# Patient Record
Sex: MALE | Race: WHITE | NOT HISPANIC OR LATINO | Employment: OTHER | URBAN - METROPOLITAN AREA
[De-identification: names, ages, dates, MRNs, and addresses within clinical notes are randomized per-mention and may not be internally consistent; named-entity substitution may affect disease eponyms.]

---

## 2018-08-30 ENCOUNTER — DOCUMENTATION ONLY (OUTPATIENT)
Dept: FAMILY MEDICINE | Facility: CLINIC | Age: 63
End: 2018-08-30

## 2018-08-30 ENCOUNTER — OFFICE VISIT (OUTPATIENT)
Dept: FAMILY MEDICINE | Facility: CLINIC | Age: 63
End: 2018-08-30
Payer: MEDICARE

## 2018-08-30 VITALS
HEIGHT: 67 IN | DIASTOLIC BLOOD PRESSURE: 72 MMHG | HEART RATE: 84 BPM | TEMPERATURE: 98 F | BODY MASS INDEX: 20.62 KG/M2 | OXYGEN SATURATION: 98 % | SYSTOLIC BLOOD PRESSURE: 136 MMHG | RESPIRATION RATE: 20 BRPM | WEIGHT: 131.38 LBS

## 2018-08-30 DIAGNOSIS — G25.81 RESTLESS LEG SYNDROME: ICD-10-CM

## 2018-08-30 DIAGNOSIS — I10 ESSENTIAL HYPERTENSION: ICD-10-CM

## 2018-08-30 DIAGNOSIS — Z23 FLU VACCINE NEED: ICD-10-CM

## 2018-08-30 DIAGNOSIS — M70.61 TROCHANTERIC BURSITIS OF BOTH HIPS: Primary | ICD-10-CM

## 2018-08-30 DIAGNOSIS — F10.21 RECOVERING ALCOHOLIC IN REMISSION: ICD-10-CM

## 2018-08-30 DIAGNOSIS — Z79.1 NSAID LONG-TERM USE: ICD-10-CM

## 2018-08-30 DIAGNOSIS — M70.62 TROCHANTERIC BURSITIS OF BOTH HIPS: Primary | ICD-10-CM

## 2018-08-30 DIAGNOSIS — I71.40 ABDOMINAL AORTIC ANEURYSM (AAA) WITHOUT RUPTURE: ICD-10-CM

## 2018-08-30 DIAGNOSIS — F51.01 PRIMARY INSOMNIA: ICD-10-CM

## 2018-08-30 PROCEDURE — 99203 OFFICE O/P NEW LOW 30 MIN: CPT | Mod: 25,S$GLB,, | Performed by: NURSE PRACTITIONER

## 2018-08-30 PROCEDURE — G0008 ADMIN INFLUENZA VIRUS VAC: HCPCS | Mod: S$GLB,,, | Performed by: NURSE PRACTITIONER

## 2018-08-30 PROCEDURE — 90662 IIV NO PRSV INCREASED AG IM: CPT | Mod: S$GLB,,, | Performed by: NURSE PRACTITIONER

## 2018-08-30 RX ORDER — DICLOFENAC SODIUM 75 MG/1
75 TABLET, DELAYED RELEASE ORAL 2 TIMES DAILY
Qty: 60 TABLET | Refills: 5 | Status: SHIPPED | OUTPATIENT
Start: 2018-08-30 | End: 2018-10-17 | Stop reason: ALTCHOICE

## 2018-08-30 RX ORDER — CLONAZEPAM 1 MG/1
1 TABLET ORAL DAILY
COMMUNITY
End: 2018-08-30

## 2018-08-30 RX ORDER — ROPINIROLE 3 MG/1
3 TABLET, FILM COATED ORAL NIGHTLY
Qty: 30 TABLET | Refills: 5 | Status: SHIPPED | OUTPATIENT
Start: 2018-08-30 | End: 2019-01-31 | Stop reason: ALTCHOICE

## 2018-08-30 RX ORDER — MISOPROSTOL 200 UG/1
200 TABLET ORAL 2 TIMES DAILY
Qty: 60 TABLET | Refills: 5 | Status: SHIPPED | OUTPATIENT
Start: 2018-08-30 | End: 2018-10-17 | Stop reason: ALTCHOICE

## 2018-08-30 RX ORDER — NAPROXEN SODIUM 220 MG/1
81 TABLET, FILM COATED ORAL DAILY
COMMUNITY

## 2018-08-30 RX ORDER — MELOXICAM 15 MG/1
15 TABLET ORAL DAILY
COMMUNITY
End: 2018-08-30

## 2018-08-30 RX ORDER — FOLIC ACID 1 MG/1
1 TABLET ORAL DAILY
COMMUNITY
End: 2019-01-31

## 2018-08-30 RX ORDER — TRAZODONE HYDROCHLORIDE 50 MG/1
TABLET ORAL
Qty: 90 TABLET | Refills: 5 | Status: SHIPPED | OUTPATIENT
Start: 2018-08-30 | End: 2019-04-29

## 2018-08-30 RX ORDER — BENAZEPRIL HYDROCHLORIDE 20 MG/1
20 TABLET ORAL DAILY
COMMUNITY
End: 2019-01-31 | Stop reason: SDUPTHER

## 2018-08-30 NOTE — PROGRESS NOTES
Health Maintenance Due   Topic Date Due    Zoster Vaccine  07/22/2013    Colonoscopy  04/11/2017    Pneumococcal (65+) (1 of 2 - PCV13) 07/22/2018    Abdominal Aortic Aneurysm Screening  07/22/2018    Influenza Vaccine  08/01/2018

## 2018-08-30 NOTE — PROGRESS NOTES
Subjective:       Patient ID: Miles Vazquez is a 63 y.o. male.    Chief Complaint: Establish Care  New patient to me, not seen in past 3 years in family practice. Was previously seen in [place] by multiple specialists.     Has chronic trochanteric bursitis, started about 5 years ago. He has had steroid injections into the hips in the past without relief and has taken meloxicam, but it does not help either. He had a few weeks of physical therapy, but did not finish as he moved. The pain is gone when he is sitting and is 8-9/10 when walking. He has to stop after walking a little, then the pain goes away.     Has abdominal aortic aneruyism. He was supposed to have follow up for it, but moved here so has not had an ultrasound. He thinks the aneurysm is about the size of the tip of his finger. He denies any abdominal pain.     He has chronic restless leg syndrome and takes ropinirole for that with good relief. He only takes the medication at night.     Is recovering alcoholic since 3/2016. Doing well except he has neuropathy in both feet.     Has insomnia, was taking klonopin for it, helped him fall asleep, but did not keep him asleep, he ran out of it 3-4 days ago, he was only taking it once a day. Had taken seroquel for insomnia in the past.   HPI  Review of Systems   Constitutional: Positive for unexpected weight change. Negative for fatigue and fever.   HENT: Positive for dental problem. Negative for congestion and rhinorrhea.    Eyes: Negative for pain, redness and itching.   Respiratory: Positive for shortness of breath. Negative for cough and wheezing.    Cardiovascular: Negative for chest pain and leg swelling.   Gastrointestinal: Negative for abdominal pain, blood in stool, diarrhea, nausea and vomiting.   Endocrine: Negative for cold intolerance, heat intolerance, polydipsia and polyphagia.   Genitourinary: Negative for difficulty urinating and dysuria.   Musculoskeletal: Positive for arthralgias and  gait problem. Negative for back pain.   Skin: Negative for rash and wound.   Allergic/Immunologic: Negative for environmental allergies, food allergies and immunocompromised state.   Neurological: Positive for numbness. Negative for dizziness and headaches.   Hematological: Negative for adenopathy. Bruises/bleeds easily.   Psychiatric/Behavioral: Negative for dysphoric mood. The patient is not nervous/anxious.        Objective:      Physical Exam   Constitutional: He is oriented to person, place, and time. He appears well-developed and well-nourished. No distress.   HENT:   Head: Normocephalic and atraumatic.   Right Ear: External ear normal.   Left Ear: External ear normal.   Mouth/Throat: Oropharynx is clear and moist. No oropharyngeal exudate.   Eyes: Conjunctivae are normal. Right eye exhibits no discharge. Left eye exhibits no discharge. No scleral icterus.   Neck: Normal range of motion. Neck supple. No JVD present. No tracheal deviation present.   No supraclavicular lymph nodes palpated.    Cardiovascular: Normal rate, regular rhythm and normal heart sounds. Exam reveals no gallop and no friction rub.   No murmur heard.  Pulmonary/Chest: Effort normal and breath sounds normal. No respiratory distress. He has no wheezes. He has no rales.   Abdominal: Soft. Bowel sounds are normal. He exhibits no distension. There is no tenderness.   Lymphadenopathy:     He has no cervical adenopathy.   Neurological: He is alert and oriented to person, place, and time.   Skin: Skin is warm and dry. He is not diaphoretic.   Psychiatric: He has a normal mood and affect. His behavior is normal.   Nursing note and vitals reviewed.      Assessment:     This office visit cannot be billed incident to as it does not meet the needed criteria.     1. Trochanteric bursitis of both hips    2. NSAID long-term use    3. Restless leg syndrome    4. Primary insomnia    5. Abdominal aortic aneurysm (AAA) without rupture    6. Essential  hypertension        Plan:       Trochanteric bursitis of both hips  -     diclofenac (VOLTAREN) 75 MG EC tablet; Take 1 tablet (75 mg total) by mouth 2 (two) times daily.  Dispense: 60 tablet; Refill: 5  -     Ambulatory Referral to Physical Medicine Rehab    NSAID long-term use  -     miSOPROStol (CYTOTEC) 200 MCG Tab; Take 1 tablet (200 mcg total) by mouth 2 (two) times daily.  Dispense: 60 tablet; Refill: 5    Restless leg syndrome  -     rOPINIRole (REQUIP) 3 MG tablet; Take 1 tablet (3 mg total) by mouth every evening.  Dispense: 30 tablet; Refill: 5    Primary insomnia  -     traZODone (DESYREL) 50 MG tablet; 1-3 tabs po qhs prn  Dispense: 90 tablet; Refill: 5    Abdominal aortic aneurysm (AAA) without rupture  -     US Abdominal Aorta; Future; Expected date: 08/30/2018    Essential hypertension  -     CBC auto differential; Future; Expected date: 09/30/2018  -     Comprehensive metabolic panel; Future; Expected date: 09/30/2018  -     Lipid panel; Future; Expected date: 09/30/2018  -     TSH; Future; Expected date: 09/30/2018         May follow up with physician in [facility]. (1-3 months)

## 2018-09-15 DIAGNOSIS — M25.552 BILATERAL HIP PAIN: Primary | ICD-10-CM

## 2018-09-15 DIAGNOSIS — M25.551 BILATERAL HIP PAIN: Primary | ICD-10-CM

## 2018-09-24 ENCOUNTER — HOSPITAL ENCOUNTER (INPATIENT)
Facility: HOSPITAL | Age: 63
LOS: 8 days | Discharge: HOME | DRG: 341 | End: 2018-10-02
Attending: EMERGENCY MEDICINE | Admitting: INTERNAL MEDICINE
Payer: MEDICARE

## 2018-09-24 DIAGNOSIS — K56.609 SMALL BOWEL OBSTRUCTION: Primary | ICD-10-CM

## 2018-09-24 DIAGNOSIS — I71.40 ABDOMINAL AORTIC ANEURYSM (AAA) WITHOUT RUPTURE: ICD-10-CM

## 2018-09-24 LAB
ALBUMIN SERPL BCP-MCNC: 4.4 G/DL
ALP SERPL-CCNC: 102 U/L
ALT SERPL W/O P-5'-P-CCNC: 12 U/L
ANION GAP SERPL CALC-SCNC: 10 MMOL/L
AST SERPL-CCNC: 24 U/L
BASOPHILS # BLD AUTO: 0.04 K/UL
BASOPHILS NFR BLD: 0.3 %
BILIRUB SERPL-MCNC: 0.5 MG/DL
BUN SERPL-MCNC: 12 MG/DL
CALCIUM SERPL-MCNC: 9.5 MG/DL
CHLORIDE SERPL-SCNC: 102 MMOL/L
CO2 SERPL-SCNC: 23 MMOL/L
CREAT SERPL-MCNC: 0.8 MG/DL
DIFFERENTIAL METHOD: ABNORMAL
EOSINOPHIL # BLD AUTO: 0.2 K/UL
EOSINOPHIL NFR BLD: 1.4 %
ERYTHROCYTE [DISTWIDTH] IN BLOOD BY AUTOMATED COUNT: 17.1 %
EST. GFR  (AFRICAN AMERICAN): >60 ML/MIN/1.73 M^2
EST. GFR  (NON AFRICAN AMERICAN): >60 ML/MIN/1.73 M^2
GLUCOSE SERPL-MCNC: 117 MG/DL
HCT VFR BLD AUTO: 40.4 %
HGB BLD-MCNC: 13.3 G/DL
IMM GRANULOCYTES # BLD AUTO: 0.04 K/UL
IMM GRANULOCYTES NFR BLD AUTO: 0.3 %
LIPASE SERPL-CCNC: 13 U/L
LYMPHOCYTES # BLD AUTO: 2.2 K/UL
LYMPHOCYTES NFR BLD: 15.1 %
MCH RBC QN AUTO: 27.5 PG
MCHC RBC AUTO-ENTMCNC: 32.9 G/DL
MCV RBC AUTO: 84 FL
MONOCYTES # BLD AUTO: 0.8 K/UL
MONOCYTES NFR BLD: 5.5 %
NEUTROPHILS # BLD AUTO: 11.1 K/UL
NEUTROPHILS NFR BLD: 77.4 %
NRBC BLD-RTO: 0 /100 WBC
PLATELET # BLD AUTO: 269 K/UL
PMV BLD AUTO: 11.1 FL
POTASSIUM SERPL-SCNC: 3.6 MMOL/L
PROT SERPL-MCNC: 8.3 G/DL
RBC # BLD AUTO: 4.84 M/UL
SODIUM SERPL-SCNC: 135 MMOL/L
WBC # BLD AUTO: 14.28 K/UL

## 2018-09-24 PROCEDURE — 12000002 HC ACUTE/MED SURGE SEMI-PRIVATE ROOM

## 2018-09-24 PROCEDURE — 85025 COMPLETE CBC W/AUTO DIFF WBC: CPT

## 2018-09-24 PROCEDURE — 74176 CT ABD & PELVIS W/O CONTRAST: CPT | Mod: TC

## 2018-09-24 PROCEDURE — 83690 ASSAY OF LIPASE: CPT

## 2018-09-24 PROCEDURE — 80053 COMPREHEN METABOLIC PANEL: CPT

## 2018-09-24 PROCEDURE — 25000003 PHARM REV CODE 250: Performed by: EMERGENCY MEDICINE

## 2018-09-24 PROCEDURE — 63600175 PHARM REV CODE 636 W HCPCS: Performed by: EMERGENCY MEDICINE

## 2018-09-24 PROCEDURE — 99285 EMERGENCY DEPT VISIT HI MDM: CPT | Mod: 25

## 2018-09-24 PROCEDURE — 96374 THER/PROPH/DIAG INJ IV PUSH: CPT

## 2018-09-24 PROCEDURE — 74176 CT ABD & PELVIS W/O CONTRAST: CPT | Mod: 26,,, | Performed by: RADIOLOGY

## 2018-09-24 RX ORDER — FAMOTIDINE 10 MG/ML
20 INJECTION INTRAVENOUS EVERY 12 HOURS
Status: DISCONTINUED | OUTPATIENT
Start: 2018-09-25 | End: 2018-09-26

## 2018-09-24 RX ORDER — MORPHINE SULFATE 4 MG/ML
4 INJECTION, SOLUTION INTRAMUSCULAR; INTRAVENOUS EVERY 4 HOURS PRN
Status: DISCONTINUED | OUTPATIENT
Start: 2018-09-25 | End: 2018-09-26

## 2018-09-24 RX ORDER — ONDANSETRON 2 MG/ML
4 INJECTION INTRAMUSCULAR; INTRAVENOUS
Status: COMPLETED | OUTPATIENT
Start: 2018-09-24 | End: 2018-09-24

## 2018-09-24 RX ORDER — ONDANSETRON 2 MG/ML
4 INJECTION INTRAMUSCULAR; INTRAVENOUS EVERY 6 HOURS PRN
Status: DISCONTINUED | OUTPATIENT
Start: 2018-09-25 | End: 2018-10-02 | Stop reason: HOSPADM

## 2018-09-24 RX ORDER — MORPHINE SULFATE 4 MG/ML
4 INJECTION, SOLUTION INTRAMUSCULAR; INTRAVENOUS
Status: COMPLETED | OUTPATIENT
Start: 2018-09-24 | End: 2018-09-24

## 2018-09-24 RX ORDER — SODIUM CHLORIDE, SODIUM LACTATE, POTASSIUM CHLORIDE, CALCIUM CHLORIDE 600; 310; 30; 20 MG/100ML; MG/100ML; MG/100ML; MG/100ML
INJECTION, SOLUTION INTRAVENOUS CONTINUOUS
Status: DISCONTINUED | OUTPATIENT
Start: 2018-09-25 | End: 2018-09-26

## 2018-09-24 RX ORDER — MORPHINE SULFATE 4 MG/ML
2 INJECTION, SOLUTION INTRAMUSCULAR; INTRAVENOUS EVERY 4 HOURS PRN
Status: DISCONTINUED | OUTPATIENT
Start: 2018-09-25 | End: 2018-09-26

## 2018-09-24 RX ORDER — LORAZEPAM 2 MG/ML
1 INJECTION INTRAMUSCULAR EVERY 4 HOURS PRN
Status: DISCONTINUED | OUTPATIENT
Start: 2018-09-25 | End: 2018-09-26

## 2018-09-24 RX ADMIN — ONDANSETRON HYDROCHLORIDE 4 MG: 2 INJECTION INTRAMUSCULAR; INTRAVENOUS at 10:09

## 2018-09-24 RX ADMIN — SODIUM CHLORIDE, POTASSIUM CHLORIDE, SODIUM LACTATE AND CALCIUM CHLORIDE 1000 ML: 600; 310; 30; 20 INJECTION, SOLUTION INTRAVENOUS at 11:09

## 2018-09-24 RX ADMIN — MORPHINE SULFATE 4 MG: 4 INJECTION INTRAVENOUS at 10:09

## 2018-09-25 LAB
BILIRUB UR QL STRIP: NEGATIVE
CLARITY UR: CLEAR
COLOR UR: YELLOW
GLUCOSE UR QL STRIP: NEGATIVE
HGB UR QL STRIP: NEGATIVE
KETONES UR QL STRIP: ABNORMAL
LEUKOCYTE ESTERASE UR QL STRIP: NEGATIVE
NITRITE UR QL STRIP: NEGATIVE
PH UR STRIP: 6 [PH] (ref 5–8)
PROT UR QL STRIP: ABNORMAL
SP GR UR STRIP: 1.02 (ref 1–1.03)
URN SPEC COLLECT METH UR: ABNORMAL
UROBILINOGEN UR STRIP-ACNC: NEGATIVE EU/DL

## 2018-09-25 PROCEDURE — S0028 INJECTION, FAMOTIDINE, 20 MG: HCPCS | Performed by: EMERGENCY MEDICINE

## 2018-09-25 PROCEDURE — 11000001 HC ACUTE MED/SURG PRIVATE ROOM

## 2018-09-25 PROCEDURE — 63600175 PHARM REV CODE 636 W HCPCS: Performed by: INTERNAL MEDICINE

## 2018-09-25 PROCEDURE — S0030 INJECTION, METRONIDAZOLE: HCPCS | Performed by: INTERNAL MEDICINE

## 2018-09-25 PROCEDURE — 63600175 PHARM REV CODE 636 W HCPCS: Performed by: EMERGENCY MEDICINE

## 2018-09-25 PROCEDURE — 99222 1ST HOSP IP/OBS MODERATE 55: CPT | Mod: ICN,,, | Performed by: SURGERY

## 2018-09-25 PROCEDURE — 81003 URINALYSIS AUTO W/O SCOPE: CPT

## 2018-09-25 PROCEDURE — 25000003 PHARM REV CODE 250: Performed by: INTERNAL MEDICINE

## 2018-09-25 PROCEDURE — 25000003 PHARM REV CODE 250: Performed by: EMERGENCY MEDICINE

## 2018-09-25 RX ORDER — METRONIDAZOLE 500 MG/100ML
500 INJECTION, SOLUTION INTRAVENOUS
Status: DISCONTINUED | OUTPATIENT
Start: 2018-09-25 | End: 2018-09-28

## 2018-09-25 RX ADMIN — SODIUM CHLORIDE, POTASSIUM CHLORIDE, SODIUM LACTATE AND CALCIUM CHLORIDE: 600; 310; 30; 20 INJECTION, SOLUTION INTRAVENOUS at 12:09

## 2018-09-25 RX ADMIN — SODIUM CHLORIDE, POTASSIUM CHLORIDE, SODIUM LACTATE AND CALCIUM CHLORIDE: 600; 310; 30; 20 INJECTION, SOLUTION INTRAVENOUS at 10:09

## 2018-09-25 RX ADMIN — FAMOTIDINE 20 MG: 10 INJECTION, SOLUTION INTRAVENOUS at 08:09

## 2018-09-25 RX ADMIN — MORPHINE SULFATE 4 MG: 4 INJECTION INTRAVENOUS at 12:09

## 2018-09-25 RX ADMIN — LORAZEPAM 1 MG: 2 INJECTION INTRAMUSCULAR; INTRAVENOUS at 03:09

## 2018-09-25 RX ADMIN — SODIUM CHLORIDE, POTASSIUM CHLORIDE, SODIUM LACTATE AND CALCIUM CHLORIDE 1000 ML: 600; 310; 30; 20 INJECTION, SOLUTION INTRAVENOUS at 08:09

## 2018-09-25 RX ADMIN — ONDANSETRON HYDROCHLORIDE 4 MG: 2 INJECTION, SOLUTION INTRAMUSCULAR; INTRAVENOUS at 07:09

## 2018-09-25 RX ADMIN — MORPHINE SULFATE 4 MG: 4 INJECTION INTRAVENOUS at 06:09

## 2018-09-25 RX ADMIN — MORPHINE SULFATE 4 MG: 4 INJECTION INTRAVENOUS at 10:09

## 2018-09-25 RX ADMIN — ONDANSETRON HYDROCHLORIDE 4 MG: 2 INJECTION, SOLUTION INTRAMUSCULAR; INTRAVENOUS at 06:09

## 2018-09-25 RX ADMIN — Medication 3.38 G: at 06:09

## 2018-09-25 RX ADMIN — LORAZEPAM 1 MG: 2 INJECTION INTRAMUSCULAR; INTRAVENOUS at 10:09

## 2018-09-25 RX ADMIN — SODIUM CHLORIDE, POTASSIUM CHLORIDE, SODIUM LACTATE AND CALCIUM CHLORIDE 1000 ML: 600; 310; 30; 20 INJECTION, SOLUTION INTRAVENOUS at 04:09

## 2018-09-25 RX ADMIN — ONDANSETRON HYDROCHLORIDE 4 MG: 2 INJECTION, SOLUTION INTRAMUSCULAR; INTRAVENOUS at 12:09

## 2018-09-25 RX ADMIN — MORPHINE SULFATE 4 MG: 4 INJECTION INTRAVENOUS at 07:09

## 2018-09-25 RX ADMIN — METRONIDAZOLE 500 MG: 500 INJECTION, SOLUTION INTRAVENOUS at 10:09

## 2018-09-25 RX ADMIN — Medication 3.38 G: at 10:09

## 2018-09-25 RX ADMIN — LORAZEPAM 1 MG: 2 INJECTION INTRAMUSCULAR; INTRAVENOUS at 08:09

## 2018-09-25 RX ADMIN — METRONIDAZOLE 500 MG: 500 INJECTION, SOLUTION INTRAVENOUS at 06:09

## 2018-09-25 NOTE — ASSESSMENT & PLAN NOTE
1.  Continue bowel rest with NG tube suction and NPO status  2.  Continue IV fluids  3.  Continue pain control as needed  4.  Surgical consult to determine further treatment plan  5.  Empiric antibiotics of Zosyn and Flagyl  6.  Follow as needed based on clinical course

## 2018-09-25 NOTE — SUBJECTIVE & OBJECTIVE
Past Medical History:   Diagnosis Date    Abdominal aortic aneurysm     Alcoholism     Bursitis of both hips     COPD (chronic obstructive pulmonary disease)     Coronary artery calcification seen on CAT scan 4/24/2013    Depression     HTN (hypertension) 1/7/2013    Hyperlipidemia     Hypertension     Macrocytic anemia 3/27/2016    Personal history of colonic polyps     Restless leg syndrome     Right hip pain     RLS (restless legs syndrome)     Sleep apnea 2 years ago    does not use cpap    Smoker 1/7/2013    Vitamin disease     Wheezing        Past Surgical History:   Procedure Laterality Date    COLONOSCOPY      HERNIA REPAIR      INJECTION-STEROID-EPIDURAL-TRANSFORAMINAL Bilateral 11/9/2015    [facility]    PEG TUBE PLACEMENT/REPLACEMENT N/A 4/12/2016    Performed by Andres Luong MD at [facility] ENDO ([address] [place] [address])    UPPER GASTROINTESTINAL ENDOSCOPY         Review of patient's allergies indicates:  No Known Allergies    No current facility-administered medications on file prior to encounter.      Current Outpatient Medications on File Prior to Encounter   Medication Sig    albuterol 90 mcg/actuation inhaler Inhale 2 puffs into the lungs every 6 (six) hours as needed for Wheezing.    aspirin 81 MG Chew Take 81 mg by mouth once daily.    benazepril (LOTENSIN) 20 MG tablet Take 20 mg by mouth once daily.    cyanocobalamin (VITAMIN B-12) 1000 MCG tablet Take 0.5 tablets (500 mcg total) by mouth once daily.    folic acid (FOLVITE) 1 MG tablet Take 1 mg by mouth once daily.    rOPINIRole (REQUIP) 3 MG tablet Take 1 tablet (3 mg total) by mouth every evening.    thiamine 100 MG tablet Take 1 tablet (100 mg total) by mouth once daily.    traZODone (DESYREL) 50 MG tablet 1-3 tabs po qhs prn    diclofenac (VOLTAREN) 75 MG EC tablet Take 1 tablet (75 mg total) by mouth 2 (two) times daily.    miSOPROStol (CYTOTEC) 200 MCG Tab Take 1 tablet (200 mcg total)  by mouth 2 (two) times daily.     Family History     Problem Relation (Age of Onset)    Tuberculosis Maternal Grandmother        Tobacco Use    Smoking status: Current Every Day Smoker     Types: Cigarettes    Smokeless tobacco: Never Used    Tobacco comment: 1 or 2 cigarettes a day    Substance and Sexual Activity    Alcohol use: No     Frequency: Never     Comment: Quit drinking in March 2016    Drug use: No    Sexual activity: Not on file     Review of Systems   Constitutional: Negative for activity change, appetite change, fatigue and fever.   HENT: Negative for congestion, ear discharge, mouth sores, nosebleeds, rhinorrhea, sinus pressure, sinus pain and tinnitus.    Eyes: Negative.  Negative for pain, redness and itching.   Respiratory: Negative for apnea, cough, choking, chest tightness, shortness of breath, wheezing and stridor.    Cardiovascular: Negative for chest pain, palpitations and leg swelling.   Gastrointestinal: Positive for abdominal distention, abdominal pain and nausea. Negative for anal bleeding, blood in stool, constipation and diarrhea.   Endocrine: Negative.    Genitourinary: Negative for difficulty urinating, flank pain, frequency and urgency.   Musculoskeletal: Negative for arthralgias, back pain, gait problem and myalgias.   Skin: Negative for color change and pallor.   Allergic/Immunologic: Negative.    Neurological: Negative for dizziness, facial asymmetry, weakness, light-headedness and headaches.   Hematological: Negative for adenopathy. Does not bruise/bleed easily.     Objective:     Vital Signs (Most Recent):  Temp: 97.2 °F (36.2 °C) (09/25/18 0729)  Pulse: 98 (09/25/18 0729)  Resp: 18 (09/25/18 0729)  BP: 138/65 (09/25/18 0729)  SpO2: 95 % (09/25/18 0729) Vital Signs (24h Range):  Temp:  [96 °F (35.6 °C)-98.5 °F (36.9 °C)] 97.2 °F (36.2 °C)  Pulse:  [] 98  Resp:  [16-18] 18  SpO2:  [95 %-99 %] 95 %  BP: (138-180)/(55-80) 138/65     Weight: 59.9 kg (132 lb 0.9 oz)  Body  mass index is 20.68 kg/m².    Physical Exam   Constitutional: He is oriented to person, place, and time.   Cardiovascular: Normal rate, regular rhythm, normal heart sounds and intact distal pulses.   Pulmonary/Chest: Effort normal.   Abdominal: He exhibits distension. There is tenderness. There is guarding.   Musculoskeletal: Normal range of motion.   Neurological: He is alert and oriented to person, place, and time.   Skin: Capillary refill takes less than 2 seconds.   Psychiatric: He has a normal mood and affect. His behavior is normal. Judgment and thought content normal.   Nursing note and vitals reviewed.          Significant Labs: All pertinent labs within the past 24 hours have been reviewed.    Significant Imaging: I have reviewed and interpreted all pertinent imaging results/findings within the past 24 hours.

## 2018-09-25 NOTE — PLAN OF CARE
Problem: Patient Care Overview  Goal: Discharge Needs Assessment   09/25/18 0903   Activity/Self Care ROS   Equipment Currently Used at Home walker, standard   Social Work Plan   Patient's perception of discharge disposition home or selfcare   Patient/Family In Agreement With Plan yes   Living Environment   Able to Return to Prior Arrangements yes   Current Health   Expected Length of Stay (days) 4   Discharge Needs Assessment   Readmission Within The Last 30 Days no previous admission in last 30 days   OTHER   Communicated expected length of stay with patient/caregiver yes   Is patient able to care for self after discharge? Yes   Who are your caregiver(s) and their phone number(s)? oliva cain [phone]

## 2018-09-25 NOTE — PLAN OF CARE
CM rounded on patient to complete discharge planning assessment. Radiology present to transport patient for testing. CM will return.

## 2018-09-25 NOTE — NURSING
RECEIVED REPORT FROM ALANA MICHAUD RN. PT TRANSFERRED TO FLOOR FROM ED VIA WC, TOLERATED TRANSFER WELL, AMBULATES FROM WC TO BED W/O DIFFICULTY, GAIT STEADY, PT AOX4, PLEASANT, 18FR NG NOTED TO R NARE, CONNECTED TO LOW INTERMITTENT SUCTION AT THIS TIME, TEDS PLACED, PT ASSESSED, NO SIGNS OF DISTRESS, ADMISSION ORDERS ACKNOWLEDGED AND COMPLETED, WILL CONT. TO MONITOR.

## 2018-09-25 NOTE — PLAN OF CARE
09/25/18 0901   Medicare Message   Important Message from Medicare regarding Discharge Appeal Rights Given to patient/caregiver;Explained to patient/caregiver;Signed/date by patient/caregiver   Date IMM was signed 09/25/18   Time IMM was signed 0830

## 2018-09-25 NOTE — HPI
Patient is a 63-year-old male that presented to the emergency last evening complaining of right lower quadrant abdominal pain. Patient states a 1 day history of pain in the right lower quadrant area isn't nausea with 1 episode of vomiting.  Patient has a past medical history of recently discovered abdominal aortic aneurysm, and also has history of hypertension hyperlipidemia osteoarthritis, colonic polyps, depression, and restless leg syndrome.  Patient denies fever, chills, rigors, but states significant nausea for the last 24 hr and right lower quadrant pain.  It is vomiting only x1 patient was seen in the emergency department and started on NG tube suction and bowel rest.

## 2018-09-25 NOTE — CONSULTS
[facility] - Med Surg  General Surgery  Consult Note    Patient Name: Miles Vazquez  MRN: [id number]  Admission Date: 9/24/2018  Attending Physician: Burton Cast MD   Consult Physician: Ramírez Horner MD  Primary Care Provider: Nichelle Frederick MD    Patient information was obtained from patient.     Subjective:     Chief Complaint/Reason for Admission: Abdominal Pain    History of Present Illness:  Miles Vazquez is a 63 y.o. male with a history of abdominal aortic aneurysm, previous alcohol abuse now sober for 2 years, COPD, coronary artery disease, depression, hypertension, hyperlipidemia, macrocytic anemia, previous history of colon polyps, restless leg syndrome presented to the ER last night with abdominal pain of less than 24 hr duration.  Patient stated that it started yesterday morning.  It was associated with nausea and vomitus.  Vomitus described as previous food which she had just recently eaten.  Pain described in his pelvis and right lower quadrant. No fevers.  Patient with worsening pain presented to the ER.  In the ER the patient underwent CT scan evaluation.  CT scan showed possible distal small-bowel obstruction with mild mesenteric edema and slight amount of free fluid in pelvis.  No free air.  Patient was admitted to the medical service.  I as a surgeon on duty was called consultation.    Review of patient's allergies indicates:  No Known Allergies    Past Medical History:   Diagnosis Date    Abdominal aortic aneurysm     Alcoholism     Bursitis of both hips     COPD (chronic obstructive pulmonary disease)     Coronary artery calcification seen on CAT scan 4/24/2013    Depression     HTN (hypertension) 1/7/2013    Hyperlipidemia     Hypertension     Macrocytic anemia 3/27/2016    Personal history of colonic polyps     Restless leg syndrome     Right hip pain     RLS (restless legs syndrome)     Sleep apnea 2 years ago    does not use  cpap    Smoker 1/7/2013    Vitamin disease     Wheezing      Past Surgical History:   Procedure Laterality Date    COLONOSCOPY      HERNIA REPAIR      INJECTION-STEROID-EPIDURAL-TRANSFORAMINAL Bilateral 11/9/2015    Performed by [facility]chor [facility] at [facility] PAIN MGT    PEG TUBE PLACEMENT/REPLACEMENT N/A 4/12/2016    Performed by And[facility] at [facility] END[facility] (2ND FLR)    UPPER GASTROINTESTINAL ENDOSCOPY       Family History     Problem Relation (Age of Onset)    Tuberculosis Maternal Grandmother        Tobacco Use    Smoking status: Current Every Day Smoker     Types: Cigarettes    Smokeless tobacco: Never Used    Tobacco comment: 1 or 2 cigarettes a day    Substance and Sexual Activity    Alcohol use: No     Frequency: Never     Comment: Quit drinking in March 2016    Drug use: No    Sexual activity: Not on file     Review of Systems   Constitutional: Negative for appetite change, chills and fever.   HENT: Negative for congestion, dental problem and drooling.    Eyes: Negative for photophobia, discharge and itching.   Respiratory: Negative for apnea and chest tightness.    Cardiovascular: Negative for chest pain, palpitations and leg swelling.   Gastrointestinal: Positive for abdominal pain, nausea and vomiting. Negative for abdominal distention.   Endocrine: Negative for cold intolerance and heat intolerance.   Genitourinary: Negative for difficulty urinating and dysuria.   Musculoskeletal: Negative for arthralgias and back pain.   Skin: Negative for color change and pallor.   Neurological: Negative for dizziness, facial asymmetry and headaches.   Hematological: Negative for adenopathy. Does not bruise/bleed easily.   Psychiatric/Behavioral: Negative for agitation, behavioral problems and confusion.     Objective:     Vital Signs (Most Recent):  Temp: 96.6 °F (35.9 °C) (09/25/18 1013)  Pulse: 105 (09/25/18 1013)  Resp: 18 (09/25/18 1013)  BP: (!) 174/78 (09/25/18 1013)  SpO2: 97 % (09/25/18 1013)  Vital Signs (24h Range):  Temp:  [96 °F (35.6 °C)-98.5 °F (36.9 °C)] 96.6 °F (35.9 °C)  Pulse:  [] 105  Resp:  [16-18] 18  SpO2:  [95 %-99 %] 97 %  BP: (138-180)/(55-80) 174/78     Weight: 59.9 kg (132 lb 0.9 oz)  Body mass index is 20.68 kg/m².    Physical Exam   Constitutional: He is oriented to person, place, and time. He appears well-developed and well-nourished.   HENT:   Head: Normocephalic and atraumatic.   Eyes: EOM are normal. Pupils are equal, round, and reactive to light.   Neck: Normal range of motion. Neck supple. No thyromegaly present.   Cardiovascular: Normal rate and regular rhythm.   No murmur heard.  Pulmonary/Chest: Effort normal and breath sounds normal. No respiratory distress.   Abdominal: Soft. Bowel sounds are normal. He exhibits no distension. There is tenderness in the right lower quadrant and suprapubic area.       Musculoskeletal: Normal range of motion. He exhibits no edema.   Neurological: He is alert and oriented to person, place, and time. No cranial nerve deficit.   Skin: Skin is warm. Capillary refill takes less than 2 seconds. No rash noted. He is not diaphoretic. No erythema.   Psychiatric: He has a normal mood and affect.       Significant Labs:  CBC:   Recent Labs   Lab  09/24/18 2209   WBC  14.28*   RBC  4.84   HGB  13.3*   HCT  40.4   PLT  269   MCV  84   MCH  27.5   MCHC  32.9     BMP:   Recent Labs   Lab  09/24/18 2209   GLU  117*   NA  135*   K  3.6   CL  102   CO2  23   BUN  12   CREATININE  0.8   CALCIUM  9.5     CMP:   Recent Labs   Lab  09/24/18 2209   GLU  117*   CALCIUM  9.5   ALBUMIN  4.4   PROT  8.3   NA  135*   K  3.6   CO2  23   CL  102   BUN  12   CREATININE  0.8   ALKPHOS  102   ALT  12   AST  24   BILITOT  0.5     LFTs:   Recent Labs   Lab  09/24/18 2209   ALT  12   AST  24   ALKPHOS  102   BILITOT  0.5   PROT  8.3   ALBUMIN  4.4     Coagulation: No results for input(s): LABPROT, INR, APTT in the last 168 hours.  Specimen (12h ago, onward)     None        No results for input(s): COLORU, CLARITYU, SPECGRAV, PHUR, PROTEINUA, GLUCOSEU, BILIRUBINCON, BLOODU, WBCU, RBCU, BACTERIA, MUCUS, NITRITE, LEUKOCYTESUR, UROBILINOGEN, HYALINECASTS in the last 168 hours.    Significant Diagnostics:  CT: I have reviewed all pertinent results/findings within the past 24 hours and my personal findings are:  Bowel dilation with air fluid levels within the lower quadrants.  Mild mesenteric edema. Minimal free fluid.  No free air.    Assessment:   Miles Vazquez is a 63 y.o. male who presents with Small bowel obstruction.    Active Diagnoses:    Diagnosis Date Noted POA    PRINCIPAL PROBLEM:  Small bowel obstruction [K56.609] 09/24/2018 Yes      Problems Resolved During this Admission:     VTE Risk Mitigation (From admission, onward)        Ordered     IP VTE LOW RISK PATIENT  Once      09/24/18 2357     Place RADHA hose  Until discontinued      09/24/18 2357          Medical Decision Making/Plan:  Patient with previous history of alcohol abuse.  Will plan for routine LFTs and INR evaluation.  From a standpoint of patient's bowel obstruction, he suggest his pain is improved since admission.  IV fluids NG tube decompression and bowel rest have helped.  No peritoneal signs.  Recommendations at this time will be to continue current course. Plan will for repeat CT scan tomorrow with NG tube contrast for further delineation of this bowel obstruction.  Ultimately given the patient has not had previous surgeries, he likely will be a surgical candidate if it does not resolve in the next 24-48 hours.  Will continue to follow with you thank you for the consult    Ramírez Horner MD  General Surgery  [facility] - Med Surg

## 2018-09-25 NOTE — ED TRIAGE NOTES
Patient reports middle abdominal pain since this morning, aching, 10/10, non-radiating. No bowel movement today.

## 2018-09-25 NOTE — PLAN OF CARE
Patient is having significant pain at this time and CM advised patient nurse Juan Jose BEST. She states patient recently medicated with Morphine.  Patient participates with DC planning assessment and states that he recently moved here from [place] and lives alone. Patient drove himself to the hospital and his truck is here so he does have transportation home.

## 2018-09-25 NOTE — ED PROVIDER NOTES
Encounter Date: 9/24/2018       History     Chief Complaint   Patient presents with    Abdominal Pain     63-year-old male complains of day long pain of the abdomen with distention mild nausea and a single bout of vomiting  Surgical history he recalls is that of PEG tube placement  This was done during a period of illness surrounding some alcoholism and poor swallowing  He denies alcohol use for a number of years at this time    He reports pain diffusely but upon exam is pain in the right lower quadrant    Denies melena or bright red blood per rectum  Denies coffee-ground emesis  Denies fever  Denies hematuria dysuria    PMHx AAA - discovered in Jan 2018           Review of patient's allergies indicates:  No Known Allergies  Past Medical History:   Diagnosis Date    Abdominal aortic aneurysm     Alcoholism     Bursitis of both hips     COPD (chronic obstructive pulmonary disease)     Coronary artery calcification seen on CAT scan 4/24/2013    Depression     HTN (hypertension) 1/7/2013    Hyperlipidemia     Hypertension     Macrocytic anemia 3/27/2016    Personal history of colonic polyps     Restless leg syndrome     Right hip pain     RLS (restless legs syndrome)     Sleep apnea 2 years ago    does not use cpap    Smoker 1/7/2013    Vitamin disease     Wheezing      Past Surgical History:   Procedure Laterality Date    COLONOSCOPY      HERNIA REPAIR      INJECTION-STEROID-EPIDURAL-TRANSFORAMINAL Bilateral 11/9/2015    Performed by Ayana Melchor MD at [facility] PAIN [facility]    PEG TUBE PLACEMENT/REPLACEMENT N/A 4/12/2016    Performed by Andres Luong MD at [facility] END[facility] ([address] [place] [address])    UPPER GASTROINTESTINAL ENDOSCOPY       Family History   Problem Relation Age of Onset    Tuberculosis Maternal Grandmother     Stomach cancer Neg Hx     Colon cancer Neg Hx      Social History     Tobacco Use    Smoking status: Current Every Day Smoker     Types: Cigarettes    Smokeless tobacco: Never Used    Tobacco  comment: 1 or 2 cigarettes a day    Substance Use Topics    Alcohol use: No     Frequency: Never     Comment: Quit drinking in March 2016    Drug use: No     Review of Systems   Constitutional: Negative.    HENT: Negative.    Respiratory: Negative.    Cardiovascular: Negative.    Gastrointestinal: Positive for abdominal distention, abdominal pain, nausea and vomiting. Negative for anal bleeding, blood in stool, constipation, diarrhea and rectal pain.   Genitourinary: Negative.    Musculoskeletal: Negative.    Skin: Negative.    Neurological: Negative.    Hematological: Negative.    All other systems reviewed and are negative.      Physical Exam     Initial Vitals [09/24/18 2104]   BP Pulse Resp Temp SpO2   (!) 153/55 (!) 118 16 98.5 °F (36.9 °C) 99 %      MAP       --         Physical Exam    Nursing note and vitals reviewed.  Constitutional: He appears well-developed and well-nourished.   HENT:   Head: Normocephalic and atraumatic.   Mouth/Throat: Oropharynx is clear and moist.   Eyes: Conjunctivae are normal. No scleral icterus.   Cardiovascular: Normal rate, regular rhythm, normal heart sounds and intact distal pulses.   Pulmonary/Chest: Effort normal and breath sounds normal.   Abdominal: Bowel sounds are normal. He exhibits distension (min). There is tenderness in the right lower quadrant. There is guarding (mild muscular). There is no rebound. No hernia.   Neurological: He is oriented to person, place, and time.   Skin: Skin is warm and dry. Capillary refill takes less than 2 seconds.   Psychiatric: He has a normal mood and affect. His behavior is normal.         ED Course   Procedures  Labs Reviewed   CBC W/ AUTO DIFFERENTIAL - Abnormal; Notable for the following components:       Result Value    WBC 14.28 (*)     Hemoglobin 13.3 (*)     RDW 17.1 (*)     Gran # (ANC) 11.1 (*)     Gran% 77.4 (*)     Lymph% 15.1 (*)     All other components within normal limits   COMPREHENSIVE METABOLIC PANEL - Abnormal;  Notable for the following components:    Sodium 135 (*)     Glucose 117 (*)     All other components within normal limits   LIPASE   URINALYSIS, REFLEX TO URINE CULTURE          Imaging Results          CT Renal Stone Study ABD Pelvis WO (In process)               X-Rays:   Independently Interpreted Readings:   Other Readings:  Small-bowel obstruction with a transition point in the right lower quadrant per Radiology vRAD    Medical Decision Making:   Clinical Tests:   Lab Tests: Ordered and Reviewed  Radiological Study: Ordered and Reviewed  ED Management:  SBO with transition point in the right lower quadrant    Patient is admitted for IV fluids NG tube suction and surgical consultation with Dr. Horner  Other:   I have discussed this case with another health care provider.       <> Summary of the Discussion: Dr Evens Lindsay                       Clinical Impression:   The primary encounter diagnosis was Small bowel obstruction. A diagnosis of Abdominal aortic aneurysm (AAA) without rupture was also pertinent to this visit.      Disposition:   Disposition: Admitted  Condition: Stable                        Toni Mancilla MD  09/25/18 0141

## 2018-09-25 NOTE — HOSPITAL COURSE
Patient will be admitted to medical-surgical unit.  Inpatient surgical consult will be obtained.  Further treatment will be based on clinical course.    September 26, 2018:  Patient is seemingly worse today with pain, discomfort and obstruction.  White blood cell count has decreased however symptomatically the patient seems worse.  Dr. Horner discussed with this patient the need for exploratory laparotomy and EGD and will proceed with that this afternoon.  Patient does understand all risks and benefits and wants to proceed.  Continue controlling pain and continue NPO status follow up postsurgically    September 27, 2018:  Patient underwent exploratory laparotomy last evening.  Patient was found to have a strangulated but not incarcerated hernia of the distal small bowel.  This was easily reduced intraoperatively and the patient was recovered without complications.  No bowel resection had to be done.  Patient has remained stable overnight since surgery and is sitting up in the chair this morning stating some pain but nothing worse than he was having prior to the surgery.  I blood cell count this morning was normal white blood cells 7.45 hemoglobin 10.3 hematocrit level of 31.8 and platelets normal 193 chemistry showed a sodium 133 calcium 7.7 albumin 2.6 and otherwise no electrolyte abnormalities.  Patient magnesium was 1.2 which will be replaced this morning  9/2 08/2018.  Patient is doing well today sitting up in a chair is examination is excellent.  He has clear breath sounds he is doing 17 50 on IAS the patient bowel sounds are positive he has not passed gas yet.  The patient desires to be transferred to the floor he even go home if he can't.  The patient will be transferred to the floor.  The patient will need replacement  potassium and magnesium.  Lab pending for in a.m..  The patient H&H has been discussed.  Will continue to monitor.  09/29/2018.  The patient is doing well today is passing gas today his  chief complaint is that of abdominal discomfort but he is doing well.  He is eating his liquid diet without any difficulty.  Laboratory values have been reviewed.  Dr. TARIQ will see the patient today and make further determinations about the patient's diet.  Medications have been adjusted as needed.  Ambulation p.r.n..  Patient is doing well with incentive spirometry.  General exam is much improved.    09/30/2018.  The patient continues to improve and is doing well except for his blood pressure at this time.  Medications have been added to adjust his blood pressure.  The patient mostly lies in the bed he is not ambulatory as he should be I have asked him to walk the correa set up in the chair as much as possible.  I will advance his diet to a soft diet today.  Will ask Dr. TARIQ General surgery to encourage the patient as well.  The patient will have laboratory values in a.m..  Will consult Physical therapy for ambulation.  PM vomited pain meds will d/c opoid constipation and start stool colace and dulcolax prn constipation.SBO resolved     10/01/2018:  Patient is tolerating clear liquids and started regular diet this morning.  He did have some nausea vomiting after his regular diet.  Otherwise the time of my visit the patient was in no acute distress and states feeling okay.  Patient has had bowel movement without difficulty.  Otherwise patient in no acute distress and will continue clear liquids and possibly discharge tomorrow if tolerating regular diet and p.o. Intake    10/02/2018.:  Patient is doing well and having no nausea or pain according to him.  Patient tolerated full liquid diet today without difficulty.  He is requesting to go home if he tolerates supper well.  I discussed this with Dr. Horner and he feels this would be okay to do if he does tolerate his food.  Patient will be fed a full liquid diet and was advised to tolerate and advance as tolerated at home.  Patient will follow-up with Dr. Horner  within 2 weeks.  Patient will continue all other current medications as listed in medicine reconciliation

## 2018-09-25 NOTE — ED NOTES
PATIENT REQUESTING MEDICATION FOR PAIN, PATIENT DROVE HIMSELF TO HOSPITAL, NO RIDE. SANGEETHA MICHAUD RN

## 2018-09-25 NOTE — H&P
[facility] Medicine  History & Physical    Patient Name: Miles Vazquez  MRN: [id number]  Admission Date: 9/24/2018  Attending Physician: Burton Cast MD  Primary Care Provider: Nichelle Frederick MD         Patient information was obtained from patient and ER records.     Subjective:     Principal Problem:Small bowel obstruction    Chief Complaint:   Chief Complaint   Patient presents with    Abdominal Pain        HPI: Patient is a 63-year-old male that presented to the emergency last evening complaining of right lower quadrant abdominal pain. Patient states a 1 day history of pain in the right lower quadrant area isn't nausea with 1 episode of vomiting.  Patient has a past medical history of recently discovered abdominal aortic aneurysm, and also has history of hypertension hyperlipidemia osteoarthritis, colonic polyps, depression, and restless leg syndrome.  Patient denies fever, chills, rigors, but states significant nausea for the last 24 hr and right lower quadrant pain.  It is vomiting only x1 patient was seen in the emergency department and started on NG tube suction and bowel rest.    Past Medical History:   Diagnosis Date    Abdominal aortic aneurysm     Alcoholism     Bursitis of both hips     COPD (chronic obstructive pulmonary disease)     Coronary artery calcification seen on CAT scan 4/24/2013    Depression     HTN (hypertension) 1/7/2013    Hyperlipidemia     Hypertension     Macrocytic anemia 3/27/2016    Personal history of colonic polyps     Restless leg syndrome     Right hip pain     RLS (restless legs syndrome)     Sleep apnea 2 years ago    does not use cpap    Smoker 1/7/2013    Vitamin disease     Wheezing        Past Surgical History:   Procedure Laterality Date    COLONOSCOPY      HERNIA REPAIR      INJECTION-STEROID-EPIDURAL-TRANSFORAMINAL Bilateral 11/9/2015    Performed by [facility] at [facility] PAIN MGT     PEG TUBE PLACEMENT/REPLACEMENT N/A 4/12/2016    Performed by Andres Luong MD at [facility] (2ND FLR)    UPPER GASTROINTESTINAL ENDOSCOPY         Review of patient's allergies indicates:  No Known Allergies    No current facility-administered medications on file prior to encounter.      Current Outpatient Medications on File Prior to Encounter   Medication Sig    albuterol 90 mcg/actuation inhaler Inhale 2 puffs into the lungs every 6 (six) hours as needed for Wheezing.    aspirin 81 MG Chew Take 81 mg by mouth once daily.    benazepril (LOTENSIN) 20 MG tablet Take 20 mg by mouth once daily.    cyanocobalamin (VITAMIN B-12) 1000 MCG tablet Take 0.5 tablets (500 mcg total) by mouth once daily.    folic acid (FOLVITE) 1 MG tablet Take 1 mg by mouth once daily.    rOPINIRole (REQUIP) 3 MG tablet Take 1 tablet (3 mg total) by mouth every evening.    thiamine 100 MG tablet Take 1 tablet (100 mg total) by mouth once daily.    traZODone (DESYREL) 50 MG tablet 1-3 tabs po qhs prn    diclofenac (VOLTAREN) 75 MG EC tablet Take 1 tablet (75 mg total) by mouth 2 (two) times daily.    miSOPROStol (CYTOTEC) 200 MCG Tab Take 1 tablet (200 mcg total) by mouth 2 (two) times daily.     Family History     Problem Relation (Age of Onset)    Tuberculosis Maternal Grandmother        Tobacco Use    Smoking status: Current Every Day Smoker     Types: Cigarettes    Smokeless tobacco: Never Used    Tobacco comment: 1 or 2 cigarettes a day    Substance and Sexual Activity    Alcohol use: No     Frequency: Never     Comment: Quit drinking in March 2016    Drug use: No    Sexual activity: Not on file     Review of Systems   Constitutional: Negative for activity change, appetite change, fatigue and fever.   HENT: Negative for congestion, ear discharge, mouth sores, nosebleeds, rhinorrhea, sinus pressure, sinus pain and tinnitus.    Eyes: Negative.  Negative for pain, redness and itching.   Respiratory: Negative for apnea,  cough, choking, chest tightness, shortness of breath, wheezing and stridor.    Cardiovascular: Negative for chest pain, palpitations and leg swelling.   Gastrointestinal: Positive for abdominal distention, abdominal pain and nausea. Negative for anal bleeding, blood in stool, constipation and diarrhea.   Endocrine: Negative.    Genitourinary: Negative for difficulty urinating, flank pain, frequency and urgency.   Musculoskeletal: Negative for arthralgias, back pain, gait problem and myalgias.   Skin: Negative for color change and pallor.   Allergic/Immunologic: Negative.    Neurological: Negative for dizziness, facial asymmetry, weakness, light-headedness and headaches.   Hematological: Negative for adenopathy. Does not bruise/bleed easily.     Objective:     Vital Signs (Most Recent):  Temp: 97.2 °F (36.2 °C) (09/25/18 0729)  Pulse: 98 (09/25/18 0729)  Resp: 18 (09/25/18 0729)  BP: 138/65 (09/25/18 0729)  SpO2: 95 % (09/25/18 0729) Vital Signs (24h Range):  Temp:  [96 °F (35.6 °C)-98.5 °F (36.9 °C)] 97.2 °F (36.2 °C)  Pulse:  [] 98  Resp:  [16-18] 18  SpO2:  [95 %-99 %] 95 %  BP: (138-180)/(55-80) 138/65     Weight: 59.9 kg (132 lb 0.9 oz)  Body mass index is 20.68 kg/m².    Physical Exam   Constitutional: He is oriented to person, place, and time.   Cardiovascular: Normal rate, regular rhythm, normal heart sounds and intact distal pulses.   Pulmonary/Chest: Effort normal.   Abdominal: He exhibits distension. There is tenderness. There is guarding.   Musculoskeletal: Normal range of motion.   Neurological: He is alert and oriented to person, place, and time.   Skin: Capillary refill takes less than 2 seconds.   Psychiatric: He has a normal mood and affect. His behavior is normal. Judgment and thought content normal.   Nursing note and vitals reviewed.          Significant Labs: All pertinent labs within the past 24 hours have been reviewed.    Significant Imaging: I have reviewed and interpreted all pertinent  imaging results/findings within the past 24 hours.    Assessment/Plan:     * Small bowel obstruction    1.  Continue bowel rest with NG tube suction and NPO status  2.  Continue IV fluids  3.  Continue pain control as needed  4.  Surgical consult to determine further treatment plan  5.  Empiric antibiotics of Zosyn and Flagyl  6.  Follow as needed based on clinical course            VTE Risk Mitigation (From admission, onward)        Ordered     IP VTE LOW RISK PATIENT  Once      09/24/18 2966     Place RADHA hose  Until discontinued      09/24/18 3196             Burton Cast MD  Department of Hospital Medicine   [facility] - Med Surg

## 2018-09-26 ENCOUNTER — ANESTHESIA EVENT (OUTPATIENT)
Dept: SURGERY | Facility: HOSPITAL | Age: 63
DRG: 341 | End: 2018-09-26
Payer: MEDICARE

## 2018-09-26 ENCOUNTER — ANESTHESIA (OUTPATIENT)
Dept: SURGERY | Facility: HOSPITAL | Age: 63
DRG: 341 | End: 2018-09-26
Payer: MEDICARE

## 2018-09-26 LAB
ALBUMIN SERPL BCP-MCNC: 3.2 G/DL
ALP SERPL-CCNC: 70 U/L
ALT SERPL W/O P-5'-P-CCNC: 12 U/L
ANION GAP SERPL CALC-SCNC: 7 MMOL/L
ANION GAP SERPL CALC-SCNC: 9 MMOL/L
AST SERPL-CCNC: 21 U/L
BASOPHILS # BLD AUTO: 0.04 K/UL
BASOPHILS NFR BLD: 0.6 %
BILIRUB SERPL-MCNC: 0.5 MG/DL
BUN SERPL-MCNC: 11 MG/DL
BUN SERPL-MCNC: 9 MG/DL
CALCIUM SERPL-MCNC: 8 MG/DL
CALCIUM SERPL-MCNC: 8.4 MG/DL
CHLORIDE SERPL-SCNC: 101 MMOL/L
CHLORIDE SERPL-SCNC: 102 MMOL/L
CO2 SERPL-SCNC: 24 MMOL/L
CO2 SERPL-SCNC: 26 MMOL/L
CREAT SERPL-MCNC: 0.8 MG/DL
CREAT SERPL-MCNC: 0.8 MG/DL
DIFFERENTIAL METHOD: ABNORMAL
EOSINOPHIL # BLD AUTO: 0.3 K/UL
EOSINOPHIL NFR BLD: 4.5 %
ERYTHROCYTE [DISTWIDTH] IN BLOOD BY AUTOMATED COUNT: 16.8 %
ERYTHROCYTE [DISTWIDTH] IN BLOOD BY AUTOMATED COUNT: 17 %
EST. GFR  (AFRICAN AMERICAN): >60 ML/MIN/1.73 M^2
EST. GFR  (AFRICAN AMERICAN): >60 ML/MIN/1.73 M^2
EST. GFR  (NON AFRICAN AMERICAN): >60 ML/MIN/1.73 M^2
EST. GFR  (NON AFRICAN AMERICAN): >60 ML/MIN/1.73 M^2
GLUCOSE SERPL-MCNC: 107 MG/DL
GLUCOSE SERPL-MCNC: 93 MG/DL
HCT VFR BLD AUTO: 35.1 %
HCT VFR BLD AUTO: 35.7 %
HGB BLD-MCNC: 11.4 G/DL
HGB BLD-MCNC: 11.5 G/DL
IMM GRANULOCYTES # BLD AUTO: 0.01 K/UL
IMM GRANULOCYTES NFR BLD AUTO: 0.1 %
INR PPP: 1.3
LYMPHOCYTES # BLD AUTO: 1.8 K/UL
LYMPHOCYTES NFR BLD: 25.5 %
MCH RBC QN AUTO: 27.5 PG
MCH RBC QN AUTO: 27.7 PG
MCHC RBC AUTO-ENTMCNC: 32.2 G/DL
MCHC RBC AUTO-ENTMCNC: 32.5 G/DL
MCV RBC AUTO: 85 FL
MCV RBC AUTO: 85 FL
MONOCYTES # BLD AUTO: 1 K/UL
MONOCYTES NFR BLD: 13.4 %
NEUTROPHILS # BLD AUTO: 4 K/UL
NEUTROPHILS NFR BLD: 55.9 %
NRBC BLD-RTO: 0 /100 WBC
PLATELET # BLD AUTO: 212 K/UL
PLATELET # BLD AUTO: 220 K/UL
PMV BLD AUTO: 11.1 FL
PMV BLD AUTO: 11.3 FL
POTASSIUM SERPL-SCNC: 3.4 MMOL/L
POTASSIUM SERPL-SCNC: 3.8 MMOL/L
PROT SERPL-MCNC: 6.3 G/DL
PROTHROMBIN TIME: 15.1 SEC
RBC # BLD AUTO: 4.11 M/UL
RBC # BLD AUTO: 4.18 M/UL
SODIUM SERPL-SCNC: 132 MMOL/L
SODIUM SERPL-SCNC: 137 MMOL/L
WBC # BLD AUTO: 10.25 K/UL
WBC # BLD AUTO: 7.09 K/UL

## 2018-09-26 PROCEDURE — 63600175 PHARM REV CODE 636 W HCPCS: Performed by: INTERNAL MEDICINE

## 2018-09-26 PROCEDURE — D9220A PRA ANESTHESIA: Mod: ANES,,, | Performed by: ANESTHESIOLOGY

## 2018-09-26 PROCEDURE — 63600175 PHARM REV CODE 636 W HCPCS: Performed by: EMERGENCY MEDICINE

## 2018-09-26 PROCEDURE — 25500020 PHARM REV CODE 255: Performed by: INTERNAL MEDICINE

## 2018-09-26 PROCEDURE — 25000003 PHARM REV CODE 250: Performed by: EMERGENCY MEDICINE

## 2018-09-26 PROCEDURE — 25000003 PHARM REV CODE 250: Performed by: NURSE ANESTHETIST, CERTIFIED REGISTERED

## 2018-09-26 PROCEDURE — 0DTJ0ZZ RESECTION OF APPENDIX, OPEN APPROACH: ICD-10-PCS | Performed by: SURGERY

## 2018-09-26 PROCEDURE — 44050 REDUCE BOWEL OBSTRUCTION: CPT | Mod: 80,,, | Performed by: SURGERY

## 2018-09-26 PROCEDURE — 0DQV0ZZ REPAIR MESENTERY, OPEN APPROACH: ICD-10-PCS | Performed by: SURGERY

## 2018-09-26 PROCEDURE — 85027 COMPLETE CBC AUTOMATED: CPT

## 2018-09-26 PROCEDURE — 88342 IMHCHEM/IMCYTCHM 1ST ANTB: CPT | Mod: 26,,, | Performed by: PATHOLOGY

## 2018-09-26 PROCEDURE — 44955 APPENDECTOMY ADD-ON: CPT | Mod: 80,,, | Performed by: SURGERY

## 2018-09-26 PROCEDURE — 44955 APPENDECTOMY ADD-ON: CPT | Mod: ,,, | Performed by: SURGERY

## 2018-09-26 PROCEDURE — 88304 TISSUE EXAM BY PATHOLOGIST: CPT | Mod: 26,,, | Performed by: PATHOLOGY

## 2018-09-26 PROCEDURE — S0030 INJECTION, METRONIDAZOLE: HCPCS | Performed by: INTERNAL MEDICINE

## 2018-09-26 PROCEDURE — 37000009 HC ANESTHESIA EA ADD 15 MINS: Performed by: SURGERY

## 2018-09-26 PROCEDURE — 25000003 PHARM REV CODE 250: Performed by: SURGERY

## 2018-09-26 PROCEDURE — 85610 PROTHROMBIN TIME: CPT

## 2018-09-26 PROCEDURE — 25000003 PHARM REV CODE 250: Performed by: INTERNAL MEDICINE

## 2018-09-26 PROCEDURE — S0028 INJECTION, FAMOTIDINE, 20 MG: HCPCS | Performed by: INTERNAL MEDICINE

## 2018-09-26 PROCEDURE — 99233 SBSQ HOSP IP/OBS HIGH 50: CPT | Mod: 25,57,ICN, | Performed by: SURGERY

## 2018-09-26 PROCEDURE — S0028 INJECTION, FAMOTIDINE, 20 MG: HCPCS | Performed by: EMERGENCY MEDICINE

## 2018-09-26 PROCEDURE — 37000008 HC ANESTHESIA 1ST 15 MINUTES: Performed by: SURGERY

## 2018-09-26 PROCEDURE — 27000221 HC OXYGEN, UP TO 24 HOURS

## 2018-09-26 PROCEDURE — 63600175 PHARM REV CODE 636 W HCPCS: Performed by: NURSE ANESTHETIST, CERTIFIED REGISTERED

## 2018-09-26 PROCEDURE — 02HV33Z INSERTION OF INFUSION DEVICE INTO SUPERIOR VENA CAVA, PERCUTANEOUS APPROACH: ICD-10-PCS | Performed by: SURGERY

## 2018-09-26 PROCEDURE — 88342 IMHCHEM/IMCYTCHM 1ST ANTB: CPT | Performed by: PATHOLOGY

## 2018-09-26 PROCEDURE — C1751 CATH, INF, PER/CENT/MIDLINE: HCPCS | Performed by: SURGERY

## 2018-09-26 PROCEDURE — 44050 REDUCE BOWEL OBSTRUCTION: CPT | Mod: ,,, | Performed by: SURGERY

## 2018-09-26 PROCEDURE — 36561 INSERT TUNNELED CV CATH: CPT | Mod: 51,RT,, | Performed by: SURGERY

## 2018-09-26 PROCEDURE — 63600175 PHARM REV CODE 636 W HCPCS: Performed by: SURGERY

## 2018-09-26 PROCEDURE — D9220A PRA ANESTHESIA: Mod: CRNA,,, | Performed by: NURSE ANESTHETIST, CERTIFIED REGISTERED

## 2018-09-26 PROCEDURE — 94640 AIRWAY INHALATION TREATMENT: CPT

## 2018-09-26 PROCEDURE — 36000706: Performed by: SURGERY

## 2018-09-26 PROCEDURE — 88305 TISSUE EXAM BY PATHOLOGIST: CPT | Performed by: PATHOLOGY

## 2018-09-26 PROCEDURE — 43239 EGD BIOPSY SINGLE/MULTIPLE: CPT | Mod: ,,, | Performed by: SURGERY

## 2018-09-26 PROCEDURE — 85025 COMPLETE CBC W/AUTO DIFF WBC: CPT

## 2018-09-26 PROCEDURE — 94799 UNLISTED PULMONARY SVC/PX: CPT

## 2018-09-26 PROCEDURE — 80048 BASIC METABOLIC PNL TOTAL CA: CPT

## 2018-09-26 PROCEDURE — 20000000 HC ICU ROOM

## 2018-09-26 PROCEDURE — 27201423 OPTIME MED/SURG SUP & DEVICES STERILE SUPPLY: Performed by: SURGERY

## 2018-09-26 PROCEDURE — 80053 COMPREHEN METABOLIC PANEL: CPT

## 2018-09-26 PROCEDURE — 36561 INSERT TUNNELED CV CATH: CPT | Mod: 80,RT,, | Performed by: SURGERY

## 2018-09-26 PROCEDURE — 25000242 PHARM REV CODE 250 ALT 637 W/ HCPCS: Performed by: SURGERY

## 2018-09-26 PROCEDURE — 0DB68ZX EXCISION OF STOMACH, VIA NATURAL OR ARTIFICIAL OPENING ENDOSCOPIC, DIAGNOSTIC: ICD-10-PCS | Performed by: SURGERY

## 2018-09-26 PROCEDURE — 36000707: Performed by: SURGERY

## 2018-09-26 PROCEDURE — 36415 COLL VENOUS BLD VENIPUNCTURE: CPT

## 2018-09-26 PROCEDURE — 71000033 HC RECOVERY, INTIAL HOUR: Performed by: SURGERY

## 2018-09-26 PROCEDURE — 88305 TISSUE EXAM BY PATHOLOGIST: CPT | Mod: 26,,, | Performed by: PATHOLOGY

## 2018-09-26 DEVICE — KIT CATH PRSS INJ  7FRX16CM: Type: IMPLANTABLE DEVICE | Site: NECK | Status: FUNCTIONAL

## 2018-09-26 RX ORDER — MEPERIDINE HYDROCHLORIDE 50 MG/ML
INJECTION INTRAMUSCULAR; INTRAVENOUS; SUBCUTANEOUS
Status: DISCONTINUED | OUTPATIENT
Start: 2018-09-26 | End: 2018-09-26

## 2018-09-26 RX ORDER — ONDANSETRON 2 MG/ML
INJECTION INTRAMUSCULAR; INTRAVENOUS
Status: DISCONTINUED | OUTPATIENT
Start: 2018-09-26 | End: 2018-09-26

## 2018-09-26 RX ORDER — LIDOCAINE HYDROCHLORIDE 10 MG/ML
1 INJECTION, SOLUTION EPIDURAL; INFILTRATION; INTRACAUDAL; PERINEURAL ONCE
Status: DISCONTINUED | OUTPATIENT
Start: 2018-09-26 | End: 2018-09-26

## 2018-09-26 RX ORDER — ACETAMINOPHEN 10 MG/ML
1000 INJECTION, SOLUTION INTRAVENOUS EVERY 8 HOURS
Status: COMPLETED | OUTPATIENT
Start: 2018-09-26 | End: 2018-09-27

## 2018-09-26 RX ORDER — POTASSIUM CHLORIDE 14.9 MG/ML
20 INJECTION INTRAVENOUS ONCE
Status: COMPLETED | OUTPATIENT
Start: 2018-09-26 | End: 2018-09-26

## 2018-09-26 RX ORDER — HYDROMORPHONE HYDROCHLORIDE 2 MG/ML
1 INJECTION, SOLUTION INTRAMUSCULAR; INTRAVENOUS; SUBCUTANEOUS
Status: DISCONTINUED | OUTPATIENT
Start: 2018-09-26 | End: 2018-09-29

## 2018-09-26 RX ORDER — HEPARIN SODIUM 5000 [USP'U]/ML
5000 INJECTION, SOLUTION INTRAVENOUS; SUBCUTANEOUS EVERY 8 HOURS
Status: DISCONTINUED | OUTPATIENT
Start: 2018-09-26 | End: 2018-10-02 | Stop reason: HOSPADM

## 2018-09-26 RX ORDER — FAMOTIDINE 20 MG/50ML
20 INJECTION, SOLUTION INTRAVENOUS 2 TIMES DAILY
Status: DISCONTINUED | OUTPATIENT
Start: 2018-09-26 | End: 2018-10-02 | Stop reason: HOSPADM

## 2018-09-26 RX ORDER — PROPOFOL 10 MG/ML
VIAL (ML) INTRAVENOUS
Status: DISCONTINUED | OUTPATIENT
Start: 2018-09-26 | End: 2018-09-26

## 2018-09-26 RX ORDER — BUPIVACAINE HYDROCHLORIDE AND EPINEPHRINE 5; 5 MG/ML; UG/ML
INJECTION, SOLUTION EPIDURAL; INTRACAUDAL; PERINEURAL
Status: DISCONTINUED | OUTPATIENT
Start: 2018-09-26 | End: 2018-09-26 | Stop reason: HOSPADM

## 2018-09-26 RX ORDER — IPRATROPIUM BROMIDE AND ALBUTEROL SULFATE 2.5; .5 MG/3ML; MG/3ML
3 SOLUTION RESPIRATORY (INHALATION) EVERY 6 HOURS
Status: DISCONTINUED | OUTPATIENT
Start: 2018-09-26 | End: 2018-09-30

## 2018-09-26 RX ORDER — MORPHINE SULFATE 4 MG/ML
2 INJECTION, SOLUTION INTRAMUSCULAR; INTRAVENOUS EVERY 5 MIN PRN
Status: DISCONTINUED | OUTPATIENT
Start: 2018-09-26 | End: 2018-09-26

## 2018-09-26 RX ORDER — SUCCINYLCHOLINE CHLORIDE 20 MG/ML
INJECTION INTRAMUSCULAR; INTRAVENOUS
Status: DISCONTINUED | OUTPATIENT
Start: 2018-09-26 | End: 2018-09-26

## 2018-09-26 RX ORDER — SODIUM CHLORIDE, SODIUM LACTATE, POTASSIUM CHLORIDE, CALCIUM CHLORIDE 600; 310; 30; 20 MG/100ML; MG/100ML; MG/100ML; MG/100ML
INJECTION, SOLUTION INTRAVENOUS CONTINUOUS
Status: DISCONTINUED | OUTPATIENT
Start: 2018-09-26 | End: 2018-09-28

## 2018-09-26 RX ORDER — CISATRACURIUM BESYLATE 2 MG/ML
INJECTION, SOLUTION INTRAVENOUS
Status: DISCONTINUED | OUTPATIENT
Start: 2018-09-26 | End: 2018-09-26

## 2018-09-26 RX ORDER — ONDANSETRON 2 MG/ML
4 INJECTION INTRAMUSCULAR; INTRAVENOUS DAILY PRN
Status: DISCONTINUED | OUTPATIENT
Start: 2018-09-26 | End: 2018-09-26

## 2018-09-26 RX ORDER — DIPHENHYDRAMINE HYDROCHLORIDE 50 MG/ML
12.5 INJECTION INTRAMUSCULAR; INTRAVENOUS
Status: DISCONTINUED | OUTPATIENT
Start: 2018-09-26 | End: 2018-09-26

## 2018-09-26 RX ORDER — PHYTONADIONE 10 MG/ML
10 INJECTION, EMULSION INTRAMUSCULAR; INTRAVENOUS; SUBCUTANEOUS ONCE
Status: COMPLETED | OUTPATIENT
Start: 2018-09-26 | End: 2018-09-26

## 2018-09-26 RX ADMIN — Medication 3.38 G: at 12:09

## 2018-09-26 RX ADMIN — ONDANSETRON 4 MG: 2 INJECTION INTRAMUSCULAR; INTRAVENOUS at 06:09

## 2018-09-26 RX ADMIN — METRONIDAZOLE 500 MG: 500 INJECTION, SOLUTION INTRAVENOUS at 10:09

## 2018-09-26 RX ADMIN — MORPHINE SULFATE 4 MG: 4 INJECTION INTRAVENOUS at 04:09

## 2018-09-26 RX ADMIN — HEPARIN SODIUM 5000 UNITS: 5000 INJECTION, SOLUTION INTRAVENOUS; SUBCUTANEOUS at 09:09

## 2018-09-26 RX ADMIN — PROPOFOL 150 MG: 10 INJECTION, EMULSION INTRAVENOUS at 05:09

## 2018-09-26 RX ADMIN — PHYTONADIONE 10 MG: 10 INJECTION, EMULSION INTRAMUSCULAR; INTRAVENOUS; SUBCUTANEOUS at 01:09

## 2018-09-26 RX ADMIN — SUCCINYLCHOLINE CHLORIDE 100 MG: 20 INJECTION, SOLUTION INTRAMUSCULAR; INTRAVENOUS at 05:09

## 2018-09-26 RX ADMIN — IOHEXOL 75 ML: 350 INJECTION, SOLUTION INTRAVENOUS at 07:09

## 2018-09-26 RX ADMIN — LORAZEPAM 1 MG: 2 INJECTION INTRAMUSCULAR; INTRAVENOUS at 07:09

## 2018-09-26 RX ADMIN — MORPHINE SULFATE 4 MG: 4 INJECTION INTRAVENOUS at 01:09

## 2018-09-26 RX ADMIN — MEPERIDINE HYDROCHLORIDE 25 MG: 50 INJECTION INTRAMUSCULAR; INTRAVENOUS; SUBCUTANEOUS at 06:09

## 2018-09-26 RX ADMIN — SODIUM CHLORIDE, POTASSIUM CHLORIDE, SODIUM LACTATE AND CALCIUM CHLORIDE 125 ML/HR: 600; 310; 30; 20 INJECTION, SOLUTION INTRAVENOUS at 07:09

## 2018-09-26 RX ADMIN — PIPERACILLIN SODIUM AND TAZOBACTAM SODIUM 3.38 ML: 3; .375 INJECTION, POWDER, FOR SOLUTION INTRAVENOUS at 06:09

## 2018-09-26 RX ADMIN — METRONIDAZOLE 500 MG: 500 INJECTION, SOLUTION INTRAVENOUS at 06:09

## 2018-09-26 RX ADMIN — POTASSIUM CHLORIDE 20 MEQ: 14.9 INJECTION, SOLUTION INTRAVENOUS at 10:09

## 2018-09-26 RX ADMIN — ONDANSETRON HYDROCHLORIDE 4 MG: 2 INJECTION, SOLUTION INTRAMUSCULAR; INTRAVENOUS at 04:09

## 2018-09-26 RX ADMIN — HYDROMORPHONE HYDROCHLORIDE 1 MG: 2 INJECTION INTRAMUSCULAR; INTRAVENOUS; SUBCUTANEOUS at 07:09

## 2018-09-26 RX ADMIN — MEPERIDINE HYDROCHLORIDE 15 MG: 50 INJECTION INTRAMUSCULAR; INTRAVENOUS; SUBCUTANEOUS at 05:09

## 2018-09-26 RX ADMIN — FAMOTIDINE 20 MG: 10 INJECTION, SOLUTION INTRAVENOUS at 09:09

## 2018-09-26 RX ADMIN — IOHEXOL 30 ML: 300 INJECTION, SOLUTION INTRAVENOUS at 05:09

## 2018-09-26 RX ADMIN — IPRATROPIUM BROMIDE AND ALBUTEROL SULFATE 3 ML: .5; 3 SOLUTION RESPIRATORY (INHALATION) at 08:09

## 2018-09-26 RX ADMIN — SODIUM CHLORIDE 500 ML: 0.9 INJECTION, SOLUTION INTRAVENOUS at 09:09

## 2018-09-26 RX ADMIN — MEPERIDINE HYDROCHLORIDE 10 MG: 50 INJECTION INTRAMUSCULAR; INTRAVENOUS; SUBCUTANEOUS at 06:09

## 2018-09-26 RX ADMIN — CISATRACURIUM BESYLATE 3 MG: 2 INJECTION INTRAVENOUS at 05:09

## 2018-09-26 RX ADMIN — HYDROMORPHONE HYDROCHLORIDE 1 MG: 2 INJECTION INTRAMUSCULAR; INTRAVENOUS; SUBCUTANEOUS at 09:09

## 2018-09-26 RX ADMIN — Medication 3.38 G: at 06:09

## 2018-09-26 RX ADMIN — SODIUM CHLORIDE, POTASSIUM CHLORIDE, SODIUM LACTATE AND CALCIUM CHLORIDE: 600; 310; 30; 20 INJECTION, SOLUTION INTRAVENOUS at 05:09

## 2018-09-26 RX ADMIN — MORPHINE SULFATE 4 MG: 4 INJECTION INTRAVENOUS at 09:09

## 2018-09-26 RX ADMIN — METRONIDAZOLE 500 MG: 500 INJECTION, SOLUTION INTRAVENOUS at 02:09

## 2018-09-26 RX ADMIN — ACETAMINOPHEN 1000 MG: 10 INJECTION, SOLUTION INTRAVENOUS at 07:09

## 2018-09-26 RX ADMIN — FAMOTIDINE 20 MG: 20 INJECTION, SOLUTION INTRAVENOUS at 08:09

## 2018-09-26 RX ADMIN — PROPOFOL 50 MG: 10 INJECTION, EMULSION INTRAVENOUS at 05:09

## 2018-09-26 NOTE — ASSESSMENT & PLAN NOTE
1.  Continue bowel rest with NG tube suction and NPO status  2.  Continue IV fluids  3.  Continue pain control as needed  4.  Surgical consult to determine further treatment plan  5.  Empiric antibiotics of Zosyn and Flagyl  6.  Follow as needed based on clinical course    September 26th 2018:  This patient will be taken for exploratory laparotomy later today                                        Will follow up postsurgically as needed                                         Will repeat labs in the a.m.

## 2018-09-26 NOTE — PROGRESS NOTES
[facility] - Med Surg  General Surgery  Daily Note    Patient Name: Miles Vazquez  MRN: [id number]  Admission Date: 9/24/2018  Attending Physician: Burton Cast MD   Consult Physician: Ramírez Horner MD  Primary Care Provider: Nichelle Frederick MD    Subjective:     Principle Problem: Small bowel obstruction    Last 24 hour history:  9/26/18  Patient with increasing abdominal pain last 24 hr.  Afebrile vital signs stable.  Nausea no vomiting.  Patient states that his abdominal pain is worse than yesterday.  CT scan conducted this morning with IV and p.o. contrast for which I have reviewed with Dr. Gomez of Radiology.  He agrees with worsening small bowel obstruction. More abdominal free fluid.  No free air.  Increasing distention of small bowel.  Definite area of transition in the right lower quadrant. No defined masses.  Area in distal antrum possible inflammation versus ulceration.  Patient will need EGD as well. No other new complaints    Objective:     Vital Signs (Most Recent):  Temp: 97 °F (36.1 °C) (09/26/18 0730)  Pulse: 96 (09/26/18 0730)  Resp: 16 (09/26/18 0730)  BP: (!) 159/70 (09/26/18 0730)  SpO2: 99 % (09/26/18 0730) Vital Signs (24h Range):  Temp:  [96.6 °F (35.9 °C)-98.3 °F (36.8 °C)] 97 °F (36.1 °C)  Pulse:  [] 96  Resp:  [16-97] 16  SpO2:  [97 %-99 %] 99 %  BP: (122-174)/(65-78) 159/70     Intake/Output last 24 hours:    Intake/Output Summary (Last 24 hours) at 9/26/2018 0838  Last data filed at 9/26/2018 0400  Gross per 24 hour   Intake 1112 ml   Output 1650 ml   Net -538 ml       I/O last 3 completed shifts:  In: 1112 [I.V.:1112]  Out: 2100 [Urine:1950; Drains:150]  No intake/output data recorded.    Weight: 59.9 kg (132 lb 0.9 oz)  Body mass index is 20.68 kg/m².    Gen: Wd Wn male currently in NAD  Heent: Nc/At, MMM  Eyes: Perrl, Eomi  Cv: RRR, no  M/g/r  Lung: Non-labored breathing, clear bilaterally  Abd: Soft, distended, tender to palpation  epigastrium right lower quadrant.    Significant Labs:  CBC:   Recent Labs   Lab  09/26/18   0452   WBC  7.09   RBC  4.18*   HGB  11.5*   HCT  35.7*   PLT  212   MCV  85   MCH  27.5   MCHC  32.2     BMP:   Recent Labs   Lab  09/26/18   0452   GLU  93   NA  137   K  3.8   CL  102   CO2  26   BUN  11   CREATININE  0.8   CALCIUM  8.4*     CMP:   Recent Labs   Lab  09/26/18   0452   GLU  93   CALCIUM  8.4*   ALBUMIN  3.2*   PROT  6.3   NA  137   K  3.8   CO2  26   CL  102   BUN  11   CREATININE  0.8   ALKPHOS  70   ALT  12   AST  21   BILITOT  0.5     LFTs:   Recent Labs   Lab  09/26/18   0452   ALT  12   AST  21   ALKPHOS  70   BILITOT  0.5   PROT  6.3   ALBUMIN  3.2*     Coagulation:   Recent Labs   Lab  09/26/18 0452   LABPROT  15.1*   INR  1.3*     Specimen (12h ago, onward)    None        Recent Labs   Lab  09/25/18   1830   COLORU  Yellow   SPECGRAV  1.025   PHUR  6.0   PROTEINUA  Trace*   NITRITE  Negative   LEUKOCYTESUR  Negative   UROBILINOGEN  Negative       Cultures:    Microbiology Results (last 7 days)     ** No results found for the last 168 hours. **          Significant Diagnostics:  CT: I have reviewed all pertinent results/findings within the past 24 hours and my personal findings are:  Worsening small bowel obstruction with free fluid no free air.    Assessment:   Miles Vazquez is a 63 y.o. male who presents with Small bowel obstruction.    Active Diagnoses:    Diagnosis Date Noted POA    PRINCIPAL PROBLEM:  Small bowel obstruction [K56.609] 09/24/2018 Yes      Problems Resolved During this Admission:     VTE Risk Mitigation (From admission, onward)        Ordered     IP VTE LOW RISK PATIENT  Once      09/24/18 2357     Place RADHA hose  Until discontinued      09/24/18 2357          Medical Decision Making/Plan:  Patient with worsening small bowel obstruction. No vital sign instability.  Laboratory evaluations reviewed.  INR up just slightly at 1.3, will replete with vitamin K. LFTs within normal  limits.  White count improving.  Given the worsening of the patient's pain and evidence of worsening small bowel obstruction on CT scan, I believe the best next step for the patient will be to proceed with exploratory laparotomy and EGD this afternoon.  Risk and benefits were discussed in detail in the patient's hospital room. Risks of possible into enterectomy, possible appendectomy, possible cholecystectomy possible gastrectomy, possible need for further surgeries possible small bowel injury possible intra-abdominal masses that were not discovered on CT scan, possible need for mechanical ventilation postoperative possible need for intravenous nutritional support postoperative, possible postoperative infection, possible bleeding, etc all discussed with the patient in detail in his hospital room.  Patient understands the above risks.  He discussed that this is not await to continue giving the worsening abdominal pain and something has to be done.  Will proceed with exploratory laparotomy EGD and possible central line placement tonight.    Ramírez Horner MD  General Surgery  [facility] - Med Surg

## 2018-09-26 NOTE — BRIEF OP NOTE
[facility] - Med Surg  Brief Operative Note     SUMMARY     Surgery Date: 9/26/2018     Primary Surgeon: Ramírez Horner MD    Assisting Surgeon: Syed Hutton MD    Pre-op Diagnosis:  Small bowel obstruction [K56.609]    Post-op Diagnosis:  Post-Op Diagnosis Codes:     Internal hernia with volvulus     Chronic appendicitis      Lack of peripheral venous access requiring central line placement    Operations:  1) Exploratory laparotomy  2) Reduction of internal hernia with volvulus  3) Appendectomy  4) Insertion of Right IJ Central venous access device (catheter) under ultrasound guidance    Description of the findings of the procedure:  Small bowel obstruction with internal hernia with volvulus    Estimated Blood Loss: 100 mL         Specimens:   Appendix    Abx: Theraputic zosyn    Anesthesia: GETA

## 2018-09-26 NOTE — [employer]ATION PATIENT INSTRUCTIONS
Discharge Summary/Instructions after an Endoscopic Procedure  Patient Name: Miles Saldaña  Patient MRN: [id number]  Patient YOB: 1955 Wednesday, September 26, 2018  Ramírez Horner MD  RESTRICTIONS:  During your procedure today, you received medications for sedation.  These   medications may affect your judgment, balance and coordination.  Therefore,   for 24 hours, you have the following restrictions:   - DO NOT drive a car, operate machinery, make legal/financial decisions,   sign important papers or drink alcohol.    ACTIVITY:  Today: no heavy lifting, straining or running due to procedural   sedation/anesthesia.  The following day: return to full activity including work.  DIET:  Eat and drink normally unless instructed otherwise.     TREATMENT FOR COMMON SIDE EFFECTS:  - Mild abdominal pain, nausea, belching, bloating or excessive gas:  rest,   eat lightly and use a heating pad.  - Sore Throat: treat with throat lozenges and/or gargle with warm salt   water.  - Because air was used during the procedure, expelling large amounts of air   from your rectum or belching is normal.  - If a bowel prep was taken, you may not have a bowel movement for 1-3 days.    This is normal.  SYMPTOMS TO WATCH FOR AND REPORT TO YOUR PHYSICIAN:  1. Abdominal pain or bloating, other than gas cramps.  2. Chest pain.  3. Back pain.  4. Signs of infection such as: chills or fever occurring within 24 hours   after the procedure.  5. Rectal bleeding, which would show as bright red, maroon, or black stools.   (A tablespoon of blood from the rectum is not serious, especially if   hemorrhoids are present.)  6. Vomiting.  7. Weakness or dizziness.  GO DIRECTLY TO THE NEAREST EMERGENCY ROOM IF YOU HAVE ANY OF THE FOLLOWING:      Difficulty breathing              Chills and/or fever over 101 F   Persistent vomiting and/or vomiting blood   Severe abdominal pain   Severe chest pain   Black, tarry stools   Bleeding- more than one  tablespoon   Any other symptom or condition that you feel may need urgent attention  Your doctor recommends these additional instructions:  If any biopsies were taken, your doctors clinic will contact you in 1 to 2   weeks with any results.  - Return patient to ICU for ongoing care.   - Resume previous diet.   - Use Protonix (pantoprazole) 40 mg PO daily.   - Await pathology results.  For questions, problems or results please call your physician - Ramírez Horner MD at Work:  [phone].  [facility] EMERGENCY ROOM PHONE NUMBER: [phone]  IF A COMPLICATION OR EMERGENCY SITUATION ARISES AND YOU ARE UNABLE TO REACH   YOUR PHYSICIAN - GO DIRECTLY TO THE EMERGENCY ROOM.  MD Ramírez Simmons MD  9/26/2018 6:50:48 PM  This report has been verified and signed electronically.  [employer]

## 2018-09-26 NOTE — NURSING
1702 MD BERNARD AND OR MANAGER REVA IN WITH PT SIGNING CONSENTS, PT AAOX3, PT TAKEN TO PREOP VIA W/C, 1800 AND 1845 IV ANTIBIOTIC GIVEN TO OR NURSE.

## 2018-09-26 NOTE — PROGRESS NOTES
[facility] Medicine  Progress Note    Patient Name: Miles Vazquez  MRN: [id number]  Patient Class: IP- Inpatient   Admission Date: 9/24/2018  Length of Stay: 2 days  Attending Physician: Burton Cast MD  Primary Care Provider: Nichelle Frederick MD        Subjective:     Principal Problem:Small bowel obstruction    HPI:  Patient is a 63-year-old male that presented to the emergency last evening complaining of right lower quadrant abdominal pain. Patient states a 1 day history of pain in the right lower quadrant area isn't nausea with 1 episode of vomiting.  Patient has a past medical history of recently discovered abdominal aortic aneurysm, and also has history of hypertension hyperlipidemia osteoarthritis, colonic polyps, depression, and restless leg syndrome.  Patient denies fever, chills, rigors, but states significant nausea for the last 24 hr and right lower quadrant pain.  It is vomiting only x1 patient was seen in the emergency department and started on NG tube suction and bowel rest.    Hospital Course:  Patient will be admitted to medical-surgical unit.  Inpatient surgical consult will be obtained.  Further treatment will be based on clinical course.    September 26, 2018:  Patient is seemingly worse today with pain, discomfort and obstruction.  White blood cell count has decreased however symptomatically the patient seems worse.  Dr. Horner discussed with this patient the need for exploratory laparotomy and EGD and will proceed with that this afternoon.  Patient does understand all risks and benefits and wants to proceed.  Continue controlling pain and continue NPO status follow up postsurgically    Interval History:  Small-bowel obstruction appears to be worsening and will proceed with surgery per Dr. Horner    Review of Systems   Constitutional: Negative for activity change, appetite change, fatigue and fever.   HENT: Negative for congestion,  ear discharge, mouth sores, nosebleeds, rhinorrhea, sinus pressure, sinus pain and tinnitus.    Eyes: Negative.  Negative for pain, redness and itching.   Respiratory: Negative for apnea, cough, choking, chest tightness, shortness of breath, wheezing and stridor.    Cardiovascular: Negative for chest pain, palpitations and leg swelling.   Gastrointestinal: Positive for abdominal distention, abdominal pain, nausea and vomiting. Negative for anal bleeding, blood in stool, constipation and diarrhea.   Endocrine: Negative.    Genitourinary: Negative for difficulty urinating, flank pain, frequency and urgency.   Musculoskeletal: Negative for arthralgias, back pain, gait problem and myalgias.   Skin: Negative for color change and pallor.   Allergic/Immunologic: Negative.    Neurological: Negative for dizziness, facial asymmetry, weakness, light-headedness and headaches.   Hematological: Negative for adenopathy. Does not bruise/bleed easily.   Psychiatric/Behavioral: Positive for agitation. The patient is nervous/anxious.      Objective:     Vital Signs (Most Recent):  Temp: 97 °F (36.1 °C) (09/26/18 0730)  Pulse: 96 (09/26/18 0730)  Resp: 16 (09/26/18 0730)  BP: (!) 159/70 (09/26/18 0730)  SpO2: 99 % (09/26/18 0730) Vital Signs (24h Range):  Temp:  [97 °F (36.1 °C)-98.3 °F (36.8 °C)] 97 °F (36.1 °C)  Pulse:  [95-98] 96  Resp:  [16-97] 16  SpO2:  [97 %-99 %] 99 %  BP: (122-159)/(65-70) 159/70     Weight: 59.9 kg (132 lb 0.9 oz)  Body mass index is 20.68 kg/m².    Intake/Output Summary (Last 24 hours) at 9/26/2018 1159  Last data filed at 9/26/2018 0915  Gross per 24 hour   Intake 1112 ml   Output 2050 ml   Net -938 ml      Physical Exam   Constitutional: He is oriented to person, place, and time. He appears well-developed and well-nourished.   HENT:   Head: Normocephalic and atraumatic.   Eyes: EOM are normal. Pupils are equal, round, and reactive to light.   Neck: Normal range of motion. Neck supple. No tracheal deviation  present. No thyromegaly present.   Pulmonary/Chest: Effort normal and breath sounds normal.   Abdominal: Soft. Bowel sounds are normal. He exhibits distension. There is tenderness. There is guarding. There is no rebound.   Musculoskeletal: Normal range of motion.   Lymphadenopathy:     He has no cervical adenopathy.   Neurological: He is alert and oriented to person, place, and time.   Skin: Skin is warm and dry. Capillary refill takes less than 2 seconds.   Psychiatric: He has a normal mood and affect. His behavior is normal. Judgment and thought content normal.       Significant Labs: All pertinent labs within the past 24 hours have been reviewed.    Significant Imaging: I have reviewed and interpreted all pertinent imaging results/findings within the past 24 hours.    Assessment/Plan:      * Small bowel obstruction    1.  Continue bowel rest with NG tube suction and NPO status  2.  Continue IV fluids  3.  Continue pain control as needed  4.  Surgical consult to determine further treatment plan  5.  Empiric antibiotics of Zosyn and Flagyl  6.  Follow as needed based on clinical course    September 26th 2018:  This patient will be taken for exploratory laparotomy later today                                        Will follow up postsurgically as needed                                         Will repeat labs in the a.m.            VTE Risk Mitigation (From admission, onward)        Ordered     IP VTE LOW RISK PATIENT  Once      09/24/18 5086     Place RADHA hose  Until discontinued      09/24/18 5906              Burton Cast MD  Department of Hospital Medicine   [facility]

## 2018-09-26 NOTE — PLAN OF CARE
Problem: Patient Care Overview  Goal: Plan of Care Review  Outcome: Ongoing (interventions implemented as appropriate)  MD BERNARD MET WITH PT ON POSSIBLE SURGERY THIS DAY, PT VERB UNDERSTOOD.

## 2018-09-26 NOTE — ANESTHESIA PREPROCEDURE EVALUATION
09/26/2018  Miles Vazquez is a 63 y.o., male.    Pre-op Assessment    I have reviewed the Patient Summary Reports.     I have reviewed the Nursing Notes.   I have reviewed the Medications.     Review of Systems  Social:  Alcohol Use, Smoker    Cardiovascular:   Hypertension CAD   PVD (AAA)    Pulmonary:   COPD Sleep Apnea    Psych:   Psychiatric History          Physical Exam   Airway/Jaw/Neck:  Airway Findings: Mouth Opening: Normal Tongue: Normal  General Airway Assessment: Adult  Mallampati: II  TM Distance: Normal, at least 6 cm  Jaw/Neck Findings:  Neck ROM: Normal ROM      Dental:  Dental Findings: Edentulous   Chest/Lungs:  Chest/Lungs Findings: (Increased AP diameter) Clear to auscultation     Heart/Vascular:  Heart Findings: Rate: Normal  Rhythm: Regular Rhythm        Mental Status:  Mental Status Findings:  Cooperative, Alert and Oriented         Anesthesia Plan  Type of Anesthesia, risks & benefits discussed:  Anesthesia Type:  general  Patient's Preference:   Intra-op Monitoring Plan: standard ASA monitors  Intra-op Monitoring Plan Comments:   Post Op Pain Control Plan: IV/PO Opioids PRN  Post Op Pain Control Plan Comments:   Induction:   IV  Beta Blocker:  Patient is not currently on a Beta-Blocker (No further documentation required).       Informed Consent: Patient understands risks and agrees with Anesthesia plan.  Questions answered. Anesthesia consent signed with patient.  ASA Score: 4  emergent   Day of Surgery Review of History & Physical: I have interviewed and examined the patient. I have reviewed the patient's H&P dated:

## 2018-09-26 NOTE — SUBJECTIVE & OBJECTIVE
Interval History:  Small-bowel obstruction appears to be worsening and will proceed with surgery per Dr. Horner    Review of Systems   Constitutional: Negative for activity change, appetite change, fatigue and fever.   HENT: Negative for congestion, ear discharge, mouth sores, nosebleeds, rhinorrhea, sinus pressure, sinus pain and tinnitus.    Eyes: Negative.  Negative for pain, redness and itching.   Respiratory: Negative for apnea, cough, choking, chest tightness, shortness of breath, wheezing and stridor.    Cardiovascular: Negative for chest pain, palpitations and leg swelling.   Gastrointestinal: Positive for abdominal distention, abdominal pain, nausea and vomiting. Negative for anal bleeding, blood in stool, constipation and diarrhea.   Endocrine: Negative.    Genitourinary: Negative for difficulty urinating, flank pain, frequency and urgency.   Musculoskeletal: Negative for arthralgias, back pain, gait problem and myalgias.   Skin: Negative for color change and pallor.   Allergic/Immunologic: Negative.    Neurological: Negative for dizziness, facial asymmetry, weakness, light-headedness and headaches.   Hematological: Negative for adenopathy. Does not bruise/bleed easily.   Psychiatric/Behavioral: Positive for agitation. The patient is nervous/anxious.      Objective:     Vital Signs (Most Recent):  Temp: 97 °F (36.1 °C) (09/26/18 0730)  Pulse: 96 (09/26/18 0730)  Resp: 16 (09/26/18 0730)  BP: (!) 159/70 (09/26/18 0730)  SpO2: 99 % (09/26/18 0730) Vital Signs (24h Range):  Temp:  [97 °F (36.1 °C)-98.3 °F (36.8 °C)] 97 °F (36.1 °C)  Pulse:  [95-98] 96  Resp:  [16-97] 16  SpO2:  [97 %-99 %] 99 %  BP: (122-159)/(65-70) 159/70     Weight: 59.9 kg (132 lb 0.9 oz)  Body mass index is 20.68 kg/m².    Intake/Output Summary (Last 24 hours) at 9/26/2018 1159  Last data filed at 9/26/2018 0915  Gross per 24 hour   Intake 1112 ml   Output 2050 ml   Net -938 ml      Physical Exam   Constitutional: He is oriented to  person, place, and time. He appears well-developed and well-nourished.   HENT:   Head: Normocephalic and atraumatic.   Eyes: EOM are normal. Pupils are equal, round, and reactive to light.   Neck: Normal range of motion. Neck supple. No tracheal deviation present. No thyromegaly present.   Pulmonary/Chest: Effort normal and breath sounds normal.   Abdominal: Soft. Bowel sounds are normal. He exhibits distension. There is tenderness. There is guarding. There is no rebound.   Musculoskeletal: Normal range of motion.   Lymphadenopathy:     He has no cervical adenopathy.   Neurological: He is alert and oriented to person, place, and time.   Skin: Skin is warm and dry. Capillary refill takes less than 2 seconds.   Psychiatric: He has a normal mood and affect. His behavior is normal. Judgment and thought content normal.       Significant Labs: All pertinent labs within the past 24 hours have been reviewed.    Significant Imaging: I have reviewed and interpreted all pertinent imaging results/findings within the past 24 hours.

## 2018-09-26 NOTE — PLAN OF CARE
09/26/18 0830   Discharge Reassessment   Assessment Type Discharge Planning Reassessment   Discharge plan remains the same: Yes   Provided patient/caregiver education on the expected discharge date and the discharge plan Yes   Discharge Plan A Home   Change in patient condition or support system No   Patient choice form signed by patient/caregiver N/A   Explained to the the patient/caregiver why the discharge planned changed: Yes   Involved the patient/caregiver in establishing a new discharge plan: Yes   Patient states waiting for doctor to decide what he needs to have done. States at this point doesn't think he will need any assistance at home but will continue to follow for discharge needs after surgery.

## 2018-09-26 NOTE — OP NOTE
[facility] - Med Surg  Operative Note     SUMMARY     Surgery Date: 9/26/2018     Primary Surgeon: Ramírez Horner MD    Assisting Surgeon: Syed Hutton MD    Pre-op Diagnosis:  Small bowel obstruction [K56.609]    Post-op Diagnosis:  Post-Op Diagnosis Codes:     Internal hernia with volvulus     Chronic appendicitis      Lack of peripheral venous access requiring central line placement    Operations:  1) Exploratory laparotomy  2) Reduction of internal hernia with volvulus  3) Appendectomy  4) Insertion of Right IJ Central venous access device (catheter) under ultrasound guidance    Description of the findings of the procedure:  Small bowel obstruction with internal hernia with volvulus    Estimated Blood Loss: 100 mL         Specimens:   Appendix    Abx: Theraputic zosyn    Anesthesia: GETA    DATE OF PROCEDURE:  09/26/2018.    INDICATIONS FOR PROCEDURE:  Mr. Vazquez is a 63-year-old male who presented  to the ER two nights ago with evidence of acute onset of abdominal pain  with evidence of CT scan showing small-bowel obstruction.  The patient had  no previous abdominal surgeries besides an endoscopic PEG tube placement.   The patient was treated with nonoperative management for 24 hours and the  next morning, this morning, the patient had worsening abdominal pain, at  which time, a CT scan was conducted again.  CT scan showed increasing  reactive fluid, increasing mesenteric edema and continued transition point  in the right lower pelvis.  The patient with worsening bowel distention.   Given the increasing pain, I believe it is necessary to proceed to the  Operative Room for reduction of the patient's small-bowel obstruction,  evaluation and treatment of the patient.  Risks and benefits of operative  intervention were discussed in detail in the hospital room with the  patient.  He wished to proceed today by signing the informed consent.    PROCEDURE IN DETAIL:  The patient was brought  back into the Operative Room,  placed on the table in supine position.  General anesthesia was introduced  via an endotracheal tube by the Anesthesia staff.  A timeout was conducted  with all in the Operating Room in agreement to correct patient, correct  procedure, correct allergies, correct antibiotics.  The patient was already  therapeutic on Zosyn therapy.  Therefore, no new antibiotics were given.   The patient's abdomen was prepped and draped in standard sterile surgical  fashion.    I had my partner assist with the case, given lack of first assistants.    I then made a periumbilical midline incision to the left of the umbilicus.   Skin incision was carried down to fascia with Bovie electrocautery.  Fascia  was excised widely.  Upon entering the abdomen, there was a moderate amount  of reactive ascites.  Bowel was eviscerated, ascites was irrigated and  washed out.  In the distal small bowel, there was evidence of two points of  incarceration.  These appeared to be incarcerated with omental fat.  Both  points of incarceration had areas of relative stenosis and distal to the  second area of incarceration had decompressed bowel.  Everything proximal  to this point was dilated.  Omentum was opened, incarceration was relieved.   There was no evidence of strangulation.  Bowel wall appeared viable at  both the ends of the distal small bowel, which was incarcerated in this  internal hernia volvulus type incarceration.  The bowel obstruction at this  point was relieved.  The appendix was in the right lower quadrant with  periappendiceal inflammation suggestive of chronic inflammation.  For this,  a GARLAND blue load stapler was used to transect the appendix at the base and a  LigaSure device was used to take the mesoappendix.  The appendix was handed  off as specimen.  At this point, the bowel was run from the ligament of  Treitz to the terminal ileum.  It was in continuity and the mesentery was  wide based.  NG tube was  felt within the stomach and in good location.  At  this point, the contents of the small bowel was milked back into the  stomach to allow for decompression of the bowel for adequate abdominal  closure.  This was done and approximately 1.5 mL of biliary content came  out through the NG tube.  The small bowel was placed back into the abdomen  and a bowel clamp was placed in the proximal small bowel.    I then switched and performed an EGD.  Please see separate EGD report.  EGD  was conducted for concerns of distal antral mass on CT scan of abdomen and  pelvis.  In short, the patient had distal gastritis.  Biopsies were taken.   No evidence of mass.    I then flipped back to the abdominal portion of the case.  Bowel clamps was  removed in the proximal small bowel.  Kochers were then used to elevate the  fascia, #1 looped PDS was used on the inferior and superior aspects of the  fascia to close the fascia to the midline.  They were tied in the midline  of the umbilicus.  Soft tissue was washed out with  irrigant.  Staples  were applied over top as a skin closure.  Dry dressing was placed over top  of this.  Prior to starting fascial closure and after completion of the  fascial closure, two counts were conducted, which showed all lap pads,  instruments as well as needles were correct.  After dry dressings were  closed, I then changed to the central venous access portion of the  procedure.    The patient's right neck was prepped and draped in standard sterile  surgical fashion.  An ultrasound was used to identify the patient's right  internal jugular vein.  It was the dark venous compressible structure in  the patient's right neck lateral to the carotid artery.  I used an 18-gauge  needle with syringe under ultrasound guidance and accessed the right  internal jugular vein, withdrew dark venous blood return.  Wire was placed  over needle, needle removed.  There was no ectopy.  Wire was confirmed to  be in the right  internal jugular vein with ultrasound guidance.  At this  point, a #11 blade was used to make a small skin nick.  Dilator was placed  over the wire to dilate the soft tissue and skin tract.  The three port  catheter was then placed into the internal jugular vein on the right side.   The wire was completely removed.  The central venous catheter was tied into  place with two 2-0 silk sutures.  All three ports were aspirated first.   Dark venous blood returned and flushed with 5 mL of normal saline.   Biopatch with appropriate dressings were placed over top.  Please note,  prior to insertion of central venous access device, the skin was prepped  with chlorhexidine solution and allowed 3 minutes to dry.  The patient was  placed back in a normal anatomic position.  He was then extubated in the OR  by the Anesthesia staff, transferred back to postop care in stable  condition.  Again, all counts were correct at the end of procedure  including lap pads, instruments as well as needles.  Plan will be for  admission to ICU.  Continue IV fluid hydration.  Continue NG tube  decompression.  Continue bowel rest and adequate pain control.  We will  await bowel function return for further progress.  Further management will  depend on the patient's clinical course.        SDR/IN dd: 09/26/2018 19:07:20 (CDT)   td: 09/26/2018 19:37:06 (CDT)  Doc ID #[id number]   Job ID #[id number]    CC:            [id number]

## 2018-09-27 LAB
ALBUMIN SERPL BCP-MCNC: 2.6 G/DL
ALP SERPL-CCNC: 52 U/L
ALT SERPL W/O P-5'-P-CCNC: 12 U/L
ANION GAP SERPL CALC-SCNC: 4 MMOL/L
AST SERPL-CCNC: 28 U/L
BASOPHILS # BLD AUTO: 0.01 K/UL
BASOPHILS NFR BLD: 0.1 %
BILIRUB SERPL-MCNC: 0.4 MG/DL
BUN SERPL-MCNC: 9 MG/DL
CALCIUM SERPL-MCNC: 7.7 MG/DL
CHLORIDE SERPL-SCNC: 102 MMOL/L
CO2 SERPL-SCNC: 27 MMOL/L
CREAT SERPL-MCNC: 0.7 MG/DL
DIFFERENTIAL METHOD: ABNORMAL
EOSINOPHIL # BLD AUTO: 0.1 K/UL
EOSINOPHIL NFR BLD: 1.7 %
ERYTHROCYTE [DISTWIDTH] IN BLOOD BY AUTOMATED COUNT: 16.8 %
EST. GFR  (AFRICAN AMERICAN): >60 ML/MIN/1.73 M^2
EST. GFR  (NON AFRICAN AMERICAN): >60 ML/MIN/1.73 M^2
GLUCOSE SERPL-MCNC: 87 MG/DL
HCT VFR BLD AUTO: 31.8 %
HGB BLD-MCNC: 10.3 G/DL
IMM GRANULOCYTES # BLD AUTO: 0.02 K/UL
IMM GRANULOCYTES NFR BLD AUTO: 0.3 %
LYMPHOCYTES # BLD AUTO: 1.2 K/UL
LYMPHOCYTES NFR BLD: 16.2 %
MAGNESIUM SERPL-MCNC: 1.2 MG/DL
MCH RBC QN AUTO: 27.7 PG
MCHC RBC AUTO-ENTMCNC: 32.4 G/DL
MCV RBC AUTO: 86 FL
MONOCYTES # BLD AUTO: 0.7 K/UL
MONOCYTES NFR BLD: 9.7 %
NEUTROPHILS # BLD AUTO: 5.4 K/UL
NEUTROPHILS NFR BLD: 72 %
NRBC BLD-RTO: 0 /100 WBC
PLATELET # BLD AUTO: 193 K/UL
PMV BLD AUTO: 11.6 FL
POTASSIUM SERPL-SCNC: 3.7 MMOL/L
PROT SERPL-MCNC: 4.9 G/DL
RBC # BLD AUTO: 3.72 M/UL
SODIUM SERPL-SCNC: 133 MMOL/L
WBC # BLD AUTO: 7.45 K/UL

## 2018-09-27 PROCEDURE — S0028 INJECTION, FAMOTIDINE, 20 MG: HCPCS | Performed by: INTERNAL MEDICINE

## 2018-09-27 PROCEDURE — 25000242 PHARM REV CODE 250 ALT 637 W/ HCPCS: Performed by: SURGERY

## 2018-09-27 PROCEDURE — 85025 COMPLETE CBC W/AUTO DIFF WBC: CPT

## 2018-09-27 PROCEDURE — 20000000 HC ICU ROOM

## 2018-09-27 PROCEDURE — 94640 AIRWAY INHALATION TREATMENT: CPT

## 2018-09-27 PROCEDURE — 83735 ASSAY OF MAGNESIUM: CPT

## 2018-09-27 PROCEDURE — 27000221 HC OXYGEN, UP TO 24 HOURS

## 2018-09-27 PROCEDURE — 94760 N-INVAS EAR/PLS OXIMETRY 1: CPT

## 2018-09-27 PROCEDURE — 36415 COLL VENOUS BLD VENIPUNCTURE: CPT

## 2018-09-27 PROCEDURE — 25000003 PHARM REV CODE 250: Performed by: SURGERY

## 2018-09-27 PROCEDURE — 25000003 PHARM REV CODE 250: Performed by: INTERNAL MEDICINE

## 2018-09-27 PROCEDURE — 94799 UNLISTED PULMONARY SVC/PX: CPT

## 2018-09-27 PROCEDURE — 63600175 PHARM REV CODE 636 W HCPCS: Performed by: SURGERY

## 2018-09-27 PROCEDURE — 63600175 PHARM REV CODE 636 W HCPCS: Performed by: INTERNAL MEDICINE

## 2018-09-27 PROCEDURE — 80053 COMPREHEN METABOLIC PANEL: CPT

## 2018-09-27 PROCEDURE — S0030 INJECTION, METRONIDAZOLE: HCPCS | Performed by: INTERNAL MEDICINE

## 2018-09-27 PROCEDURE — 63600175 PHARM REV CODE 636 W HCPCS: Performed by: EMERGENCY MEDICINE

## 2018-09-27 RX ORDER — KETOROLAC TROMETHAMINE 30 MG/ML
15 INJECTION, SOLUTION INTRAMUSCULAR; INTRAVENOUS EVERY 6 HOURS
Status: COMPLETED | OUTPATIENT
Start: 2018-09-27 | End: 2018-09-29

## 2018-09-27 RX ORDER — MAGNESIUM SULFATE HEPTAHYDRATE 40 MG/ML
2 INJECTION, SOLUTION INTRAVENOUS ONCE
Status: COMPLETED | OUTPATIENT
Start: 2018-09-27 | End: 2018-09-27

## 2018-09-27 RX ADMIN — HYDROMORPHONE HYDROCHLORIDE 1 MG: 2 INJECTION INTRAMUSCULAR; INTRAVENOUS; SUBCUTANEOUS at 12:09

## 2018-09-27 RX ADMIN — IPRATROPIUM BROMIDE AND ALBUTEROL SULFATE 3 ML: .5; 3 SOLUTION RESPIRATORY (INHALATION) at 06:09

## 2018-09-27 RX ADMIN — HYDROMORPHONE HYDROCHLORIDE 1 MG: 2 INJECTION INTRAMUSCULAR; INTRAVENOUS; SUBCUTANEOUS at 11:09

## 2018-09-27 RX ADMIN — HYDROMORPHONE HYDROCHLORIDE 1 MG: 2 INJECTION INTRAMUSCULAR; INTRAVENOUS; SUBCUTANEOUS at 05:09

## 2018-09-27 RX ADMIN — ACETAMINOPHEN 1000 MG: 10 INJECTION, SOLUTION INTRAVENOUS at 05:09

## 2018-09-27 RX ADMIN — FAMOTIDINE 20 MG: 20 INJECTION, SOLUTION INTRAVENOUS at 08:09

## 2018-09-27 RX ADMIN — SODIUM CHLORIDE, POTASSIUM CHLORIDE, SODIUM LACTATE AND CALCIUM CHLORIDE: 600; 310; 30; 20 INJECTION, SOLUTION INTRAVENOUS at 06:09

## 2018-09-27 RX ADMIN — MAGNESIUM SULFATE IN WATER 2 G: 40 INJECTION, SOLUTION INTRAVENOUS at 08:09

## 2018-09-27 RX ADMIN — PIPERACILLIN SODIUM AND TAZOBACTAM SODIUM: 3; .375 INJECTION, POWDER, FOR SOLUTION INTRAVENOUS at 11:09

## 2018-09-27 RX ADMIN — PIPERACILLIN SODIUM AND TAZOBACTAM SODIUM: 3; .375 INJECTION, POWDER, FOR SOLUTION INTRAVENOUS at 12:09

## 2018-09-27 RX ADMIN — SODIUM CHLORIDE, POTASSIUM CHLORIDE, SODIUM LACTATE AND CALCIUM CHLORIDE 125 ML/HR: 600; 310; 30; 20 INJECTION, SOLUTION INTRAVENOUS at 06:09

## 2018-09-27 RX ADMIN — HYDROMORPHONE HYDROCHLORIDE 1 MG: 2 INJECTION INTRAMUSCULAR; INTRAVENOUS; SUBCUTANEOUS at 01:09

## 2018-09-27 RX ADMIN — HYDROMORPHONE HYDROCHLORIDE 1 MG: 2 INJECTION INTRAMUSCULAR; INTRAVENOUS; SUBCUTANEOUS at 06:09

## 2018-09-27 RX ADMIN — PIPERACILLIN SODIUM AND TAZOBACTAM SODIUM: 3; .375 INJECTION, POWDER, FOR SOLUTION INTRAVENOUS at 06:09

## 2018-09-27 RX ADMIN — FAMOTIDINE 20 MG: 20 INJECTION, SOLUTION INTRAVENOUS at 09:09

## 2018-09-27 RX ADMIN — ACETAMINOPHEN 1000 MG: 10 INJECTION, SOLUTION INTRAVENOUS at 01:09

## 2018-09-27 RX ADMIN — KETOROLAC TROMETHAMINE 15 MG: 30 INJECTION, SOLUTION INTRAMUSCULAR; INTRAVENOUS at 05:09

## 2018-09-27 RX ADMIN — KETOROLAC TROMETHAMINE 15 MG: 30 INJECTION, SOLUTION INTRAMUSCULAR; INTRAVENOUS at 12:09

## 2018-09-27 RX ADMIN — HYDROMORPHONE HYDROCHLORIDE 1 MG: 2 INJECTION INTRAMUSCULAR; INTRAVENOUS; SUBCUTANEOUS at 09:09

## 2018-09-27 RX ADMIN — HYDROMORPHONE HYDROCHLORIDE 1 MG: 2 INJECTION INTRAMUSCULAR; INTRAVENOUS; SUBCUTANEOUS at 04:09

## 2018-09-27 RX ADMIN — METRONIDAZOLE 500 MG: 500 INJECTION, SOLUTION INTRAVENOUS at 06:09

## 2018-09-27 RX ADMIN — KETOROLAC TROMETHAMINE 15 MG: 30 INJECTION, SOLUTION INTRAMUSCULAR; INTRAVENOUS at 11:09

## 2018-09-27 RX ADMIN — IPRATROPIUM BROMIDE AND ALBUTEROL SULFATE 3 ML: .5; 3 SOLUTION RESPIRATORY (INHALATION) at 01:09

## 2018-09-27 RX ADMIN — ONDANSETRON HYDROCHLORIDE 4 MG: 2 INJECTION, SOLUTION INTRAMUSCULAR; INTRAVENOUS at 06:09

## 2018-09-27 RX ADMIN — SODIUM CHLORIDE, POTASSIUM CHLORIDE, SODIUM LACTATE AND CALCIUM CHLORIDE: 600; 310; 30; 20 INJECTION, SOLUTION INTRAVENOUS at 11:09

## 2018-09-27 RX ADMIN — ONDANSETRON HYDROCHLORIDE 4 MG: 2 INJECTION, SOLUTION INTRAMUSCULAR; INTRAVENOUS at 08:09

## 2018-09-27 RX ADMIN — IPRATROPIUM BROMIDE AND ALBUTEROL SULFATE 3 ML: .5; 3 SOLUTION RESPIRATORY (INHALATION) at 07:09

## 2018-09-27 RX ADMIN — ONDANSETRON HYDROCHLORIDE 4 MG: 2 INJECTION, SOLUTION INTRAMUSCULAR; INTRAVENOUS at 05:09

## 2018-09-27 RX ADMIN — HEPARIN SODIUM 5000 UNITS: 5000 INJECTION, SOLUTION INTRAVENOUS; SUBCUTANEOUS at 09:09

## 2018-09-27 RX ADMIN — HEPARIN SODIUM 5000 UNITS: 5000 INJECTION, SOLUTION INTRAVENOUS; SUBCUTANEOUS at 01:09

## 2018-09-27 RX ADMIN — METRONIDAZOLE 500 MG: 500 INJECTION, SOLUTION INTRAVENOUS at 02:09

## 2018-09-27 RX ADMIN — HYDROMORPHONE HYDROCHLORIDE 1 MG: 2 INJECTION INTRAMUSCULAR; INTRAVENOUS; SUBCUTANEOUS at 08:09

## 2018-09-27 RX ADMIN — HEPARIN SODIUM 5000 UNITS: 5000 INJECTION, SOLUTION INTRAVENOUS; SUBCUTANEOUS at 05:09

## 2018-09-27 RX ADMIN — METRONIDAZOLE 500 MG: 500 INJECTION, SOLUTION INTRAVENOUS at 12:09

## 2018-09-27 RX ADMIN — ONDANSETRON HYDROCHLORIDE 4 MG: 2 INJECTION, SOLUTION INTRAMUSCULAR; INTRAVENOUS at 12:09

## 2018-09-27 NOTE — PROGRESS NOTES
Patient received from the OR s/p abdominal surgery.  Abdominal dressing to midline abdomen is CDI.  TEDS and SCDS to lower legs.  Dutton cath in place.  RIJ triple lumen central line in place pending verification by CXR of placement.  IV is currently infusing to the RAC 18 gauge at 125 ml/hr.  Labs to be drawn, CXR to be done and breathing treatment to be given.  Patient is complaining of pain primarily at catheter insertion site (penis).  Catheter is not taut and is secured with a stat lock to the upper thigh.  Urine is draining.  CM demonstrates NSR with IVCD.  Monitoring closely.

## 2018-09-27 NOTE — PLAN OF CARE
09/27/18 0900   Discharge Reassessment   Assessment Type Discharge Planning Reassessment   Discharge plan remains the same: Yes   Provided patient/caregiver education on the expected discharge date and the discharge plan Yes   Discharge Plan A Home   Change in patient condition or support system No   Patient choice form signed by patient/caregiver N/A   Explained to the the patient/caregiver why the discharge planned changed: Yes   Involved the patient/caregiver in establishing a new discharge plan: Yes   Patient sitting up in chair. No discharge needs at this time. Will continue to follow.

## 2018-09-27 NOTE — ASSESSMENT & PLAN NOTE
1.  Continue bowel rest with NG tube suction and NPO status  2.  Continue IV fluids  3.  Continue pain control as needed  4.  Surgical consult to determine further treatment plan  5.  Empiric antibiotics of Zosyn and Flagyl  6.  Follow as needed based on clinical course    September 26th 2018:  This patient will be taken for exploratory laparotomy later today                                        Will follow up postsurgically as needed                                         Will repeat labs in the a.m.    September 27, 2018:  Replace magnesium IV 2 g IV over 2 hr recheck in the morning addendum                                       Continue pain control and NPO status until bowel function returned                                       Continue current pain and nausea medications and follow this patient as needed

## 2018-09-27 NOTE — PLAN OF CARE
Patient awake and alert. VSS. Dutton intact and draining yellow urine. Wound midline and intact. ABD pads to wound. No draining noted. Transferred straight to ICU from OR. Taken by 2 RN's and CRNA. Patient stable on transfer. Report given to ESTEPHANIA Elizabeth. ICU Nurse.

## 2018-09-27 NOTE — NURSING
PATIENT SAT IN CHAIR FOR 1 HOUR AND THEN AMBULATED WITH THE NURSE APPROXIMATELY 30 FEET, ASSISTED PATIENT BACK TO BED, SR UP X 2, C /L IN REACH, NO DISTRESS NOTED.

## 2018-09-27 NOTE — PLAN OF CARE
Problem: Infection, Risk/Actual (Adult)  Intervention: Manage Suspected/Actual Infection   09/27/18 1746   Safety Interventions   Infection Management aseptic technique maintained

## 2018-09-27 NOTE — PLAN OF CARE
Problem: Surgery Nonspecified (Adult)  Intervention: Monitor/Manage Postoperative Bleeding   09/27/18 0600   Safety Interventions   Bleeding Management dressing monitored

## 2018-09-27 NOTE — PLAN OF CARE
Problem: Patient Care Overview  Goal: Plan of Care Review  Outcome: Ongoing (interventions implemented as appropriate)  Patient stable overnight s/p exploratory lap with repair of hernia, appy.  Medicated frequently for pain but patient has already been up in the chair for one hour this am.  He is using his IS well with excellent technique.  Bush remains in place, UOP marginal.  Patient would really like to get bush removed today.  Potassium replaced overnight and 500 ml bolus of NS given.  Afebrile, receiving antibiotics.  No falls, injuries or skin issues noted overnight.

## 2018-09-27 NOTE — PROGRESS NOTES
Dr. Horner notified of potassium and sodium level on post op labs as well as UOP of 20 ml for the last hour. Orders received and will be implemented.

## 2018-09-27 NOTE — PLAN OF CARE
Problem: Fall Risk (Adult)  Intervention: Monitor/Assist with Self Care   09/27/18 2433   Activity   Activity Assistance Provided assistance, 1 person

## 2018-09-27 NOTE — PLAN OF CARE
Problem: Patient Care Overview  Goal: Plan of Care Review  PT IS ON 3 LPM, PER NASAL CANNULA. PULSE OX IS 97%  HR 90.   BBS CLEAR/ SLIGHT DIMINISHED, ALL LOBES.  AEROSOL TX GIVIN WITH DUONEB, FOLLOWED BY USING THE INCENTIVE SPIROMEOTR AT 1600. GOOD EFFORT,   PT HAD A PROD. COUGH POST TX.  PT TOLERATED TX WELL.

## 2018-09-27 NOTE — TRANSFER OF CARE
"Anesthesia Transfer of Care Note    Patient: Miles Vazquez    Procedure(s) Performed: Procedure(s) (LRB):  EGD (ESOPHAGOGASTRODUODENOSCOPY) (N/A)  Insertion,central venous access device (N/A)  REPAIR, HERNIA (N/A)    Patient location: ICU    Anesthesia Type: general    Transport from OR: Transported from OR on 2-3 L/min O2 by NC with adequate spontaneous ventilation. Continuous ECG monitoring in transport. Continuous SpO2 monitoring in transport. Continuos invasive BP monitoring in transport    Post pain: adequate analgesia    Post assessment: no apparent anesthetic complications and tolerated procedure well    Post vital signs: stable    Level of consciousness: awake, alert and oriented    Nausea/Vomiting: no nausea/vomiting    Complications: none    Transfer of care protocol was followed      Last vitals:   Visit Vitals  /63   Pulse 104   Temp 36.1 °C (97 °F) (Oral)   Resp 13   Ht 5' 7" (1.702 m)   Wt 59.9 kg (132 lb 0.9 oz)   SpO2 96%   BMI 20.68 kg/m²     "

## 2018-09-27 NOTE — PROGRESS NOTES
[facility] - [facility]  Hospital Medicine  Progress Note    Patient Name: Miles Vazquez  MRN: [id number]  Patient Class: IP- Inpatient   Admission Date: 9/24/2018  Length of Stay: 3 days  Attending Physician: Burton Cast MD  Primary Care Provider: Nichelle Frederick MD        Subjective:     Principal Problem:Small bowel obstruction    HPI:  Patient is a 63-year-old male that presented to the emergency last evening complaining of right lower quadrant abdominal pain. Patient states a 1 day history of pain in the right lower quadrant area isn't nausea with 1 episode of vomiting.  Patient has a past medical history of recently discovered abdominal aortic aneurysm, and also has history of hypertension hyperlipidemia osteoarthritis, colonic polyps, depression, and restless leg syndrome.  Patient denies fever, chills, rigors, but states significant nausea for the last 24 hr and right lower quadrant pain.  It is vomiting only x1 patient was seen in the emergency department and started on NG tube suction and bowel rest.    Hospital Course:  Patient will be admitted to medical-surgical unit.  Inpatient surgical consult will be obtained.  Further treatment will be based on clinical course.    September 26, 2018:  Patient is seemingly worse today with pain, discomfort and obstruction.  White blood cell count has decreased however symptomatically the patient seems worse.  Dr. Horner discussed with this patient the need for exploratory laparotomy and EGD and will proceed with that this afternoon.  Patient does understand all risks and benefits and wants to proceed.  Continue controlling pain and continue NPO status follow up postsurgically    September 27, 2018:  Patient underwent exploratory laparotomy last evening.  Patient was found to have a strangulated but not incarcerated hernia of the distal small bowel.  This was easily reduced intraoperatively and the patient was recovered without  complications.  No bowel resection had to be done.  Patient has remained stable overnight since surgery and is sitting up in the chair this morning stating some pain but nothing worse than he was having prior to the surgery.  I blood cell count this morning was normal white blood cells 7.45 hemoglobin 10.3 hematocrit level of 31.8 and platelets normal 193 chemistry showed a sodium 133 calcium 7.7 albumin 2.6 and otherwise no electrolyte abnormalities.  Patient magnesium was 1.2 which will be replaced this morning    Interval History:  Patient doing well postop day 1.  Surgery was without complications and we await his bowel function returned so as to give him clear liquids and advance as tolerated    Review of Systems   Constitutional: Negative for activity change, appetite change, fatigue and fever.   HENT: Negative for congestion, ear discharge, mouth sores, nosebleeds, rhinorrhea, sinus pressure, sinus pain and tinnitus.    Eyes: Negative.  Negative for pain, redness and itching.   Respiratory: Negative for apnea, cough, choking, chest tightness, shortness of breath, wheezing and stridor.    Cardiovascular: Negative for chest pain, palpitations and leg swelling.   Gastrointestinal: Positive for abdominal pain and blood in stool. Negative for abdominal distention, anal bleeding, constipation and diarrhea.   Endocrine: Negative.    Genitourinary: Negative for difficulty urinating, flank pain, frequency and urgency.   Musculoskeletal: Negative for arthralgias, back pain, gait problem and myalgias.   Skin: Negative for color change and pallor.   Allergic/Immunologic: Negative.    Neurological: Negative for dizziness, facial asymmetry, weakness, light-headedness and headaches.   Hematological: Negative for adenopathy. Does not bruise/bleed easily.     Objective:     Vital Signs (Most Recent):  Temp: 98 °F (36.7 °C) (09/27/18 0715)  Pulse: 86 (09/27/18 1351)  Resp: 12 (09/27/18 1351)  BP: 111/67 (09/27/18 1000)  SpO2:  98 % (09/27/18 1351) Vital Signs (24h Range):  Temp:  [97.5 °F (36.4 °C)-98 °F (36.7 °C)] 98 °F (36.7 °C)  Pulse:  [] 86  Resp:  [0-19] 12  SpO2:  [91 %-100 %] 98 %  BP: ()/(39-67) 111/67     Weight: 59.9 kg (132 lb 0.9 oz)  Body mass index is 20.68 kg/m².    Intake/Output Summary (Last 24 hours) at 9/27/2018 1355  Last data filed at 9/27/2018 1000  Gross per 24 hour   Intake 4059.58 ml   Output 2651 ml   Net 1408.58 ml      Physical Exam   Constitutional: He is oriented to person, place, and time. He appears well-developed and well-nourished.   HENT:   Head: Normocephalic and atraumatic.   Eyes: EOM are normal. Pupils are equal, round, and reactive to light.   Neck: Normal range of motion. Neck supple. No tracheal deviation present. No thyromegaly present.   Pulmonary/Chest: Effort normal and breath sounds normal.   Abdominal: Soft. Bowel sounds are normal. He exhibits no distension. There is tenderness. There is guarding. There is no rebound.   Musculoskeletal: Normal range of motion.   Lymphadenopathy:     He has no cervical adenopathy.   Neurological: He is alert and oriented to person, place, and time.   Skin: Skin is warm and dry. Capillary refill takes less than 2 seconds.   Psychiatric: He has a normal mood and affect. His behavior is normal. Judgment and thought content normal.   Nursing note and vitals reviewed.      Significant Labs: All pertinent labs within the past 24 hours have been reviewed.    Significant Imaging: I have reviewed and interpreted all pertinent imaging results/findings within the past 24 hours.    Assessment/Plan:      * Small bowel obstruction    1.  Continue bowel rest with NG tube suction and NPO status  2.  Continue IV fluids  3.  Continue pain control as needed  4.  Surgical consult to determine further treatment plan  5.  Empiric antibiotics of Zosyn and Flagyl  6.  Follow as needed based on clinical course    September 26th 2018:  This patient will be taken for  exploratory laparotomy later today                                        Will follow up postsurgically as needed                                         Will repeat labs in the a.m.    September 27, 2018:  Replace magnesium IV 2 g IV over 2 hr recheck in the morning addendum                                       Continue pain control and NPO status until bowel function returned                                       Continue current pain and nausea medications and follow this patient as needed            VTE Risk Mitigation (From admission, onward)        Ordered     heparin (porcine) injection 5,000 Units  Every 8 hours      09/26/18 1913     Place sequential compression device  Until discontinued      09/26/18 1913     Place RADHA hose  Until discontinued      09/26/18 1913     IP VTE LOW RISK PATIENT  Once      09/24/18 2357     Place RADHA hose  Until discontinued      09/24/18 2357              Burton Cast MD  Department of Hospital Medicine   [facility] - ICU

## 2018-09-27 NOTE — PROGRESS NOTES
[facility] - Med Surg  General Surgery  Daily Note    Patient Name: Miles Vazquez  MRN: [id number]  Admission Date: 9/24/2018  Attending Physician: Burton Cast MD   Consult Physician: Ramírez Horenr MD  Primary Care Provider: Nichelle Frederick MD    Subjective:     Principle Problem: Small bowel obstruction    Last 24 hour history:  9/26/18  Patient with increasing abdominal pain last 24 hr.  Afebrile vital signs stable.  Nausea no vomiting.  Patient states that his abdominal pain is worse than yesterday.  CT scan conducted this morning with IV and p.o. contrast for which I have reviewed with Dr. Gomez of Radiology.  He agrees with worsening small bowel obstruction. More abdominal free fluid.  No free air.  Increasing distention of small bowel.  Definite area of transition in the right lower quadrant. No defined masses.  Area in distal antrum possible inflammation versus ulceration.  Patient will need EGD as well. No other new complaints    9/27/18  Patient s/p laparotomy with internal hernia reduction and appendectomy.  Doing well today.  Pain well controlled.  Feels better from prior to surgery.  NGT in place with 1500cc output.  No bowel function yet.  No fevers. Vital signs stable.  No new complaints.    Objective:     Vital Signs (Most Recent):  Temp: 98 °F (36.7 °C) (09/27/18 0715)  Pulse: 86 (09/27/18 1351)  Resp: 12 (09/27/18 1351)  BP: 111/67 (09/27/18 1000)  SpO2: 98 % (09/27/18 1351) Vital Signs (24h Range):  Temp:  [97.5 °F (36.4 °C)-98 °F (36.7 °C)] 98 °F (36.7 °C)  Pulse:  [] 86  Resp:  [0-19] 12  SpO2:  [91 %-100 %] 98 %  BP: ()/(39-67) 111/67     Intake/Output last 24 hours:    Intake/Output Summary (Last 24 hours) at 9/27/2018 1650  Last data filed at 9/27/2018 1500  Gross per 24 hour   Intake 4784.58 ml   Output 2651 ml   Net 2133.58 ml       I/O last 3 completed shifts:  In: 3614.6 [I.V.:2924.6; NG/GT:140; IV Piggyback:550]  Out: 4378  [Urine:2676; Emesis/NG output:1500; Drains:100; Blood:100]  I/O this shift:  In: 1200 [I.V.:1050; IV Piggyback:150]  Out: 175 [Urine:175]    Weight: 59.9 kg (132 lb 0.9 oz)  Body mass index is 20.68 kg/m².    Gen: Wd Wn male currently in NAD  Heent: Nc/At, MMM, NGT in place with bilious output.  Eyes: Perrl, Eomi  Cv: RRR, no  M/g/r  Lung: Non-labored breathing, clear bilaterally  Abd: Soft,mild distention, tender to palpation around surgical site    Significant Labs:  CBC:   Recent Labs   Lab  09/27/18   0425   WBC  7.45   RBC  3.72*   HGB  10.3*   HCT  31.8*   PLT  193   MCV  86   MCH  27.7   MCHC  32.4     BMP:   Recent Labs   Lab  09/27/18   0425   GLU  87   NA  133*   K  3.7   CL  102   CO2  27   BUN  9   CREATININE  0.7   CALCIUM  7.7*   MG  1.2*     CMP:   Recent Labs   Lab  09/27/18   0425   GLU  87   CALCIUM  7.7*   ALBUMIN  2.6*   PROT  4.9*   NA  133*   K  3.7   CO2  27   CL  102   BUN  9   CREATININE  0.7   ALKPHOS  52*   ALT  12   AST  28   BILITOT  0.4     LFTs:   Recent Labs   Lab  09/27/18   0425   ALT  12   AST  28   ALKPHOS  52*   BILITOT  0.4   PROT  4.9*   ALBUMIN  2.6*     Coagulation:   Recent Labs   Lab  09/26/18   0452   LABPROT  15.1*   INR  1.3*     Specimen (12h ago, onward)    None        Recent Labs   Lab  09/25/18   1830   COLORU  Yellow   SPECGRAV  1.025   PHUR  6.0   PROTEINUA  Trace*   NITRITE  Negative   LEUKOCYTESUR  Negative   UROBILINOGEN  Negative       Cultures:    Microbiology Results (last 7 days)     ** No results found for the last 168 hours. **          Assessment:   Miles Vazquez is a 63 y.o. male who presents with Small bowel obstruction.    Active Diagnoses:    Diagnosis Date Noted POA    PRINCIPAL PROBLEM:  Small bowel obstruction [K56.609] 09/24/2018 Yes      Problems Resolved During this Admission:     VTE Risk Mitigation (From admission, onward)        Ordered     heparin (porcine) injection 5,000 Units  Every 8 hours      09/26/18 1913     Place sequential  compression device  Until discontinued      09/26/18 1913     Place RADHA hose  Until discontinued      09/26/18 1913     IP VTE LOW RISK PATIENT  Once      09/24/18 2357     Place RADHA hose  Until discontinued      09/24/18 2357          Medical Decision Making/Plan:  Satisfactory post-operative recovery thus far.  Continue NGT to LCWS and bowel rest.  Continue DVT PPX.  Continue GI Ppx.  Continue pulmonary toilet.  Anticipate tx to floor tomorrow possibly.    Ramírez Horner MD  General Surgery  [facility] - Med Surg

## 2018-09-27 NOTE — SUBJECTIVE & OBJECTIVE
Interval History:  Patient doing well postop day 1.  Surgery was without complications and we await his bowel function returned so as to give him clear liquids and advance as tolerated    Review of Systems   Constitutional: Negative for activity change, appetite change, fatigue and fever.   HENT: Negative for congestion, ear discharge, mouth sores, nosebleeds, rhinorrhea, sinus pressure, sinus pain and tinnitus.    Eyes: Negative.  Negative for pain, redness and itching.   Respiratory: Negative for apnea, cough, choking, chest tightness, shortness of breath, wheezing and stridor.    Cardiovascular: Negative for chest pain, palpitations and leg swelling.   Gastrointestinal: Positive for abdominal pain and blood in stool. Negative for abdominal distention, anal bleeding, constipation and diarrhea.   Endocrine: Negative.    Genitourinary: Negative for difficulty urinating, flank pain, frequency and urgency.   Musculoskeletal: Negative for arthralgias, back pain, gait problem and myalgias.   Skin: Negative for color change and pallor.   Allergic/Immunologic: Negative.    Neurological: Negative for dizziness, facial asymmetry, weakness, light-headedness and headaches.   Hematological: Negative for adenopathy. Does not bruise/bleed easily.     Objective:     Vital Signs (Most Recent):  Temp: 98 °F (36.7 °C) (09/27/18 0715)  Pulse: 86 (09/27/18 1351)  Resp: 12 (09/27/18 1351)  BP: 111/67 (09/27/18 1000)  SpO2: 98 % (09/27/18 1351) Vital Signs (24h Range):  Temp:  [97.5 °F (36.4 °C)-98 °F (36.7 °C)] 98 °F (36.7 °C)  Pulse:  [] 86  Resp:  [0-19] 12  SpO2:  [91 %-100 %] 98 %  BP: ()/(39-67) 111/67     Weight: 59.9 kg (132 lb 0.9 oz)  Body mass index is 20.68 kg/m².    Intake/Output Summary (Last 24 hours) at 9/27/2018 1355  Last data filed at 9/27/2018 1000  Gross per 24 hour   Intake 4059.58 ml   Output 2651 ml   Net 1408.58 ml      Physical Exam   Constitutional: He is oriented to person, place, and time. He  appears well-developed and well-nourished.   HENT:   Head: Normocephalic and atraumatic.   Eyes: EOM are normal. Pupils are equal, round, and reactive to light.   Neck: Normal range of motion. Neck supple. No tracheal deviation present. No thyromegaly present.   Pulmonary/Chest: Effort normal and breath sounds normal.   Abdominal: Soft. Bowel sounds are normal. He exhibits no distension. There is tenderness. There is guarding. There is no rebound.   Musculoskeletal: Normal range of motion.   Lymphadenopathy:     He has no cervical adenopathy.   Neurological: He is alert and oriented to person, place, and time.   Skin: Skin is warm and dry. Capillary refill takes less than 2 seconds.   Psychiatric: He has a normal mood and affect. His behavior is normal. Judgment and thought content normal.   Nursing note and vitals reviewed.      Significant Labs: All pertinent labs within the past 24 hours have been reviewed.    Significant Imaging: I have reviewed and interpreted all pertinent imaging results/findings within the past 24 hours.

## 2018-09-27 NOTE — PLAN OF CARE
Problem: Pressure Ulcer Risk (Orlin Scale) (Adult,Obstetrics,Pediatric)  Intervention: Prevent/Minimize Sheer/Friction Injuries   09/27/18 0000   Positioning   Positioning/Transfer Devices pillows;in use

## 2018-09-27 NOTE — ANESTHESIA POSTPROCEDURE EVALUATION
"Anesthesia Post Evaluation    Patient: Miles Vazquez    Procedure(s) Performed: Procedure(s) (LRB):  EGD (ESOPHAGOGASTRODUODENOSCOPY) (N/A)  Insertion,central venous access device (N/A)  REPAIR, HERNIA (N/A)    Final Anesthesia Type: general  Patient location during evaluation: PACU  Patient participation: Yes- Able to Participate  Level of consciousness: awake and alert  Post-procedure vital signs: reviewed and stable  Pain management: adequate  Airway patency: patent  PONV status at discharge: No PONV  Anesthetic complications: no      Cardiovascular status: blood pressure returned to baseline  Respiratory status: unassisted  Hydration status: euvolemic  Follow-up not needed.        Visit Vitals  /63   Pulse 104   Temp 36.1 °C (97 °F) (Oral)   Resp 13   Ht 5' 7" (1.702 m)   Wt 59.9 kg (132 lb 0.9 oz)   SpO2 96%   BMI 20.68 kg/m²       Pain/Patricia Score: Pain Assessment Performed: Yes (9/26/2018  7:05 AM)  Presence of Pain: complains of pain/discomfort (9/26/2018  7:05 AM)  Pain Rating Prior to Med Admin: 10 (9/26/2018  1:08 PM)  Pain Rating Post Med Admin: 5 (9/26/2018  1:38 PM)  Patricia Score: 9 (9/26/2018  7:10 PM)        "

## 2018-09-28 PROBLEM — E83.51 HYPOCALCEMIA: Status: ACTIVE | Noted: 2018-09-28

## 2018-09-28 LAB
ALBUMIN SERPL BCP-MCNC: 2.5 G/DL
ALP SERPL-CCNC: 50 U/L
ALT SERPL W/O P-5'-P-CCNC: 16 U/L
ANION GAP SERPL CALC-SCNC: 5 MMOL/L
AST SERPL-CCNC: 32 U/L
BASOPHILS # BLD AUTO: 0 K/UL
BASOPHILS NFR BLD: 0 %
BILIRUB SERPL-MCNC: 0.9 MG/DL
BUN SERPL-MCNC: 10 MG/DL
CALCIUM SERPL-MCNC: 7.7 MG/DL
CHLORIDE SERPL-SCNC: 101 MMOL/L
CO2 SERPL-SCNC: 27 MMOL/L
CREAT SERPL-MCNC: 0.6 MG/DL
DIFFERENTIAL METHOD: ABNORMAL
EOSINOPHIL # BLD AUTO: 0.3 K/UL
EOSINOPHIL NFR BLD: 3.7 %
ERYTHROCYTE [DISTWIDTH] IN BLOOD BY AUTOMATED COUNT: 16.9 %
EST. GFR  (AFRICAN AMERICAN): >60 ML/MIN/1.73 M^2
EST. GFR  (NON AFRICAN AMERICAN): >60 ML/MIN/1.73 M^2
GLUCOSE SERPL-MCNC: 67 MG/DL
HCT VFR BLD AUTO: 28.9 %
HGB BLD-MCNC: 9.3 G/DL
IMM GRANULOCYTES # BLD AUTO: 0.02 K/UL
IMM GRANULOCYTES NFR BLD AUTO: 0.3 %
LYMPHOCYTES # BLD AUTO: 1.2 K/UL
LYMPHOCYTES NFR BLD: 16.9 %
MCH RBC QN AUTO: 27.5 PG
MCHC RBC AUTO-ENTMCNC: 32.2 G/DL
MCV RBC AUTO: 86 FL
MONOCYTES # BLD AUTO: 0.7 K/UL
MONOCYTES NFR BLD: 9.9 %
NEUTROPHILS # BLD AUTO: 4.7 K/UL
NEUTROPHILS NFR BLD: 69.2 %
NRBC BLD-RTO: 0 /100 WBC
PLATELET # BLD AUTO: 171 K/UL
PMV BLD AUTO: 11.9 FL
POTASSIUM SERPL-SCNC: 3.3 MMOL/L
PROT SERPL-MCNC: 5 G/DL
RBC # BLD AUTO: 3.38 M/UL
SODIUM SERPL-SCNC: 133 MMOL/L
WBC # BLD AUTO: 6.8 K/UL

## 2018-09-28 PROCEDURE — 94760 N-INVAS EAR/PLS OXIMETRY 1: CPT

## 2018-09-28 PROCEDURE — 63600175 PHARM REV CODE 636 W HCPCS: Performed by: INTERNAL MEDICINE

## 2018-09-28 PROCEDURE — 80053 COMPREHEN METABOLIC PANEL: CPT

## 2018-09-28 PROCEDURE — 25000003 PHARM REV CODE 250: Performed by: INTERNAL MEDICINE

## 2018-09-28 PROCEDURE — 97802 MEDICAL NUTRITION INDIV IN: CPT

## 2018-09-28 PROCEDURE — 94640 AIRWAY INHALATION TREATMENT: CPT

## 2018-09-28 PROCEDURE — 25000242 PHARM REV CODE 250 ALT 637 W/ HCPCS: Performed by: SURGERY

## 2018-09-28 PROCEDURE — 94761 N-INVAS EAR/PLS OXIMETRY MLT: CPT

## 2018-09-28 PROCEDURE — 36415 COLL VENOUS BLD VENIPUNCTURE: CPT

## 2018-09-28 PROCEDURE — 27000221 HC OXYGEN, UP TO 24 HOURS

## 2018-09-28 PROCEDURE — 11000001 HC ACUTE MED/SURG PRIVATE ROOM

## 2018-09-28 PROCEDURE — 85025 COMPLETE CBC W/AUTO DIFF WBC: CPT

## 2018-09-28 PROCEDURE — 63600175 PHARM REV CODE 636 W HCPCS: Performed by: EMERGENCY MEDICINE

## 2018-09-28 PROCEDURE — S0028 INJECTION, FAMOTIDINE, 20 MG: HCPCS | Performed by: INTERNAL MEDICINE

## 2018-09-28 PROCEDURE — 63600175 PHARM REV CODE 636 W HCPCS: Performed by: SURGERY

## 2018-09-28 PROCEDURE — S0030 INJECTION, METRONIDAZOLE: HCPCS | Performed by: INTERNAL MEDICINE

## 2018-09-28 RX ORDER — POTASSIUM CHLORIDE 14.9 MG/ML
20 INJECTION INTRAVENOUS ONCE
Status: DISCONTINUED | OUTPATIENT
Start: 2018-09-28 | End: 2018-09-28

## 2018-09-28 RX ORDER — CLONIDINE HYDROCHLORIDE 0.1 MG/1
0.1 TABLET ORAL EVERY 6 HOURS PRN
Status: DISCONTINUED | OUTPATIENT
Start: 2018-09-28 | End: 2018-09-30 | Stop reason: SDUPTHER

## 2018-09-28 RX ORDER — SODIUM CHLORIDE AND POTASSIUM CHLORIDE 150; 900 MG/100ML; MG/100ML
INJECTION, SOLUTION INTRAVENOUS CONTINUOUS
Status: DISCONTINUED | OUTPATIENT
Start: 2018-09-28 | End: 2018-09-30

## 2018-09-28 RX ORDER — ENALAPRILAT 1.25 MG/ML
5 INJECTION INTRAVENOUS EVERY 6 HOURS
Status: DISCONTINUED | OUTPATIENT
Start: 2018-09-28 | End: 2018-09-29

## 2018-09-28 RX ORDER — ACETAMINOPHEN 10 MG/ML
1000 INJECTION, SOLUTION INTRAVENOUS EVERY 8 HOURS
Status: COMPLETED | OUTPATIENT
Start: 2018-09-28 | End: 2018-09-29

## 2018-09-28 RX ORDER — TRAZODONE HYDROCHLORIDE 50 MG/1
100 TABLET ORAL NIGHTLY
Status: DISCONTINUED | OUTPATIENT
Start: 2018-09-28 | End: 2018-10-02 | Stop reason: HOSPADM

## 2018-09-28 RX ORDER — MAGNESIUM SULFATE HEPTAHYDRATE 40 MG/ML
2 INJECTION, SOLUTION INTRAVENOUS ONCE
Status: COMPLETED | OUTPATIENT
Start: 2018-09-28 | End: 2018-09-28

## 2018-09-28 RX ORDER — POTASSIUM CHLORIDE 7.45 MG/ML
10 INJECTION INTRAVENOUS
Status: COMPLETED | OUTPATIENT
Start: 2018-09-28 | End: 2018-09-28

## 2018-09-28 RX ADMIN — POTASSIUM CHLORIDE AND SODIUM CHLORIDE: 900; 150 INJECTION, SOLUTION INTRAVENOUS at 09:09

## 2018-09-28 RX ADMIN — FAMOTIDINE 20 MG: 20 INJECTION, SOLUTION INTRAVENOUS at 08:09

## 2018-09-28 RX ADMIN — HYDROMORPHONE HYDROCHLORIDE 1 MG: 2 INJECTION INTRAMUSCULAR; INTRAVENOUS; SUBCUTANEOUS at 08:09

## 2018-09-28 RX ADMIN — KETOROLAC TROMETHAMINE 15 MG: 30 INJECTION, SOLUTION INTRAMUSCULAR; INTRAVENOUS at 06:09

## 2018-09-28 RX ADMIN — HYDROMORPHONE HYDROCHLORIDE 1 MG: 2 INJECTION INTRAMUSCULAR; INTRAVENOUS; SUBCUTANEOUS at 10:09

## 2018-09-28 RX ADMIN — KETOROLAC TROMETHAMINE 15 MG: 30 INJECTION, SOLUTION INTRAMUSCULAR; INTRAVENOUS at 11:09

## 2018-09-28 RX ADMIN — METRONIDAZOLE 500 MG: 500 INJECTION, SOLUTION INTRAVENOUS at 03:09

## 2018-09-28 RX ADMIN — HYDROMORPHONE HYDROCHLORIDE 1 MG: 2 INJECTION INTRAMUSCULAR; INTRAVENOUS; SUBCUTANEOUS at 05:09

## 2018-09-28 RX ADMIN — HEPARIN SODIUM 5000 UNITS: 5000 INJECTION, SOLUTION INTRAVENOUS; SUBCUTANEOUS at 01:09

## 2018-09-28 RX ADMIN — IPRATROPIUM BROMIDE AND ALBUTEROL SULFATE 3 ML: .5; 3 SOLUTION RESPIRATORY (INHALATION) at 07:09

## 2018-09-28 RX ADMIN — PIPERACILLIN SODIUM AND TAZOBACTAM SODIUM: 3; .375 INJECTION, POWDER, FOR SOLUTION INTRAVENOUS at 06:09

## 2018-09-28 RX ADMIN — HYDROMORPHONE HYDROCHLORIDE 1 MG: 2 INJECTION INTRAMUSCULAR; INTRAVENOUS; SUBCUTANEOUS at 01:09

## 2018-09-28 RX ADMIN — IPRATROPIUM BROMIDE AND ALBUTEROL SULFATE 3 ML: .5; 3 SOLUTION RESPIRATORY (INHALATION) at 01:09

## 2018-09-28 RX ADMIN — HEPARIN SODIUM 5000 UNITS: 5000 INJECTION, SOLUTION INTRAVENOUS; SUBCUTANEOUS at 06:09

## 2018-09-28 RX ADMIN — TRAZODONE HYDROCHLORIDE 100 MG: 50 TABLET ORAL at 08:09

## 2018-09-28 RX ADMIN — HYDROMORPHONE HYDROCHLORIDE 1 MG: 2 INJECTION INTRAMUSCULAR; INTRAVENOUS; SUBCUTANEOUS at 03:09

## 2018-09-28 RX ADMIN — ONDANSETRON HYDROCHLORIDE 4 MG: 2 INJECTION, SOLUTION INTRAMUSCULAR; INTRAVENOUS at 06:09

## 2018-09-28 RX ADMIN — POTASSIUM CHLORIDE AND SODIUM CHLORIDE: 900; 150 INJECTION, SOLUTION INTRAVENOUS at 08:09

## 2018-09-28 RX ADMIN — POTASSIUM CHLORIDE 10 MEQ: 7.46 INJECTION, SOLUTION INTRAVENOUS at 09:09

## 2018-09-28 RX ADMIN — ENALAPRILAT 5 MG: 2.5 INJECTION INTRAVENOUS at 11:09

## 2018-09-28 RX ADMIN — ACETAMINOPHEN 1000 MG: 10 INJECTION, SOLUTION INTRAVENOUS at 10:09

## 2018-09-28 RX ADMIN — ONDANSETRON HYDROCHLORIDE 4 MG: 2 INJECTION, SOLUTION INTRAMUSCULAR; INTRAVENOUS at 01:09

## 2018-09-28 RX ADMIN — ACETAMINOPHEN 1000 MG: 10 INJECTION, SOLUTION INTRAVENOUS at 01:09

## 2018-09-28 RX ADMIN — ONDANSETRON HYDROCHLORIDE 4 MG: 2 INJECTION, SOLUTION INTRAMUSCULAR; INTRAVENOUS at 05:09

## 2018-09-28 RX ADMIN — MAGNESIUM SULFATE IN WATER 2 G: 40 INJECTION, SOLUTION INTRAVENOUS at 09:09

## 2018-09-28 RX ADMIN — ENALAPRILAT 5 MG: 2.5 INJECTION INTRAVENOUS at 05:09

## 2018-09-28 RX ADMIN — KETOROLAC TROMETHAMINE 15 MG: 30 INJECTION, SOLUTION INTRAMUSCULAR; INTRAVENOUS at 05:09

## 2018-09-28 RX ADMIN — HYDROMORPHONE HYDROCHLORIDE 1 MG: 2 INJECTION INTRAMUSCULAR; INTRAVENOUS; SUBCUTANEOUS at 06:09

## 2018-09-28 RX ADMIN — IPRATROPIUM BROMIDE AND ALBUTEROL SULFATE 3 ML: .5; 3 SOLUTION RESPIRATORY (INHALATION) at 08:09

## 2018-09-28 NOTE — PLAN OF CARE
Problem: Infection, Risk/Actual (Adult)  Goal: Infection Prevention/Resolution  Patient will demonstrate the desired outcomes by discharge/transition of care.  Outcome: Ongoing (interventions implemented as appropriate)   09/28/18 1438   Infection, Risk/Actual (Adult)   Infection Prevention/Resolution making progress toward outcome

## 2018-09-28 NOTE — PLAN OF CARE
Problem: Patient Care Overview  Goal: Plan of Care Review  Outcome: Ongoing (interventions implemented as appropriate)   09/28/18 4028   Coping/Psychosocial   Plan Of Care Reviewed With patient

## 2018-09-28 NOTE — PROGRESS NOTES
[facility]  Hospital Medicine  Progress Note    Patient Name: Miles Vazquez  MRN: [id number]  Patient Class: IP- Inpatient   Admission Date: 9/24/2018  Length of Stay: 4 days  Attending Physician: Burton Cast MD  Primary Care Provider: Nichelle Frederick MD        Subjective:     Principal Problem:Small bowel obstruction    HPI:  Patient is a 63-year-old male that presented to the emergency last evening complaining of right lower quadrant abdominal pain. Patient states a 1 day history of pain in the right lower quadrant area isn't nausea with 1 episode of vomiting.  Patient has a past medical history of recently discovered abdominal aortic aneurysm, and also has history of hypertension hyperlipidemia osteoarthritis, colonic polyps, depression, and restless leg syndrome.  Patient denies fever, chills, rigors, but states significant nausea for the last 24 hr and right lower quadrant pain.  It is vomiting only x1 patient was seen in the emergency department and started on NG tube suction and bowel rest.    Hospital Course:  Patient will be admitted to medical-surgical unit.  Inpatient surgical consult will be obtained.  Further treatment will be based on clinical course.    September 26, 2018:  Patient is seemingly worse today with pain, discomfort and obstruction.  White blood cell count has decreased however symptomatically the patient seems worse.  Dr. Horner discussed with this patient the need for exploratory laparotomy and EGD and will proceed with that this afternoon.  Patient does understand all risks and benefits and wants to proceed.  Continue controlling pain and continue NPO status follow up postsurgically    September 27, 2018:  Patient underwent exploratory laparotomy last evening.  Patient was found to have a strangulated but not incarcerated hernia of the distal small bowel.  This was easily reduced intraoperatively and the patient was recovered without  complications.  No bowel resection had to be done.  Patient has remained stable overnight since surgery and is sitting up in the chair this morning stating some pain but nothing worse than he was having prior to the surgery.  I blood cell count this morning was normal white blood cells 7.45 hemoglobin 10.3 hematocrit level of 31.8 and platelets normal 193 chemistry showed a sodium 133 calcium 7.7 albumin 2.6 and otherwise no electrolyte abnormalities.  Patient magnesium was 1.2 which will be replaced this morning  9/2 08/2018.  Patient is doing well today sitting up in a chair is examination is excellent.  He has clear breath sounds he is doing 17 50 on IAS the patient bowel sounds are positive he has not passed gas yet.  The patient desires to be transferred to the floor he even go home if he can't.  The patient will be transferred to the floor.  The patient will need replacement patch potassium and magnesium.  Lab pending for in a.m..  The patient H&H has been discussed.  Will continue to monitor.    Interval History:  Patient is doing well no new complaints today.  The patient does complain not sleep last p.m. will add trazodone will at home medications.  Blood pressure slightly elevated this morning will use Vasotec IV.  Transfer the patient to the floor at this time he is doing well no longer needs ICU care.  Patient's respiratory status is excellent.  Heart normal sinus rhythm.  Abdomen has slightly tender bowel sounds are positive.  The patient has not passed gas or had a bowel movement this time.  Progressive ambulation should help the patient.  Potassium and magnesium will be replaced.  Monitor labs in a.m..    Review of Systems   Constitutional: Negative for activity change, appetite change, chills, diaphoresis, fatigue, fever and unexpected weight change.   HENT: Negative for congestion, drooling, hearing loss and trouble swallowing.    Eyes: Negative for pain and visual disturbance.   Respiratory:  Negative.  Negative for apnea, cough, choking, chest tightness, shortness of breath and wheezing.    Cardiovascular: Negative for chest pain, palpitations and leg swelling.   Gastrointestinal: Negative for abdominal distention, abdominal pain, blood in stool, constipation, diarrhea, nausea and vomiting.        Tenderness.   Endocrine: Negative for polydipsia, polyphagia and polyuria.   Genitourinary: Negative for difficulty urinating, dysuria and flank pain.   Musculoskeletal: Negative for arthralgias, back pain, gait problem, joint swelling, neck pain and neck stiffness.   Skin: Negative for color change, rash and wound.   Allergic/Immunologic: Negative for environmental allergies, food allergies and immunocompromised state.   Neurological: Negative for dizziness, syncope, weakness, numbness and headaches.   Hematological: Negative for adenopathy.   Psychiatric/Behavioral: Negative for agitation, confusion and sleep disturbance. The patient is not nervous/anxious.      Objective:     Vital Signs (Most Recent):  Temp: 98.1 °F (36.7 °C) (09/28/18 0315)  Pulse: 92 (09/28/18 0800)  Resp: 12 (09/28/18 0800)  BP: (!) 152/75 (09/28/18 0800)  SpO2: 98 % (09/28/18 0800) Vital Signs (24h Range):  Temp:  [97.8 °F (36.6 °C)-98.3 °F (36.8 °C)] 98.1 °F (36.7 °C)  Pulse:  [] 92  Resp:  [5-40] 12  SpO2:  [87 %-100 %] 98 %  BP: (103-178)/(47-95) 152/75     Weight: 59.9 kg (132 lb 0.9 oz)  Body mass index is 20.68 kg/m².    Intake/Output Summary (Last 24 hours) at 9/28/2018 0840  Last data filed at 9/28/2018 0600  Gross per 24 hour   Intake 3190 ml   Output 1675 ml   Net 1515 ml      Physical Exam   Constitutional: He is oriented to person, place, and time. He appears well-developed and well-nourished.   HENT:   Head: Normocephalic and atraumatic.   Right Ear: External ear normal.   Left Ear: External ear normal.   Nose: Nose normal.   NG tube in place.   Eyes: Conjunctivae, EOM and lids are normal. Pupils are equal, round,  and reactive to light.   Neck: Trachea normal, normal range of motion and full passive range of motion without pain. Neck supple.   Cardiovascular: Normal rate, regular rhythm, S1 normal, S2 normal, normal heart sounds, intact distal pulses and normal pulses.   Pulmonary/Chest: Effort normal and breath sounds normal.   Abdominal: Normal aorta and bowel sounds are normal. There is tenderness.   Musculoskeletal: Normal range of motion.   Neurological: He is alert and oriented to person, place, and time. He has normal reflexes.   Skin: Skin is warm, dry and intact.   Psychiatric: He has a normal mood and affect. His speech is normal and behavior is normal. Judgment and thought content normal. Cognition and memory are normal.   Nursing note and vitals reviewed.      Significant Labs:   BMP:   Recent Labs   Lab  09/27/18 0425 09/28/18   0400   GLU  87  67*   NA  133*  133*   K  3.7  3.3*   CL  102  101   CO2  27  27   BUN  9  10   CREATININE  0.7  0.6   CALCIUM  7.7*  7.7*   MG  1.2*   --      CBC:   Recent Labs   Lab  09/26/18 1914 09/27/18   0425  09/28/18   0400   WBC  10.25  7.45  6.80   HGB  11.4*  10.3*  9.3*   HCT  35.1*  31.8*  28.9*   PLT  220  193  171     CMP:   Recent Labs   Lab  09/26/18 1914 09/27/18 0425  09/28/18   0400   NA  132*  133*  133*   K  3.4*  3.7  3.3*   CL  101  102  101   CO2  24  27  27   GLU  107  87  67*   BUN  9  9  10   CREATININE  0.8  0.7  0.6   CALCIUM  8.0*  7.7*  7.7*   PROT   --   4.9*  5.0*   ALBUMIN   --   2.6*  2.5*   BILITOT   --   0.4  0.9   ALKPHOS   --   52*  50*   AST   --   28  32   ALT   --   12  16   ANIONGAP  7*  4*  5*   EGFRNONAA  >60.0  >60.0  >60.0     Magnesium:   Recent Labs   Lab  09/27/18   0425   MG  1.2*       Significant Imaging: I have reviewed all pertinent imaging results/findings within the past 24 hours.    Assessment/Plan:      * Small bowel obstruction    1.  Continue bowel rest with NG tube suction and NPO status  2.  Continue IV fluids  3.   Continue pain control as needed  4.  Surgical consult to determine further treatment plan  5.  Empiric antibiotics of Zosyn and Flagyl  6.  Follow as needed based on clinical course    September 26th 2018:  This patient will be taken for exploratory laparotomy later today                                        Will follow up postsurgically as needed                                         Will repeat labs in the a.m.    September 27, 2018:  Replace magnesium IV 2 g IV over 2 hr recheck in the morning addendum                                       Continue pain control and NPO status until bowel function returned                                       Continue current pain and nausea medications and follow this patient as needed  09/28/2018.  The patient has bowel sounds this morning he has not passed flatus or had a bowel movement.  He does state that he is tender but otherwise doing well.  Patient has excellent breath sounds he can do his IS at 17 50 cc            VTE Risk Mitigation (From admission, onward)        Ordered     heparin (porcine) injection 5,000 Units  Every 8 hours      09/26/18 1913     Place sequential compression device  Until discontinued      09/26/18 1913     Place RADHA hose  Until discontinued      09/26/18 1913     IP VTE LOW RISK PATIENT  Once      09/24/18 2357     Place RADHA hose  Until discontinued      09/24/18 2357              Oziel Ortiz MD  Department of Hospital Medicine   [facility] - ICU

## 2018-09-28 NOTE — PROVIDER TRANSFER
[facility] - ICU  Transfer of Care Note      Patient Name: Miles Vazquez  MRN: [id number]  Admission Date: 9/24/2018  Hospital Length of Stay: 4 days  Transfer Date: 9/28/2018  Attending Physician: Burton Cast MD   Primary Care Provider: Nichelle Frederick MD  Reason for Admission:  Transfer from the ICU to [facility] surg continue level of treatment.  Patient long needs to be in ICU.    HPI:   Patient is a 63-year-old male that presented to the emergency last evening complaining of right lower quadrant abdominal pain. Patient states a 1 day history of pain in the right lower quadrant area isn't nausea with 1 episode of vomiting.  Patient has a past medical history of recently discovered abdominal aortic aneurysm, and also has history of hypertension hyperlipidemia osteoarthritis, colonic polyps, depression, and restless leg syndrome.  Patient denies fever, chills, rigors, but states significant nausea for the last 24 hr and right lower quadrant pain.  It is vomiting only x1 patient was seen in the emergency department and started on NG tube suction and bowel rest.    Hospital Course:   Patient will be admitted to medical-surgical unit.  Inpatient surgical consult will be obtained.  Further treatment will be based on clinical course.    September 26, 2018:  Patient is seemingly worse today with pain, discomfort and obstruction.  White blood cell count has decreased however symptomatically the patient seems worse.  Dr. Horner discussed with this patient the need for exploratory laparotomy and EGD and will proceed with that this afternoon.  Patient does understand all risks and benefits and wants to proceed.  Continue controlling pain and continue NPO status follow up postsurgically    September 27, 2018:  Patient underwent exploratory laparotomy last evening.  Patient was found to have a strangulated but not incarcerated hernia of the distal small bowel.  This was easily reduced  intraoperatively and the patient was recovered without complications.  No bowel resection had to be done.  Patient has remained stable overnight since surgery and is sitting up in the chair this morning stating some pain but nothing worse than he was having prior to the surgery.  I blood cell count this morning was normal white blood cells 7.45 hemoglobin 10.3 hematocrit level of 31.8 and platelets normal 193 chemistry showed a sodium 133 calcium 7.7 albumin 2.6 and otherwise no electrolyte abnormalities.  Patient magnesium was 1.2 which will be replaced this morning  9/2 08/2018.  Patient is doing well today sitting up in a chair is examination is excellent.  He has clear breath sounds he is doing 17 50 on IAS the patient bowel sounds are positive he has not passed gas yet.  The patient desires to be transferred to the floor he even go home if he can't.  The patient will be transferred to the floor.  The patient will need replacement patch potassium and magnesium.  Lab pending for in a.m..  The patient H&H has been discussed.  Will continue to monitor.    * Small bowel obstruction    1.  Continue bowel rest with NG tube suction and NPO status  2.  Continue IV fluids  3.  Continue pain control as needed  4.  Surgical consult to determine further treatment plan  5.  Empiric antibiotics of Zosyn and Flagyl  6.  Follow as needed based on clinical course    September 26th 2018:  This patient will be taken for exploratory laparotomy later today                                        Will follow up postsurgically as needed                                         Will repeat labs in the a.m.    September 27, 2018:  Replace magnesium IV 2 g IV over 2 hr recheck in the morning addendum                                       Continue pain control and NPO status until bowel function returned                                       Continue current pain and nausea medications and follow this patient as needed  09/28/2018.  The  patient has bowel sounds this morning he has not passed flatus or had a bowel movement.  He does state that he is tender but otherwise doing well.  Patient has excellent breath sounds he can do his IS at 17 50 cc            Consults (From admission, onward)        Status Ordering Provider     IP CONSULT TO GENERAL SURGERY  Once     Provider:  Ramírez Horner MD    Completed MORGAN PHAN        Procedure(s) (LRB):  EGD (ESOPHAGOGASTRODUODENOSCOPY) (N/A)  Insertion,central venous access device (N/A)  REPAIR, HERNIA (N/A)  APPENDECTOMY (N/A)    Diet Orders          Diet NPO Except for: Medication: NPO starting at 09/26 1729        Activity Orders          None        Pending Diagnostic Studies:     None        Oziel Ortiz MD  [facility] - ICU

## 2018-09-28 NOTE — ASSESSMENT & PLAN NOTE
1.  Continue bowel rest with NG tube suction and NPO status  2.  Continue IV fluids  3.  Continue pain control as needed  4.  Surgical consult to determine further treatment plan  5.  Empiric antibiotics of Zosyn and Flagyl  6.  Follow as needed based on clinical course    September 26th 2018:  This patient will be taken for exploratory laparotomy later today                                        Will follow up postsurgically as needed                                         Will repeat labs in the a.m.    September 27, 2018:  Replace magnesium IV 2 g IV over 2 hr recheck in the morning addendum                                       Continue pain control and NPO status until bowel function returned                                       Continue current pain and nausea medications and follow this patient as needed  09/28/2018.  The patient has bowel sounds this morning he has not passed flatus or had a bowel movement.  He does state that he is tender but otherwise doing well.  Patient has excellent breath sounds he can do his IS at 17 50 cc

## 2018-09-28 NOTE — PLAN OF CARE
"Problem: Patient Care Overview  Goal: Plan of Care Review  Pt stable overnight, continued difficulty with sleeping. Education provided on use of nursing call system for assistance as necessary and in regards to pain medication. PRN medications provided for pain throughout night as well as regularly scheduled NSAIDS. Antibiotic/Antimicrobial use ongoing via R TLC IJ. Dsg changed utilizing sterile technique, pt verbalizes that incision "looks well compared to other surgeries in past". Midline abdominal incision with staples, C/D/I. Pt adequately moves in bed independently, and frequent weight shifting is promoted per RN. Tolerated ambulation to bedside chair this AM after bath. Linen change provided. Call light in reach. Will continue to monitor.       "

## 2018-09-28 NOTE — SUBJECTIVE & OBJECTIVE
Interval History:  Patient is doing well no new complaints today.  The patient does complain not sleep last p.m. will add trazodone will at home medications.  Blood pressure slightly elevated this morning will use Vasotec IV.  Transfer the patient to the floor at this time he is doing well no longer needs ICU care.  Patient's respiratory status is excellent.  Heart normal sinus rhythm.  Abdomen has slightly tender bowel sounds are positive.  The patient has not passed gas or had a bowel movement this time.  Progressive ambulation should help the patient.  Potassium and magnesium will be replaced.  Monitor labs in a.m..    Review of Systems   Constitutional: Negative for activity change, appetite change, chills, diaphoresis, fatigue, fever and unexpected weight change.   HENT: Negative for congestion, drooling, hearing loss and trouble swallowing.    Eyes: Negative for pain and visual disturbance.   Respiratory: Negative.  Negative for apnea, cough, choking, chest tightness, shortness of breath and wheezing.    Cardiovascular: Negative for chest pain, palpitations and leg swelling.   Gastrointestinal: Negative for abdominal distention, abdominal pain, blood in stool, constipation, diarrhea, nausea and vomiting.        Tenderness.   Endocrine: Negative for polydipsia, polyphagia and polyuria.   Genitourinary: Negative for difficulty urinating, dysuria and flank pain.   Musculoskeletal: Negative for arthralgias, back pain, gait problem, joint swelling, neck pain and neck stiffness.   Skin: Negative for color change, rash and wound.   Allergic/Immunologic: Negative for environmental allergies, food allergies and immunocompromised state.   Neurological: Negative for dizziness, syncope, weakness, numbness and headaches.   Hematological: Negative for adenopathy.   Psychiatric/Behavioral: Negative for agitation, confusion and sleep disturbance. The patient is not nervous/anxious.      Objective:     Vital Signs (Most  Recent):  Temp: 98.1 °F (36.7 °C) (09/28/18 0315)  Pulse: 92 (09/28/18 0800)  Resp: 12 (09/28/18 0800)  BP: (!) 152/75 (09/28/18 0800)  SpO2: 98 % (09/28/18 0800) Vital Signs (24h Range):  Temp:  [97.8 °F (36.6 °C)-98.3 °F (36.8 °C)] 98.1 °F (36.7 °C)  Pulse:  [] 92  Resp:  [5-40] 12  SpO2:  [87 %-100 %] 98 %  BP: (103-178)/(47-95) 152/75     Weight: 59.9 kg (132 lb 0.9 oz)  Body mass index is 20.68 kg/m².    Intake/Output Summary (Last 24 hours) at 9/28/2018 0840  Last data filed at 9/28/2018 0600  Gross per 24 hour   Intake 3190 ml   Output 1675 ml   Net 1515 ml      Physical Exam   Constitutional: He is oriented to person, place, and time. He appears well-developed and well-nourished.   HENT:   Head: Normocephalic and atraumatic.   Right Ear: External ear normal.   Left Ear: External ear normal.   Nose: Nose normal.   NG tube in place.   Eyes: Conjunctivae, EOM and lids are normal. Pupils are equal, round, and reactive to light.   Neck: Trachea normal, normal range of motion and full passive range of motion without pain. Neck supple.   Cardiovascular: Normal rate, regular rhythm, S1 normal, S2 normal, normal heart sounds, intact distal pulses and normal pulses.   Pulmonary/Chest: Effort normal and breath sounds normal.   Abdominal: Normal aorta and bowel sounds are normal. There is tenderness.   Musculoskeletal: Normal range of motion.   Neurological: He is alert and oriented to person, place, and time. He has normal reflexes.   Skin: Skin is warm, dry and intact.   Psychiatric: He has a normal mood and affect. His speech is normal and behavior is normal. Judgment and thought content normal. Cognition and memory are normal.   Nursing note and vitals reviewed.      Significant Labs:   BMP:   Recent Labs   Lab  09/27/18   0425  09/28/18   0400   GLU  87  67*   NA  133*  133*   K  3.7  3.3*   CL  102  101   CO2  27  27   BUN  9  10   CREATININE  0.7  0.6   CALCIUM  7.7*  7.7*   MG  1.2*   --      CBC:   Recent  Labs   Lab  09/26/18 1914 09/27/18 0425  09/28/18   0400   WBC  10.25  7.45  6.80   HGB  11.4*  10.3*  9.3*   HCT  35.1*  31.8*  28.9*   PLT  220  193  171     CMP:   Recent Labs   Lab  09/26/18 1914 09/27/18 0425  09/28/18   0400   NA  132*  133*  133*   K  3.4*  3.7  3.3*   CL  101  102  101   CO2  24  27  27   GLU  107  87  67*   BUN  9  9  10   CREATININE  0.8  0.7  0.6   CALCIUM  8.0*  7.7*  7.7*   PROT   --   4.9*  5.0*   ALBUMIN   --   2.6*  2.5*   BILITOT   --   0.4  0.9   ALKPHOS   --   52*  50*   AST   --   28  32   ALT   --   12  16   ANIONGAP  7*  4*  5*   EGFRNONAA  >60.0  >60.0  >60.0     Magnesium:   Recent Labs   Lab  09/27/18   0425   MG  1.2*       Significant Imaging: I have reviewed all pertinent imaging results/findings within the past 24 hours.

## 2018-09-28 NOTE — PROGRESS NOTES
[facility] - Med Surg  General Surgery  Daily Note    Patient Name: Miles Vazquez  MRN: [id number]  Admission Date: 9/24/2018  Attending Physician: Burton Cast MD   Consult Physician: Ramírez Horner MD  Primary Care Provider: Nichelle Frederick MD    Subjective:     Principle Problem: Small bowel obstruction    Last 24 hour history:  9/26/18  Patient with increasing abdominal pain last 24 hr.  Afebrile vital signs stable.  Nausea no vomiting.  Patient states that his abdominal pain is worse than yesterday.  CT scan conducted this morning with IV and p.o. contrast for which I have reviewed with Dr. Gomez of Radiology.  He agrees with worsening small bowel obstruction. More abdominal free fluid.  No free air.  Increasing distention of small bowel.  Definite area of transition in the right lower quadrant. No defined masses.  Area in distal antrum possible inflammation versus ulceration.  Patient will need EGD as well. No other new complaints    9/27/18  Patient s/p laparotomy with internal hernia reduction and appendectomy.  Doing well today.  Pain well controlled.  Feels better from prior to surgery.  NGT in place with 1500cc output.  No bowel function yet.  No fevers. Vital signs stable.  No new complaints.    9/28/18  No issues in the last 24 hr.  Still awaiting bowel function.  Afebrile.  Vital signs stable.  Laboratory values reviewed.  White count within normal limits.  NG tube with minimal output.  No new complaints    Objective:     Vital Signs (Most Recent):  Temp: 98.1 °F (36.7 °C) (09/28/18 0315)  Pulse: 92 (09/28/18 0800)  Resp: 12 (09/28/18 0800)  BP: (!) 152/75 (09/28/18 0800)  SpO2: 98 % (09/28/18 0800) Vital Signs (24h Range):  Temp:  [97.8 °F (36.6 °C)-98.3 °F (36.8 °C)] 98.1 °F (36.7 °C)  Pulse:  [] 92  Resp:  [6-40] 12  SpO2:  [87 %-99 %] 98 %  BP: (103-178)/(47-95) 152/75     Intake/Output last 24 hours:    Intake/Output Summary (Last 24 hours) at  9/28/2018 0910  Last data filed at 9/28/2018 0600  Gross per 24 hour   Intake 3065 ml   Output 1675 ml   Net 1390 ml       I/O last 3 completed shifts:  In: 5449.6 [I.V.:4349.6; NG/GT:150; IV Piggyback:950]  Out: 2351 [Urine:1801; Drains:550]  No intake/output data recorded.    Weight: 59.9 kg (132 lb 0.9 oz)  Body mass index is 20.68 kg/m².    Gen: Wd Wn male currently in NAD  Heent: Nc/At, MMM, NGT in place with bilious output.  Eyes: Perrl, Eomi  Cv: RRR, no  M/g/r  Lung: Non-labored breathing, clear bilaterally  Abd: Soft,mild distention, tender to palpation around surgical site    Significant Labs:  CBC:   Recent Labs   Lab  09/28/18   0400   WBC  6.80   RBC  3.38*   HGB  9.3*   HCT  28.9*   PLT  171   MCV  86   MCH  27.5   MCHC  32.2     BMP:   Recent Labs   Lab  09/27/18   0425  09/28/18   0400   GLU  87  67*   NA  133*  133*   K  3.7  3.3*   CL  102  101   CO2  27  27   BUN  9  10   CREATININE  0.7  0.6   CALCIUM  7.7*  7.7*   MG  1.2*   --      CMP:   Recent Labs   Lab  09/28/18   0400   GLU  67*   CALCIUM  7.7*   ALBUMIN  2.5*   PROT  5.0*   NA  133*   K  3.3*   CO2  27   CL  101   BUN  10   CREATININE  0.6   ALKPHOS  50*   ALT  16   AST  32   BILITOT  0.9     LFTs:   Recent Labs   Lab  09/28/18   0400   ALT  16   AST  32   ALKPHOS  50*   BILITOT  0.9   PROT  5.0*   ALBUMIN  2.5*     Coagulation:   Recent Labs   Lab  09/26/18   0452   LABPROT  15.1*   INR  1.3*     Specimen (12h ago, onward)    None        Recent Labs   Lab  09/25/18   1830   COLORU  Yellow   SPECGRAV  1.025   PHUR  6.0   PROTEINUA  Trace*   NITRITE  Negative   LEUKOCYTESUR  Negative   UROBILINOGEN  Negative       Cultures:    Microbiology Results (last 7 days)     ** No results found for the last 168 hours. **          Assessment:   Miles Vazquez is a 63 y.o. male who presents with Small bowel obstruction.    Active Diagnoses:    Diagnosis Date Noted POA    PRINCIPAL PROBLEM:  Small bowel obstruction [K56.609] 09/24/2018 Yes     Hypocalcemia [E83.51] 09/28/2018 Unknown      Problems Resolved During this Admission:     VTE Risk Mitigation (From admission, onward)        Ordered     heparin (porcine) injection 5,000 Units  Every 8 hours      09/26/18 1913     Place sequential compression device  Until discontinued      09/26/18 1913     Place RADHA hose  Until discontinued      09/26/18 1913     IP VTE LOW RISK PATIENT  Once      09/24/18 2357     Place RADHA hose  Until discontinued      09/24/18 2357          Medical Decision Making/Plan:  Satisfactory post-operative recovery thus far. D/c NGT.  Continue DVT PPX.  Continue GI Ppx.  Continue pulmonary toilet.  D/c Abx.  Tx to floor. Decrease IVFs.    Ramírez Horner MD  General Surgery  [facility] - Med Surg

## 2018-09-28 NOTE — PLAN OF CARE
09/28/18 1508   Discharge Reassessment   Assessment Type Discharge Planning Reassessment   Discharge plan remains the same: Yes   Provided patient/caregiver education on the expected discharge date and the discharge plan Yes   Discharge Plan A Home   Change in patient condition or support system No   Patient choice form signed by patient/caregiver N/A   Explained to the the patient/caregiver why the discharge planned changed: Yes   Involved the patient/caregiver in establishing a new discharge plan: Yes   PT MOVED OUT OF ICU TO FLOOR TODAY. SW SPOKE WITH PT WHO SAYS HE IS FEELING BETTER. HE ANTICIPATES BEING HOSPITALIZED FOR ANOTHER 2 DAYS OR SO. HE HAS A WALKER AT HOME. HE DOES NOT THINK HE WILL NEED HOME HEALTH.  SW /CASE MANAGEMENT WILL CONTINUE TO FOLLOW AND ASSIST AS NEEDED.

## 2018-09-28 NOTE — PHYSICIAN QUERY
"PT Name: Miles Vazquez  MR #: [id number]    Physician Query Form - Hematology Clarification      CDS/: Clara Corbin RN, CCDS               Contact information:  [email or web address]    This form is a permanent document in the medical record.      Query Date: September 28, 2018    By submitting this query, we are merely seeking further clarification of documentation. Please utilize your independent clinical judgment when addressing the question(s) below.    The Medical record contains the following:   Indicators  Supporting Clinical Findings Location in Medical Record    "Anemia" documented     X H & H = H/H  13.3/40.4  11.5/35.7-->11.4/35.1  10.3/31.8  9.3/28.9 Lab  09/24 09/26  0452-->1914  09/27 09/28    BP =                     HR=      "GI bleeding" documented      Acute bleeding (Non GI site)      Transfusion(s)      Treatment:     X Other:  63-year-old male complains of day long pain of the abdomen with distention mild nausea and a single bout of vomiting    presented to the ER last night with abdominal pain of less than 24 hr duration.  Patient stated that it started yesterday morning.  It was associated with nausea and vomitus.  Vomitus described as previous food which she had just recently eaten.     presents with Small bowel obstruction.    Surgery date: 9/26/2018   Post-op Diagnosis:  Post-Op Diagnosis Codes:     Internal hernia with volvulus     Chronic appendicitis      Lack of peripheral venous access requiring central line placement  Operations:  1) Exploratory laparotomy  2) Reduction of internal hernia with volvulus  3) Appendectomy  4) Insertion of Right IJ Central venous access device (catheter) under ultrasound guidance  Estimated blood loss: 100 mL   ED provider note 09/25      General Surgery consult 09/25        General Surgery consult 09/25    Op note     Provider, please specify diagnosis or diagnoses associated with above clinical findings.    [ X ] Acute blood loss anemia expected " post-operatively  [  ] Acute blood loss anemia  [  ] Other Hematological Diagnosis (please specify): _________________________________    [  ] Clinically Undetermined       Please document in your progress notes daily for the duration of treatment, until resolved, and include in your discharge summary.

## 2018-09-28 NOTE — NURSING
"RECEIVED PT FROM ICU IN WHEELCHAIR. RESP EVEN AND UNLABORED. NAD NOTED. PT AAOX3. MIDLINE INCISION NOTED TO ABD, DRESSING DRY CLEAN, INTACT. NO DRAINAGE NOTED. ORIENTED PT TO ROOM. PT VOICED UNDERSTANDING. RIGHT IJ NOTED TO NECK, NS WITH 20K INFUSING 100ML/HR, PATENT.INFORMED PT THE NEED TO "AMBULATE. " PT STATES, I WILL. INSTRUCTED AND STRONGLY EDUCATED THE  IMPORTANCE OF WALKING. PT STATES, "i WILL IN A LITTLE WHILE. " WILL RE ASSESS.  INFORMED PT TO NOTIFY NURSE OF ANY CONCERNS/NEEDS. PT VOICED UNDERSTANDING. CL,RN  "

## 2018-09-28 NOTE — PROGRESS NOTES
"  [facility] - ICU  Adult Nutrition  Consult Note    SUMMARY     Recommendations    Recommendation/Intervention: 1) Advance diet to cl liq within 48 hrs or consider Clinimix to supplement diet.  Goals: When diet advanced pt will: 1) consume >75% of meals  2) consume recommended fluid  Nutrition Goal Status: new    Reason for Assessment    Reason for Assessment: NPO/clear liquids x >3 days  Diagnosis: Small bowel obstruction  General Information Comments: Pt in ICU being transferred to floor today. NGT in place. NPO to rest colon. Bowel sounds but no flatulance.    Past Medical History:   Diagnosis Date    Abdominal aortic aneurysm     Alcoholism     Bursitis of both hips     COPD (chronic obstructive pulmonary disease)     Coronary artery calcification seen on CAT scan 4/24/2013    Depression     HTN (hypertension) 1/7/2013    Hyperlipidemia     Hypertension     Macrocytic anemia 3/27/2016    Personal history of colonic polyps     Restless leg syndrome     Right hip pain     RLS (restless legs syndrome)     Sleep apnea 2 years ago    does not use cpap    Smoker 1/7/2013    Vitamin disease     Wheezing        Nutrition Risk Screen    Nutrition Risk Screen: no indicators present    Nutrition/Diet History    Patient Reported Diet/Restrictions/Preferences: no oral intake  Do you have any cultural, spiritual, Orthodoxy conflicts, given your current situation?: Adventism    Anthropometrics    Temp: 98.1 °F (36.7 °C)  Height Method: Stated  Height: 5' 7" (170.2 cm)  Height (inches): 67 in  Weight Method: Bed Scale  Weight: 59.9 kg (132 lb 0.9 oz)  Weight (lb): 132.06 lb  Ideal Body Weight (IBW), Male: 148 lb  % Ideal Body Weight, Male (lb): 94.59 lb  BMI (Calculated): 22     Lab/Procedures/Meds    Pertinent Labs Reviewed: reviewed    Lab Results   Component Value Date    ALBUMIN 2.5 (L) 09/28/2018     Lab Results   Component Value Date    WBC 6.80 09/28/2018    HGB 9.3 (L) 09/28/2018 "    HCT 28.9 (L) 09/28/2018    MCV 86 09/28/2018     09/28/2018     Lab Results   Component Value Date     (L) 09/28/2018    K 3.3 (L) 09/28/2018     09/28/2018    CO2 27 09/28/2018     Physical Findings/Assessment    Overall Physical Appearance: weak  Tubes: nasogastric tube  Orlin Score: 18    Estimated/Assessed Needs    Weight Used For Calorie Calculations: 59.9 kg (132 lb)  Energy Calorie Requirements (kcal): 2883-0547 calories  (25-30 kcal/kg)  Energy Need Method: Kcal/kg  Protein Requirements: 72-90 gms  (1. 2- 1.5)  Weight Used For Protein Calculations: 59.9 kg (132 lb 0.9 oz)     Fluid Need Method: RDA Method  RDA Method (mL): 1497     Nutrition Prescription Ordered    Current Diet Order: NPO  Nutrition Order Comments: Pt could benefit from Cinimix at this time.    Evaluation of Received Nutrient/Fluid Intake    Energy Calories Required: not meeting needs  Protein Required: not meeting needs  Fluid Required: exceed needs  Total Fluid Intake (mL/kg): 3415 mls  Tolerance: tolerating  % Intake of Estimated Energy Needs:   % Meal Intake: NPO    Nutrition Risk    Level of Risk/Frequency of Follow-up: moderate     Assessment and Plan    Nutrition Problem  Inadequate Intake    Related to (etiology):   Acuity of illness    Signs and Symptoms (as evidenced by):   NPO status for 4 days    Interventions/Recommendations (treatment strategy):  Consider Clinimix for nutritional support    Nutrition Diagnosis Status:   Continuing     Monitor and Evaluation    Food and Nutrient Intake: energy intake  Food and Nutrient Adminstration: diet order  Anthropometric Measurements: weight  Nutrition-Focused Physical Findings: overall appearance     Nutrition Follow-Up    Yes

## 2018-09-29 LAB
ANION GAP SERPL CALC-SCNC: 8 MMOL/L
BASOPHILS # BLD AUTO: 0.03 K/UL
BASOPHILS NFR BLD: 0.5 %
BUN SERPL-MCNC: 7 MG/DL
CALCIUM SERPL-MCNC: 8.1 MG/DL
CHLORIDE SERPL-SCNC: 106 MMOL/L
CO2 SERPL-SCNC: 26 MMOL/L
CREAT SERPL-MCNC: 0.7 MG/DL
DIFFERENTIAL METHOD: ABNORMAL
EOSINOPHIL # BLD AUTO: 0.4 K/UL
EOSINOPHIL NFR BLD: 6.6 %
ERYTHROCYTE [DISTWIDTH] IN BLOOD BY AUTOMATED COUNT: 16.8 %
EST. GFR  (AFRICAN AMERICAN): >60 ML/MIN/1.73 M^2
EST. GFR  (NON AFRICAN AMERICAN): >60 ML/MIN/1.73 M^2
GLUCOSE SERPL-MCNC: 90 MG/DL
HCT VFR BLD AUTO: 31 %
HGB BLD-MCNC: 10 G/DL
IMM GRANULOCYTES # BLD AUTO: 0.03 K/UL
IMM GRANULOCYTES NFR BLD AUTO: 0.5 %
LYMPHOCYTES # BLD AUTO: 1.4 K/UL
LYMPHOCYTES NFR BLD: 22.4 %
MAGNESIUM SERPL-MCNC: 1.8 MG/DL
MCH RBC QN AUTO: 27.5 PG
MCHC RBC AUTO-ENTMCNC: 32.3 G/DL
MCV RBC AUTO: 85 FL
MONOCYTES # BLD AUTO: 0.6 K/UL
MONOCYTES NFR BLD: 9.3 %
NEUTROPHILS # BLD AUTO: 3.9 K/UL
NEUTROPHILS NFR BLD: 60.7 %
NRBC BLD-RTO: 0 /100 WBC
PLATELET # BLD AUTO: 195 K/UL
PMV BLD AUTO: 11.4 FL
POTASSIUM SERPL-SCNC: 3.8 MMOL/L
RBC # BLD AUTO: 3.63 M/UL
SODIUM SERPL-SCNC: 140 MMOL/L
WBC # BLD AUTO: 6.37 K/UL

## 2018-09-29 PROCEDURE — 25000003 PHARM REV CODE 250: Performed by: INTERNAL MEDICINE

## 2018-09-29 PROCEDURE — 83735 ASSAY OF MAGNESIUM: CPT

## 2018-09-29 PROCEDURE — 11000001 HC ACUTE MED/SURG PRIVATE ROOM

## 2018-09-29 PROCEDURE — 99900035 HC TECH TIME PER 15 MIN (STAT)

## 2018-09-29 PROCEDURE — 25000242 PHARM REV CODE 250 ALT 637 W/ HCPCS: Performed by: SURGERY

## 2018-09-29 PROCEDURE — S0028 INJECTION, FAMOTIDINE, 20 MG: HCPCS | Performed by: INTERNAL MEDICINE

## 2018-09-29 PROCEDURE — 80048 BASIC METABOLIC PNL TOTAL CA: CPT

## 2018-09-29 PROCEDURE — 36415 COLL VENOUS BLD VENIPUNCTURE: CPT

## 2018-09-29 PROCEDURE — 25000003 PHARM REV CODE 250: Performed by: SURGERY

## 2018-09-29 PROCEDURE — 63600175 PHARM REV CODE 636 W HCPCS: Performed by: SURGERY

## 2018-09-29 PROCEDURE — 85025 COMPLETE CBC W/AUTO DIFF WBC: CPT

## 2018-09-29 PROCEDURE — 94640 AIRWAY INHALATION TREATMENT: CPT

## 2018-09-29 PROCEDURE — 94761 N-INVAS EAR/PLS OXIMETRY MLT: CPT

## 2018-09-29 RX ORDER — AMLODIPINE BESYLATE 2.5 MG/1
5 TABLET ORAL DAILY
Status: DISCONTINUED | OUTPATIENT
Start: 2018-09-29 | End: 2018-10-02 | Stop reason: HOSPADM

## 2018-09-29 RX ORDER — LISINOPRIL 10 MG/1
20 TABLET ORAL DAILY
Status: DISCONTINUED | OUTPATIENT
Start: 2018-09-29 | End: 2018-10-02 | Stop reason: HOSPADM

## 2018-09-29 RX ORDER — OXYCODONE AND ACETAMINOPHEN 5; 325 MG/1; MG/1
1 TABLET ORAL EVERY 4 HOURS PRN
Status: DISCONTINUED | OUTPATIENT
Start: 2018-09-29 | End: 2018-09-30

## 2018-09-29 RX ADMIN — OXYCODONE HYDROCHLORIDE AND ACETAMINOPHEN 1 TABLET: 5; 325 TABLET ORAL at 09:09

## 2018-09-29 RX ADMIN — HEPARIN SODIUM 5000 UNITS: 5000 INJECTION, SOLUTION INTRAVENOUS; SUBCUTANEOUS at 09:09

## 2018-09-29 RX ADMIN — HEPARIN SODIUM 5000 UNITS: 5000 INJECTION, SOLUTION INTRAVENOUS; SUBCUTANEOUS at 02:09

## 2018-09-29 RX ADMIN — IPRATROPIUM BROMIDE AND ALBUTEROL SULFATE 3 ML: .5; 3 SOLUTION RESPIRATORY (INHALATION) at 07:09

## 2018-09-29 RX ADMIN — KETOROLAC TROMETHAMINE 15 MG: 30 INJECTION, SOLUTION INTRAMUSCULAR; INTRAVENOUS at 05:09

## 2018-09-29 RX ADMIN — LISINOPRIL 20 MG: 10 TABLET ORAL at 03:09

## 2018-09-29 RX ADMIN — POTASSIUM CHLORIDE AND SODIUM CHLORIDE: 900; 150 INJECTION, SOLUTION INTRAVENOUS at 06:09

## 2018-09-29 RX ADMIN — CLONIDINE HYDROCHLORIDE 0.1 MG: 0.1 TABLET ORAL at 07:09

## 2018-09-29 RX ADMIN — IPRATROPIUM BROMIDE AND ALBUTEROL SULFATE 3 ML: .5; 3 SOLUTION RESPIRATORY (INHALATION) at 01:09

## 2018-09-29 RX ADMIN — POTASSIUM CHLORIDE AND SODIUM CHLORIDE: 900; 150 INJECTION, SOLUTION INTRAVENOUS at 07:09

## 2018-09-29 RX ADMIN — HEPARIN SODIUM 5000 UNITS: 5000 INJECTION, SOLUTION INTRAVENOUS; SUBCUTANEOUS at 05:09

## 2018-09-29 RX ADMIN — FAMOTIDINE 20 MG: 20 INJECTION, SOLUTION INTRAVENOUS at 09:09

## 2018-09-29 RX ADMIN — HYDROMORPHONE HYDROCHLORIDE 1 MG: 2 INJECTION INTRAMUSCULAR; INTRAVENOUS; SUBCUTANEOUS at 07:09

## 2018-09-29 RX ADMIN — AMLODIPINE BESYLATE 5 MG: 2.5 TABLET ORAL at 06:09

## 2018-09-29 RX ADMIN — ENALAPRILAT 5 MG: 2.5 INJECTION INTRAVENOUS at 11:09

## 2018-09-29 RX ADMIN — ACETAMINOPHEN 1000 MG: 10 INJECTION, SOLUTION INTRAVENOUS at 05:09

## 2018-09-29 RX ADMIN — OXYCODONE HYDROCHLORIDE AND ACETAMINOPHEN 1 TABLET: 5; 325 TABLET ORAL at 02:09

## 2018-09-29 RX ADMIN — TRAZODONE HYDROCHLORIDE 100 MG: 50 TABLET ORAL at 09:09

## 2018-09-29 NOTE — PROGRESS NOTES
[facility] - Med Surg  General Surgery  Daily Note    Patient Name: Miles Vazquez  MRN: [id number]  Admission Date: 9/24/2018  Attending Physician: Burton Cast MD   Consult Physician: Ramírez Horner MD  Primary Care Provider: Nichelle Frederick MD    Subjective:     Principle Problem: Small bowel obstruction    Last 24 hour history:  9/26/18  Patient with increasing abdominal pain last 24 hr.  Afebrile vital signs stable.  Nausea no vomiting.  Patient states that his abdominal pain is worse than yesterday.  CT scan conducted this morning with IV and p.o. contrast for which I have reviewed with Dr. Gomez of Radiology.  He agrees with worsening small bowel obstruction. More abdominal free fluid.  No free air.  Increasing distention of small bowel.  Definite area of transition in the right lower quadrant. No defined masses.  Area in distal antrum possible inflammation versus ulceration.  Patient will need EGD as well. No other new complaints    9/27/18  Patient s/p laparotomy with internal hernia reduction and appendectomy.  Doing well today.  Pain well controlled.  Feels better from prior to surgery.  NGT in place with 1500cc output.  No bowel function yet.  No fevers. Vital signs stable.  No new complaints.    9/28/18  No issues in the last 24 hr.  Still awaiting bowel function.  Afebrile.  Vital signs stable.  Laboratory values reviewed.  White count within normal limits.  NG tube with minimal output.  No new complaints    9/29/18  Patient without acute events in the last 24 hr.  Afebrile vital signs stable.  Laboratory evaluations reviewed and within normal limits.  Positive flatus, no bowel movement.  Tolerance of clear liquid diet.  No vomiting.  No other new events or issues    Objective:     Vital Signs (Most Recent):  Temp: 99 °F (37.2 °C) (09/29/18 1021)  Pulse: 85 (09/29/18 1149)  Resp: 18 (09/29/18 1021)  BP: (!) 210/94 (09/29/18 1149)  SpO2: (!) 92 % (09/29/18  1021) Vital Signs (24h Range):  Temp:  [96.6 °F (35.9 °C)-99 °F (37.2 °C)] 99 °F (37.2 °C)  Pulse:  [79-95] 85  Resp:  [16-18] 18  SpO2:  [91 %-98 %] 92 %  BP: (138-210)/(61-95) 210/94     Intake/Output last 24 hours:    Intake/Output Summary (Last 24 hours) at 9/29/2018 1205  Last data filed at 9/29/2018 0800  Gross per 24 hour   Intake 2955 ml   Output --   Net 2955 ml       I/O last 3 completed shifts:  In: 5146.7 [P.O.:720; I.V.:3786.7; NG/GT:90; IV Piggyback:550]  Out: 1425 [Urine:1175; Drains:250]  I/O this shift:  In: 360 [P.O.:360]  Out: -     Weight: 59.9 kg (132 lb 0.9 oz)  Body mass index is 20.68 kg/m².    Gen: Wd Wn male currently in NAD  Heent: Nc/At, MMM, NGT in place with bilious output.  Eyes: Perrl, Eomi  Cv: RRR, no  M/g/r  Lung: Non-labored breathing, clear bilaterally  Abd: Soft, moderate distention, tender to palpation around surgical site    Significant Labs:  CBC:   Recent Labs   Lab  09/29/18   0601   WBC  6.37   RBC  3.63*   HGB  10.0*   HCT  31.0*   PLT  195   MCV  85   MCH  27.5   MCHC  32.3     BMP:   Recent Labs   Lab  09/29/18   0601   GLU  90   NA  140   K  3.8   CL  106   CO2  26   BUN  7*   CREATININE  0.7   CALCIUM  8.1*   MG  1.8     CMP:   Recent Labs   Lab  09/28/18   0400  09/29/18   0601   GLU  67*  90   CALCIUM  7.7*  8.1*   ALBUMIN  2.5*   --    PROT  5.0*   --    NA  133*  140   K  3.3*  3.8   CO2  27  26   CL  101  106   BUN  10  7*   CREATININE  0.6  0.7   ALKPHOS  50*   --    ALT  16   --    AST  32   --    BILITOT  0.9   --      LFTs:   Recent Labs   Lab  09/28/18   0400   ALT  16   AST  32   ALKPHOS  50*   BILITOT  0.9   PROT  5.0*   ALBUMIN  2.5*     Coagulation:   Recent Labs   Lab  09/26/18   0452   LABPROT  15.1*   INR  1.3*     Specimen (12h ago, onward)    None        Recent Labs   Lab  09/25/18   1830   COLORU  Yellow   SPECGRAV  1.025   PHUR  6.0   PROTEINUA  Trace*   NITRITE  Negative   LEUKOCYTESUR  Negative   UROBILINOGEN  Negative       Cultures:     Microbiology Results (last 7 days)     ** No results found for the last 168 hours. **          Assessment:   Miles Vazquez is a 63 y.o. male who presents with Small bowel obstruction.    Active Diagnoses:    Diagnosis Date Noted POA    PRINCIPAL PROBLEM:  Small bowel obstruction [K56.609] 09/24/2018 Yes    Hypocalcemia [E83.51] 09/28/2018 Unknown      Problems Resolved During this Admission:     VTE Risk Mitigation (From admission, onward)        Ordered     heparin (porcine) injection 5,000 Units  Every 8 hours      09/26/18 1913     Place sequential compression device  Until discontinued      09/26/18 1913     IP VTE LOW RISK PATIENT  Once      09/24/18 2357     Place RADHA hose  Until discontinued      09/24/18 2357          Medical Decision Making/Plan:  Satisfactory post-operative recovery thus far.Recommend: CLD, Decrease ivfs, d/c central line and place peripheral ivs.      Ramírez Horner MD  General Surgery  [facility] - Med Surg

## 2018-09-29 NOTE — PROGRESS NOTES
[facility] Medicine  Progress Note    Patient Name: Miles Vazquez  MRN: [id number]  Patient Class: IP- Inpatient   Admission Date: 9/24/2018  Length of Stay: 5 days  Attending Physician: Burton aCst MD  Primary Care Provider: Nichelle Frederick MD        Subjective:     Principal Problem:Small bowel obstruction    HPI:  Patient is a 63-year-old male that presented to the emergency last evening complaining of right lower quadrant abdominal pain. Patient states a 1 day history of pain in the right lower quadrant area isn't nausea with 1 episode of vomiting.  Patient has a past medical history of recently discovered abdominal aortic aneurysm, and also has history of hypertension hyperlipidemia osteoarthritis, colonic polyps, depression, and restless leg syndrome.  Patient denies fever, chills, rigors, but states significant nausea for the last 24 hr and right lower quadrant pain.  It is vomiting only x1 patient was seen in the emergency department and started on NG tube suction and bowel rest.    Hospital Course:  Patient will be admitted to medical-surgical unit.  Inpatient surgical consult will be obtained.  Further treatment will be based on clinical course.    September 26, 2018:  Patient is seemingly worse today with pain, discomfort and obstruction.  White blood cell count has decreased however symptomatically the patient seems worse.  Dr. Horner discussed with this patient the need for exploratory laparotomy and EGD and will proceed with that this afternoon.  Patient does understand all risks and benefits and wants to proceed.  Continue controlling pain and continue NPO status follow up postsurgically    September 27, 2018:  Patient underwent exploratory laparotomy last evening.  Patient was found to have a strangulated but not incarcerated hernia of the distal small bowel.  This was easily reduced intraoperatively and the patient was recovered without  complications.  No bowel resection had to be done.  Patient has remained stable overnight since surgery and is sitting up in the chair this morning stating some pain but nothing worse than he was having prior to the surgery.  I blood cell count this morning was normal white blood cells 7.45 hemoglobin 10.3 hematocrit level of 31.8 and platelets normal 193 chemistry showed a sodium 133 calcium 7.7 albumin 2.6 and otherwise no electrolyte abnormalities.  Patient magnesium was 1.2 which will be replaced this morning  9/2 08/2018.  Patient is doing well today sitting up in a chair is examination is excellent.  He has clear breath sounds he is doing 17 50 on IAS the patient bowel sounds are positive he has not passed gas yet.  The patient desires to be transferred to the floor he even go home if he can't.  The patient will be transferred to the floor.  The patient will need replacement  potassium and magnesium.  Lab pending for in a.m..  The patient H&H has been discussed.  Will continue to monitor.  09/29/2018.  The patient is doing well today is passing gas today his chief complaint is that of abdominal discomfort but he is doing well.  He is eating his liquid diet without any difficulty.  Laboratory values have been reviewed.  Dr. TARIQ will see the patient today and make further determinations about the patient's diet.  Medications have been adjusted as needed.  Ambulation p.r.n..  Patient is doing well with incentive spirometry.  General exam is much improved.    Interval History:  No new complaints today.  The patient is passing flatus.  The patient has not had a bowel movement today.  The patient is looking for to an increase in diet.  The patient is ambulating well.  The patient uses incentive spirometer well.  His only complaint today is he still has some mild abdominal discomfort.  Otherwise the patient is very stable today.  Laboratory values have been reviewed.  Potassium has been corrected.  Magnesium is normal.   Calcium is 8.1.    Review of Systems   Constitutional: Negative for activity change, appetite change, chills, diaphoresis, fatigue, fever and unexpected weight change.   HENT: Negative for congestion, drooling, hearing loss and trouble swallowing.    Eyes: Negative for pain and visual disturbance.   Respiratory: Negative.  Negative for apnea, cough, choking, chest tightness, shortness of breath and wheezing.    Cardiovascular: Negative for chest pain, palpitations and leg swelling.   Gastrointestinal: Negative for abdominal distention, abdominal pain, blood in stool, constipation, diarrhea, nausea and vomiting.        Mild abdominal discomfort.   Endocrine: Negative for polydipsia, polyphagia and polyuria.   Genitourinary: Negative for difficulty urinating, dysuria and flank pain.   Musculoskeletal: Negative for arthralgias, back pain, gait problem, joint swelling, neck pain and neck stiffness.   Skin: Negative for color change, rash and wound.   Allergic/Immunologic: Negative for environmental allergies, food allergies and immunocompromised state.   Neurological: Negative for dizziness, syncope, weakness, numbness and headaches.   Hematological: Negative for adenopathy.   Psychiatric/Behavioral: Negative for agitation, confusion and sleep disturbance. The patient is not nervous/anxious.      Objective:     Vital Signs (Most Recent):  Temp: 99 °F (37.2 °C) (09/29/18 1021)  Pulse: 84 (09/29/18 1021)  Resp: 18 (09/29/18 1021)  BP: (!) 156/69 (09/29/18 1021)  SpO2: (!) 92 % (09/29/18 1021) Vital Signs (24h Range):  Temp:  [96.6 °F (35.9 °C)-99 °F (37.2 °C)] 99 °F (37.2 °C)  Pulse:  [79-95] 84  Resp:  [12-18] 18  SpO2:  [91 %-98 %] 92 %  BP: (138-198)/(61-95) 156/69     Weight: 59.9 kg (132 lb 0.9 oz)  Body mass index is 20.68 kg/m².    Intake/Output Summary (Last 24 hours) at 9/29/2018 1022  Last data filed at 9/29/2018 0800  Gross per 24 hour   Intake 2955 ml   Output --   Net 2955 ml      Physical Exam    Constitutional: He is oriented to person, place, and time. He appears well-developed and well-nourished.   HENT:   Head: Normocephalic and atraumatic.   Right Ear: External ear normal.   Left Ear: External ear normal.   Nose: Nose normal.   Eyes: Conjunctivae, EOM and lids are normal. Pupils are equal, round, and reactive to light.   Neck: Trachea normal, normal range of motion and full passive range of motion without pain. Neck supple.   Cardiovascular: Normal rate, regular rhythm, S1 normal, S2 normal, normal heart sounds, intact distal pulses and normal pulses.   Pulmonary/Chest: Effort normal and breath sounds normal.   Abdominal: Soft. Normal aorta and bowel sounds are normal.   Musculoskeletal: Normal range of motion.   Neurological: He is alert and oriented to person, place, and time. He has normal reflexes.   Skin: Skin is warm, dry and intact.   Psychiatric: He has a normal mood and affect. His speech is normal and behavior is normal. Judgment and thought content normal. Cognition and memory are normal.   Nursing note and vitals reviewed.      Significant Labs:   BMP:   Recent Labs   Lab  09/29/18   0601   GLU  90   NA  140   K  3.8   CL  106   CO2  26   BUN  7*   CREATININE  0.7   CALCIUM  8.1*   MG  1.8     CBC:   Recent Labs   Lab  09/28/18   0400  09/29/18   0601   WBC  6.80  6.37   HGB  9.3*  10.0*   HCT  28.9*  31.0*   PLT  171  195     CMP:   Recent Labs   Lab  09/28/18   0400  09/29/18   0601   NA  133*  140   K  3.3*  3.8   CL  101  106   CO2  27  26   GLU  67*  90   BUN  10  7*   CREATININE  0.6  0.7   CALCIUM  7.7*  8.1*   PROT  5.0*   --    ALBUMIN  2.5*   --    BILITOT  0.9   --    ALKPHOS  50*   --    AST  32   --    ALT  16   --    ANIONGAP  5*  8   EGFRNONAA  >60.0  >60.0     Magnesium:   Recent Labs   Lab  09/29/18   0601   MG  1.8     All pertinent labs within the past 24 hours have been reviewed.    Significant Imaging: None at this time.    Assessment/Plan:      * Small bowel  obstruction    1.  Continue bowel rest with NG tube suction and NPO status  2.  Continue IV fluids  3.  Continue pain control as needed  4.  Surgical consult to determine further treatment plan  5.  Empiric antibiotics of Zosyn and Flagyl  6.  Follow as needed based on clinical course    September 26th 2018:  This patient will be taken for exploratory laparotomy later today                                        Will follow up postsurgically as needed                                         Will repeat labs in the a.m.    September 27, 2018:  Replace magnesium IV 2 g IV over 2 hr recheck in the morning addendum                                       Continue pain control and NPO status until bowel function returned                                       Continue current pain and nausea medications and follow this patient as needed  09/28/2018.  The patient has bowel sounds this morning he has not passed flatus or had a bowel movement.  He does state that he is tender but otherwise doing well.  Patient has excellent breath sounds he can do his IS at 17 50 cc   09/29/2018.  The patient is doing very well today he is passing flatus.  No bowel movements yet.  Patient complaints of mild abdominal pain this morning.  Examination is unrevealing.  Patient is awaiting increasing diet as possible.  The patient has been ambulating and use incentive spirometry.  Discharge is pending per Dr. Horner.  Continue current level of care.        Hypocalcemia    Calcium is 8.1 today.            VTE Risk Mitigation (From admission, onward)        Ordered     heparin (porcine) injection 5,000 Units  Every 8 hours      09/26/18 1913     Place sequential compression device  Until discontinued      09/26/18 1913     IP VTE LOW RISK PATIENT  Once      09/24/18 2357     Place RADHA hose  Until discontinued      09/24/18 2357              Oziel Ortiz MD  Department of Hospital Medicine   [facility]

## 2018-09-29 NOTE — SUBJECTIVE & OBJECTIVE
Interval History:  No new complaints today.  The patient is passing flatus.  The patient has not had a bowel movement today.  The patient is looking for to an increase in diet.  The patient is ambulating well.  The patient uses incentive spirometer well.  His only complaint today is he still has some mild abdominal discomfort.  Otherwise the patient is very stable today.  Laboratory values have been reviewed.  Potassium has been corrected.  Magnesium is normal.  Calcium is 8.1.    Review of Systems   Constitutional: Negative for activity change, appetite change, chills, diaphoresis, fatigue, fever and unexpected weight change.   HENT: Negative for congestion, drooling, hearing loss and trouble swallowing.    Eyes: Negative for pain and visual disturbance.   Respiratory: Negative.  Negative for apnea, cough, choking, chest tightness, shortness of breath and wheezing.    Cardiovascular: Negative for chest pain, palpitations and leg swelling.   Gastrointestinal: Negative for abdominal distention, abdominal pain, blood in stool, constipation, diarrhea, nausea and vomiting.        Mild abdominal discomfort.   Endocrine: Negative for polydipsia, polyphagia and polyuria.   Genitourinary: Negative for difficulty urinating, dysuria and flank pain.   Musculoskeletal: Negative for arthralgias, back pain, gait problem, joint swelling, neck pain and neck stiffness.   Skin: Negative for color change, rash and wound.   Allergic/Immunologic: Negative for environmental allergies, food allergies and immunocompromised state.   Neurological: Negative for dizziness, syncope, weakness, numbness and headaches.   Hematological: Negative for adenopathy.   Psychiatric/Behavioral: Negative for agitation, confusion and sleep disturbance. The patient is not nervous/anxious.      Objective:     Vital Signs (Most Recent):  Temp: 99 °F (37.2 °C) (09/29/18 1021)  Pulse: 84 (09/29/18 1021)  Resp: 18 (09/29/18 1021)  BP: (!) 156/69 (09/29/18  1021)  SpO2: (!) 92 % (09/29/18 1021) Vital Signs (24h Range):  Temp:  [96.6 °F (35.9 °C)-99 °F (37.2 °C)] 99 °F (37.2 °C)  Pulse:  [79-95] 84  Resp:  [12-18] 18  SpO2:  [91 %-98 %] 92 %  BP: (138-198)/(61-95) 156/69     Weight: 59.9 kg (132 lb 0.9 oz)  Body mass index is 20.68 kg/m².    Intake/Output Summary (Last 24 hours) at 9/29/2018 1022  Last data filed at 9/29/2018 0800  Gross per 24 hour   Intake 2955 ml   Output --   Net 2955 ml      Physical Exam   Constitutional: He is oriented to person, place, and time. He appears well-developed and well-nourished.   HENT:   Head: Normocephalic and atraumatic.   Right Ear: External ear normal.   Left Ear: External ear normal.   Nose: Nose normal.   Eyes: Conjunctivae, EOM and lids are normal. Pupils are equal, round, and reactive to light.   Neck: Trachea normal, normal range of motion and full passive range of motion without pain. Neck supple.   Cardiovascular: Normal rate, regular rhythm, S1 normal, S2 normal, normal heart sounds, intact distal pulses and normal pulses.   Pulmonary/Chest: Effort normal and breath sounds normal.   Abdominal: Soft. Normal aorta and bowel sounds are normal.   Musculoskeletal: Normal range of motion.   Neurological: He is alert and oriented to person, place, and time. He has normal reflexes.   Skin: Skin is warm, dry and intact.   Psychiatric: He has a normal mood and affect. His speech is normal and behavior is normal. Judgment and thought content normal. Cognition and memory are normal.   Nursing note and vitals reviewed.      Significant Labs:   BMP:   Recent Labs   Lab  09/29/18   0601   GLU  90   NA  140   K  3.8   CL  106   CO2  26   BUN  7*   CREATININE  0.7   CALCIUM  8.1*   MG  1.8     CBC:   Recent Labs   Lab  09/28/18   0400  09/29/18   0601   WBC  6.80  6.37   HGB  9.3*  10.0*   HCT  28.9*  31.0*   PLT  171  195     CMP:   Recent Labs   Lab  09/28/18   0400  09/29/18   0601   NA  133*  140   K  3.3*  3.8   CL  101  106   CO2   27  26   GLU  67*  90   BUN  10  7*   CREATININE  0.6  0.7   CALCIUM  7.7*  8.1*   PROT  5.0*   --    ALBUMIN  2.5*   --    BILITOT  0.9   --    ALKPHOS  50*   --    AST  32   --    ALT  16   --    ANIONGAP  5*  8   EGFRNONAA  >60.0  >60.0     Magnesium:   Recent Labs   Lab  09/29/18   0601   MG  1.8     All pertinent labs within the past 24 hours have been reviewed.    Significant Imaging: None at this time.

## 2018-09-29 NOTE — PLAN OF CARE
Problem: Pain, Acute (Adult)  Intervention: Support/Optimize Psychosocial Response to Acute Pain   09/29/18 0213   Coping/Psychosocial Interventions   Supportive Measures self-care encouraged   Psychosocial Support   Diversional Activities television   Family/Support System Care self-care encouraged;involvement promoted   Trust Relationship/Rapport care explained;choices provided;empathic listening provided;thoughts/feelings acknowledged

## 2018-09-29 NOTE — ASSESSMENT & PLAN NOTE
1.  Continue bowel rest with NG tube suction and NPO status  2.  Continue IV fluids  3.  Continue pain control as needed  4.  Surgical consult to determine further treatment plan  5.  Empiric antibiotics of Zosyn and Flagyl  6.  Follow as needed based on clinical course    September 26th 2018:  This patient will be taken for exploratory laparotomy later today                                        Will follow up postsurgically as needed                                         Will repeat labs in the a.m.    September 27, 2018:  Replace magnesium IV 2 g IV over 2 hr recheck in the morning addendum                                       Continue pain control and NPO status until bowel function returned                                       Continue current pain and nausea medications and follow this patient as needed  09/28/2018.  The patient has bowel sounds this morning he has not passed flatus or had a bowel movement.  He does state that he is tender but otherwise doing well.  Patient has excellent breath sounds he can do his IS at 17 50 cc   09/29/2018.  The patient is doing very well today he is passing flatus.  No bowel movements yet.  Patient complaints of mild abdominal pain this morning.  Examination is unrevealing.  Patient is awaiting increasing diet as possible.  The patient has been ambulating and use incentive spirometry.  Discharge is pending per Dr. Horner.  Continue current level of care.

## 2018-09-29 NOTE — NURSING
Right IJ removed per MD order, patient tolerated well.  Pressure dressing applied.  22G left AC peripheral IV inserted.  No distress noted or verbalized by patient at this time.

## 2018-09-30 PROCEDURE — 25000003 PHARM REV CODE 250: Performed by: SURGERY

## 2018-09-30 PROCEDURE — 25000003 PHARM REV CODE 250: Performed by: INTERNAL MEDICINE

## 2018-09-30 PROCEDURE — 63600175 PHARM REV CODE 636 W HCPCS: Performed by: EMERGENCY MEDICINE

## 2018-09-30 PROCEDURE — S0028 INJECTION, FAMOTIDINE, 20 MG: HCPCS | Performed by: INTERNAL MEDICINE

## 2018-09-30 PROCEDURE — 99900035 HC TECH TIME PER 15 MIN (STAT)

## 2018-09-30 PROCEDURE — 63600175 PHARM REV CODE 636 W HCPCS: Performed by: SURGERY

## 2018-09-30 PROCEDURE — 11000001 HC ACUTE MED/SURG PRIVATE ROOM

## 2018-09-30 RX ORDER — IPRATROPIUM BROMIDE AND ALBUTEROL SULFATE 2.5; .5 MG/3ML; MG/3ML
3 SOLUTION RESPIRATORY (INHALATION) EVERY 6 HOURS PRN
Status: DISCONTINUED | OUTPATIENT
Start: 2018-09-30 | End: 2018-10-02 | Stop reason: HOSPADM

## 2018-09-30 RX ORDER — CLONIDINE HYDROCHLORIDE 0.1 MG/1
0.1 TABLET ORAL EVERY 6 HOURS PRN
Status: DISCONTINUED | OUTPATIENT
Start: 2018-09-30 | End: 2018-10-02 | Stop reason: HOSPADM

## 2018-09-30 RX ORDER — DOCUSATE SODIUM 100 MG/1
100 CAPSULE, LIQUID FILLED ORAL DAILY
Status: DISCONTINUED | OUTPATIENT
Start: 2018-09-30 | End: 2018-10-02 | Stop reason: HOSPADM

## 2018-09-30 RX ORDER — ROPINIROLE 1 MG/1
3 TABLET, FILM COATED ORAL NIGHTLY
Status: DISCONTINUED | OUTPATIENT
Start: 2018-09-30 | End: 2018-09-30

## 2018-09-30 RX ORDER — BISACODYL 10 MG
10 SUPPOSITORY, RECTAL RECTAL DAILY PRN
Status: DISCONTINUED | OUTPATIENT
Start: 2018-09-30 | End: 2018-10-02 | Stop reason: HOSPADM

## 2018-09-30 RX ORDER — ROPINIROLE 1 MG/1
3 TABLET, FILM COATED ORAL NIGHTLY
Status: DISCONTINUED | OUTPATIENT
Start: 2018-09-30 | End: 2018-10-02 | Stop reason: HOSPADM

## 2018-09-30 RX ORDER — IPRATROPIUM BROMIDE AND ALBUTEROL SULFATE 2.5; .5 MG/3ML; MG/3ML
3 SOLUTION RESPIRATORY (INHALATION) EVERY 6 HOURS PRN
Status: DISCONTINUED | OUTPATIENT
Start: 2018-09-30 | End: 2018-09-30

## 2018-09-30 RX ORDER — CALCIUM CARBONATE 500(1250)
500 TABLET ORAL 2 TIMES DAILY
Status: DISCONTINUED | OUTPATIENT
Start: 2018-09-30 | End: 2018-10-02 | Stop reason: HOSPADM

## 2018-09-30 RX ADMIN — CALCIUM 500 MG: 500 TABLET ORAL at 09:09

## 2018-09-30 RX ADMIN — HEPARIN SODIUM 5000 UNITS: 5000 INJECTION, SOLUTION INTRAVENOUS; SUBCUTANEOUS at 05:09

## 2018-09-30 RX ADMIN — CALCIUM 500 MG: 500 TABLET ORAL at 08:09

## 2018-09-30 RX ADMIN — AMLODIPINE BESYLATE 5 MG: 2.5 TABLET ORAL at 05:09

## 2018-09-30 RX ADMIN — FAMOTIDINE 20 MG: 20 INJECTION, SOLUTION INTRAVENOUS at 08:09

## 2018-09-30 RX ADMIN — THERA TABS 1 TABLET: TAB at 08:09

## 2018-09-30 RX ADMIN — LISINOPRIL 20 MG: 10 TABLET ORAL at 08:09

## 2018-09-30 RX ADMIN — BISACODYL 10 MG: 10 SUPPOSITORY RECTAL at 09:09

## 2018-09-30 RX ADMIN — ROPINIROLE HYDROCHLORIDE 3 MG: 1 TABLET, FILM COATED ORAL at 09:09

## 2018-09-30 RX ADMIN — OXYCODONE HYDROCHLORIDE AND ACETAMINOPHEN 1 TABLET: 5; 325 TABLET ORAL at 03:09

## 2018-09-30 RX ADMIN — OXYCODONE HYDROCHLORIDE AND ACETAMINOPHEN 1 TABLET: 5; 325 TABLET ORAL at 11:09

## 2018-09-30 RX ADMIN — FAMOTIDINE 20 MG: 20 INJECTION, SOLUTION INTRAVENOUS at 09:09

## 2018-09-30 RX ADMIN — HEPARIN SODIUM 5000 UNITS: 5000 INJECTION, SOLUTION INTRAVENOUS; SUBCUTANEOUS at 02:09

## 2018-09-30 RX ADMIN — ONDANSETRON HYDROCHLORIDE 4 MG: 2 INJECTION, SOLUTION INTRAMUSCULAR; INTRAVENOUS at 04:09

## 2018-09-30 RX ADMIN — CLONIDINE HYDROCHLORIDE 0.1 MG: 0.1 TABLET ORAL at 07:09

## 2018-09-30 RX ADMIN — TRAZODONE HYDROCHLORIDE 100 MG: 50 TABLET ORAL at 09:09

## 2018-09-30 RX ADMIN — DOCUSATE SODIUM 100 MG: 100 CAPSULE, LIQUID FILLED ORAL at 04:09

## 2018-09-30 RX ADMIN — OXYCODONE HYDROCHLORIDE AND ACETAMINOPHEN 1 TABLET: 5; 325 TABLET ORAL at 07:09

## 2018-09-30 RX ADMIN — POTASSIUM CHLORIDE AND SODIUM CHLORIDE 1000 ML: 900; 150 INJECTION, SOLUTION INTRAVENOUS at 02:09

## 2018-09-30 RX ADMIN — HEPARIN SODIUM 5000 UNITS: 5000 INJECTION, SOLUTION INTRAVENOUS; SUBCUTANEOUS at 09:09

## 2018-09-30 NOTE — PROGRESS NOTES
[facility] - Med Surg  General Surgery  Daily Note    Patient Name: Miles Vazquez  MRN: [id number]  Admission Date: 9/24/2018  Attending Physician: Burton Cast MD   Consult Physician: Ramírez Horner MD  Primary Care Provider: iNchelle Frederick MD    Subjective:     Principle Problem: Small bowel obstruction    Last 24 hour history:  9/26/18  Patient with increasing abdominal pain last 24 hr.  Afebrile vital signs stable.  Nausea no vomiting.  Patient states that his abdominal pain is worse than yesterday.  CT scan conducted this morning with IV and p.o. contrast for which I have reviewed with Dr. Gomez of Radiology.  He agrees with worsening small bowel obstruction. More abdominal free fluid.  No free air.  Increasing distention of small bowel.  Definite area of transition in the right lower quadrant. No defined masses.  Area in distal antrum possible inflammation versus ulceration.  Patient will need EGD as well. No other new complaints    9/27/18  Patient s/p laparotomy with internal hernia reduction and appendectomy.  Doing well today.  Pain well controlled.  Feels better from prior to surgery.  NGT in place with 1500cc output.  No bowel function yet.  No fevers. Vital signs stable.  No new complaints.    9/28/18  No issues in the last 24 hr.  Still awaiting bowel function.  Afebrile.  Vital signs stable.  Laboratory values reviewed.  White count within normal limits.  NG tube with minimal output.  No new complaints    9/29/18  Patient without acute events in the last 24 hr.  Afebrile vital signs stable.  Laboratory evaluations reviewed and within normal limits.  Positive flatus, no bowel movement.  Tolerance of clear liquid diet.  No vomiting.  No other new events or issues    9/30/18  No issues in the last 24 hr.  Patient passing flatus.  No nausea vomiting.  Tolerating clear liquid diet well. No new complaints    Objective:     Vital Signs (Most Recent):  Temp: 98.2  °F (36.8 °C) (09/30/18 0717)  Pulse: 99 (09/30/18 0859)  Resp: 18 (09/30/18 0717)  BP: (!) 161/79 (09/30/18 0859)  SpO2: 97 % (09/30/18 0717) Vital Signs (24h Range):  Temp:  [97.9 °F (36.6 °C)-99.9 °F (37.7 °C)] 98.2 °F (36.8 °C)  Pulse:  [72-99] 99  Resp:  [16-18] 18  SpO2:  [92 %-100 %] 97 %  BP: (149-210)/(69-98) 161/79     Intake/Output last 24 hours:    Intake/Output Summary (Last 24 hours) at 9/30/2018 1016  Last data filed at 9/30/2018 0857  Gross per 24 hour   Intake 2329 ml   Output --   Net 2329 ml       I/O last 3 completed shifts:  In: 2892.3 [P.O.:1200; I.V.:1542.3; IV Piggyback:150]  Out: -   I/O this shift:  In: 1090 [P.O.:1040; IV Piggyback:50]  Out: -     Weight: 59.9 kg (132 lb 0.9 oz)  Body mass index is 20.68 kg/m².    Gen: Wd Wn male currently in NAD  Heent: Nc/At, MMM, NGT in place with bilious output.  Eyes: Perrl, Eomi  Cv: RRR, no  M/g/r  Lung: Non-labored breathing, clear bilaterally  Abd: Soft, moderate distention, tender to palpation around surgical site    Significant Labs:  CBC:   Recent Labs   Lab  09/29/18   0601   WBC  6.37   RBC  3.63*   HGB  10.0*   HCT  31.0*   PLT  195   MCV  85   MCH  27.5   MCHC  32.3     BMP:   Recent Labs   Lab  09/29/18   0601   GLU  90   NA  140   K  3.8   CL  106   CO2  26   BUN  7*   CREATININE  0.7   CALCIUM  8.1*   MG  1.8     CMP:   Recent Labs   Lab  09/28/18   0400  09/29/18   0601   GLU  67*  90   CALCIUM  7.7*  8.1*   ALBUMIN  2.5*   --    PROT  5.0*   --    NA  133*  140   K  3.3*  3.8   CO2  27  26   CL  101  106   BUN  10  7*   CREATININE  0.6  0.7   ALKPHOS  50*   --    ALT  16   --    AST  32   --    BILITOT  0.9   --      LFTs:   Recent Labs   Lab  09/28/18   0400   ALT  16   AST  32   ALKPHOS  50*   BILITOT  0.9   PROT  5.0*   ALBUMIN  2.5*     Coagulation:   Recent Labs   Lab  09/26/18   0452   LABPROT  15.1*   INR  1.3*     Specimen (12h ago, onward)    None        Recent Labs   Lab  09/25/18   1830   COLORU  Yellow   SPECGRAV  1.025    PHUR  6.0   PROTEINUA  Trace*   NITRITE  Negative   LEUKOCYTESUR  Negative   UROBILINOGEN  Negative       Cultures:    Microbiology Results (last 7 days)     ** No results found for the last 168 hours. **          Assessment:   Miles Vazquez is a 63 y.o. male who presents with Small bowel obstruction.    Active Diagnoses:    Diagnosis Date Noted POA    PRINCIPAL PROBLEM:  Small bowel obstruction [K56.609] 09/24/2018 Yes    Hypocalcemia [E83.51] 09/28/2018 Unknown      Problems Resolved During this Admission:     VTE Risk Mitigation (From admission, onward)        Ordered     heparin (porcine) injection 5,000 Units  Every 8 hours      09/26/18 1913     IP VTE LOW RISK PATIENT  Once      09/24/18 2357     Place RADHA hose  Until discontinued      09/24/18 2357          Medical Decision Making/Plan:  Satisfactory post-operative recovery thus far.Recommend:  Advance diet as tolerated.  Patient may be discharged once he has a bowel movement.  He may follow up my clinic in 2 weeks.    Ramírez Horner MD  General Surgery  [facility] - Med Surg

## 2018-09-30 NOTE — ASSESSMENT & PLAN NOTE
1.  Continue bowel rest with NG tube suction and NPO status  2.  Continue IV fluids  3.  Continue pain control as needed  4.  Surgical consult to determine further treatment plan  5.  Empiric antibiotics of Zosyn and Flagyl  6.  Follow as needed based on clinical course    September 26th 2018:  This patient will be taken for exploratory laparotomy later today                                        Will follow up postsurgically as needed                                         Will repeat labs in the a.m.    September 27, 2018:  Replace magnesium IV 2 g IV over 2 hr recheck in the morning addendum                                       Continue pain control and NPO status until bowel function returned                                       Continue current pain and nausea medications and follow this patient as needed  09/28/2018.  The patient has bowel sounds this morning he has not passed flatus or had a bowel movement.  He does state that he is tender but otherwise doing well.  Patient has excellent breath sounds he can do his IS at 17 50 cc   09/29/2018.  The patient is doing very well today he is passing flatus.  No bowel movements yet.  Patient complaints of mild abdominal pain this morning.  Examination is unrevealing.  Patient is awaiting increasing diet as possible.  The patient has been ambulating and use incentive spirometry.  Discharge is pending per Dr. Horner.  Continue current level of care.    09/30/2028.  See above dictation for assessment and plan. Soft diet today ambulation as possible today consult physical therapy today.  Patient needs to be more ambulatory.  Patient exam LA than blood pressure is fine.  Have added blood pressure medications.  Encouraged the patient to get out of the bed.  Basic exam is within normal limits except for tenderness of the abdomen done status post surgery.  Satisfactory recovery at this time.

## 2018-09-30 NOTE — SUBJECTIVE & OBJECTIVE
Interval History:  Patient continues to improve.  The patient's blood pressure is elevated I have ordered medications for blood pressure.  Last laboratory values were reviewed.  Patient has no new complaints she remains to be tender in his abdomen status post surgery.  Will advance the patient's diet to soft diet today.  Have encouraged the patient to get up out of the bed sat in the chair and ambulate.  Will consult physical therapy for ambulation.    Review of Systems   Constitutional: Negative for activity change, appetite change, chills, diaphoresis, fatigue, fever and unexpected weight change.   HENT: Negative for congestion, drooling, hearing loss and trouble swallowing.    Eyes: Negative for pain and visual disturbance.   Respiratory: Negative.  Negative for apnea, cough, choking, chest tightness, shortness of breath and wheezing.    Cardiovascular: Negative for chest pain, palpitations and leg swelling.   Gastrointestinal: Negative for abdominal distention, abdominal pain, blood in stool, constipation, diarrhea, nausea and vomiting.   Endocrine: Negative for polydipsia, polyphagia and polyuria.   Genitourinary: Negative for difficulty urinating, dysuria and flank pain.   Musculoskeletal: Negative for arthralgias, back pain, gait problem, joint swelling, neck pain and neck stiffness.   Skin: Negative for color change, rash and wound.   Allergic/Immunologic: Negative for environmental allergies, food allergies and immunocompromised state.   Neurological: Negative for dizziness, syncope, weakness, numbness and headaches.   Hematological: Negative for adenopathy.   Psychiatric/Behavioral: Negative for agitation, confusion and sleep disturbance. The patient is not nervous/anxious.      Objective:     Vital Signs (Most Recent):  Temp: 98.2 °F (36.8 °C) (09/30/18 0717)  Pulse: 89 (09/30/18 0717)  Resp: 18 (09/30/18 0717)  BP: (!) 183/85 (09/30/18 0717)  SpO2: 97 % (09/30/18 0717) Vital Signs (24h Range):  Temp:   [97.9 °F (36.6 °C)-99.9 °F (37.7 °C)] 98.2 °F (36.8 °C)  Pulse:  [72-96] 89  Resp:  [16-18] 18  SpO2:  [92 %-100 %] 97 %  BP: (149-210)/(69-98) 183/85     Weight: 59.9 kg (132 lb 0.9 oz)  Body mass index is 20.68 kg/m².    Intake/Output Summary (Last 24 hours) at 9/30/2018 0813  Last data filed at 9/30/2018 0300  Gross per 24 hour   Intake 1239 ml   Output --   Net 1239 ml      Physical Exam   Constitutional: He is oriented to person, place, and time. He appears well-developed and well-nourished.   HENT:   Head: Normocephalic and atraumatic.   Right Ear: External ear normal.   Left Ear: External ear normal.   Nose: Nose normal.   Eyes: Conjunctivae, EOM and lids are normal. Pupils are equal, round, and reactive to light.   Neck: Trachea normal, normal range of motion and full passive range of motion without pain. Neck supple.   Cardiovascular: Normal rate, regular rhythm, S1 normal, S2 normal, normal heart sounds, intact distal pulses and normal pulses.   Pulmonary/Chest: Effort normal and breath sounds normal.   Abdominal: Soft. Normal aorta and bowel sounds are normal. There is tenderness.   Musculoskeletal: Normal range of motion.   Neurological: He is alert and oriented to person, place, and time. He has normal reflexes.   Skin: Skin is warm, dry and intact.   Psychiatric: He has a normal mood and affect. His speech is normal and behavior is normal. Judgment and thought content normal. Cognition and memory are normal.   Nursing note and vitals reviewed.      Significant Labs: All pertinent labs within the past 24 hours have been reviewed.    Significant Imaging: None.

## 2018-09-30 NOTE — NURSING
Pt ambulated in hallway from room to just past nursing station and then back to room. Gait steady.  No distress noted.  Pt tolerated well.  Pt sat in chair when returned to room.  Family at bedside.  Pt did report mild nausea but did not require meds at this time.  MARIA GUADALUPE, RN

## 2018-09-30 NOTE — PROGRESS NOTES
[facility] Medicine  Progress Note    Patient Name: Miels Vazquez  MRN: [id number]  Patient Class: IP- Inpatient   Admission Date: 9/24/2018  Length of Stay: 6 days  Attending Physician: Burton Cast MD  Primary Care Provider: Nichelle Frederick MD        Subjective:     Principal Problem:Small bowel obstruction    HPI:  Patient is a 63-year-old male that presented to the emergency last evening complaining of right lower quadrant abdominal pain. Patient states a 1 day history of pain in the right lower quadrant area isn't nausea with 1 episode of vomiting.  Patient has a past medical history of recently discovered abdominal aortic aneurysm, and also has history of hypertension hyperlipidemia osteoarthritis, colonic polyps, depression, and restless leg syndrome.  Patient denies fever, chills, rigors, but states significant nausea for the last 24 hr and right lower quadrant pain.  It is vomiting only x1 patient was seen in the emergency department and started on NG tube suction and bowel rest.    Hospital Course:  Patient will be admitted to medical-surgical unit.  Inpatient surgical consult will be obtained.  Further treatment will be based on clinical course.    September 26, 2018:  Patient is seemingly worse today with pain, discomfort and obstruction.  White blood cell count has decreased however symptomatically the patient seems worse.  Dr. Horner discussed with this patient the need for exploratory laparotomy and EGD and will proceed with that this afternoon.  Patient does understand all risks and benefits and wants to proceed.  Continue controlling pain and continue NPO status follow up postsurgically    September 27, 2018:  Patient underwent exploratory laparotomy last evening.  Patient was found to have a strangulated but not incarcerated hernia of the distal small bowel.  This was easily reduced intraoperatively and the patient was recovered without  complications.  No bowel resection had to be done.  Patient has remained stable overnight since surgery and is sitting up in the chair this morning stating some pain but nothing worse than he was having prior to the surgery.  I blood cell count this morning was normal white blood cells 7.45 hemoglobin 10.3 hematocrit level of 31.8 and platelets normal 193 chemistry showed a sodium 133 calcium 7.7 albumin 2.6 and otherwise no electrolyte abnormalities.  Patient magnesium was 1.2 which will be replaced this morning  9/2 08/2018.  Patient is doing well today sitting up in a chair is examination is excellent.  He has clear breath sounds he is doing 17 50 on IAS the patient bowel sounds are positive he has not passed gas yet.  The patient desires to be transferred to the floor he even go home if he can't.  The patient will be transferred to the floor.  The patient will need replacement  potassium and magnesium.  Lab pending for in a.m..  The patient H&H has been discussed.  Will continue to monitor.  09/29/2018.  The patient is doing well today is passing gas today his chief complaint is that of abdominal discomfort but he is doing well.  He is eating his liquid diet without any difficulty.  Laboratory values have been reviewed.  Dr. TARIQ will see the patient today and make further determinations about the patient's diet.  Medications have been adjusted as needed.  Ambulation p.r.n..  Patient is doing well with incentive spirometry.  General exam is much improved.    09/30/2018.  The patient continues to improve and is doing well except for his blood pressure at this time.  Medications have been added to adjust his blood pressure.  The patient mostly lies in the bed he is not ambulatory as he should be I have asked him to walk the correa set up in the chair as much as possible.  I will advance his diet to a soft diet today.  Will ask Dr. TARIQ General surgery to encourage the patient as well.  The patient will have laboratory  values in a.m..  Will consult Physical therapy for ambulation.    Interval History:  Patient continues to improve.  The patient's blood pressure is elevated I have ordered medications for blood pressure.  Last laboratory values were reviewed.  Patient has no new complaints she remains to be tender in his abdomen status post surgery.  Will advance the patient's diet to soft diet today.  Have encouraged the patient to get up out of the bed sat in the chair and ambulate.  Will consult physical therapy for ambulation.    Review of Systems   Constitutional: Negative for activity change, appetite change, chills, diaphoresis, fatigue, fever and unexpected weight change.   HENT: Negative for congestion, drooling, hearing loss and trouble swallowing.    Eyes: Negative for pain and visual disturbance.   Respiratory: Negative.  Negative for apnea, cough, choking, chest tightness, shortness of breath and wheezing.    Cardiovascular: Negative for chest pain, palpitations and leg swelling.   Gastrointestinal: Negative for abdominal distention, abdominal pain, blood in stool, constipation, diarrhea, nausea and vomiting.   Endocrine: Negative for polydipsia, polyphagia and polyuria.   Genitourinary: Negative for difficulty urinating, dysuria and flank pain.   Musculoskeletal: Negative for arthralgias, back pain, gait problem, joint swelling, neck pain and neck stiffness.   Skin: Negative for color change, rash and wound.   Allergic/Immunologic: Negative for environmental allergies, food allergies and immunocompromised state.   Neurological: Negative for dizziness, syncope, weakness, numbness and headaches.   Hematological: Negative for adenopathy.   Psychiatric/Behavioral: Negative for agitation, confusion and sleep disturbance. The patient is not nervous/anxious.      Objective:     Vital Signs (Most Recent):  Temp: 98.2 °F (36.8 °C) (09/30/18 0717)  Pulse: 89 (09/30/18 0717)  Resp: 18 (09/30/18 0717)  BP: (!) 183/85 (09/30/18  0717)  SpO2: 97 % (09/30/18 0717) Vital Signs (24h Range):  Temp:  [97.9 °F (36.6 °C)-99.9 °F (37.7 °C)] 98.2 °F (36.8 °C)  Pulse:  [72-96] 89  Resp:  [16-18] 18  SpO2:  [92 %-100 %] 97 %  BP: (149-210)/(69-98) 183/85     Weight: 59.9 kg (132 lb 0.9 oz)  Body mass index is 20.68 kg/m².    Intake/Output Summary (Last 24 hours) at 9/30/2018 0813  Last data filed at 9/30/2018 0300  Gross per 24 hour   Intake 1239 ml   Output --   Net 1239 ml      Physical Exam   Constitutional: He is oriented to person, place, and time. He appears well-developed and well-nourished.   HENT:   Head: Normocephalic and atraumatic.   Right Ear: External ear normal.   Left Ear: External ear normal.   Nose: Nose normal.   Eyes: Conjunctivae, EOM and lids are normal. Pupils are equal, round, and reactive to light.   Neck: Trachea normal, normal range of motion and full passive range of motion without pain. Neck supple.   Cardiovascular: Normal rate, regular rhythm, S1 normal, S2 normal, normal heart sounds, intact distal pulses and normal pulses.   Pulmonary/Chest: Effort normal and breath sounds normal.   Abdominal: Soft. Normal aorta and bowel sounds are normal. There is tenderness.   Musculoskeletal: Normal range of motion.   Neurological: He is alert and oriented to person, place, and time. He has normal reflexes.   Skin: Skin is warm, dry and intact.   Psychiatric: He has a normal mood and affect. His speech is normal and behavior is normal. Judgment and thought content normal. Cognition and memory are normal.   Nursing note and vitals reviewed.      Significant Labs: All pertinent labs within the past 24 hours have been reviewed.    Significant Imaging: None.    Assessment/Plan:      * Small bowel obstruction    1.  Continue bowel rest with NG tube suction and NPO status  2.  Continue IV fluids  3.  Continue pain control as needed  4.  Surgical consult to determine further treatment plan  5.  Empiric antibiotics of Zosyn and Flagyl  6.   Follow as needed based on clinical course    September 26th 2018:  This patient will be taken for exploratory laparotomy later today                                        Will follow up postsurgically as needed                                         Will repeat labs in the a.m.    September 27, 2018:  Replace magnesium IV 2 g IV over 2 hr recheck in the morning addendum                                       Continue pain control and NPO status until bowel function returned                                       Continue current pain and nausea medications and follow this patient as needed  09/28/2018.  The patient has bowel sounds this morning he has not passed flatus or had a bowel movement.  He does state that he is tender but otherwise doing well.  Patient has excellent breath sounds he can do his IS at 17 50 cc   09/29/2018.  The patient is doing very well today he is passing flatus.  No bowel movements yet.  Patient complaints of mild abdominal pain this morning.  Examination is unrevealing.  Patient is awaiting increasing diet as possible.  The patient has been ambulating and use incentive spirometry.  Discharge is pending per Dr. Horner.  Continue current level of care.    09/30/2028.  See above dictation for assessment and plan. Soft diet today ambulation as possible today consult physical therapy today.  Patient needs to be more ambulatory.  Patient exam LA than blood pressure is fine.  Have added blood pressure medications.  Encouraged the patient to get out of the bed.  Basic exam is within normal limits except for tenderness of the abdomen done status post surgery.  Satisfactory recovery at this time.        Hypocalcemia    Calcium is 8.1 today.  09/30/2028.  Will continue p.o. calcium.          VTE Risk Mitigation (From admission, onward)        Ordered     heparin (porcine) injection 5,000 Units  Every 8 hours      09/26/18 1913     IP VTE LOW RISK PATIENT  Once      09/24/18 7931     [facility]  hose  Until discontinued      09/24/18 9763              Oziel Ortiz MD  Department of Hospital Medicine   [facility] Surg

## 2018-09-30 NOTE — PLAN OF CARE
Problem: Patient Care Overview  Goal: Plan of Care Review  Outcome: Ongoing (interventions implemented as appropriate)   09/30/18 0462   Coping/Psychosocial   Plan Of Care Reviewed With patient;caregiver

## 2018-10-01 LAB
ANION GAP SERPL CALC-SCNC: 10 MMOL/L
BASOPHILS # BLD AUTO: 0.02 K/UL
BASOPHILS NFR BLD: 0.3 %
BUN SERPL-MCNC: 10 MG/DL
CALCIUM SERPL-MCNC: 8.5 MG/DL
CHLORIDE SERPL-SCNC: 104 MMOL/L
CO2 SERPL-SCNC: 23 MMOL/L
CREAT SERPL-MCNC: 0.7 MG/DL
DIFFERENTIAL METHOD: ABNORMAL
EOSINOPHIL # BLD AUTO: 0.3 K/UL
EOSINOPHIL NFR BLD: 3.9 %
ERYTHROCYTE [DISTWIDTH] IN BLOOD BY AUTOMATED COUNT: 17 %
EST. GFR  (AFRICAN AMERICAN): >60 ML/MIN/1.73 M^2
EST. GFR  (NON AFRICAN AMERICAN): >60 ML/MIN/1.73 M^2
GLUCOSE SERPL-MCNC: 76 MG/DL
HCT VFR BLD AUTO: 31.9 %
HGB BLD-MCNC: 10.4 G/DL
IMM GRANULOCYTES # BLD AUTO: 0.03 K/UL
IMM GRANULOCYTES NFR BLD AUTO: 0.4 %
LYMPHOCYTES # BLD AUTO: 2.1 K/UL
LYMPHOCYTES NFR BLD: 29.7 %
MCH RBC QN AUTO: 27.6 PG
MCHC RBC AUTO-ENTMCNC: 32.6 G/DL
MCV RBC AUTO: 85 FL
MONOCYTES # BLD AUTO: 0.7 K/UL
MONOCYTES NFR BLD: 10 %
NEUTROPHILS # BLD AUTO: 4 K/UL
NEUTROPHILS NFR BLD: 55.7 %
NRBC BLD-RTO: 0 /100 WBC
PLATELET # BLD AUTO: 210 K/UL
PMV BLD AUTO: 11.2 FL
POTASSIUM SERPL-SCNC: 4.2 MMOL/L
RBC # BLD AUTO: 3.77 M/UL
SODIUM SERPL-SCNC: 137 MMOL/L
WBC # BLD AUTO: 7.1 K/UL

## 2018-10-01 PROCEDURE — 97803 MED NUTRITION INDIV SUBSEQ: CPT

## 2018-10-01 PROCEDURE — 80048 BASIC METABOLIC PNL TOTAL CA: CPT

## 2018-10-01 PROCEDURE — 25000003 PHARM REV CODE 250: Performed by: INTERNAL MEDICINE

## 2018-10-01 PROCEDURE — 25000003 PHARM REV CODE 250: Performed by: SURGERY

## 2018-10-01 PROCEDURE — 36415 COLL VENOUS BLD VENIPUNCTURE: CPT

## 2018-10-01 PROCEDURE — 63600175 PHARM REV CODE 636 W HCPCS: Performed by: SURGERY

## 2018-10-01 PROCEDURE — S0028 INJECTION, FAMOTIDINE, 20 MG: HCPCS | Performed by: INTERNAL MEDICINE

## 2018-10-01 PROCEDURE — 85025 COMPLETE CBC W/AUTO DIFF WBC: CPT

## 2018-10-01 PROCEDURE — 11000001 HC ACUTE MED/SURG PRIVATE ROOM

## 2018-10-01 RX ORDER — DEXTROSE MONOHYDRATE, SODIUM CHLORIDE, AND POTASSIUM CHLORIDE 50; 1.49; 4.5 G/1000ML; G/1000ML; G/1000ML
INJECTION, SOLUTION INTRAVENOUS CONTINUOUS
Status: DISCONTINUED | OUTPATIENT
Start: 2018-10-01 | End: 2018-10-02 | Stop reason: HOSPADM

## 2018-10-01 RX ORDER — HYDROCODONE BITARTRATE AND ACETAMINOPHEN 5; 325 MG/1; MG/1
1 TABLET ORAL EVERY 4 HOURS PRN
Status: DISCONTINUED | OUTPATIENT
Start: 2018-10-01 | End: 2018-10-02 | Stop reason: HOSPADM

## 2018-10-01 RX ADMIN — HYDROCODONE BITARTRATE AND ACETAMINOPHEN 1 TABLET: 5; 325 TABLET ORAL at 09:10

## 2018-10-01 RX ADMIN — DOCUSATE SODIUM 100 MG: 100 CAPSULE, LIQUID FILLED ORAL at 08:10

## 2018-10-01 RX ADMIN — POTASSIUM CHLORIDE, DEXTROSE MONOHYDRATE AND SODIUM CHLORIDE: 150; 5; 450 INJECTION, SOLUTION INTRAVENOUS at 08:10

## 2018-10-01 RX ADMIN — HYDROCODONE BITARTRATE AND ACETAMINOPHEN 1 TABLET: 5; 325 TABLET ORAL at 05:10

## 2018-10-01 RX ADMIN — FAMOTIDINE 20 MG: 20 INJECTION, SOLUTION INTRAVENOUS at 09:10

## 2018-10-01 RX ADMIN — THERA TABS 1 TABLET: TAB at 08:10

## 2018-10-01 RX ADMIN — POTASSIUM CHLORIDE, DEXTROSE MONOHYDRATE AND SODIUM CHLORIDE: 150; 5; 450 INJECTION, SOLUTION INTRAVENOUS at 09:10

## 2018-10-01 RX ADMIN — CALCIUM 500 MG: 500 TABLET ORAL at 08:10

## 2018-10-01 RX ADMIN — TRAZODONE HYDROCHLORIDE 100 MG: 50 TABLET ORAL at 09:10

## 2018-10-01 RX ADMIN — HEPARIN SODIUM 5000 UNITS: 5000 INJECTION, SOLUTION INTRAVENOUS; SUBCUTANEOUS at 02:10

## 2018-10-01 RX ADMIN — AMLODIPINE BESYLATE 5 MG: 2.5 TABLET ORAL at 05:10

## 2018-10-01 RX ADMIN — LISINOPRIL 20 MG: 10 TABLET ORAL at 08:10

## 2018-10-01 RX ADMIN — HYDROCODONE BITARTRATE AND ACETAMINOPHEN 1 TABLET: 5; 325 TABLET ORAL at 11:10

## 2018-10-01 RX ADMIN — HEPARIN SODIUM 5000 UNITS: 5000 INJECTION, SOLUTION INTRAVENOUS; SUBCUTANEOUS at 06:10

## 2018-10-01 RX ADMIN — CALCIUM 500 MG: 500 TABLET ORAL at 09:10

## 2018-10-01 RX ADMIN — CLONIDINE HYDROCHLORIDE 0.1 MG: 0.1 TABLET ORAL at 11:10

## 2018-10-01 RX ADMIN — FAMOTIDINE 20 MG: 20 INJECTION, SOLUTION INTRAVENOUS at 08:10

## 2018-10-01 RX ADMIN — CLONIDINE HYDROCHLORIDE 0.1 MG: 0.1 TABLET ORAL at 07:10

## 2018-10-01 RX ADMIN — HEPARIN SODIUM 5000 UNITS: 5000 INJECTION, SOLUTION INTRAVENOUS; SUBCUTANEOUS at 09:10

## 2018-10-01 RX ADMIN — ROPINIROLE HYDROCHLORIDE 3 MG: 1 TABLET, FILM COATED ORAL at 09:10

## 2018-10-01 NOTE — PHYSICIAN QUERY
PT Name: Miles Vazquez  MR #: [id number]     Physician Query Form - Documentation Clarification      CDS/: Clara Corbin RN, CCDS               Contact information:  [email or web address]    This form is a permanent document in the medical record.     Query Date: October 1, 2018    By submitting this query, we are merely seeking further clarification of documentation. Please utilize your independent clinical judgment when addressing the question(s) below.    The Medical record reflects the following:    Supporting Clinical Findings Location in Medical Record   Potassium  3.6  3.8-->3.4  3.7  3.3  3.8  4.2    Magnesium  1.2  1.8 Lab  09/24 09/26  0452--> 1914  09/27  09/28  09/29  10/01    Lab  09/27 09/29   Dextrose 5% and 0.45% NaCl with KCl    20 mEq   Infusion  100mL/hr   intravenous continuous    Potassium chloride IVPB    Magnesium sulfate IVPB MAR Order start 10/01    MAR  09/26, 09/28    MAR  09/27, 09/28                                                                            Doctor, Please specify diagnosis or diagnoses associated with above clinical findings.    Provider Use Only      [  x ] Hypokalemia    [ x  ] Hypomagnesemia    [   ] Other (specify) ________________                                                                                                                   [  ] Clinically undetermined

## 2018-10-01 NOTE — SUBJECTIVE & OBJECTIVE
Interval History:  Patient has had bowel movement.  Is tolerating clear liquids although did not tolerate regular diet this morning and had nausea and vomiting after eating.  Patient will be watched overnight with clear liquid diet and continue trying to advance as necessary    Review of Systems   Constitutional: Positive for appetite change. Negative for activity change, fatigue and fever.   HENT: Negative for congestion, ear discharge, mouth sores, nosebleeds, rhinorrhea, sinus pressure, sinus pain and tinnitus.    Eyes: Negative.  Negative for pain, redness and itching.   Respiratory: Negative for apnea, cough, choking, chest tightness, shortness of breath, wheezing and stridor.    Cardiovascular: Negative for chest pain, palpitations and leg swelling.   Gastrointestinal: Positive for abdominal pain, nausea and vomiting. Negative for abdominal distention, anal bleeding, blood in stool, constipation and diarrhea.   Endocrine: Negative.    Genitourinary: Negative for difficulty urinating, flank pain, frequency and urgency.   Musculoskeletal: Negative for arthralgias, back pain, gait problem and myalgias.   Skin: Negative for color change and pallor.   Allergic/Immunologic: Negative.    Neurological: Negative for dizziness, facial asymmetry, weakness, light-headedness and headaches.   Hematological: Negative for adenopathy. Does not bruise/bleed easily.     Objective:     Vital Signs (Most Recent):  Temp: 98.5 °F (36.9 °C) (10/01/18 0702)  Pulse: 104 (10/01/18 0702)  Resp: 16 (10/01/18 0702)  BP: (!) 165/77 (10/01/18 0702)  SpO2: 95 % (10/01/18 0702) Vital Signs (24h Range):  Temp:  [98.4 °F (36.9 °C)-99.3 °F (37.4 °C)] 98.5 °F (36.9 °C)  Pulse:  [] 104  Resp:  [15-18] 16  SpO2:  [94 %-95 %] 95 %  BP: (138-186)/(66-93) 165/77     Weight: 59.9 kg (132 lb 0.9 oz)  Body mass index is 20.68 kg/m².    Intake/Output Summary (Last 24 hours) at 10/1/2018 1105  Last data filed at 10/1/2018 0835  Gross per 24 hour    Intake 1310 ml   Output 1 ml   Net 1309 ml      Physical Exam   Constitutional: He is oriented to person, place, and time. He appears well-developed and well-nourished.   HENT:   Head: Normocephalic and atraumatic.   Eyes: EOM are normal. Pupils are equal, round, and reactive to light.   Neck: Normal range of motion. Neck supple. No tracheal deviation present. No thyromegaly present.   Cardiovascular: Normal rate, regular rhythm and normal heart sounds.   Pulmonary/Chest: Effort normal and breath sounds normal.   Abdominal: Soft. Bowel sounds are normal. He exhibits no distension. There is tenderness. There is no rebound and no guarding.   Musculoskeletal: Normal range of motion.   Lymphadenopathy:     He has no cervical adenopathy.   Neurological: He is alert and oriented to person, place, and time.   Skin: Skin is warm and dry. Capillary refill takes less than 2 seconds.   Psychiatric: He has a normal mood and affect. His behavior is normal. Judgment and thought content normal.   Nursing note and vitals reviewed.      Significant Labs: All pertinent labs within the past 24 hours have been reviewed.    Significant Imaging: I have reviewed and interpreted all pertinent imaging results/findings within the past 24 hours.

## 2018-10-01 NOTE — PROGRESS NOTES
"  [facility] - ICU  Adult Nutrition  Consult Note    SUMMARY     Recommendations    Recommendation/Intervention: 1) Provide cl liq diet  2) Monitor intake  Goals: When diet advanced pt will: 1) consume >75% of meals  2) consume recommended fluid  Nutrition Goal Status: new    Reason for Assessment    Reason for Assessment: NPO/clear liquids  >5 days  Diagnosis: Small bowel obstruction  General Information Comments: Pt tolerating Cl Liq Diet. Some nausea. Ambulating in correa. Did not tolerate soft diet. Pt has had bowel movement.    Past Medical History:   Diagnosis Date    Abdominal aortic aneurysm     Alcoholism     Bursitis of both hips     COPD (chronic obstructive pulmonary disease)     Coronary artery calcification seen on CAT scan 4/24/2013    Depression     HTN (hypertension) 1/7/2013    Hyperlipidemia     Hypertension     Macrocytic anemia 3/27/2016    Personal history of colonic polyps     Restless leg syndrome     Right hip pain     RLS (restless legs syndrome)     Sleep apnea 2 years ago    does not use cpap    Smoker 1/7/2013    Vitamin disease     Wheezing        Nutrition Risk Screen    Nutrition Risk Screen: no indicators present    Nutrition/Diet History    Patient Reported Diet/Restrictions/Preferences: Cl Liquid. Regular diet at home.  Do you have any cultural, spiritual, Jain conflicts, given your current situation?: Church    Anthropometrics    Temp: 98 °F (36.7 °C)  Height Method: Stated  Height: 5' 7" (170.2 cm)  Height (inches): 67 in  Weight Method: Bed Scale  Weight: 59.9 kg (132 lb 0.9 oz)  Weight (lb): 132.06 lb  Ideal Body Weight (IBW), Male: 148 lb  % Ideal Body Weight, Male (lb): 89.23 lb  BMI (Calculated): 20.7     Lab/Procedures/Meds    Pertinent Labs Reviewed: reviewed    Lab Results   Component Value Date    ALBUMIN 2.5 (L) 09/28/2018     Lab Results   Component Value Date    WBC 7.10 10/01/2018    HGB 10.4 (L) 10/01/2018    HCT 31.9 (L) " 10/01/2018    MCV 85 10/01/2018     10/01/2018     Lab Results   Component Value Date     10/01/2018    K 4.2 10/01/2018     10/01/2018    CO2 23 10/01/2018     Physical Findings/Assessment    Overall Physical Appearance: weak. Improved. Walking halls.  Tubes: nasogastric tube removed.  Orlin Score: 18    Estimated/Assessed Needs    Weight Used For Calorie Calculations: 59.9 kg (132 lb)  Energy Calorie Requirements (kcal): 4900-8517 calories  (25-30 kcal/kg)  Energy Need Method: Kcal/kg  Protein Requirements: 72-90 gms  (1. 2- 1.5)  Weight Used For Protein Calculations: 59.9 kg (132 lb 0.9 oz)     Fluid Need Method: RDA Method  RDA Method (mL): 1497     Nutrition Prescription Ordered    Current Diet Order: Cl Liq  Nutrition Order Comments: Advance diet to regular soft texture when medically indicated.    Evaluation of Received Nutrient/Fluid Intake    Energy Calories Required: not meeting needs  Protein Required: not meeting needs  Fluid Required: exceed needs  Total Fluid Intake (mL/kg): 2160 mls  Tolerance: tolerating  % Intake of Estimated Energy Needs:   % Meal Intake: Cl Liquids    Nutrition Risk    Level of Risk/Frequency of Follow-up: moderate     Assessment and Plan    Nutrition Problem  Potential for nutritional deficiences    Related to (etiology):   Acuity of illness    Signs and Symptoms (as evidenced by):   Inadequate intake of Cl Liq Diet    Interventions/Recommendations (treatment strategy):  Continue to provide Cl Liq Diet. Advance diet as soon as medically indicated    Nutrition Diagnosis Status:   Continuing     Monitor and Evaluation    Food and Nutrient Intake: energy intake  Food and Nutrient Adminstration: diet order  Anthropometric Measurements: weight  Nutrition-Focused Physical Findings: overall appearance     Nutrition Follow-Up    Yes

## 2018-10-01 NOTE — PLAN OF CARE
Problem: Pain, Acute (Adult)  Intervention: Mutually Develop/Implement Acute Pain Management Plan   09/30/18 9575   Pain/Comfort/Sleep Interventions   Pain Management Interventions care clustered;diversional activity provided;medication;pillow support provided   Cognitive Interventions   Sensory Stimulation Regulation music/television provided for relaxation

## 2018-10-01 NOTE — PLAN OF CARE
Problem: Patient Care Overview  Goal: Plan of Care Review   09/30/18 1917   Coping/Psychosocial   Plan Of Care Reviewed With patient

## 2018-10-01 NOTE — PLAN OF CARE
10/01/18 1120   Discharge Reassessment   Assessment Type Discharge Planning Reassessment   Discharge plan remains the same: Yes   Provided patient/caregiver education on the expected discharge date and the discharge plan Yes   Discharge Plan A Home   Change in patient condition or support system No   Patient choice form signed by patient/caregiver N/A   Explained to the the patient/caregiver why the discharge planned changed: Yes   Involved the patient/caregiver in establishing a new discharge plan: Yes   Patient walking in the correa. States having nausea. States may have missed some follow up appointments but not sure; they were all with [facility] doctors. They only one scheduled is for Oct 19 with Dr Dunne. Will make follow up with Dr Horner. Denies any discharge needs at this time.

## 2018-10-01 NOTE — PROGRESS NOTES
Patient seen and examined today. Afebrile vital signs stable. Nausea with one time vomiting this morning of bilious fluid. Multiple bowel movements. Moderately distended abdomen. Nontender. Discussed with internal medicine. Likely an ileus post surgery. Recommendations today will be for De-advancement to clear liquid diet and repeat initiation of IV fluids to prevent dehydration. Further management of patient will depend upon clinical c lesliee.

## 2018-10-01 NOTE — PROGRESS NOTES
[facility] Medicine  Progress Note    Patient Name: Miles Vazquez  MRN: [id number]  Patient Class: IP- Inpatient   Admission Date: 9/24/2018  Length of Stay: 7 days  Attending Physician: Burton Cast MD  Primary Care Provider: Nichelle Frederick MD        Subjective:     Principal Problem:Small bowel obstruction    HPI:  Patient is a 63-year-old male that presented to the emergency last evening complaining of right lower quadrant abdominal pain. Patient states a 1 day history of pain in the right lower quadrant area isn't nausea with 1 episode of vomiting.  Patient has a past medical history of recently discovered abdominal aortic aneurysm, and also has history of hypertension hyperlipidemia osteoarthritis, colonic polyps, depression, and restless leg syndrome.  Patient denies fever, chills, rigors, but states significant nausea for the last 24 hr and right lower quadrant pain.  It is vomiting only x1 patient was seen in the emergency department and started on NG tube suction and bowel rest.    Hospital Course:  Patient will be admitted to medical-surgical unit.  Inpatient surgical consult will be obtained.  Further treatment will be based on clinical course.    September 26, 2018:  Patient is seemingly worse today with pain, discomfort and obstruction.  White blood cell count has decreased however symptomatically the patient seems worse.  Dr. Horner discussed with this patient the need for exploratory laparotomy and EGD and will proceed with that this afternoon.  Patient does understand all risks and benefits and wants to proceed.  Continue controlling pain and continue NPO status follow up postsurgically    September 27, 2018:  Patient underwent exploratory laparotomy last evening.  Patient was found to have a strangulated but not incarcerated hernia of the distal small bowel.  This was easily reduced intraoperatively and the patient was recovered without  complications.  No bowel resection had to be done.  Patient has remained stable overnight since surgery and is sitting up in the chair this morning stating some pain but nothing worse than he was having prior to the surgery.  I blood cell count this morning was normal white blood cells 7.45 hemoglobin 10.3 hematocrit level of 31.8 and platelets normal 193 chemistry showed a sodium 133 calcium 7.7 albumin 2.6 and otherwise no electrolyte abnormalities.  Patient magnesium was 1.2 which will be replaced this morning  9/2 08/2018.  Patient is doing well today sitting up in a chair is examination is excellent.  He has clear breath sounds he is doing 17 50 on IAS the patient bowel sounds are positive he has not passed gas yet.  The patient desires to be transferred to the floor he even go home if he can't.  The patient will be transferred to the floor.  The patient will need replacement  potassium and magnesium.  Lab pending for in a.m..  The patient H&H has been discussed.  Will continue to monitor.  09/29/2018.  The patient is doing well today is passing gas today his chief complaint is that of abdominal discomfort but he is doing well.  He is eating his liquid diet without any difficulty.  Laboratory values have been reviewed.  Dr. TARIQ will see the patient today and make further determinations about the patient's diet.  Medications have been adjusted as needed.  Ambulation p.r.n..  Patient is doing well with incentive spirometry.  General exam is much improved.    09/30/2018.  The patient continues to improve and is doing well except for his blood pressure at this time.  Medications have been added to adjust his blood pressure.  The patient mostly lies in the bed he is not ambulatory as he should be I have asked him to walk the correa set up in the chair as much as possible.  I will advance his diet to a soft diet today.  Will ask Dr. TARIQ General surgery to encourage the patient as well.  The patient will have laboratory  values in a.m..  Will consult Physical therapy for ambulation.  PM vomited pain meds will d/c opoid constipation and start stool colace and dulcolax prn constipation.SBO resolved     10/01/2018:  Patient is tolerating clear liquids and started regular diet this morning.  He did have some nausea vomiting after his regular diet.  Otherwise the time of my visit the patient was in no acute distress and states feeling okay.  Patient has had bowel movement without difficulty.  Otherwise patient in no acute distress and will continue clear liquids and possibly discharge tomorrow if tolerating regular diet and p.o. intake        Interval History:  Patient has had bowel movement.  Is tolerating clear liquids although did not tolerate regular diet this morning and had nausea and vomiting after eating.  Patient will be watched overnight with clear liquid diet and continue trying to advance as necessary    Review of Systems   Constitutional: Positive for appetite change. Negative for activity change, fatigue and fever.   HENT: Negative for congestion, ear discharge, mouth sores, nosebleeds, rhinorrhea, sinus pressure, sinus pain and tinnitus.    Eyes: Negative.  Negative for pain, redness and itching.   Respiratory: Negative for apnea, cough, choking, chest tightness, shortness of breath, wheezing and stridor.    Cardiovascular: Negative for chest pain, palpitations and leg swelling.   Gastrointestinal: Positive for abdominal pain, nausea and vomiting. Negative for abdominal distention, anal bleeding, blood in stool, constipation and diarrhea.   Endocrine: Negative.    Genitourinary: Negative for difficulty urinating, flank pain, frequency and urgency.   Musculoskeletal: Negative for arthralgias, back pain, gait problem and myalgias.   Skin: Negative for color change and pallor.   Allergic/Immunologic: Negative.    Neurological: Negative for dizziness, facial asymmetry, weakness, light-headedness and headaches.   Hematological:  Negative for adenopathy. Does not bruise/bleed easily.     Objective:     Vital Signs (Most Recent):  Temp: 98.5 °F (36.9 °C) (10/01/18 0702)  Pulse: 104 (10/01/18 0702)  Resp: 16 (10/01/18 0702)  BP: (!) 165/77 (10/01/18 0702)  SpO2: 95 % (10/01/18 0702) Vital Signs (24h Range):  Temp:  [98.4 °F (36.9 °C)-99.3 °F (37.4 °C)] 98.5 °F (36.9 °C)  Pulse:  [] 104  Resp:  [15-18] 16  SpO2:  [94 %-95 %] 95 %  BP: (138-186)/(66-93) 165/77     Weight: 59.9 kg (132 lb 0.9 oz)  Body mass index is 20.68 kg/m².    Intake/Output Summary (Last 24 hours) at 10/1/2018 1105  Last data filed at 10/1/2018 0835  Gross per 24 hour   Intake 1310 ml   Output 1 ml   Net 1309 ml      Physical Exam   Constitutional: He is oriented to person, place, and time. He appears well-developed and well-nourished.   HENT:   Head: Normocephalic and atraumatic.   Eyes: EOM are normal. Pupils are equal, round, and reactive to light.   Neck: Normal range of motion. Neck supple. No tracheal deviation present. No thyromegaly present.   Cardiovascular: Normal rate, regular rhythm and normal heart sounds.   Pulmonary/Chest: Effort normal and breath sounds normal.   Abdominal: Soft. Bowel sounds are normal. He exhibits no distension. There is tenderness. There is no rebound and no guarding.   Musculoskeletal: Normal range of motion.   Lymphadenopathy:     He has no cervical adenopathy.   Neurological: He is alert and oriented to person, place, and time.   Skin: Skin is warm and dry. Capillary refill takes less than 2 seconds.   Psychiatric: He has a normal mood and affect. His behavior is normal. Judgment and thought content normal.   Nursing note and vitals reviewed.      Significant Labs: All pertinent labs within the past 24 hours have been reviewed.    Significant Imaging: I have reviewed and interpreted all pertinent imaging results/findings within the past 24 hours.    Assessment/Plan:      * Small bowel obstruction    1.  Continue bowel rest with NG  tube suction and NPO status  2.  Continue IV fluids  3.  Continue pain control as needed  4.  Surgical consult to determine further treatment plan  5.  Empiric antibiotics of Zosyn and Flagyl  6.  Follow as needed based on clinical course    September 26th 2018:  This patient will be taken for exploratory laparotomy later today                                        Will follow up postsurgically as needed                                         Will repeat labs in the a.m.    September 27, 2018:  Replace magnesium IV 2 g IV over 2 hr recheck in the morning addendum                                       Continue pain control and NPO status until bowel function returned                                       Continue current pain and nausea medications and follow this patient as needed  09/28/2018.  The patient has bowel sounds this morning he has not passed flatus or had a bowel movement.  He does state that he is tender but otherwise doing well.  Patient has excellent breath sounds he can do his IS at 17 50 cc   09/29/2018.  The patient is doing very well today he is passing flatus.  No bowel movements yet.  Patient complaints of mild abdominal pain this morning.  Examination is unrevealing.  Patient is awaiting increasing diet as possible.  The patient has been ambulating and use incentive spirometry.  Discharge is pending per Dr. Horner.  Continue current level of care.    09/30/2028.  See above dictation for assessment and plan. Soft diet today ambulation as possible today consult physical therapy today.  Patient needs to be more ambulatory.  Patient exam LA than blood pressure is fine.  Have added blood pressure medications.  Encouraged the patient to get out of the bed.  Basic exam is within normal limits except for tenderness of the abdomen done status post surgery.  Satisfactory recovery at this time.    10/01/2018:  Will continue ambulating patient                        Referred back to clear liquid  diet                         Advanced diet to regular as tolerated                         Monitor and control symptoms overnight                         Possible discharge tomorrow if stable        Hypocalcemia    Calcium is 8.1 today.  09/30/2028.  Will continue p.o. calcium.          VTE Risk Mitigation (From admission, onward)        Ordered     heparin (porcine) injection 5,000 Units  Every 8 hours      09/26/18 1913     IP VTE LOW RISK PATIENT  Once      09/24/18 2357     Place RADHA hose  Until discontinued      09/24/18 2357              Burton Cast MD  Department of Hospital Medicine   [facility] - Med Surg

## 2018-10-01 NOTE — ASSESSMENT & PLAN NOTE
1.  Continue bowel rest with NG tube suction and NPO status  2.  Continue IV fluids  3.  Continue pain control as needed  4.  Surgical consult to determine further treatment plan  5.  Empiric antibiotics of Zosyn and Flagyl  6.  Follow as needed based on clinical course    September 26th 2018:  This patient will be taken for exploratory laparotomy later today                                        Will follow up postsurgically as needed                                         Will repeat labs in the a.m.    September 27, 2018:  Replace magnesium IV 2 g IV over 2 hr recheck in the morning addendum                                       Continue pain control and NPO status until bowel function returned                                       Continue current pain and nausea medications and follow this patient as needed  09/28/2018.  The patient has bowel sounds this morning he has not passed flatus or had a bowel movement.  He does state that he is tender but otherwise doing well.  Patient has excellent breath sounds he can do his IS at 17 50 cc   09/29/2018.  The patient is doing very well today he is passing flatus.  No bowel movements yet.  Patient complaints of mild abdominal pain this morning.  Examination is unrevealing.  Patient is awaiting increasing diet as possible.  The patient has been ambulating and use incentive spirometry.  Discharge is pending per Dr. Horner.  Continue current level of care.    09/30/2028.  See above dictation for assessment and plan. Soft diet today ambulation as possible today consult physical therapy today.  Patient needs to be more ambulatory.  Patient exam LA than blood pressure is fine.  Have added blood pressure medications.  Encouraged the patient to get out of the bed.  Basic exam is within normal limits except for tenderness of the abdomen done status post surgery.  Satisfactory recovery at this time.    10/01/2018:  Will continue ambulating patient                         Referred back to clear liquid diet                         Advanced diet to regular as tolerated                         Monitor and control symptoms overnight                         Possible discharge tomorrow if stable

## 2018-10-02 VITALS
HEART RATE: 73 BPM | HEIGHT: 67 IN | OXYGEN SATURATION: 99 % | TEMPERATURE: 98 F | RESPIRATION RATE: 16 BRPM | SYSTOLIC BLOOD PRESSURE: 162 MMHG | BODY MASS INDEX: 20.73 KG/M2 | WEIGHT: 132.06 LBS | DIASTOLIC BLOOD PRESSURE: 76 MMHG

## 2018-10-02 PROCEDURE — S0028 INJECTION, FAMOTIDINE, 20 MG: HCPCS | Performed by: INTERNAL MEDICINE

## 2018-10-02 PROCEDURE — 63600175 PHARM REV CODE 636 W HCPCS: Performed by: SURGERY

## 2018-10-02 PROCEDURE — 25000003 PHARM REV CODE 250: Performed by: INTERNAL MEDICINE

## 2018-10-02 RX ORDER — HYDROCODONE BITARTRATE AND ACETAMINOPHEN 5; 325 MG/1; MG/1
1 TABLET ORAL EVERY 4 HOURS PRN
Qty: 20 TABLET | Refills: 0 | Status: SHIPPED | OUTPATIENT
Start: 2018-10-02 | End: 2018-10-17 | Stop reason: ALTCHOICE

## 2018-10-02 RX ADMIN — HYDROCODONE BITARTRATE AND ACETAMINOPHEN 1 TABLET: 5; 325 TABLET ORAL at 05:10

## 2018-10-02 RX ADMIN — LISINOPRIL 20 MG: 10 TABLET ORAL at 09:10

## 2018-10-02 RX ADMIN — FAMOTIDINE 20 MG: 20 INJECTION, SOLUTION INTRAVENOUS at 09:10

## 2018-10-02 RX ADMIN — CALCIUM 500 MG: 500 TABLET ORAL at 09:10

## 2018-10-02 RX ADMIN — THERA TABS 1 TABLET: TAB at 09:10

## 2018-10-02 RX ADMIN — HYDROCODONE BITARTRATE AND ACETAMINOPHEN 1 TABLET: 5; 325 TABLET ORAL at 09:10

## 2018-10-02 RX ADMIN — HEPARIN SODIUM 5000 UNITS: 5000 INJECTION, SOLUTION INTRAVENOUS; SUBCUTANEOUS at 05:10

## 2018-10-02 RX ADMIN — DOCUSATE SODIUM 100 MG: 100 CAPSULE, LIQUID FILLED ORAL at 09:10

## 2018-10-02 RX ADMIN — HEPARIN SODIUM 5000 UNITS: 5000 INJECTION, SOLUTION INTRAVENOUS; SUBCUTANEOUS at 03:10

## 2018-10-02 NOTE — PLAN OF CARE
Problem: Patient Care Overview  Goal: Plan of Care Review  Outcome: Ongoing (interventions implemented as appropriate)   10/01/18 9464   Coping/Psychosocial   Plan Of Care Reviewed With patient

## 2018-10-02 NOTE — PHYSICIAN QUERY
PT Name: Miles Vazquez  MR #: [id number]     Physician Query Form - Documentation Clarification      CDS/: Clara Corbin RN, CCDS               Contact information:  [email or web address]    This form is a permanent document in the medical record.     Query Date: October 2, 2018    By submitting this query, we are merely seeking further clarification of documentation. Please utilize your independent clinical judgment when addressing the question(s) below.    The Medical record reflects the following:    Supporting Clinical Findings Location in Medical Record   Surgery date: 9/26/2018   Pre-op diagnosis:  Small bowel obstruction   Post-op diagnosis:    Internal hernia with volvulus     Chronic appendicitis      Lack of peripheral venous access requiring central line placement   Operations:  1) Exploratory laparotomy  2) Reduction of internal hernia with volvulus  3) Appendectomy  4) Insertion of Right IJ Central venous access device (catheter) under ultrasound guidance    Nausea with one time vomiting this morning of bilious fluid. Multiple bowel movements. Moderately distended abdomen. Nontender. Discussed with internal medicine. Likely an ileus post surgery. Recommendations today will be for De-advancement to clear liquid diet and repeat initiation of IV fluids to prevent dehydration. Further management of patient will depend upon clinical c ourse.  Op note                          General Surgery progress note 10/01 5:13 PM                                                                                      Doctor, Please specify diagnosis or diagnoses associated with above clinical findings.  Please further specify likely an ileus post surgery.    Provider Use Only      [   ] Condition occurred in the post operative period (not a complication of surgery)    [   ] Condition is a complication of surgery    [ X  ] Other intended meaning (specify) ________Delayed return of bowel function, not Ileus.  I mistyped.   Ileus is defined as >7 days post surgery._____                                                                                                               [  ] Clinically undetermined

## 2018-10-02 NOTE — PLAN OF CARE
Problem: Infection, Risk/Actual (Adult)  Goal: Identify Related Risk Factors and Signs and Symptoms  Related risk factors and signs and symptoms are identified upon initiation of Human [employer] Clinical Practice Guideline (CPG)   10/01/18 2008   Infection, Risk/Actual   Related Risk Factors (Infection, Risk/Actual) prolonged hospitalization;surgery/procedure;skin integrity impairment   Signs and Symptoms (Infection, Risk/Actual) pain;weakness

## 2018-10-02 NOTE — PROGRESS NOTES
Patient doing well today.  No issues.  No nausea or vomiting.  Tolerance of a FLD.  Ready for discharge home.  Afebrile.  Vital signs stable.  Recommend f/u surgery clinic in 2 weeks.  Surgical site c/d/i without s/s of infection.

## 2018-10-02 NOTE — DISCHARGE SUMMARY
[facility] Medicine  Discharge Summary      Patient Name: Miles Vazquez  MRN: [id number]  Admission Date: 9/24/2018  Hospital Length of Stay: 8 days  Discharge Date and Time:  10/02/2018 4:52 PM  Attending Physician: Burton Cast MD   Discharging Provider: Burton Cast MD  Primary Care Provider: Nichelle Frederick MD      HPI:   Patient is a 63-year-old male that presented to the emergency last evening complaining of right lower quadrant abdominal pain. Patient states a 1 day history of pain in the right lower quadrant area isn't nausea with 1 episode of vomiting.  Patient has a past medical history of recently discovered abdominal aortic aneurysm, and also has history of hypertension hyperlipidemia osteoarthritis, colonic polyps, depression, and restless leg syndrome.  Patient denies fever, chills, rigors, but states significant nausea for the last 24 hr and right lower quadrant pain.  It is vomiting only x1 patient was seen in the emergency department and started on NG tube suction and bowel rest.    Procedure(s) (LRB):  EGD (ESOPHAGOGASTRODUODENOSCOPY) (N/A)  Insertion,central venous access device (N/A)  REPAIR, HERNIA (N/A)  APPENDECTOMY (N/A)      Hospital Course:   Patient will be admitted to medical-surgical unit.  Inpatient surgical consult will be obtained.  Further treatment will be based on clinical course.    September 26, 2018:  Patient is seemingly worse today with pain, discomfort and obstruction.  White blood cell count has decreased however symptomatically the patient seems worse.  Dr. Horner discussed with this patient the need for exploratory laparotomy and EGD and will proceed with that this afternoon.  Patient does understand all risks and benefits and wants to proceed.  Continue controlling pain and continue NPO status follow up postsurgically    September 27, 2018:  Patient underwent exploratory laparotomy last evening.  Patient  was found to have a strangulated but not incarcerated hernia of the distal small bowel.  This was easily reduced intraoperatively and the patient was recovered without complications.  No bowel resection had to be done.  Patient has remained stable overnight since surgery and is sitting up in the chair this morning stating some pain but nothing worse than he was having prior to the surgery.  I blood cell count this morning was normal white blood cells 7.45 hemoglobin 10.3 hematocrit level of 31.8 and platelets normal 193 chemistry showed a sodium 133 calcium 7.7 albumin 2.6 and otherwise no electrolyte abnormalities.  Patient magnesium was 1.2 which will be replaced this morning  9/2 08/2018.  Patient is doing well today sitting up in a chair is examination is excellent.  He has clear breath sounds he is doing 17 50 on IAS the patient bowel sounds are positive he has not passed gas yet.  The patient desires to be transferred to the floor he even go home if he can't.  The patient will be transferred to the floor.  The patient will need replacement  potassium and magnesium.  Lab pending for in a.m..  The patient H&H has been discussed.  Will continue to monitor.  09/29/2018.  The patient is doing well today is passing gas today his chief complaint is that of abdominal discomfort but he is doing well.  He is eating his liquid diet without any difficulty.  Laboratory values have been reviewed.  Dr. TARIQ will see the patient today and make further determinations about the patient's diet.  Medications have been adjusted as needed.  Ambulation p.r.n..  Patient is doing well with incentive spirometry.  General exam is much improved.    09/30/2018.  The patient continues to improve and is doing well except for his blood pressure at this time.  Medications have been added to adjust his blood pressure.  The patient mostly lies in the bed he is not ambulatory as he should be I have asked him to walk the correa set up in the chair as  much as possible.  I will advance his diet to a soft diet today.  Will ask Dr. TARIQ General surgery to encourage the patient as well.  The patient will have laboratory values in a.m..  Will consult Physical therapy for ambulation.  PM vomited pain meds will d/c opoid constipation and start stool colace and dulcolax prn constipation.SBO resolved     10/01/2018:  Patient is tolerating clear liquids and started regular diet this morning.  He did have some nausea vomiting after his regular diet.  Otherwise the time of my visit the patient was in no acute distress and states feeling okay.  Patient has had bowel movement without difficulty.  Otherwise patient in no acute distress and will continue clear liquids and possibly discharge tomorrow if tolerating regular diet and p.o. Intake    10/02/2018.:  Patient is doing well and having no nausea or pain according to him.  Patient tolerated full liquid diet today without difficulty.  He is requesting to go home if he tolerates supper well.  I discussed this with Dr. Horner and he feels this would be okay to do if he does tolerate his food.  Patient will be fed a full liquid diet and was advised to tolerate and advance as tolerated at home.  Patient will follow-up with Dr. Horner within 2 weeks.  Patient will continue all other current medications as listed in medicine reconciliation         Consults:   Consults (From admission, onward)        Status Ordering Provider     IP CONSULT TO GENERAL SURGERY  Once     Provider:  Ramírez Horner MD    Completed MORGAN PHAN          * Small bowel obstruction    1.  Continue bowel rest with NG tube suction and NPO status  2.  Continue IV fluids  3.  Continue pain control as needed  4.  Surgical consult to determine further treatment plan  5.  Empiric antibiotics of Zosyn and Flagyl  6.  Follow as needed based on clinical course    September 26th 2018:  This patient will be taken for exploratory laparotomy later today                                         Will follow up postsurgically as needed                                         Will repeat labs in the a.m.    September 27, 2018:  Replace magnesium IV 2 g IV over 2 hr recheck in the morning addendum                                       Continue pain control and NPO status until bowel function returned                                       Continue current pain and nausea medications and follow this patient as needed  09/28/2018.  The patient has bowel sounds this morning he has not passed flatus or had a bowel movement.  He does state that he is tender but otherwise doing well.  Patient has excellent breath sounds he can do his IS at 17 50 cc   09/29/2018.  The patient is doing very well today he is passing flatus.  No bowel movements yet.  Patient complaints of mild abdominal pain this morning.  Examination is unrevealing.  Patient is awaiting increasing diet as possible.  The patient has been ambulating and use incentive spirometry.  Discharge is pending per Dr. Horner.  Continue current level of care.    09/30/2028.  See above dictation for assessment and plan. Soft diet today ambulation as possible today consult physical therapy today.  Patient needs to be more ambulatory.  Patient exam LA than blood pressure is fine.  Have added blood pressure medications.  Encouraged the patient to get out of the bed.  Basic exam is within normal limits except for tenderness of the abdomen done status post surgery.  Satisfactory recovery at this time.    10/01/2018:  Will continue ambulating patient                        Referred back to clear liquid diet                         Advanced diet to regular as tolerated                         Monitor and control symptoms overnight                         Possible discharge tomorrow if stable    10/02/2018:  Discharge to home in stable condition.                        He will be discharged if tolerates full liquid diet for supper.                         Follow-up with Dr. Horner within 1-2 weeks in his office.                       Patient advised to continue all current home medications.                       Patient was given prescription for hydrocodone [id number] p.o. q.6 hours p.r.n. pain 20.  No refill            Final Active Diagnoses:    Diagnosis Date Noted POA    PRINCIPAL PROBLEM:  Small bowel obstruction [K56.609] 09/24/2018 Yes    Hypocalcemia [E83.51] 09/28/2018 Unknown      Problems Resolved During this Admission:       Discharged Condition: good    Disposition: Home or Self Care    Follow Up:    Patient Instructions:   No discharge procedures on file.    Significant Diagnostic Studies: Labs: All labs within the past 24 hours have been reviewed    Pending Diagnostic Studies:     None         Medications:  Reconciled Home Medications:      Medication List      START taking these medications    HYDROcodone-acetaminophen 5-325 mg per tablet  Commonly known as:  NORCO  Take 1 tablet by mouth every 4 (four) hours as needed.        CONTINUE taking these medications    albuterol 90 mcg/actuation inhaler  Commonly known as:  PROVENTIL/VENTOLIN HFA  Inhale 2 puffs into the lungs every 6 (six) hours as needed for Wheezing.     aspirin 81 MG Chew  Take 81 mg by mouth once daily.     benazepril 20 MG tablet  Commonly known as:  LOTENSIN  Take 20 mg by mouth once daily.     cyanocobalamin 1000 MCG tablet  Commonly known as:  VITAMIN B-12  Take 0.5 tablets (500 mcg total) by mouth once daily.     diclofenac 75 MG EC tablet  Commonly known as:  VOLTAREN  Take 1 tablet (75 mg total) by mouth 2 (two) times daily.     folic acid 1 MG tablet  Commonly known as:  FOLVITE  Take 1 mg by mouth once daily.     miSOPROStol 200 MCG Tab  Commonly known as:  CYTOTEC  Take 1 tablet (200 mcg total) by mouth 2 (two) times daily.     rOPINIRole 3 MG tablet  Commonly known as:  REQUIP  Take 1 tablet (3 mg total) by mouth every evening.     thiamine 100 MG  tablet  Take 1 tablet (100 mg total) by mouth once daily.     traZODone 50 MG tablet  Commonly known as:  DESYREL  1-3 tabs po qhs prn            Indwelling Lines/Drains at time of discharge:   Lines/Drains/Airways          None          Time spent on the discharge of patient: 30 minutes  Patient was seen and examined on the date of discharge and determined to be suitable for discharge.         Burton Cast MD  Department of Hospital Medicine  [facility] - Med Surg

## 2018-10-02 NOTE — PLAN OF CARE
10/02/18 0934   Discharge Reassessment   Assessment Type Discharge Planning Reassessment   Discharge plan remains the same: Yes   Provided patient/caregiver education on the expected discharge date and the discharge plan Yes   Discharge Plan A Home   Change in patient condition or support system No   Patient choice form signed by patient/caregiver N/A   Explained to the the patient/caregiver why the discharge planned changed: Yes   Involved the patient/caregiver in establishing a new discharge plan: Yes   Patient states hoping to go home today if can tolerate some food. Waiting for doctor to make rounds. Denies any discharge needs at this time.

## 2018-10-03 NOTE — PLAN OF CARE
10/02/18 1700   Medicare Message   Important Message from Medicare regarding Discharge Appeal Rights Given to patient/caregiver;Explained to patient/caregiver;Signed/date by patient/caregiver   Date IMM was signed 10/02/18   Time IMM was signed 1700

## 2018-10-03 NOTE — PLAN OF CARE
10/03/18 0954   Final Note   Assessment Type Final Discharge Note   Discharge Disposition Home   What phone number can be called within the next 1-3 days to see how you are doing after discharge? [phone]   Hospital Follow Up  Appt(s) scheduled? Yes   Discharge plans and expectations educations in teach back method with documentation complete? Yes   Called patient with follow up appointments. Demonstrated understanding by verbal feedback & writing them down. States was suppose to have an ultrasound too for AAA. Spoke to radiology dept who spoke to radiologist who stated that that since CT was done & he addressed the AAA that ultrasound would not be needed. Patient demonstrated understanding by verbal feedback.

## 2018-10-17 ENCOUNTER — OFFICE VISIT (OUTPATIENT)
Dept: SURGERY | Facility: CLINIC | Age: 63
End: 2018-10-17
Payer: MEDICARE

## 2018-10-17 VITALS
HEIGHT: 67 IN | DIASTOLIC BLOOD PRESSURE: 82 MMHG | SYSTOLIC BLOOD PRESSURE: 154 MMHG | WEIGHT: 124 LBS | HEART RATE: 89 BPM | TEMPERATURE: 97 F | RESPIRATION RATE: 18 BRPM | OXYGEN SATURATION: 99 % | BODY MASS INDEX: 19.46 KG/M2

## 2018-10-17 DIAGNOSIS — Z09 POSTOP CHECK: Primary | ICD-10-CM

## 2018-10-17 PROCEDURE — 99024 POSTOP FOLLOW-UP VISIT: CPT | Mod: S$GLB,POP,, | Performed by: SURGERY

## 2018-10-17 NOTE — PROGRESS NOTES
"General Surgery  [facility]  Follow-up    HPI/Follow-up exam:  Miles Vazquez is a 63 y.o. male status post laparotomy with internal hernia reduction and appendectomy as well as EGD with biopsy.  Patient since discharge has done well. No issues.  Pain improving.  Off pain medications.  Appendectomy showed increased chronic inflammation focally.  Stomach antrum showed chronic active gastritis.  Negative for Helicobacter pylori species.  Patient has had normal bowel movements.  No nausea vomiting.  No fevers.  He is feeling closer back to normal now.    PHYSICAL EXAM:  BP (!) 154/82   Pulse 89   Temp 97.4 °F (36.3 °C) (Oral)   Resp 18   Ht 5' 7" (1.702 m)   Wt 56.2 kg (124 lb)   SpO2 99%   BMI 19.42 kg/m²   Gen: Wd Wn male in NAD  Heent: Nc/At, MMM  Cv: RRR  Lung: Non-labored breathing, clear bilaterally  Abd: Soft, non-tender, non-distended, surgical site clean dry and intact no signs or symptoms of infection.  Staples in place.  Ext: No cyanosis clubbing or edema    Assessment:  Miles aVzquez is a 63 y.o. male s/p laparotomy with appendectomy internal hernia reduction EGD with biopsy.    Plan/Medical Decision Making:  Patient doing well today.  Continue light duty.  Follow-up 4 weeks.  Staples removed today.        "

## 2018-10-23 ENCOUNTER — OFFICE VISIT (OUTPATIENT)
Dept: FAMILY MEDICINE | Facility: CLINIC | Age: 63
End: 2018-10-23
Payer: MEDICARE

## 2018-10-23 VITALS
TEMPERATURE: 98 F | WEIGHT: 121.38 LBS | BODY MASS INDEX: 19.01 KG/M2 | HEART RATE: 84 BPM | OXYGEN SATURATION: 99 % | SYSTOLIC BLOOD PRESSURE: 139 MMHG | DIASTOLIC BLOOD PRESSURE: 77 MMHG

## 2018-10-23 DIAGNOSIS — I10 ESSENTIAL HYPERTENSION: ICD-10-CM

## 2018-10-23 DIAGNOSIS — M70.61 TROCHANTERIC BURSITIS OF BOTH HIPS: ICD-10-CM

## 2018-10-23 DIAGNOSIS — D64.9 ANEMIA, UNSPECIFIED TYPE: Primary | ICD-10-CM

## 2018-10-23 DIAGNOSIS — M70.62 TROCHANTERIC BURSITIS OF BOTH HIPS: ICD-10-CM

## 2018-10-23 PROCEDURE — 99214 OFFICE O/P EST MOD 30 MIN: CPT | Mod: S$GLB,,, | Performed by: FAMILY MEDICINE

## 2018-10-23 NOTE — PROGRESS NOTES
Subjective:       Patient ID: Miles Vazquez is a 63 y.o. male.    Chief Complaint: Follow-up (well visit, Pt states he just had surgery 4 weeks ago)    Mr. Vazquez presents to establish with me. Medications, medical history, and surgical history reviewed. He has no new complaints at this time and reports getting this year's flu shot already. Most recent labs were done on 10/1 and have been reviewed - showed a mild anemia, normal electrolytes, normal kidney function.     Complains of bilateral hip pain with prolonged walking. He is on disability for this.    Hypertension   This is a chronic problem. The current episode started more than 1 year ago. The problem is unchanged. The problem is controlled. Pertinent negatives include no chest pain, headaches, palpitations or shortness of breath. There are no associated agents to hypertension. Risk factors for coronary artery disease include male gender. Past treatments include ACE inhibitors. There are no compliance problems.      Review of Systems   Constitutional: Negative for chills, fatigue, fever and unexpected weight change.   Respiratory: Negative for cough, chest tightness, shortness of breath and wheezing.    Cardiovascular: Negative for chest pain, palpitations and leg swelling.   Gastrointestinal: Positive for abdominal pain. Negative for diarrhea, nausea and vomiting.   Neurological: Positive for numbness. Negative for syncope, speech difficulty and headaches.       Past Medical History:   Diagnosis Date    Abdominal aortic aneurysm     Alcoholism     Bursitis of both hips     COPD (chronic obstructive pulmonary disease)     Coronary artery calcification seen on CAT scan 4/24/2013    Depression     HTN (hypertension) 1/7/2013    Hyperlipidemia     Hypertension     Macrocytic anemia 3/27/2016    Personal history of colonic polyps     Restless leg syndrome     Right hip pain     RLS (restless legs syndrome)     Sleep apnea 2 years ago    does not  use cpap    Smoker 1/7/2013    Vitamin disease     Wheezing      Past Surgical History:   Procedure Laterality Date    APPENDECTOMY N/A 9/26/2018    Procedure: APPENDECTOMY;  Surgeon: Ramírez Horner MD;  Location: [facility] OR;  Service: General;  Laterality: N/A;    APPENDECTOMY      APPENDECTOMY N/A 9/26/2018    Performed by Ramírez Horner MD at [facility] OR    COLONOSCOPY      EGD (ESOPHAGOGASTRODUODENOSCOPY) N/A 9/26/2018    Performed by Ramírez Horner MD at [facility] OR    ESOPHAGOGASTRODUODENOSCOPY N/A 9/26/2018    Procedure: EGD (ESOPHAGOGASTRODUODENOSCOPY);  Surgeon: Ramírez Horner MD;  Location: [facility] OR;  Service: General;  Laterality: N/A;  WITH BX    HERNIA REPAIR      HERNIA REPAIR N/A 9/26/2018    Procedure: REPAIR, HERNIA;  Surgeon: Ramírez Horner MD;  Location: [facility] OR;  Service: General;  Laterality: N/A;  INTERNAL LYSIS WITH REDUCTION OF INTERNAL HERNIA    INJECTION-STEROID-EPIDURAL-TRANSFORAMINAL Bilateral 11/9/2015    Performed by [facility]chor MD at [facility] PAIN MGT    Insertion,central venous access device N/A 9/26/2018    Performed by Ramírez Horner MD at [facility] OR    PEG TUBE PLACEMENT/REPLACEMENT N/A 4/12/2016    [facility] at [facility] ([facility]ND F[facility])    REPAIR, HERNIA N/A 9/26/2018    Performed by Ramírez Horner MD at [facility] OR    UPPER GASTROINTESTINAL ENDOSCOPY       Social History     Socioeconomic History    Marital status: Single     Spouse name: Not on file    Number of children: Not on file    Years of education: Not on file    Highest education level: Not on file   Social Needs    Financial resource strain: Not on file    Food insecurity - worry: Not on file    Food insecurity - inability: Not on file    Transportation needs - medical: Not on file    Transportation needs - non-medical: Not on file   Occupational History    Not on file   Tobacco Use    Smoking status: Current Every Day Smoker     Types:  Cigarettes    Smokeless tobacco: Never Used    Tobacco comment: 1 or 2 cigarettes a day    Substance and Sexual Activity    Alcohol use: No     Frequency: Never     Comment: Quit drinking in March 2016    Drug use: No    Sexual activity: Not on file   Other Topics Concern    Not on file   Social History Narrative    Not on file     Family History   Problem Relation Age of Onset    Tuberculosis Maternal Grandmother     Stomach cancer Neg Hx     Colon cancer Neg Hx        Objective:      /77 (BP Location: Left arm, Patient Position: Sitting, BP Method: Medium (Automatic))   Pulse 84   Temp 97.7 °F (36.5 °C) (Tympanic)   Wt 55.1 kg (121 lb 6.4 oz)   SpO2 99%   BMI 19.01 kg/m²   Physical Exam   Constitutional: He is oriented to person, place, and time. He appears well-developed and well-nourished. No distress.   Cardiovascular: Normal rate, regular rhythm and normal heart sounds.   Pulmonary/Chest: Effort normal and breath sounds normal. No respiratory distress. He has no wheezes.   Neurological: He is alert and oriented to person, place, and time.   Skin: Skin is warm and dry. No rash noted. He is not diaphoretic.   Vitals reviewed.      Assessment:       1. Anemia, unspecified type    2. Trochanteric bursitis of both hips    3. Essential hypertension        Plan:       Anemia, unspecified type  - patient to start taking multivitamin; repeat labs in 3 months    Trochanteric bursitis of both hips  - patient given form for handicapped parking    Essential hypertension  -     Stable on current regimen  -     Lipid panel; Future; Expected date: 10/23/2018  -     Comprehensive metabolic panel; Future; Expected date: 10/23/2018  -     TSH; Future; Expected date: 10/23/2018  -     CBC auto differential; Future; Expected date: 10/23/2018            Risks, benefits, and side effects were discussed with the patient. All questions were answered to the fullest satisfaction of the patient, and pt verbalized  understanding and agreement to treatment plan. Pt was to call with any new or worsening symptoms, or present to the ER.

## 2018-11-15 ENCOUNTER — OFFICE VISIT (OUTPATIENT)
Dept: SURGERY | Facility: CLINIC | Age: 63
End: 2018-11-15
Payer: MEDICARE

## 2018-11-15 VITALS
BODY MASS INDEX: 19.62 KG/M2 | WEIGHT: 125 LBS | TEMPERATURE: 98 F | HEIGHT: 67 IN | DIASTOLIC BLOOD PRESSURE: 76 MMHG | RESPIRATION RATE: 18 BRPM | OXYGEN SATURATION: 100 % | SYSTOLIC BLOOD PRESSURE: 144 MMHG | HEART RATE: 85 BPM

## 2018-11-15 DIAGNOSIS — Z09 POSTOP CHECK: Primary | ICD-10-CM

## 2018-11-15 PROCEDURE — 99024 POSTOP FOLLOW-UP VISIT: CPT | Mod: S$GLB,POP,, | Performed by: SURGERY

## 2018-11-20 NOTE — PROGRESS NOTES
"General Surgery  [facility]  Follow-up    HPI/Follow-up exam:  Miles Vazquez is a 63 y.o. male status post laparotomy for small-bowel obstruction presents today for follow-up examination.  Patient doing well today.  No issues.  No complaints.  Tolerating a regular diet having.  No new complaints.    PHYSICAL EXAM:  BP (!) 144/76   Pulse 85   Temp 97.8 °F (36.6 °C) (Oral)   Resp 18   Ht 5' 7" (1.702 m)   Wt 56.7 kg (125 lb)   SpO2 100%   BMI 19.58 kg/m²   Gen: Wd Wn male in NAD  Heent: Nc/At, MMM  Cv: RRR  Lung: Non-labored breathing, clear bilaterally  Abd: Soft, non-tender, non-distended surgical site clean dry intact without signs or symptoms of infection.  Well healed.  No hernia formation.  Ext: No cyanosis clubbing or edema    Assessment:  Miles Vazquez is a 63 y.o. male s/p laparotomy for small-bowel obstruction    Plan/Medical Decision Making:  Doing well.  Follow up with surgery clinic as needed.    No Follow-up on file.        "

## 2019-01-31 ENCOUNTER — LAB VISIT (OUTPATIENT)
Dept: LAB | Facility: HOSPITAL | Age: 64
End: 2019-01-31
Attending: FAMILY MEDICINE
Payer: MEDICARE

## 2019-01-31 ENCOUNTER — OFFICE VISIT (OUTPATIENT)
Dept: FAMILY MEDICINE | Facility: CLINIC | Age: 64
End: 2019-01-31
Payer: MEDICARE

## 2019-01-31 VITALS
TEMPERATURE: 98 F | HEIGHT: 67 IN | BODY MASS INDEX: 21.44 KG/M2 | SYSTOLIC BLOOD PRESSURE: 168 MMHG | OXYGEN SATURATION: 98 % | HEART RATE: 84 BPM | WEIGHT: 136.63 LBS | DIASTOLIC BLOOD PRESSURE: 70 MMHG | RESPIRATION RATE: 18 BRPM

## 2019-01-31 DIAGNOSIS — F51.01 PRIMARY INSOMNIA: ICD-10-CM

## 2019-01-31 DIAGNOSIS — G25.81 RESTLESS LEG SYNDROME: ICD-10-CM

## 2019-01-31 DIAGNOSIS — I10 ESSENTIAL HYPERTENSION: Primary | ICD-10-CM

## 2019-01-31 DIAGNOSIS — I10 ESSENTIAL HYPERTENSION: ICD-10-CM

## 2019-01-31 DIAGNOSIS — M70.62 TROCHANTERIC BURSITIS OF BOTH HIPS: ICD-10-CM

## 2019-01-31 DIAGNOSIS — K31.A0 INTESTINAL METAPLASIA OF GASTRIC MUCOSA: ICD-10-CM

## 2019-01-31 DIAGNOSIS — D64.9 ANEMIA, UNSPECIFIED TYPE: ICD-10-CM

## 2019-01-31 DIAGNOSIS — M70.61 TROCHANTERIC BURSITIS OF BOTH HIPS: ICD-10-CM

## 2019-01-31 LAB
ALBUMIN SERPL BCP-MCNC: 4.1 G/DL
ALP SERPL-CCNC: 85 U/L
ALT SERPL W/O P-5'-P-CCNC: 14 U/L
ANION GAP SERPL CALC-SCNC: 10 MMOL/L
AST SERPL-CCNC: 24 U/L
BASOPHILS # BLD AUTO: 0.06 K/UL
BASOPHILS NFR BLD: 0.8 %
BILIRUB SERPL-MCNC: 0.4 MG/DL
BUN SERPL-MCNC: 17 MG/DL
CALCIUM SERPL-MCNC: 9.3 MG/DL
CHLORIDE SERPL-SCNC: 105 MMOL/L
CHOLEST SERPL-MCNC: 206 MG/DL
CHOLEST/HDLC SERPL: 3.8 {RATIO}
CO2 SERPL-SCNC: 24 MMOL/L
CREAT SERPL-MCNC: 0.7 MG/DL
DIFFERENTIAL METHOD: ABNORMAL
EOSINOPHIL # BLD AUTO: 0.3 K/UL
EOSINOPHIL NFR BLD: 4.3 %
ERYTHROCYTE [DISTWIDTH] IN BLOOD BY AUTOMATED COUNT: 15.4 %
EST. GFR  (AFRICAN AMERICAN): >60 ML/MIN/1.73 M^2
EST. GFR  (NON AFRICAN AMERICAN): >60 ML/MIN/1.73 M^2
FERRITIN SERPL-MCNC: 12 NG/ML
GLUCOSE SERPL-MCNC: 72 MG/DL
HCT VFR BLD AUTO: 32 %
HDLC SERPL-MCNC: 54 MG/DL
HDLC SERPL: 26.2 %
HGB BLD-MCNC: 9.7 G/DL
IMM GRANULOCYTES # BLD AUTO: 0.02 K/UL
IMM GRANULOCYTES NFR BLD AUTO: 0.3 %
IRON SERPL-MCNC: 25 UG/DL
LDLC SERPL CALC-MCNC: 133.6 MG/DL
LYMPHOCYTES # BLD AUTO: 2.5 K/UL
LYMPHOCYTES NFR BLD: 31.3 %
MCH RBC QN AUTO: 26.6 PG
MCHC RBC AUTO-ENTMCNC: 30.3 G/DL
MCV RBC AUTO: 88 FL
MONOCYTES # BLD AUTO: 0.6 K/UL
MONOCYTES NFR BLD: 8 %
NEUTROPHILS # BLD AUTO: 4.4 K/UL
NEUTROPHILS NFR BLD: 55.3 %
NONHDLC SERPL-MCNC: 152 MG/DL
NRBC BLD-RTO: 0 /100 WBC
PLATELET # BLD AUTO: 222 K/UL
PMV BLD AUTO: 11.2 FL
POTASSIUM SERPL-SCNC: 3.8 MMOL/L
PROT SERPL-MCNC: 7.5 G/DL
RBC # BLD AUTO: 3.64 M/UL
SATURATED IRON: 6 %
SODIUM SERPL-SCNC: 139 MMOL/L
TOTAL IRON BINDING CAPACITY: 450 UG/DL
TRANSFERRIN SERPL-MCNC: 304 MG/DL
TRIGL SERPL-MCNC: 92 MG/DL
TSH SERPL DL<=0.005 MIU/L-ACNC: 1.08 UIU/ML
WBC # BLD AUTO: 7.86 K/UL

## 2019-01-31 PROCEDURE — 85025 COMPLETE CBC W/AUTO DIFF WBC: CPT

## 2019-01-31 PROCEDURE — 80061 LIPID PANEL: CPT

## 2019-01-31 PROCEDURE — 82728 ASSAY OF FERRITIN: CPT

## 2019-01-31 PROCEDURE — 86677 HELICOBACTER PYLORI ANTIBODY: CPT

## 2019-01-31 PROCEDURE — 84443 ASSAY THYROID STIM HORMONE: CPT

## 2019-01-31 PROCEDURE — 36415 COLL VENOUS BLD VENIPUNCTURE: CPT

## 2019-01-31 PROCEDURE — 99214 PR OFFICE/OUTPT VISIT, EST, LEVL IV, 30-39 MIN: ICD-10-PCS | Mod: S$GLB,,, | Performed by: FAMILY MEDICINE

## 2019-01-31 PROCEDURE — 99214 OFFICE O/P EST MOD 30 MIN: CPT | Mod: S$GLB,,, | Performed by: FAMILY MEDICINE

## 2019-01-31 PROCEDURE — 80053 COMPREHEN METABOLIC PANEL: CPT

## 2019-01-31 PROCEDURE — 83540 ASSAY OF IRON: CPT

## 2019-01-31 RX ORDER — AMITRIPTYLINE HYDROCHLORIDE 50 MG/1
50 TABLET, FILM COATED ORAL NIGHTLY
Qty: 90 TABLET | Refills: 0 | Status: SHIPPED | OUTPATIENT
Start: 2019-01-31 | End: 2019-04-29

## 2019-01-31 RX ORDER — BENAZEPRIL HYDROCHLORIDE 20 MG/1
20 TABLET ORAL DAILY
Qty: 90 TABLET | Refills: 1 | Status: SHIPPED | OUTPATIENT
Start: 2019-01-31 | End: 2019-07-03 | Stop reason: SDUPTHER

## 2019-01-31 RX ORDER — PRAMIPEXOLE DIHYDROCHLORIDE 0.25 MG/1
TABLET ORAL
Qty: 90 TABLET | Refills: 0 | Status: SHIPPED | OUTPATIENT
Start: 2019-01-31 | End: 2019-04-29

## 2019-01-31 NOTE — PROGRESS NOTES
Subjective:       Patient ID: Miles Vazquez is a 63 y.o. male.    Chief Complaint: Follow-up    HPI    Mr. Vazquez presents for follow-up. Has been out of benazepril for 4-5 days and needs it refilled at this time. Also due for bloodwork; reports he is fasting at this time.    Not sleeping well. Couldn't tolerate seroquel in the past. Currently taking trazodone 150 mg qhs. Somewhat affected by restless legs, which are not controlled with requip.    Has chronic bilateral hip pain and wants to resume physical therapy for this.    Most recent labs show anemia. He does take centrum silver daily.    Review of Systems   Constitutional: Negative for chills, fatigue, fever and unexpected weight change.   Respiratory: Negative for cough, chest tightness, shortness of breath and wheezing.    Cardiovascular: Negative for chest pain, palpitations and leg swelling.   Psychiatric/Behavioral: Positive for sleep disturbance. Negative for decreased concentration, dysphoric mood and hallucinations.       Past Medical History:   Diagnosis Date    Abdominal aortic aneurysm     Alcoholism     Bursitis of both hips     COPD (chronic obstructive pulmonary disease)     Coronary artery calcification seen on CAT scan 4/24/2013    Depression     HTN (hypertension) 1/7/2013    Hyperlipidemia     Hypertension     Macrocytic anemia 3/27/2016    Personal history of colonic polyps     Restless leg syndrome     Right hip pain     RLS (restless legs syndrome)     Sleep apnea 2 years ago    does not use cpap    Smoker 1/7/2013    Vitamin disease     Wheezing      Past Surgical History:   Procedure Laterality Date    APPENDECTOMY      APPENDECTOMY N/A 9/26/2018    [facility] at [facility] OR    COLONOSCOPY      EGD (ESOPHAGOGASTRODUODENOSCOPY) N/A 9/26/2018    Performed by [facility] Horner [facility] at [facility] OR    HERNIA REPAIR      INJECTION-STEROID-EPIDURAL-TRANSFORAMINAL Bilateral 11/9/2015    Performed  "by Ayana Melchor MD at [facility] PAIN MGT    Insertion,central venous access device N/A 9/26/2018    Performed by [facility] at [facility] OR    PEG TUBE PLACEMENT/REPLACEMENT N/A 4/12/2016    Performed by Andres Luong [facility] at [facility]O (2ND FLR)    REPAIR, HERNIA N/A 9/26/2018    Performed by [facility] at [facility] OR    UPPER GASTROINTESTINAL ENDOSCOPY       Social History     Socioeconomic History    Marital status: Single     Spouse name: Not on file    Number of children: Not on file    Years of education: Not on file    Highest education level: Not on file   Social Needs    Financial resource strain: Not on file    Food insecurity - worry: Not on file    Food insecurity - inability: Not on file    Transportation needs - medical: Not on file    Transportation needs - non-medical: Not on file   Occupational History    Not on file   Tobacco Use    Smoking status: Current Every Day Smoker     Types: Cigarettes    Smokeless tobacco: Never Used    Tobacco comment: 1 or 2 cigarettes a day    Substance and Sexual Activity    Alcohol use: No     Frequency: Never     Comment: Quit drinking in March 2016    Drug use: No    Sexual activity: Not on file   Other Topics Concern    Not on file   Social History Narrative    Not on file     Family History   Problem Relation Age of Onset    Tuberculosis Maternal Grandmother     Stomach cancer Neg Hx     Colon cancer Neg Hx        Objective:      BP (!) 168/70 (BP Location: Right arm, Patient Position: Sitting, BP Method: Medium (Automatic))   Pulse 84   Temp 97.9 °F (36.6 °C) (Oral)   Resp 18   Ht 5' 7" (1.702 m)   Wt 62 kg (136 lb 9.6 oz)   SpO2 98%   BMI 21.39 kg/m²   Physical Exam   Constitutional: He is oriented to person, place, and time. He appears well-developed and well-nourished. No distress.   Cardiovascular: Normal rate, regular rhythm and normal heart sounds.   No murmur heard.  Pulmonary/Chest: Effort normal and " breath sounds normal. No respiratory distress. He has no wheezes.   Neurological: He is alert and oriented to person, place, and time.   Skin: Skin is warm and dry. No rash noted. He is not diaphoretic.   Vitals reviewed.      Assessment:       1. Essential hypertension    2. Trochanteric bursitis of both hips    3. Primary insomnia    4. Restless leg syndrome    5. Anemia, unspecified type        Plan:       Essential hypertension  -    refill benazepril (LOTENSIN) 20 MG tablet; Take 1 tablet (20 mg total) by mouth once daily.  Dispense: 90 tablet; Refill: 1    Trochanteric bursitis of both hips  -     Ambulatory Referral to Physical/Occupational Therapy    Primary insomnia  -     Continue trazodone for now; may wean off slowly as tolerated  -     amitriptyline (ELAVIL) 50 MG tablet; Take 1 tablet (50 mg total) by mouth every evening.  Dispense: 90 tablet; Refill: 0    Restless leg syndrome  -     D/c ropinerole; start pramipexole (MIRAPEX) 0.25 MG tablet; Take 1/2 tab po qhs for 1 week, then take 1 tablet po qhs  Dispense: 90 tablet; Refill: 0  -     Iron and TIBC; Future; Expected date: 01/31/2019  -     Ferritin; Future; Expected date: 01/31/2019    Anemia, unspecified type  -     Iron and TIBC; Future; Expected date: 01/31/2019  -     Ferritin; Future; Expected date: 01/31/2019            Risks, benefits, and side effects were discussed with the patient. All questions were answered to the fullest satisfaction of the patient, and pt verbalized understanding and agreement to treatment plan. Pt was to call with any new or worsening symptoms, or present to the ER.

## 2019-02-06 ENCOUNTER — CLINICAL SUPPORT (OUTPATIENT)
Dept: REHABILITATION | Facility: HOSPITAL | Age: 64
End: 2019-02-06
Attending: FAMILY MEDICINE
Payer: MEDICARE

## 2019-02-06 DIAGNOSIS — M70.61 TROCHANTERIC BURSITIS OF BOTH HIPS: Primary | ICD-10-CM

## 2019-02-06 DIAGNOSIS — M70.62 TROCHANTERIC BURSITIS OF BOTH HIPS: Primary | ICD-10-CM

## 2019-02-06 DIAGNOSIS — M25.552 HIP PAIN, BILATERAL: ICD-10-CM

## 2019-02-06 DIAGNOSIS — M25.551 HIP PAIN, BILATERAL: ICD-10-CM

## 2019-02-06 LAB — H PYLORI IGG SERPL QL IA: ABNORMAL

## 2019-02-06 PROCEDURE — 97140 MANUAL THERAPY 1/> REGIONS: CPT

## 2019-02-06 PROCEDURE — 97161 PT EVAL LOW COMPLEX 20 MIN: CPT

## 2019-02-06 PROCEDURE — G8978 MOBILITY CURRENT STATUS: HCPCS | Mod: CK

## 2019-02-06 PROCEDURE — G8979 MOBILITY GOAL STATUS: HCPCS | Mod: CI

## 2019-02-06 NOTE — PLAN OF CARE
[facility]  Physical Therapy Initial Evaluation    Name: Miles Vazquez  Clinic Number: [id number]    Therapy Diagnosis:   Encounter Diagnosis   Name Primary?    Trochanteric bursitis of both hips Yes     Physician: Priscilla Dunne DO    Physician Orders: PT Eval and Treat   Medical Diagnosis: Trochanteric bursitis of both hips  Evaluation Date: 2/6/2019  Authorization period Expiration: 05/06/19  Plan of Care Certification Period: 05/6/19    Visit #: 1/ Visits authorized: 12  Time In:1000  Time Out: 1100  Total Billable Time: 60 minutes    Precautions: none    Subjective   Date of onset: chronic, several years  Date of Surgery: none    Past Medical History:   Diagnosis Date    Abdominal aortic aneurysm     Alcoholism     Bursitis of both hips     COPD (chronic obstructive pulmonary disease)     Coronary artery calcification seen on CAT scan 4/24/2013    Depression     HTN (hypertension) 1/7/2013    Hyperlipidemia     Hypertension     Macrocytic anemia 3/27/2016    Personal history of colonic polyps     Restless leg syndrome     Right hip pain     RLS (restless legs syndrome)     Sleep apnea 2 years ago    does not use cpap    Smoker 1/7/2013    Vitamin disease     Wheezing      Miles Vazquez  has a past surgical history that includes Upper gastrointestinal endoscopy; Colonoscopy; Hernia repair; Esophagogastroduodenoscopy (N/A, 9/26/2018); Hernia repair (N/A, 9/26/2018); Appendectomy (N/A, 9/26/2018); and Appendectomy.    Miles has a current medication list which includes the following prescription(s): albuterol, amitriptyline, aspirin, benazepril, cyanocobalamin, multivit-min/fa/lycopen/lutein, pramipexole, and trazodone.    Review of patient's allergies indicates:   Allergen Reactions    Seroquel [quetiapine]      RESTLESS LEGS        Imaging, MRI studies, bone scan films: pockets of fluid in hip joints    Prior Therapy: yes for current condition, back in July  with no success  Social History: Patient lives in a single level home with ramp to enter   Occupation: Retired  Prior Level of Function: pt with long history of declining functional mobility due to leg and hip pain. Pt with difficulty with transfers into/out of car due to pain, difficulty going up and down stairs, walking up the ramp  Current Level of Function: pt reports no improvement or worsening of symptoms, continued difficulty with mobility.     Pain:  Current 0/10, worst 10/10, best 0/10   Location: joint bilateral hips  Description: Burning and Deep  Aggravating Factors: Walking  Easing Factors: rest      Onset/JL: gradual    History of current condition - Miles reports: long term history of symptoms, including pain with walking. Pt reports hip pain limiting mobility. He states he can walk about 40 yards before having to stop and rest, pt then able to resume but cannot make it as far on subsequent attempts.     Pts goals: to walk farther with less pain.         Objective     Hip Range of Motion:   Left active Left Passive Right active  Right Passive   Flexion WNL WNL WNL WNL   Abductin WNL WNL WNL WNL   Extension WNL WNL WNL WNL   Ext. Rotation 55 65 40 50   Int. Rotation 40 45 40 45         Lower Extremity Strength  Right LE  Left LE    Knee extension: 4+/5 Knee extension: 5/5   Knee flexion: 3/5 Knee flexion: 3/5   Hip flexion: 3+/5 Hip flexion: 4+/5   Hip Internal Rotation:  3+/5    Hip Internal Rotation: 4+/5      Hip External Rotation: 3+/5    Hip External Rotation: 4+/5      Hip extension:  3/5 Hip extension: 3/5   Hip abduction: 3/5 Hip abduction: 3+/5   Hip adduction: 4/5 Hip adduction 4/5   Ankle dorsiflexion: 5/5 Ankle dorsiflexion: 5/5   Ankle plantarflexion: 5/5 Ankle plantarflexion: 5/5   Glute med  3-/5      3/5    Special Tests:   Left Right   FADIR Negative Negative   ELKIN Negative    Negative      Scour Negative    Negative      Log Roll Negative    Negative      SLR Negative     "Negative      [place] Positive    Positive        SLS: Firm 11 sec L,  14 sec R  Toe Tap test: 13 reps on 6" step with unaffecting leg in 15 seconds    6 min walk test:  868 ft  1st rest 603 ft  2nd rest 748 ft    Bicycle test for neurogenic claudication    Palpation: moderate tenderness to palpation at glute med tendon bilaterally    Sensation: impaired sensation in bilateral feet, bilateral lower legs intact to light touch    Flexibility:     90/90 SLR = R moderate restriction, L moderate restriction   Ely's test: R moderate restriction, L minimal restriction   Lauro's test: R moderate restriction, L minimal restriction   Burke test: R no restriction, L no restriction    PT Evaluation Completed? Yes  Discussed Plan of Care with patient: Yes    TREATMENT     Miles received the following manual therapy techniques: Soft tissue Mobilization for 8  Minutes to right glute med tendon with friction massage both on and off stretch and scrubbing of posterior greater trochanter, soft tissue mobs to right ITB    Miles participated in dynamic functional therapeutic activities to improve functional performance for 10  minutes, including:  Alternating toe taps and ambulation on flat, level surfaces    Home Exercises and Patient Education Provided    Education provided re:   - progress towards goals   - role of therapy in multi - disciplinary team, goals for therapy  Pt educated on condition, POC, and expectations in therapy.  No spiritual or educational barriers to learning provided    Home exercises:  Pt will be provided HEP during course of treatment with progressions as appropriate. Pt was advised to perform these exercises free of pain, and to stop performing them if pain occurs.   Miles demonstrated good  understanding of the education provided.       Functional Limitations Reports - G Codes  Category: Mobility  Tool: LEFS  Score: 36 (55%)  Current:CK = at least 40% but < 60% impaired, limited or " restricted  Goal:CI = at least 1% but < 20% impaired, limited or restricted      Assessment   Miles is a 63 y.o. male referred to outpatient physical therapy and presents to PT with pain limiting function. Pt with decreased ambulation distance/duration due to onset of pain in bilateral LEs prompting him to stand and rest. Patient demonstrates limitations as described in the problem list. Pt will benefit from physcial therapy services in order to maximize pain free and/or functional use of bilateral LE and maximize functional moblity. The following goals were discussed with the patient and patient is in agreement with them as to be addressed in the treatment plan.   Pt prognosis is Good.   Pt will benefit from skilled outpatient Physical Therapy to address the deficits stated above and in the chart below, provide pt/family education, and to maximize pt's level of independence.     Plan of care discussed with patient: Yes  Pt's spiritual, cultural and educational needs considered and pt agreeable to plan of care and goals as stated below:     Anticipated Barriers for therapy: none noted      GOALS:     Long Term Goals: 6 weeks  Pain: Decrease pain to 5/10 during ambulation to allow for increased ambulation distance  Strength: Improve strength in bilateral Hip -> knee to 5/5 for improved LE stability  Functional scale: Improve score on LEFS to 1-20% limitation  Walking: Increase walking distance/duration to 1200 with no greater than 5/10 pain and no need to stop and rest  Exercise: demonstrate independence with home exercise program to maintain gains made in therapy.          Plan   Certification Period: 2/6/2019 to 5/6/19.    Outpatient physical therapy 2 times weekly to include: Gait Training, Neuromuscular Re-ed, Therapeutic Activites and Therapeutic Exercise. Cont PT for 2 months.   Pt may be seen by PTA as part of the rehabilitation team.     I certify the need for these services furnished under this plan of  treatment and while under my care.    Ivon Knight, PT

## 2019-02-11 ENCOUNTER — CLINICAL SUPPORT (OUTPATIENT)
Dept: REHABILITATION | Facility: HOSPITAL | Age: 64
End: 2019-02-11
Attending: FAMILY MEDICINE
Payer: MEDICARE

## 2019-02-11 DIAGNOSIS — M79.662 PAIN IN BOTH LOWER LEGS: Primary | ICD-10-CM

## 2019-02-11 DIAGNOSIS — M79.661 PAIN IN BOTH LOWER LEGS: Primary | ICD-10-CM

## 2019-02-11 PROCEDURE — 97110 THERAPEUTIC EXERCISES: CPT

## 2019-02-11 NOTE — PROGRESS NOTES
Physical Therapy Daily Note     Name: Miles Vazquez  Clinic Number: [id number]  Diagnosis:   Encounter Diagnosis   Name Primary?    Pain in both lower legs Yes     Physician: Priscilla Dunne DO  Precautions: none  Visit #: 2 of 12  PTA Visit #: 0  Time In: 1000  Time Out: 1100    Subjective     Pt reports: legs feel okay today, but admits he hasn't done much yet.   Pain Scale: Miles rates pain on a scale of 0-10 to be 0 currently.    Objective     Miles received individual therapeutic exercises to develop strength, endurance, ROM and flexibility for 55  minutes including:    Date 2/11/19       Exercise Sets x Reps Resistance    Sets x Reps Resistance    Sets x Reps Resistance    Sets x Reps Resistance    Sets x Reps Resistance      Seated fwd flexion with SB 2 min       Hamstring str in long sitting 30 sec x 2 ea       DKTC with SB 2 min       LTR with SB 2 min       Calf stretch on wedge 30 sec x 2       Supine pelvic tilts 2min         Upright bicycle, L2: 3 min, 40 sec, 2:20 with back in neutral                                  5 min,  6:24     With forward trunk flexion                                  2:25                          With back extended    Lumbar extension hold test: held 62 sec with no increased pain in legs or buttocks      Home Exercises Provided: calf stretch on wall, hamstring stretch in long sitting    Pt demo good understanding of the education provided. Miles demonstrated good return demonstration of activities.     Education provided re:  Miles verbalized good understanding of education provided.   No spiritual or educational barriers to learning provided    Assessment     Patient tolerated treatment well. Pt completing treatment with pain in hips and calves while on bicycle. Pt completing treatment without complaint.    Neurogenic vs ischemic claudication difficult to differentiate. Pt with improved time on upright bike in  forward flexed position, but not a significant increase in time to definitively say claudication due to stenosis.       Plan     Continue with established Plan of Care towards PT goals.    Therapist: Ivon Knight, PT  2/11/2019

## 2019-02-15 ENCOUNTER — CLINICAL SUPPORT (OUTPATIENT)
Dept: REHABILITATION | Facility: HOSPITAL | Age: 64
End: 2019-02-15
Attending: FAMILY MEDICINE
Payer: MEDICARE

## 2019-02-15 DIAGNOSIS — R26.2 DIFFICULTY WALKING: Primary | ICD-10-CM

## 2019-02-15 PROCEDURE — 97110 THERAPEUTIC EXERCISES: CPT

## 2019-02-15 NOTE — PROGRESS NOTES
Physical Therapy Daily Note     Name: Miles Vazquez  Clinic Number: [id number]  Diagnosis:   No diagnosis found.  Physician: Priscilla Dunne DO  Precautions: none  Visit #: 3 of 12  PTA Visit #: 0  Time In: 0900  Time Out: 1000    Subjective     Pt reports: patient reporting no pain in hips. Continues to c/o of pain only with walking. Pt states his feet feel like he's walking on bubble wrap, likely neuropathy. Patient reporting 0/10 back pain, states he gets a little back pain on occasion, but feels like it's pretty normal for his age.   Pain Scale: Miles rates pain on a scale of 0-10 to be 0 currently.    Objective     Miles received individual therapeutic exercises to develop strength, endurance, ROM and flexibility for 55  minutes including:    Date 2/11/19 2/15/19      Exercise Sets x Reps Resistance    Sets x Reps Resistance    Sets x Reps Resistance    Sets x Reps Resistance    Sets x Reps Resistance      Seated fwd flexion with SB 2 min 2 min      Hamstring str in long sitting 30 sec x 2 ea 30 sec x 2 ea      DKTC with SB 2 min ---      LTR with SB 2 min ---      Calf stretch on wedge 30 sec x 2 30 sec x 2      Supine pelvic tilts 2min 2 min      LAQ  2x10/ 4lb      HS curl  2x10/ BlueTB      Supine hip abduction  2x10/ RTB      Supine knee fall out ----> ---->        Dorsal pedal pulses present bilaterally  Pt elevating legs on wall for 5 minutes with paling of feet bilaterally and equally per PT observation, dorsal pedal pulses present bilaterally throughout elevation. Upon return to sitting increased flushing in both feet lasting for approximately 3 minutes before returning to normal.     Home Exercises Provided: calf stretch on wall, hamstring stretch in long sitting    Pt demo good understanding of the education provided. Miles demonstrated good return demonstration of activities.     Education provided re:  Miles verbalized good understanding  of education provided.   No spiritual or educational barriers to learning provided    Assessment     Patient tolerated treatment well. Pt completing treatment with pain in hips and calves while on bicycle. Pt completing treatment without complaint.    Neurogenic vs ischemic claudication difficult to differentiate. Advised patient to follow up with referring provider for further workup. Patient verbalized understanding. Patient would benefit from continued therapy in the meantime for strengthening due to deconditioning.        Plan     Continue with established Plan of Care towards PT goals.    Therapist: Ivon Knight, PT  2/15/2019

## 2019-02-18 DIAGNOSIS — F51.01 PRIMARY INSOMNIA: ICD-10-CM

## 2019-02-19 RX ORDER — TRAZODONE HYDROCHLORIDE 50 MG/1
TABLET ORAL
Qty: 90 TABLET | Refills: 5 | OUTPATIENT
Start: 2019-02-19

## 2019-02-20 DIAGNOSIS — F51.01 PRIMARY INSOMNIA: ICD-10-CM

## 2019-02-20 RX ORDER — TRAZODONE HYDROCHLORIDE 50 MG/1
TABLET ORAL
Qty: 90 TABLET | Refills: 5 | OUTPATIENT
Start: 2019-02-20

## 2019-02-22 ENCOUNTER — CLINICAL SUPPORT (OUTPATIENT)
Dept: REHABILITATION | Facility: HOSPITAL | Age: 64
End: 2019-02-22
Attending: FAMILY MEDICINE
Payer: MEDICARE

## 2019-02-22 DIAGNOSIS — M70.62 TROCHANTERIC BURSITIS OF BOTH HIPS: ICD-10-CM

## 2019-02-22 DIAGNOSIS — M70.61 TROCHANTERIC BURSITIS OF BOTH HIPS: ICD-10-CM

## 2019-02-22 PROCEDURE — 97110 THERAPEUTIC EXERCISES: CPT

## 2019-02-22 NOTE — PROGRESS NOTES
Physical Therapy Daily Note     Name: Miles Vazquez  Clinic Number: [id number]  Diagnosis:   No diagnosis found.  Physician: Priscilla Dunne DO  Precautions: none  Visit #: 4 of 12  PTA Visit #: 1  Time In: 1100  Time Out: 1150    Subjective     Pt reports: patient reporting no pain in hips at the moment, pain only occurs when weight bearing. Get a little sore after doing these exercises. After walking about 40-50 yards hips start to bother him.      Pain Scale: Miles rates pain on a scale of 0-10 to be 0 currently.    Objective     Miles received individual therapeutic exercises to develop strength, endurance, ROM and flexibility for 55  minutes including:    Date 2/11/19 2/15/19 2/22/19     Exercise Sets x Reps Resistance    Sets x Reps Resistance    Sets x Reps Resistance    Sets x Reps Resistance    Sets x Reps Resistance      Seated fwd flexion with SB 2 min 2 min 2 min     Hamstring str in long sitting 30 sec x 2 ea 30 sec x 2 ea 30 sec x 2 ea     DKTC with SB 2 min ---      LTR with SB 2 min ---      Calf stretch on wedge 30 sec x 2 30 sec x 2 30 sec x 2     Supine pelvic tilts 2min 2 min 2 min     LAQ  2x10/ 4lb 2 x 10/ 4lb     HS curl  2x10/ BlueTB 2 x 10/ BTB     Supine hip abduction  2x10/ RTB 2 X 10/ RTB     Supine knee fall out ----> ----> X 15/ RTB     Nustep     Level 3 x 10 min       Dorsal pedal pulses present bilaterally  Pt elevating legs on wall for 5 minutes with paling of feet bilaterally and equally per PT observation, dorsal pedal pulses present bilaterally throughout elevation. Upon return to sitting increased flushing in both feet lasting for approximately 3 minutes before returning to normal.     Home Exercises Provided: calf stretch on wall, hamstring stretch in long sitting    Pt demo good understanding of the education provided. Miles demonstrated good return demonstration of activities.     Education provided re:  Miles  verbalized good understanding of education provided.   No spiritual or educational barriers to learning provided    Assessment     Patient tolerated treatment well. Pt completing treatment with fatigue reported with nustep in gastrocs and quads. No complications with treatment, continue to progress per pt tolerance.    Neurogenic vs ischemic claudication difficult to differentiate. Advised patient to follow up with referring provider for further workup. Patient verbalized understanding. Patient would benefit from continued therapy in the meantime for strengthening due to deconditioning.        Plan     Continue with established Plan of Care towards PT goals.    Therapist: Rolando Amaro, CELINE  2/22/2019

## 2019-03-01 ENCOUNTER — CLINICAL SUPPORT (OUTPATIENT)
Dept: REHABILITATION | Facility: HOSPITAL | Age: 64
End: 2019-03-01
Attending: FAMILY MEDICINE
Payer: MEDICARE

## 2019-03-01 DIAGNOSIS — M70.61 TROCHANTERIC BURSITIS OF BOTH HIPS: ICD-10-CM

## 2019-03-01 DIAGNOSIS — M70.62 TROCHANTERIC BURSITIS OF BOTH HIPS: ICD-10-CM

## 2019-03-01 PROCEDURE — 97110 THERAPEUTIC EXERCISES: CPT

## 2019-03-01 NOTE — PROGRESS NOTES
"                                                    Physical Therapy Daily Note     Name: Miles Vazquez  Clinic Number: [id number]  Diagnosis:   No diagnosis found.  Physician: Priscilla Dunne DO  Precautions: none  Visit #: 5 of 12  PTA Visit #: 2  Time In: 0900  Time Out: 0950    Subjective     Pt reports: consistent c/o B hip/LE pain during excessive WB'ing activities. Decreased sensation reported in feet "feels like i'm walking on bubble wrap, I can feel some parts and some parts I can't"     Pain Scale: Miles rates pain on a scale of 0-10 to be 0 currently.    Objective     Miles received individual therapeutic exercises to develop strength, endurance, ROM and flexibility for 55  minutes including:    Date 2/11/19 2/15/19 2/22/19 3-1-19    Exercise Sets x Reps Resistance    Sets x Reps Resistance    Sets x Reps Resistance    Sets x Reps Resistance    Sets x Reps Resistance      Seated fwd flexion with SB 2 min 2 min 2 min 2 min    Hamstring str in long sitting 30 sec x 2 ea 30 sec x 2 ea 30 sec x 2 ea 30sec x 2 ea    DKTC with SB 2 min ---      LTR with SB 2 min ---      Calf stretch on wedge 30 sec x 2 30 sec x 2 30 sec x 2 30sec x 2    Supine pelvic tilts 2min 2 min 2 min 2 min    LAQ  2x10/ 4lb 2 x 10/ 4lb 2 x 10/4lb    HS curl  2x10/ BlueTB 2 x 10/ BTB 2x10/ BTB    Supine hip abduction  2x10/ RTB 2 X 10/ RTB 2x10/ RTB    Supine knee fall out ----> ----> X 15/ RTB X 15/ RTB    Sit to stand    X 10    Seated hip flex    2x10/4lb    Hip abd machine    2x10/30#      Dorsal pedal pulses present bilaterally  Pt elevating legs on wall for 5 minutes with paling of feet bilaterally and equally per PT observation, dorsal pedal pulses present bilaterally throughout elevation. Upon return to sitting increased flushing in both feet lasting for approximately 3 minutes before returning to normal.     Home Exercises Provided: calf stretch on wall, hamstring stretch in long sitting    Pt demo good understanding of the " education provided. Miles demonstrated good return demonstration of activities.     Education provided re:  Miles verbalized good understanding of education provided.   No spiritual or educational barriers to learning provided    Assessment     Patient tolerated treatment well. Pt completing treatment with fatigue reported with nustep in gastrocs and quads. No complications with treatment, continue to progress per pt tolerance. Printed out info for cardiovascular physician to follow up regarding presentation of circulatory defects listed above.     Neurogenic vs ischemic claudication difficult to differentiate. Advised patient to follow up with referring provider for further workup. Patient verbalized understanding. Patient would benefit from continued therapy in the meantime for strengthening due to deconditioning.        Plan     Continue with established Plan of Care towards PT goals.    Therapist: Rolando Amaro, CELINE  3/1/2019

## 2019-03-04 ENCOUNTER — TELEPHONE (OUTPATIENT)
Dept: VASCULAR SURGERY | Facility: CLINIC | Age: 64
End: 2019-03-04

## 2019-03-04 NOTE — TELEPHONE ENCOUNTER
----- Message from Dominick Pereira sent at 3/4/2019  9:20 AM CST -----  Type: Needs Medical Advice    Who Called:  Patient    Best Call Back Number: [phone]  Additional Information: Patient states that he would like a callback regarding scheduling an appointment

## 2019-03-13 ENCOUNTER — TELEPHONE (OUTPATIENT)
Dept: CARDIOLOGY | Facility: CLINIC | Age: 64
End: 2019-03-13

## 2019-03-13 NOTE — TELEPHONE ENCOUNTER
Called and spoke with  Miles, He stated that he just needs to make an appt. I offered him an appt with Dr. Armas in [place] on 4/24/2019 @ 10a. He accepted. No further issues noted.

## 2019-03-13 NOTE — TELEPHONE ENCOUNTER
----- Message from Sangita Amaro sent at 3/13/2019  8:18 AM CDT -----  Contact: self   Patient want to speak with a nurse regarding scheduling appointment before May possible this month for numbness and lower leg and pain hip was referred by Dr. Moore please call back at [phone]

## 2019-03-26 NOTE — PROGRESS NOTES
Name: Miles Vazquez 1955  MRN: [id number]   Date: 3/26/2019    Therapy Diagnosis:   Encounter Diagnosis   Name Primary?    Trochanteric bursitis of both hips      Physician: Priscilla Dunne DO    Subjective:  Patient reports:    Patient with no change in status since starting therapy. PT/PTA have discussed with patient at length possibility of other medical issues. Patient to make an appointment with cardiovascular surgeon to confirm or rule out PAD as cause of pain. Patient understands if PAD is ruled out he should consult with neurology.     Objective:     Treatment :    Included:Therapeutic exercise          Treatment today:  See Progress note on 3/1/19 for treatment notes          Assessment:  Patient making little progress since starting therapy. He is improving in strength, however symptoms persist. Patient likely with PAD vs neurogenic claudication.   GOALS: no goals reached, patient referred out.       Patient referred to cardiovascular      Discharge plan :Pt to follow-up with MD as planned        Ivon Knight, PT

## 2019-04-29 ENCOUNTER — PATIENT MESSAGE (OUTPATIENT)
Dept: FAMILY MEDICINE | Facility: CLINIC | Age: 64
End: 2019-04-29

## 2019-04-29 ENCOUNTER — OFFICE VISIT (OUTPATIENT)
Dept: FAMILY MEDICINE | Facility: CLINIC | Age: 64
End: 2019-04-29
Payer: MEDICARE

## 2019-04-29 VITALS
BODY MASS INDEX: 27.56 KG/M2 | TEMPERATURE: 98 F | HEIGHT: 61 IN | HEART RATE: 84 BPM | SYSTOLIC BLOOD PRESSURE: 148 MMHG | WEIGHT: 146 LBS | DIASTOLIC BLOOD PRESSURE: 71 MMHG

## 2019-04-29 DIAGNOSIS — G89.29 CHRONIC PAIN OF BOTH HIPS: ICD-10-CM

## 2019-04-29 DIAGNOSIS — M70.62 TROCHANTERIC BURSITIS OF BOTH HIPS: ICD-10-CM

## 2019-04-29 DIAGNOSIS — I71.40 ABDOMINAL AORTIC ANEURYSM (AAA) WITHOUT RUPTURE: ICD-10-CM

## 2019-04-29 DIAGNOSIS — M25.551 CHRONIC PAIN OF BOTH HIPS: ICD-10-CM

## 2019-04-29 DIAGNOSIS — I73.9 INTERMITTENT CLAUDICATION: ICD-10-CM

## 2019-04-29 DIAGNOSIS — F51.01 PRIMARY INSOMNIA: ICD-10-CM

## 2019-04-29 DIAGNOSIS — F10.21 RECOVERING ALCOHOLIC IN REMISSION: ICD-10-CM

## 2019-04-29 DIAGNOSIS — D51.9 ANEMIA DUE TO VITAMIN B12 DEFICIENCY, UNSPECIFIED B12 DEFICIENCY TYPE: ICD-10-CM

## 2019-04-29 DIAGNOSIS — M25.552 CHRONIC PAIN OF BOTH HIPS: ICD-10-CM

## 2019-04-29 DIAGNOSIS — G25.81 RESTLESS LEG SYNDROME: Primary | ICD-10-CM

## 2019-04-29 DIAGNOSIS — M70.61 TROCHANTERIC BURSITIS OF BOTH HIPS: ICD-10-CM

## 2019-04-29 DIAGNOSIS — D64.9 ANEMIA, UNSPECIFIED TYPE: ICD-10-CM

## 2019-04-29 PROCEDURE — 99214 OFFICE O/P EST MOD 30 MIN: CPT | Mod: S$GLB,,, | Performed by: NURSE PRACTITIONER

## 2019-04-29 PROCEDURE — 99214 PR OFFICE/OUTPT VISIT, EST, LEVL IV, 30-39 MIN: ICD-10-PCS | Mod: S$GLB,,, | Performed by: NURSE PRACTITIONER

## 2019-04-29 RX ORDER — AMITRIPTYLINE HYDROCHLORIDE 100 MG/1
100 TABLET ORAL NIGHTLY
Qty: 90 TABLET | Refills: 1 | Status: SHIPPED | OUTPATIENT
Start: 2019-04-29 | End: 2019-08-12

## 2019-04-29 RX ORDER — ROPINIROLE 0.5 MG/1
TABLET, FILM COATED ORAL
Qty: 60 TABLET | Refills: 0 | Status: SHIPPED | OUTPATIENT
Start: 2019-04-29 | End: 2019-07-03

## 2019-04-29 RX ORDER — AMITRIPTYLINE HYDROCHLORIDE 50 MG/1
50 TABLET, FILM COATED ORAL NIGHTLY
Qty: 90 TABLET | Refills: 0 | OUTPATIENT
Start: 2019-04-29 | End: 2020-04-28

## 2019-04-29 NOTE — TELEPHONE ENCOUNTER
I see where you sent in his requip and I called [facility] and they have the rx, however, the pt states he also needs his amitriptyline as well.  Medication is pended for your review.  Thanks!

## 2019-04-29 NOTE — PATIENT INSTRUCTIONS
Peripheral Artery Disease (PAD)     Peripheral artery disease (PAD) occurs when the arteries that carry blood to the limbs are narrowed or blocked. This is usually due to a buildup of a fatty substance called plaque in the walls of the arteries.  PAD most often affects the arteries in the legs. When these arteries are narrowed or blocked, blood flow to the legs is reduced. This can cause leg and foot pain and other symptoms. If severe enough, reduced blood flow to the legs can lead to tissue death (gangrene) and the loss of a toe, foot, or leg. Having PAD also makes it more likely that arteries in other body areas are blocked. For instance, arteries that carry blood to the heart or brain may be affected. This raises the chances of heart attack and stroke.  Risk factors  Certain factors can make PAD more likely. They include:  · Smoking  · Diabetes  · High blood pressure  · Unhealthy cholesterol levels  · Obesity  · Inactive lifestyle  · Older age  · Family history of PAD  Symptoms  Many people with PAD have no symptoms. If symptoms do occur, they can include:  · Pain in the muscles of the calves, thighs or hips that gets worse with activity and better with rest (intermittent claudication)  · Achy, tired, or heavy feeling in the legs  · Weakness, numbness, tingling, or loss of feeling in the legs  · Changes in skin color of the legs  · Sores on the legs and feet  · Cold leg, feet, or toes  · Pain the feet or toes even when lying down (rest pain)  Home care  PAD is a chronic (lifelong) condition. Treatment is focused on managing your condition and lowering your health risks. This may include doing the following:  · If you smoke, quit. This helps prevent further damage to your arteries and lowers your health risks. Ask your provider about medicines or products that can help you quit smoking. Also consider joining a stop-smoking program or support group.  · Be more active. This helps you lose weight and manage  problems such as high blood pressure and unhealthy cholesterol levels. Start a walking program if advised to by your provider. Your provider may also help you form a safe exercise program that is right for your needs.  · Make healthy eating changes. This includes eating less fat, salt, and sugar.  · Take medicines for high blood pressure, unhealthy cholesterol levels, and diabetes as directed.  · Have your blood pressure and cholesterol levels checked as often as directed.  · If you have diabetes, try to keep your blood sugar well controlled. Test your blood sugar as directed.  · If you are overweight, talk to your provider about forming a weight-loss plan.  · Watch for cuts, scrapes, or open sores on your feet. Poor blood flow to the feet may slow healing and increase the risk of infection from these problems.   Follow-up care  Follow up with your healthcare provider, or as advised. If imaging tests such as ultrasound were done, they will be reviewed by a doctor. You will be told the results and any new findings that may affect your care.  When to seek medical advice   Call your healthcare provider right away if any of these occur:  · Sudden severe pain in the legs or feet  · Sudden cold, pale or blue color in the legs or feet  · Weakness or numbness in the legs or feet that worsens  · Any sore or wound in the legs or feet that wont heal  · Weak pulse in your legs or feet  Know the Signs of Heart Attack and Stroke  People with PAD are at high risk for heart attack and stroke. Knowing the signs of these problems can help you protect your health and get help when you need it. Call 911 right away if you have any of the following:  Signs of a Heart Attack  · Chest discomfort, such as pain, aching, tightness, or pressure that lasts more than a few minutes, or that comes and goes  · Pain or discomfort in the arms, back, shoulders, neck, or jaw  · Shortness of breath  · Sweating (often a cold, clammy  sweat)  · Nausea  · Lightheadedness  Signs of a Stroke  · Sudden numbness or weakness of the face, arms, or legs, especially on one side  · Sudden confusion or trouble speaking or understanding  · Sudden trouble seeing in one or both eyes  · Sudden trouble walking, dizziness, or loss of balance  · Sudden, severe headache with no known cause   Date Last Reviewed: 9/21/2015  © 7523-8321 [employer]. [address] [place] [address], [place] [address]. All rights reserved. This information is not intended as a substitute for professional medical care. Always follow your healthcare professional's instructions.  -----------------------------------------------------------------------------------      Taking Cilostazol for Peripheral Arterial Disease (PAD)  Cilostazol is a medicine that can relieve claudication. Claudication is a common symptom of peripheral arterial disease (PAD). It causes pain in your legs when you walk or exercise. This happens because of reduced blood flow to your leg muscles.  How cilostazol works  Cilostazol works by improving blood flow in the smallest blood vessels. This lets you walk farther without pain. You can then be more active and do more of the things you like to do. Cilostazol can be used safely with many medicines. These include medicines used to reduce the risk of heart attack and stroke. But some medicines can affect how cilostazol works in your body. So your healthcare provider needs to know what other medicines you take. You may need to use cilostazol for 1 to 3 months before it starts to work. If you see no improvement after using it for 3 months, then your provider will likely take you off it. You should not use cilostazol if you have heart failure.  Taking cilostazol safely  Before taking cilostazol, tell your provider about any medicines you are taking. This includes:  · All prescription medicines  · Over-the-counter medicines such as aspirin or ibuprofen  · Herbs, vitamins,  and other supplements  Avoid grapefruit or grapefruit juice when taking cilostazol. These may cause a harmful interaction. Also tell your provider if you have a history of heart failure, liver disease, or any other health problems. Always take medicines as directed.  Tips for taking cilostazol  · Have a routine. Take cilostazol when you get up each morning. Or take it when getting ready for bed at night.  · Dont skip doses. You must take this medicine daily for it to work.  · Take on an empty stomach. Take it either 1 hour before a meal or 3 hours after, with a full glass of water.  · Keep track of what you take. A pillbox with the days of the week marked can help, especially if you take several medicines. Or use a list or chart to keep track.  When to call your healthcare provider  Cilostazol can cause side effects. If you have problems, changing your dose may help. Call your provider or get medical help right away if you have any of the following:  · Rapid or irregular heartbeat  · Chest pain  · Shortness of breath  · Loose bowel movements or diarrhea  · Bloody or painful urine  · Severe headache  · Fever, chills, or flu symptoms  · Upset stomach  · Allergic reaction such as hives, rash, or swelling of your face, lips, tongue, or throat   Date Last Reviewed: 6/1/2016 © 2000-2017 [employer]. [address] [place] [address], [place] [address]. All rights reserved. This information is not intended as a substitute for professional medical care. Always follow your healthcare professional's instructions.

## 2019-05-02 NOTE — PROGRESS NOTES
Subjective:       Patient ID: Miles Vazquez is a 63 y.o. male.    Chief Complaint: Follow-up  64 y/o male new to this provider presents for 6mo check up. He c/o insomnia and RLS not controlled with Requip or trazodone, requesting trazodone be stopped. Early morning awakening discussed, denies depression.   He reports mobility significantly impaired due to leg pain with walking-relieved with rest. He is a smoker with HTN and HLP denies diagnostic vascular studies of BLE.  He c/o chronic bilateral hip pain with diagnosis of bursitis listed. Records reviewed AAA-no size documented yet recommendations from 9/2018 close follow up.     Past Medical History:   Diagnosis Date    Abdominal aortic aneurysm     Alcoholism     Bursitis of both hips     COPD (chronic obstructive pulmonary disease)     Coronary artery calcification seen on CAT scan 4/24/2013    Depression     HTN (hypertension) 1/7/2013    Hyperlipidemia     Hypertension     Macrocytic anemia 3/27/2016    Personal history of colonic polyps     Restless leg syndrome     Right hip pain     RLS (restless legs syndrome)     Sleep apnea 2 years ago    does not use cpap    Smoker 1/7/2013    Vitamin disease     Wheezing        Past Surgical History:   Procedure Laterality Date    APPENDECTOMY      APPENDECTOMY N/A 9/26/2018    [facility]    COLONOSCOPY      EGD (ESOPHAGOGASTRODUODENOSCOPY) N/A 9/26/2018    [facility]    HERNIA REPAIR      INJECTION-STEROID-EPIDURAL-TRANSFORAMINAL Bilateral 11/9/2015    [facility] PAIN [facility]GT    Insertion,central venous access device N/A 9/26/2018    [facility]    PEG TUBE PLACEMENT/REPLACEMENT N/A 4/12/2016    Per[facility]res Luong [facility] at [facility] (2ND FLR)    REPAIR, HERNIA N/A 9/26/2018    [facility]    UPPER  GASTROINTESTINAL ENDOSCOPY          Social History     Socioeconomic History    Marital status: Single     Spouse name: Not on file    Number of children: Not on file    Years of education: Not on file    Highest education level: Not on file   Occupational History    Not on file   Social Needs    Financial resource strain: Not on file    Food insecurity:     Worry: Not on file     Inability: Not on file    Transportation needs:     Medical: Not on file     Non-medical: Not on file   Tobacco Use    Smoking status: Current Every Day Smoker     Types: Cigarettes    Smokeless tobacco: Never Used    Tobacco comment: 1 or 2 cigarettes a day    Substance and Sexual Activity    Alcohol use: No     Frequency: Never     Comment: Quit drinking in March 2016    Drug use: No    Sexual activity: Not on file   Lifestyle    Physical activity:     Days per week: Not on file     Minutes per session: Not on file    Stress: Not on file   Relationships    Social connections:     Talks on phone: Not on file     Gets together: Not on file     Attends Jain service: Not on file     Active member of club or organization: Not on file     Attends meetings of clubs or organizations: Not on file     Relationship status: Not on file   Other Topics Concern    Not on file   Social History Narrative    Not on file       Review of patient's allergies indicates:   Allergen Reactions    Seroquel [quetiapine]      RESTLESS LEGS          Current Outpatient Medications:     albuterol 90 mcg/actuation inhaler, Inhale 2 puffs into the lungs every 6 (six) hours as needed for Wheezing., Disp: 1 Inhaler, Rfl: 3    aspirin 81 MG Chew, Take 81 mg by mouth once daily., Disp: , Rfl:     benazepril (LOTENSIN) 20 MG tablet, Take 1 tablet (20 mg total) by mouth once daily., Disp: 90 tablet, Rfl: 1    cyanocobalamin (VITAMIN B-12) 1000 MCG tablet, Take 0.5 tablets (500 mcg total) by mouth once daily., Disp: 60 tablet, Rfl: 12     multivit-min/FA/lycopen/lutein (CENTRUM SILVER MEN ORAL), Take by mouth., Disp: , Rfl:     amitriptyline (ELAVIL) 100 MG tablet, Take 1 tablet (100 mg total) by mouth every evening., Disp: 90 tablet, Rfl: 1    rOPINIRole (REQUIP) 0.5 MG tablet, 1/2 tab po qhs x2 nights then, 1 tab po qhs x 5 nights then increase weekly by 1 tab to a max of 4 mg a night., Disp: 60 tablet, Rfl: 0     HPI  Review of Systems   Constitutional: Negative for chills, diaphoresis, fever and unexpected weight change.   HENT: Negative for dental problem and trouble swallowing.    Eyes: Negative for visual disturbance.   Respiratory: Negative for shortness of breath and wheezing.    Cardiovascular: Negative for chest pain and palpitations.   Gastrointestinal: Negative for abdominal pain, constipation, diarrhea, nausea and vomiting.   Endocrine: Negative for polydipsia and polyuria.   Genitourinary: Negative for dysuria.   Musculoskeletal: Negative for joint swelling.        Leg pain with walking, relieved with rest and bilateral hip pain   Skin: Negative for rash.   Neurological: Negative for syncope and headaches.   Psychiatric/Behavioral: Positive for sleep disturbance. Negative for agitation and confusion.       Objective:      Physical Exam   Constitutional: He is oriented to person, place, and time. He appears well-developed and well-nourished.   HENT:   Head: Normocephalic.   Eyes: Pupils are equal, round, and reactive to light. Conjunctivae are normal.   Neck: Normal range of motion. Neck supple. No thyromegaly present.   Cardiovascular: Normal rate, regular rhythm and normal heart sounds.   No murmur heard.  Pulmonary/Chest: Effort normal and breath sounds normal. He has no wheezes.   Abdominal: Soft. Bowel sounds are normal. He exhibits no distension. There is no tenderness.   Musculoskeletal: Normal range of motion. He exhibits no edema or tenderness.   Neurological: He is alert and oriented to person, place, and time. No sensory  deficit.   Skin: Skin is warm and dry. Capillary refill takes less than 2 seconds. No rash noted.   Psychiatric: He has a normal mood and affect. Judgment and thought content normal.   Vitals reviewed.      Assessment:       1. Restless leg syndrome    2. Primary insomnia    3. Abdominal aortic aneurysm (AAA) without rupture    4. Chronic pain of both hips    5. Recovering alcoholic in remission    6. Trochanteric bursitis of both hips    7. Anemia, unspecified type    8. Anemia due to vitamin B12 deficiency, unspecified B12 deficiency type    9. Intermittent claudication        Plan:     1- US for AAA size evaluation  2- bilateral hip x-rays  3- repeat labs today  4- resume requip  5- increase amitriptyline to 100 mg, if ineffective discuss adding an SSRI       Risks, benefits, and side effects were discussed with the patient. All questions were answered to the fullest satisfaction of the patient, and pt verbalized understanding and agreement to treatment plan. Pt was to call with any new or worsening symptoms, or present to the ER.

## 2019-05-08 ENCOUNTER — HOSPITAL ENCOUNTER (OUTPATIENT)
Dept: RADIOLOGY | Facility: HOSPITAL | Age: 64
Discharge: HOME | End: 2019-05-08
Attending: NURSE PRACTITIONER
Payer: MEDICARE

## 2019-05-08 ENCOUNTER — PATIENT MESSAGE (OUTPATIENT)
Dept: FAMILY MEDICINE | Facility: CLINIC | Age: 64
End: 2019-05-08

## 2019-05-08 DIAGNOSIS — M25.552 CHRONIC PAIN OF BOTH HIPS: ICD-10-CM

## 2019-05-08 DIAGNOSIS — M25.551 CHRONIC PAIN OF BOTH HIPS: ICD-10-CM

## 2019-05-08 DIAGNOSIS — M70.62 TROCHANTERIC BURSITIS OF BOTH HIPS: ICD-10-CM

## 2019-05-08 DIAGNOSIS — I71.40 ABDOMINAL AORTIC ANEURYSM (AAA) WITHOUT RUPTURE: ICD-10-CM

## 2019-05-08 DIAGNOSIS — M70.61 TROCHANTERIC BURSITIS OF BOTH HIPS: ICD-10-CM

## 2019-05-08 DIAGNOSIS — I73.9 INTERMITTENT CLAUDICATION: ICD-10-CM

## 2019-05-08 DIAGNOSIS — G25.81 RESTLESS LEG SYNDROME: ICD-10-CM

## 2019-05-08 DIAGNOSIS — G89.29 CHRONIC PAIN OF BOTH HIPS: ICD-10-CM

## 2019-05-08 PROCEDURE — 93922 UPR/L XTREMITY ART 2 LEVELS: CPT | Mod: TC

## 2019-05-08 PROCEDURE — 76775 US EXAM ABDO BACK WALL LIM: CPT | Mod: TC

## 2019-05-08 PROCEDURE — 93922 UPR/L XTREMITY ART 2 LEVELS: CPT | Mod: 26,,, | Performed by: RADIOLOGY

## 2019-05-08 PROCEDURE — 76775 US EXAM ABDO BACK WALL LIM: CPT | Mod: 26,,, | Performed by: RADIOLOGY

## 2019-05-08 PROCEDURE — 93922 US ARTERIAL LOWER EXTREMITY BILAT WITH ABI (XPD): ICD-10-PCS | Mod: 26,,, | Performed by: RADIOLOGY

## 2019-05-08 PROCEDURE — 73521 XR HIPS BILATERAL 2 VIEW INCL AP PELVIS: ICD-10-PCS | Mod: 26,,, | Performed by: RADIOLOGY

## 2019-05-08 PROCEDURE — 73521 X-RAY EXAM HIPS BI 2 VIEWS: CPT | Mod: TC,FY

## 2019-05-08 PROCEDURE — 73521 X-RAY EXAM HIPS BI 2 VIEWS: CPT | Mod: 26,,, | Performed by: RADIOLOGY

## 2019-05-08 PROCEDURE — 93925 US ARTERIAL LOWER EXTREMITY BILAT WITH ABI (XPD): ICD-10-PCS | Mod: 26,,, | Performed by: RADIOLOGY

## 2019-05-08 PROCEDURE — 76775 US ABDOMINAL AORTA: ICD-10-PCS | Mod: 26,,, | Performed by: RADIOLOGY

## 2019-05-08 PROCEDURE — 93925 LOWER EXTREMITY STUDY: CPT | Mod: 26,,, | Performed by: RADIOLOGY

## 2019-05-08 NOTE — PROGRESS NOTES
Please notify patient xray shows normal hip structure and joint space with mild osteoarthritis. We can discuss more at f/u

## 2019-05-09 ENCOUNTER — TELEPHONE (OUTPATIENT)
Dept: FAMILY MEDICINE | Facility: CLINIC | Age: 64
End: 2019-05-09

## 2019-05-09 NOTE — TELEPHONE ENCOUNTER
See PP message.       ----- Message from Puja Finney NP sent at 5/8/2019  5:48 PM CDT -----  Please notify patient his labs do show anemia and his vascular studies show abnormalities but nothing that has to be addressed today. I will discuss and treat at follow up.

## 2019-05-09 NOTE — TELEPHONE ENCOUNTER
Spoke with pt. Informed pt of recent lab results per NP communication. Verbalized an understanding.       ----- Message from Katharine Thornton sent at 5/9/2019  3:31 PM CDT -----  Contact: Miles pt  Type:  Patient Returning Call    Who Called:  Miles  Who Left Message for Patient:  Citlali  Does the patient know what this is regarding?:  Test results  Best Call Back Number:  [phone]  Additional Information:  Pls call pt to adv/ sandeep khan n/a

## 2019-05-23 ENCOUNTER — OFFICE VISIT (OUTPATIENT)
Dept: FAMILY MEDICINE | Facility: CLINIC | Age: 64
End: 2019-05-23
Payer: MEDICARE

## 2019-05-23 VITALS
HEIGHT: 67 IN | DIASTOLIC BLOOD PRESSURE: 67 MMHG | TEMPERATURE: 98 F | RESPIRATION RATE: 18 BRPM | HEART RATE: 92 BPM | BODY MASS INDEX: 23.39 KG/M2 | SYSTOLIC BLOOD PRESSURE: 112 MMHG | WEIGHT: 149 LBS | OXYGEN SATURATION: 97 %

## 2019-05-23 DIAGNOSIS — I73.9 INTERMITTENT CLAUDICATION: Primary | ICD-10-CM

## 2019-05-23 DIAGNOSIS — I10 ESSENTIAL HYPERTENSION: ICD-10-CM

## 2019-05-23 DIAGNOSIS — F51.01 PRIMARY INSOMNIA: ICD-10-CM

## 2019-05-23 DIAGNOSIS — M25.551 CHRONIC PAIN OF BOTH HIPS: ICD-10-CM

## 2019-05-23 DIAGNOSIS — M25.552 CHRONIC PAIN OF BOTH HIPS: ICD-10-CM

## 2019-05-23 DIAGNOSIS — I71.40 ABDOMINAL AORTIC ANEURYSM (AAA) WITHOUT RUPTURE: ICD-10-CM

## 2019-05-23 DIAGNOSIS — G89.29 CHRONIC PAIN OF BOTH HIPS: ICD-10-CM

## 2019-05-23 PROCEDURE — 99213 PR OFFICE/OUTPT VISIT, EST, LEVL III, 20-29 MIN: ICD-10-PCS | Mod: S$GLB,,, | Performed by: NURSE PRACTITIONER

## 2019-05-23 PROCEDURE — 99213 OFFICE O/P EST LOW 20 MIN: CPT | Mod: S$GLB,,, | Performed by: NURSE PRACTITIONER

## 2019-05-23 RX ORDER — TEMAZEPAM 7.5 MG/1
7.5 CAPSULE ORAL NIGHTLY PRN
Qty: 30 CAPSULE | Refills: 3 | Status: SHIPPED | OUTPATIENT
Start: 2019-05-23 | End: 2019-06-22

## 2019-05-23 NOTE — PATIENT INSTRUCTIONS
1/2 tab po qhs x2 nights then, 1 tab po qhs x 5 nights then increase weekly by 1 tab to a max of 4 mg a night. (  tablets are 0.5mg )    1- NP to discuss CT abd/pelvis and arterial study with Dr. Gomez    2-  Reeducated on Requip dose    3- start Restoril for chronic insomnia stop amitriptyline     4- RTC 3 mo    5-  Refer to Dr. Florian for AAA and intermittent claudication. Patient to bring disc.     6- iron 325 mg twice daily, monitor for constipation -use Miralax if needed

## 2019-05-23 NOTE — PROGRESS NOTES
Subjective:       Patient ID: Miles Vazquez is a 63 y.o. male.    Chief Complaint: Follow-up; Hip Pain; Leg Pain; and Results (lab work , xrays, ultrasound results . )  62 y/o male presents for 1 mo follow up. Results for AAA and arterial studies discussed. Reports improvement with requip thus stop mirapex. Reports chronic insomnia not relieved by amitriptyline taking 1-2 hours to fall asleep, cant stay asleep and has early morning awaking feeling chronic fatigue.  Appt next week with Dr. Armas.   Past Medical History:   Diagnosis Date    Abdominal aortic aneurysm     Alcoholism     Bursitis of both hips     COPD (chronic obstructive pulmonary disease)     Coronary artery calcification seen on CAT scan 4/24/2013    Depression     HTN (hypertension) 1/7/2013    Hyperlipidemia     Hypertension     Macrocytic anemia 3/27/2016    Personal history of colonic polyps     Restless leg syndrome     Right hip pain     RLS (restless legs syndrome)     Sleep apnea 2 years ago    does not use cpap    Smoker 1/7/2013    Vitamin disease     Wheezing        Past Surgical History:   Procedure Laterality Date    APPENDECTOMY      APPENDECTOMY N/A 9/26/2018    [facility] at [facility] OR    COLONOSCOPY      EGD (ESOPHAGOGASTRODUODENOSCOPY) N/A 9/26/2018    [facility] at [facility] OR    HERNIA REPAIR      INJECTION-STEROID-EPIDURAL-TRANSFORAMINAL Bilateral 11/9/2015    [facility] at [facility]    Insertion,central venous access device N/A 9/26/2018    [facility]    PEG TUBE PLACEMENT/REPLACEMENT N/A 4/12/2016    Performed by [facility]res Luong [facility] at [facility] (2ND FLR)    REPAIR, HERNIA N/A 9/26/2018    [facility] at [facility] OR    SMALL INTESTINE SURGERY  09/2018    UPPER GASTROINTESTINAL ENDOSCOPY          Social History     Socioeconomic History    Marital status: Single      Spouse name: Not on file    Number of children: Not on file    Years of education: Not on file    Highest education level: Not on file   Occupational History    Not on file   Social Needs    Financial resource strain: Not on file    Food insecurity:     Worry: Not on file     Inability: Not on file    Transportation needs:     Medical: Not on file     Non-medical: Not on file   Tobacco Use    Smoking status: Current Every Day Smoker     Years: 50.00     Types: Cigarettes    Smokeless tobacco: Never Used    Tobacco comment: 1 or 2 cigarettes a day    Substance and Sexual Activity    Alcohol use: No     Frequency: Never     Comment: Quit drinking in March 2016    Drug use: No    Sexual activity: Not Currently     Partners: Female   Lifestyle    Physical activity:     Days per week: Not on file     Minutes per session: Not on file    Stress: Not on file   Relationships    Social connections:     Talks on phone: Not on file     Gets together: Not on file     Attends Buddhist service: Not on file     Active member of club or organization: Not on file     Attends meetings of clubs or organizations: Not on file     Relationship status: Not on file   Other Topics Concern    Not on file   Social History Narrative    Not on file       Family History   Problem Relation Age of Onset    Tuberculosis Maternal Grandmother     Stomach cancer Neg Hx     Colon cancer Neg Hx        Review of patient's allergies indicates:   Allergen Reactions    Seroquel [quetiapine]      RESTLESS LEGS          Current Outpatient Medications:     albuterol 90 mcg/actuation inhaler, Inhale 2 puffs into the lungs every 6 (six) hours as needed for Wheezing., Disp: 1 Inhaler, Rfl: 3    amitriptyline (ELAVIL) 100 MG tablet, Take 1 tablet (100 mg total) by mouth every evening., Disp: 90 tablet, Rfl: 1    aspirin 81 MG Chew, Take 81 mg by mouth once daily., Disp: , Rfl:     benazepril (LOTENSIN) 20 MG tablet, Take 1 tablet (20 mg  total) by mouth once daily., Disp: 90 tablet, Rfl: 1    cyanocobalamin (VITAMIN B-12) 1000 MCG tablet, Take 0.5 tablets (500 mcg total) by mouth once daily., Disp: 60 tablet, Rfl: 12    multivit-min/FA/lycopen/lutein (CENTRUM SILVER MEN ORAL), Take by mouth., Disp: , Rfl:     rOPINIRole (REQUIP) 0.5 MG tablet, 1/2 tab po qhs x2 nights then, 1 tab po qhs x 5 nights then increase weekly by 1 tab to a max of 4 mg a night., Disp: 60 tablet, Rfl: 0    temazepam (RESTORIL) 7.5 MG Cap, Take 1 capsule (7.5 mg total) by mouth nightly as needed., Disp: 30 capsule, Rfl: 3    HPI  Review of Systems   Constitutional: Negative for chills, diaphoresis, fever and unexpected weight change.   HENT: Negative for dental problem and trouble swallowing.    Eyes: Negative for visual disturbance.   Respiratory: Negative for shortness of breath and wheezing.    Cardiovascular: Negative for chest pain and palpitations.   Gastrointestinal: Negative for abdominal pain, constipation, diarrhea, nausea and vomiting.   Endocrine: Negative for polydipsia and polyuria.   Genitourinary: Negative for dysuria.   Musculoskeletal: Negative for joint swelling.        Pain with walking relieved with rest   Skin: Negative for rash.   Neurological: Negative for syncope and headaches.   Psychiatric/Behavioral: Negative for agitation and confusion.       Objective:      Physical Exam   Constitutional: He is oriented to person, place, and time. He appears well-developed and well-nourished.   HENT:   Head: Normocephalic.   Eyes: Pupils are equal, round, and reactive to light. Conjunctivae are normal.   Neck: Normal range of motion. Neck supple. No thyromegaly present.   Cardiovascular: Normal rate, regular rhythm and normal heart sounds.   Pulmonary/Chest: Effort normal and breath sounds normal. He has no wheezes. He has no rales.   Abdominal: Soft. Bowel sounds are normal. He exhibits no distension. There is no tenderness.   Musculoskeletal: Normal range of  motion. He exhibits no edema.   Neurological: He is alert and oriented to person, place, and time.   Skin: Skin is warm and dry. Capillary refill takes less than 2 seconds.   Psychiatric: He has a normal mood and affect. His behavior is normal. Judgment and thought content normal.   Vitals reviewed.      Assessment:       1. Intermittent claudication    2. Essential hypertension    3. Chronic pain of both hips    4. Primary insomnia    5. Abdominal aortic aneurysm (AAA) without rupture        Plan:     1- NP to discuss CT abd/pelvis and arterial study with Dr. Gomez    2-  Reeducated on Requip dose    3- start Restoril for chronic insomnia stop amitriptyline     4- RTC 3 mo    5-  Refer to Dr. Florian for AAA and intermittent claudication. Patient to bring disc.     6- iron 325 mg twice daily, monitor for constipation     Intermittent claudication    Essential hypertension    Chronic pain of both hips    Primary insomnia  -     temazepam (RESTORIL) 7.5 MG Cap; Take 1 capsule (7.5 mg total) by mouth nightly as needed.  Dispense: 30 capsule; Refill: 3    Abdominal aortic aneurysm (AAA) without rupture        Risks, benefits, and side effects were discussed with the patient. All questions were answered to the fullest satisfaction of the patient, and pt verbalized understanding and agreement to treatment plan. Pt was to call with any new or worsening symptoms, or present to the ER.

## 2019-05-30 ENCOUNTER — TELEPHONE (OUTPATIENT)
Dept: FAMILY MEDICINE | Facility: CLINIC | Age: 64
End: 2019-05-30

## 2019-05-30 NOTE — TELEPHONE ENCOUNTER
When PA request is received for pharmacy stating the reason it requires authorization, PA will be initiated and patient will be notified. Awaiting fax from pharmacy.        ----- Message from Vanessa Valdez sent at 5/30/2019  8:44 AM CDT -----  Contact: Self  Patient saw Puja on 5/23 and she gave him a prescription fortemazepam (RESTORIL) 7.5 MG Cap.  He took it to [facility] and they are requesting a Prior Authorization for his insurance.  Please get that sent over to    [facility] [facility] [place], [place] - [address]  [address]  [place] [address]  Phone: [phone] Fax: [phone]    Patient is requesting a call back at [phone] after sending.    Thanks

## 2019-05-30 NOTE — TELEPHONE ENCOUNTER
Pharmacy will fax the PA request and then it will be initiated.        ----- Message from Stephie Teixeira sent at 5/30/2019  2:29 PM CDT -----  Type: Needs Medical Advice    Who Called:  Patient  Pharmacy name and phone #:    [id number] - [place], [place] - [address]  [address]  [place] [address]  Phone: [phone] Fax: [phone]    Best Call Back Number: [phone] (home)     Additional Information: States that the pharmacy said that the temazepam (RESTORIL) 7.5 MG Cap needs a prior authorization. Please call back to advise of the status.

## 2019-05-31 ENCOUNTER — TELEPHONE (OUTPATIENT)
Dept: FAMILY MEDICINE | Facility: CLINIC | Age: 64
End: 2019-05-31

## 2019-05-31 NOTE — TELEPHONE ENCOUNTER
Spoke with Cory at [facility] pharmacy and gave verbal order for Temazepam that was ordered 5/23/19 per JANET Jacob NP.  RBVO.  Pt notified and voiced understanding.

## 2019-05-31 NOTE — TELEPHONE ENCOUNTER
----- Message from Lance Warner sent at 5/31/2019  3:21 PM CDT -----  Contact: Patient  [phone]  Type:  RX Refill Request    Who Called: Patient  [phone]    Refill or New Rx:  Refill    RX Name and Strength:  temazepam (RESTORIL) 7.5 MG Cap 30 capsule 3 5/23/2019 6/22/2019   Sig - Route: Take 1 capsule (7.5 mg total) by mouth nightly as needed. - Oral       How is the patient currently taking it? (ex. 1XDay):  See above.    Is this a 30 day or 90 day RX:  30 day    Preferred Pharmacy with phone number:      [id number] - [place], [place] - [address]  [address]  [place] [address]  Phone: [phone] Fax: [phone]    Local or Mail Order:  Local    Ordering Provider:  Merna Galeano Call Back Number:  Patient  [phone]    Additional Information:  Advised this is the third time he has called about this Rx and hasn't heard from anyone.  The pharmacy does not have the Rx.

## 2019-06-06 DIAGNOSIS — I73.9 BILATERAL CLAUDICATION OF LOWER LIMB: Primary | ICD-10-CM

## 2019-06-07 ENCOUNTER — OFFICE VISIT (OUTPATIENT)
Dept: CARDIOLOGY | Facility: CLINIC | Age: 64
End: 2019-06-07
Payer: MEDICARE

## 2019-06-07 VITALS
BODY MASS INDEX: 22.6 KG/M2 | HEIGHT: 67 IN | DIASTOLIC BLOOD PRESSURE: 60 MMHG | HEART RATE: 86 BPM | SYSTOLIC BLOOD PRESSURE: 112 MMHG | OXYGEN SATURATION: 100 % | RESPIRATION RATE: 14 BRPM | TEMPERATURE: 98 F | WEIGHT: 144 LBS

## 2019-06-07 DIAGNOSIS — I11.9 LVH (LEFT VENTRICULAR HYPERTROPHY) DUE TO HYPERTENSIVE DISEASE, WITHOUT HEART FAILURE: ICD-10-CM

## 2019-06-07 DIAGNOSIS — I10 ESSENTIAL HYPERTENSION: ICD-10-CM

## 2019-06-07 DIAGNOSIS — I73.9 CLAUDICATION OF BOTH LOWER EXTREMITIES: ICD-10-CM

## 2019-06-07 DIAGNOSIS — D50.8 IRON DEFICIENCY ANEMIA SECONDARY TO INADEQUATE DIETARY IRON INTAKE: ICD-10-CM

## 2019-06-07 DIAGNOSIS — G25.81 RESTLESS LEG SYNDROME: ICD-10-CM

## 2019-06-07 DIAGNOSIS — F17.200 SMOKER: ICD-10-CM

## 2019-06-07 DIAGNOSIS — Z78.9 EXCESSIVE CAFFEINE INTAKE: ICD-10-CM

## 2019-06-07 DIAGNOSIS — G47.33 OSA (OBSTRUCTIVE SLEEP APNEA): ICD-10-CM

## 2019-06-07 DIAGNOSIS — I71.40 ABDOMINAL AORTIC ANEURYSM (AAA) WITHOUT RUPTURE: ICD-10-CM

## 2019-06-07 DIAGNOSIS — I73.9 PAD (PERIPHERAL ARTERY DISEASE): Primary | ICD-10-CM

## 2019-06-07 DIAGNOSIS — E78.00 PURE HYPERCHOLESTEROLEMIA: ICD-10-CM

## 2019-06-07 DIAGNOSIS — Z91.89 AT RISK FOR CARDIOVASCULAR EVENT: ICD-10-CM

## 2019-06-07 DIAGNOSIS — I25.10 CORONARY ARTERY CALCIFICATION SEEN ON CAT SCAN: Chronic | ICD-10-CM

## 2019-06-07 PROCEDURE — 99205 OFFICE O/P NEW HI 60 MIN: CPT | Mod: S$PBB,,, | Performed by: INTERNAL MEDICINE

## 2019-06-07 PROCEDURE — 99999 PR PBB SHADOW E&M-EST. PATIENT-LVL III: ICD-10-PCS | Mod: PBBFAC,,, | Performed by: INTERNAL MEDICINE

## 2019-06-07 PROCEDURE — 99205 PR OFFICE/OUTPT VISIT, NEW, LEVL V, 60-74 MIN: ICD-10-PCS | Mod: S$PBB,,, | Performed by: INTERNAL MEDICINE

## 2019-06-07 PROCEDURE — 99213 OFFICE O/P EST LOW 20 MIN: CPT | Mod: PBBFAC | Performed by: INTERNAL MEDICINE

## 2019-06-07 PROCEDURE — 99999 PR PBB SHADOW E&M-EST. PATIENT-LVL III: CPT | Mod: PBBFAC,,, | Performed by: INTERNAL MEDICINE

## 2019-06-07 RX ORDER — ATORVASTATIN CALCIUM 40 MG/1
40 TABLET, FILM COATED ORAL DAILY
Qty: 30 TABLET | Refills: 11 | Status: SHIPPED | OUTPATIENT
Start: 2019-06-07 | End: 2019-08-12

## 2019-06-07 RX ORDER — FERROUS SULFATE 325(65) MG
325 TABLET ORAL 2 TIMES DAILY
Qty: 60 TABLET | Refills: 11 | Status: SHIPPED | OUTPATIENT
Start: 2019-06-07

## 2019-06-07 RX ORDER — CILOSTAZOL 100 MG/1
100 TABLET ORAL
Qty: 60 TABLET | Refills: 11 | Status: SHIPPED | OUTPATIENT
Start: 2019-06-07 | End: 2019-12-09 | Stop reason: SDUPTHER

## 2019-06-07 NOTE — PROGRESS NOTES
Subjective:    Patient ID:  Miles Vazquez is a 63 y.o. male who presents for evaluation of Establish Care; Hypertension; and Coronary Artery Disease  For PAD with claudication over the past 4 years, progressive, smoker, HLD not on statin  PCP and referred by Puja Finney NP   Vascular: Dr. 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    Health literacy: medium   Activities: do ADL and , but very limited, have to stop after 5 minutes due to pains in hip and legs  Nicotine: 2-3 cp week, 75+ py  Alcohol: none for 3 years, hospitalized for 2 months for alcoholism  Illicit drugs: none  Cardiac symptoms: claudication  Home BP: do not check  Medication compliance: yes  Diet: regular  Caffeine: 5 cpd, have restless sleep  Labs: 1/2019, .6, not on Rx, normal CMP, CBC (Hgb 9.9), iron sat 7%, ferritin 11, normal TSH, no A1C  Last Echo: 3/2016  Last stress test: 5/2013  Cardiovascular angiogram: none  ECG: NSR 89, RBBB with LAFB  Fundoscopic exam: none for 4-5 years    WM referred for PAD and claudication, now under the care of Dr. Florian, CT pending. Problem noted for over 4 years progressive over the past month. Starts with exertion, after about 5 minutes and start in the hips then the entire leg and have to stop. Better after 5 minute of rest. Feels very limited. Continue to smoke some and obvious vascular calcifications on X-rays.    4/2019 had X-ray of hip - 1. Mild degenerative osteoarthrosis of the bilateral hips.  2. Chronic vascular calcifications.     Abdominal US - Chronic bilobed distal abdominal aortic aneurysm.  This should be evaluated further with a dedicated contrast enhanced CT of the abdomen and pelvis.   Distal dilatation measures 3.8 cm AP x 4.1 cm transverse.     JOEL and duplex of LE -  Ankle-brachial index of 0.86 on the right and 0.78 on the left.    Decreased velocity within the right common femoral artery suggesting a proximal focus of atherosclerotic stenosis.    Elevated velocity within the left profundal femoral artery consistent with a focus of atherosclerotic stenosis.    Elevated velocity within the distal left SFA and left popliteal artery consistent with foci of atherosclerotic stenosis.     Echo 3/2016 - CONCLUSIONS     1 - Concentric remodeling.     2 - Normal left ventricular systolic function (EF 60-65%).     3 - Left ventricular diastolic dysfunction.     4 - Mild left atrial enlargement.     5 - Normal right ventricular systolic function .     6 - Pericardial space ( see text). pericardial space is circumferentially filled with echodense material of unknown significance in this patient with known pleural plaques suggestive of Asbestos exposure.    Lexiscan 5/2013 - Nuclear Quantitative Functional Analysis:   LVEF: >= 70 % (normal is >= 51%)  LVED Volume: 74 ml (normal is <=171)  LVES Volume: 10 ml (normal is <=70)    Impression: NORMAL MYOCARDIAL PERFUSION  1. The perfusion scan is free of evidence for myocardial ischemia or injury.   2. Resting wall motion is physiologic.   3. Resting LV function is normal.  (normal is >= 51%)  4. The ventricular volumes are normal at rest and stress.   5. The extracardiac distribution of radioactivity is normal.   6. There was no previous study available to compare.       Review of Systems   Constitution: Negative for diaphoresis, fever, malaise/fatigue, night sweats and weight gain.   HENT: Negative for nosebleeds and tinnitus.    Eyes: Negative for visual disturbance.   Cardiovascular: Positive for claudication. Negative for chest pain, cyanosis, dyspnea on exertion, irregular heartbeat, leg swelling, near-syncope, orthopnea, palpitations and paroxysmal nocturnal dyspnea.   Respiratory: Positive for sleep disturbances due to breathing.  "Negative for cough, shortness of breath, snoring and wheezing.         [place] score 0   Endocrine: Negative for polydipsia and polyuria.   Hematologic/Lymphatic: Does not bruise/bleed easily.   Skin: Positive for itching. Negative for color change, flushing, nail changes, poor wound healing and suspicious lesions.   Musculoskeletal: Positive for muscle cramps. Negative for arthritis, falls, gout, joint pain, joint swelling, muscle weakness and myalgias.   Gastrointestinal: Negative for heartburn, hematemesis, hematochezia, melena and nausea.   Genitourinary: Positive for decreased libido and hesitancy.   Neurological: Positive for difficulty with concentration, loss of balance, numbness, paresthesias and sensory change. Negative for disturbances in coordination, excessive daytime sleepiness, dizziness, focal weakness, headaches, light-headedness, vertigo and weakness.        RLS improved with Requip   Psychiatric/Behavioral: Positive for memory loss. Negative for depression and substance abuse. The patient has insomnia. The patient is not nervous/anxious.         Objective:    Physical Exam   Constitutional: He is oriented to person, place, and time. He appears well-developed and well-nourished.   HENT:   Head: Normocephalic.   Eyes: Pupils are equal, round, and reactive to light. Conjunctivae and EOM are normal.   Neck: Normal range of motion. Neck supple. No JVD present. No thyromegaly present.   Circumference 14.5"   Cardiovascular: Normal rate, regular rhythm and intact distal pulses. Exam reveals distant heart sounds. Exam reveals no gallop and no friction rub.   No murmur heard.  Pulses:       Carotid pulses are 1+ on the right side, and 1+ on the left side.       Radial pulses are 1+ on the right side, and 1+ on the left side.        Femoral pulses are 1+ on the right side, and 1+ on the left side.       Popliteal pulses are 1+ on the right side, and 1+ on the left side.        Dorsalis pedis pulses are 0 on " "the right side, and 1+ on the left side.        Posterior tibial pulses are 0 on the right side, and 0 on the left side.   Pulmonary/Chest: Effort normal and breath sounds normal. He has no rales. He exhibits no tenderness.   Diminished breath sounds and prolong expiration.   Abdominal: Soft. Bowel sounds are normal. There is no tenderness.   Waist 36", hip 35"   Musculoskeletal: Normal range of motion. He exhibits no edema.   Lymphadenopathy:     He has no cervical adenopathy.   Neurological: He is alert and oriented to person, place, and time.   Skin: Skin is warm and dry. No rash noted.         Assessment:       1. PAD (peripheral artery disease)    2. LVH (left ventricular hypertrophy) due to hypertensive disease, without heart failure    3. Essential hypertension, Dx 2013    4. Abdominal aortic aneurysm (AAA) without rupture    5. Claudication of both lower extremities, onset 2015    6. OMAR (obstructive sleep apnea), postive sleep study, never underwent CPAP titration    7. Restless leg syndrome    8. Smoker, 2-3 cp week, 75+ py, started age 12    9. At risk for cardiovascular event    10. Coronary artery calcification seen on CAT scan    11. Excessive caffeine intake    12. Iron deficiency anemia secondary to inadequate dietary iron intake    13. Pure hypercholesterolemia         Plan:       Miles was seen today for establish care, hypertension and coronary artery disease.    Diagnoses and all orders for this visit:    PAD (peripheral artery disease)  -     Stress test with myocardial perfusion; Future  -     cilostazol (PLETAL) 100 MG Tab; Take 1 tablet (100 mg total) by mouth 2 (two) times daily before meals.  -     Lipid panel; Future  -     High sensitivity CRP (Cardiac CRP); Future    LVH (left ventricular hypertrophy) due to hypertensive disease, without heart failure  -     Transthoracic echo (TTE) 2D with Color Flow; Future    Essential hypertension, Dx 2013  -     Transthoracic echo (TTE) 2D with " Color Flow; Future    Abdominal aortic aneurysm (AAA) without rupture    Claudication of both lower extremities, onset 2015  -     cilostazol (PLETAL) 100 MG Tab; Take 1 tablet (100 mg total) by mouth 2 (two) times daily before meals.  -     Lipid panel; Future  -     High sensitivity CRP (Cardiac CRP); Future    OMAR (obstructive sleep apnea), postive sleep study, never underwent CPAP titration    Restless leg syndrome    Smoker, 2-3 cp week, 75+ py, started age 12    At risk for cardiovascular event  -     Stress test with myocardial perfusion; Future  -     Lipid panel; Future    Coronary artery calcification seen on CAT scan  -     Transthoracic echo (TTE) 2D with Color Flow; Future  -     Stress test with myocardial perfusion; Future  -     Lipid panel; Future  -     High sensitivity CRP (Cardiac CRP); Future    Excessive caffeine intake    Iron deficiency anemia secondary to inadequate dietary iron intake  -     ferrous sulfate (FEOSOL) 325 mg (65 mg iron) Tab tablet; Take 1 tablet (325 mg total) by mouth 2 (two) times daily. Try on empty stomach first    Pure hypercholesterolemia  -     Lipid panel; Future    Other orders  -     atorvastatin (LIPITOR) 40 MG tablet; Take 1 tablet (40 mg total) by mouth once daily.    - All medical issues reviewed, continue current Rx.  - Need annual fundoscopic examination  - Reduce caffeine intake to help with restless sleep  - CV status stable, continue current Rx except for above, all medications reviewed, patient acknowledge good understanding.  - Recommend healthy living: stop nicotine, avoid alcohol, healthy diet and regular exercise aiming for fitness, and weight control   - Instruction for Mediterranean diet and heart healthy exercise given.  - Highly recommend 30 minutes of exercise / activities daily, can have Sunday off, with 2-3 sessions of muscle strengthening weekly. A  would be very helpful.  - Recommend at least annual cardiovascular evaluation  in view of patient's significant risk factors.  - Phone review / encourage use of [facility]      Total face-to-face time with the patient was 55 minutes and greater than 50% was spent in counseling and coordination of care. The above assessment and plan have been discussed at length. Referring physician's note reviewed. Labs and procedure over the last 6 months reviewed. Problem List updated. Asked to bring in all active medications / pills bottles with next visit.

## 2019-06-07 NOTE — LETTER
June 7, 2019      Tomy Moore MD  [address] [facility] [address] [place] [address]           [facility]  [address] [facility] [place] [address]  Phone: [phone]  Fax: [phone]          Patient: Miles Vazquez   MR Number: [id number]   YOB: 1955   Date of Visit: 6/7/2019       Dear Dr. Tomy Moore:    Thank you for referring Miles Vazquez to me for evaluation. Attached you will find relevant portions of my assessment and plan of care.    If you have questions, please do not hesitate to call me. I look forward to following Miles Vazquez along with you.    Sincerely,    Oziel Armas MD    Enclosure  CC:  No Recipients    If you would like to receive this communication electronically, please contact [email or web address] or [phone] to request more information on [employer] access.    For providers and/or their staff who would like to refer a patient to [facility], please contact us through our one-stop-shop provider referral line, [facility], at [phone].    If you feel you have received this communication in error or would no longer like to receive these types of communications, please e-mail [email or web address]

## 2019-06-07 NOTE — PATIENT INSTRUCTIONS
Recommended Mediterranean dietEating Heart-Healthy Food: Using the DASH Plan  Eating for your heart doesnt have to be hard or boring. You just need to know how to make healthier choices. The DASH eating plan has been developed to help you do just that. DASH stands for Dietary Approaches to Stop Hypertension. It is a plan that has been proven to be healthier for your heart and to lower your risk for high blood pressure. It can also help lower your risk for cancer, heart disease, osteoporosis, and diabetes.  Choosing from Each Food Group  Choose foods from each of the food groups below each day. Try to get the recommended number of servings for each food group. The serving numbers are based on a diet of 2,000 calories a day. Talk to your doctor if youre unsure about your calorie needs.  Grains   Servings: 7-8 a day  A serving is:  · 1 slice bread  · 1 ounce dry cereal  · half a cup cooked rice or pasta  Best choices: Whole grains and any grains high in fiber.  Vegetables   Servings: 4-5 a day  A serving is:  · 1 cup raw leafy vegetable  · Half a cup cooked vegetable  · Three-quarter cup vegetable juice  Best choices: Fresh or frozen vegetable prepared without too much added salt or fat.    Fruits   Servings: 4-5 a day  A serving is:  · Three-quarter cup fruit juice  · 1 medium fruit  · One-quarter cup dried fruit  · One-half cup fresh, frozen, or canned fruit  Best choices: A variety of fresh fruits of different colors. Whole fruits are a much better choice than fruit juices.  Low-fat or Fat Free Dairy   Servings: 2-3 a day  A serving is:  · 8 ounces milk  · 1 cup yogurt  · One and a half ounces cheese  Best choices: Skim or 1% milk, low-fat or fat free yogurt or buttermilk, and low-fat cheeses.       Meat, Poultry, Fish   Servings: 2 or fewer a day  A serving is:  · 3 ounces cooked meat, poultry, or fish  Best choices: Lean meats and fish. Trim away visible fat. Broil, roast, or boil instead of frying. Remove skin  from poultry before eating.  Nuts, Seeds, Beans   Servings: 4-5 a week  A serving is:  · One third cup nuts (or one and a half ounces)  · 2 tablespoons sunflower seeds  · Half a cup cooked beans  Best choices: Dry roasted nuts with no salt added, lentils, kidney beans, garbanzo beans, and whole ortega beans.    Fats and Oils   Servings: 2 a day  A serving is:  · 1 teaspoon vegetable oil  · 1 teaspoon soft margarine  · 1 tablespoon low-fat mayonnaise  · 1 teaspoon regular mayonnaise  · 2 tablespoons light salad dressing  · 1 tablespoon regular salad dressing  Best choices: Monounsaturated and polyunsaturated fats such as olive, canola, or safflower oil.  Sweets   Servings: 5 a week or fewer  A serving is:  · 1 tablespoon sugar, maple syrup, or honey  · 1 tablespoon jam or jelly  · 1 half-ounce jelly beans (about 15)  · 8 ounces lemonade  Best choices: Dried fruit can be a satisfying sweet. Choose low-fat sweets when possible. And watch your serving sizes!       Aerobic Exercise for a Healthy Heart  Exercise is a lot more than an energy booster and a stress reliever. It also strengthens your heart muscle, lowers your blood pressure and blood cholesterol, and burns calories.      Remember, some activity is better than none.     Choose an Aerobic Activity  Choose a nonstop activity that makes your heart and lungs work harder than they do when you rest or walk normally. This aerobic exercise can improve the way your heart and other muscles use oxygen. Make it fun by exercising with a friend and choosing an activity you enjoy. Here are some ideas:  · Walking  · Swimming  · Bicycling  · Stair climbing  · Dancing  · Jogging  Exercise Regularly  If you havent been exercising regularly,  get your doctors okay first. Then start slowly.  Here are some tips:  · Begin exercising 3 times a week for 5-10 minutes at a time.  · When you feel comfortable, add a few minutes each week.  · Slowly build up to exercising 3-4 times each  week for 20-40 minutes. Aim for a total of 150 or more minutes a week.  · Be sure to carry your nitroglycerin with you when you exercise.  · If you get angina when youre exercising, stop what youre doing, take your nitroglycerin, and call your doctor.  © 3144-5043 [employer], [address] [place] [address]. All rights reserved. This information is not intended as a substitute for professional medical care. Always follow your healthcare professional's instructions.  Understanding Abdominal Aortic Aneurysm  You may have been told that you have an aneurysm. This is when a weakened part of a blood vessel expands like a balloon. An aneurysm in the main blood vessel in your stomach area is called an abdominal aortic aneurysm (AAA).  What is AAA?     An aneurysm happens when a weakened part of the aorta wall stretches and expands.     The aorta is the large artery that carries blood from the heart to the rest of the body. With AAA, part of the aorta weakens and expands. If an aneurysm gets large enough, it may burst. This is very serious, and usually fatal.  How is an aneurysm found?  AAA usually causes no symptoms. It is often found when tests (such as an X-ray, MRI, or CT scan) are done for an unrelated problem. Or your healthcare provider may find it while feeling your stomach during a routine exam.  Who develops AAA?  These things increase your chances of having AAA:  · AAA runs in your family  · Your age--AAA is more likely as you get older  · Men are more likely than women to have AAA  · Smoking  · High blood pressure  · High cholesterol level (a buildup of fat and other materials in the blood)  · Injury (such as a car accident  What can be done?  Surgery can be done to remove an aneurysm. Your healthcare provider will weigh the chances that the aneurysm will burst against the risks of treatment. Because a small aneurysm is not likely to burst, it may be watched for a while. When it reaches a  certain size, you may have surgery to replace that section of your aorta.  Date Last Reviewed: 4/26/2016  © 4583-7837 [employer]. [address] [place] [address], [place] [address]. All rights reserved. This information is not intended as a substitute for professional medical care. Always follow your healthcare professional's instructions.        Smoking and Peripheral Arterial Disease (PAD)  Smoking is the greatest single danger to the health of your arteries. It puts you at higher risk for peripheral arterial disease (PAD). PAD is a disease of the arteries in the legs. If you have PAD, its likely that other parts of your body are diseased, too. That puts you at high risk for heart attack or stroke. Read on to learn how smoking can lead to PAD and affect your health.  How can smoking lead to PAD?  Smoking causes swelling and redness (inflammation) that leads to plaque forming. Plaque is a waxy material made up of cholesterol and other particles. It can build up in your artery walls. When there is too much plaque, your arteries can become narrowed and restrict blood flow. This then raises your risk for PAD and blood clots. It also worsens other risk factors, such as high blood pressure and high cholesterol. These are things that make you more likely to have artery disease.  What happens if you dont quit smoking?  · You have 2 to 4 times the risk of dying from a heart attack or stroke as a nonsmoker.  · You have a greater risk of getting severe PAD, pain in your legs when walking (claudication), dead body tissue due to lack of blood flow (gangrene), or having a leg or foot amputated.  · You are at greater risk for abdominal aortic aneurysm (AAA). This is a bulge in the aorta, a major artery. It can burst suddenly and be fatal.  What happens if you quit smoking?  · Your risk for heart attack and stroke drops as soon as you quit smoking.  · After 1 year of not smoking, your risk for heart attack falls by  50%.  · In 5 to 15 years after you quit, your risk for heart attack or stroke is the same as someone who never smoked.  · Your risk for amputation and other complications of PAD is reduced.  · Your risk of developing AAA decreases.     For more information  · Smokefree.gov/nwel-fg-jb-expert  · National Cancer [place] Smoking Quitline:4-421-59R-QUIT (5-528-381-8016)      Date Last Reviewed: 6/1/2016  © 2329-2560 [employer]. [address] [place] [address]. All rights reserved. This information is not intended as a substitute for professional medical care. Always follow your healthcare professional's instructions.        A Walking Program for Peripheral Arterial Disease (PAD)  Peripheral arterial disease (PAD) is a condition where the arteries in the legs are severely damaged. When you have PAD, walking can be painful. So you may start to walk less. Walking less makes your leg muscles weaker. It then becomes harder and more painful to walk. A walking program can break this cycle. This sheet gives you tips on how to get started.     Walking farther without pain  Exercise strengthens leg muscles that are out of shape. It also trains these muscles to work with less oxygen. This helps your leg muscles work better even with reduced blood flow to your legs. When you have PAD, a walking program can be helpful. Your program can:  · Let you walk longer and farther without claudication. This is an ache in your legs during exercise that goes away with rest.  · Let you do more and be more active  · Add to your overall health and well-being  · Help you control your blood sugar and blood pressure  · Help you become healthier with no risk and at little or no cost  Getting started  Your local hospital, vascular center, or cardiac rehab center may have a special walking program for people with PAD. If so, this is your best option. But if you cant find a program, or its not covered by insurance, you can  still walk on your own. Follow these steps at each session:  · Step 1. Start at a pace that lets you walk for 5 to 10 minutes before you start to feel claudication. This feeling is unpleasant, but it doesnt hurt you. Keep going until the pain makes you feel that you need to stop.  · Step 2. Stop and rest for 3 to 5 minutes, just long enough for the pain to go away. You can rest standing or sitting. (Some people like to bring along a cane or a lightweight folding chair.)  · Step 3. Again walk at a pace that lets you walk for 5 to 10 minutes more before you feel pain. This may be slower than your starting pace in step 1. Then rest again.  · Step 4. Repeat this process until youve walked for 45 minutes. This should be about 60 to 80 minutes total, including resting time. You may not be able to do a full 45 minutes at first. Do as much as you can, and increase your time as you improve.  Making the most of your program  · Walk at least 3 times a week. Take no more than 2 days off between sessions. If you stop walking, even for a week or two, you can lose the health benefits of your program.  · Find a good place to walk. A treadmill or a track may be better at first. That way, you wont run the risk of going too far and finding that you cant walk back. Be sure to have a place to walk indoors in bad weather, such as a gym or a mall.  · Wear shoes with sturdy, flexible soles. The heel should fit without slipping. You should have room to wiggle your toes.  · Keep track of how long you walk. A pedometer will show your daily progress. It can also show how much farther you can walk as time goes by.  · Ask a friend to keep you company. You may enjoy walking with someone else. Or you may want to make your walking sessions a time to relax by yourself.  · Make it fun. Listen to music while you walk and rest. Walk in a favorite park. Get to know the people at the gym, or the folks that you pass on your route. While you rest, you  can window-shop, read, or feed the birds. Do whatever will help you enjoy your exercise sessions.  Date Last Reviewed: 6/1/2016  © 7278-0720 [employer] [place]. [address] [place] [address], [place] [address]. All rights reserved. This information is not intended as a substitute for professional medical care. Always follow your healthcare professional's instructions.        Discharge Instructions for Peripheral Arterial Disease (PAD)  You have been diagnosed with peripheral arterial disease (PAD). Peripheral blood vessels deliver oxygen-rich blood to your legs and feet. Over time, your blood vessel walls may thicken as they build up with a fatty substance (plaque). As plaque builds up in an artery, blood flow can be reduced or even blocked. This causes PAD. This can lead to pain when you walk (claudication) and pain when you rest. It can even cause ulcers or tissue death due to lack of blood supply (gangrene).  Home care  · Stay at a healthy weight. Get help to lose any extra pounds.  · Eat more fresh fruits and vegetables  · Limit canned, dried, packaged, and fast foods.  · Limit your salt intake. Dont add more salt to your food at the table.  · Season foods with herbs instead of salt when you cook.  · Lower the amount of cholesterol, and saturated and trans fats in your diet.   · Begin an exercise program. Ask your healthcare provider how to get started. You can benefit from simple activities such as walking or gardening.  · Break your smoking habit. Join a stop-smoking program for a better chance of success.  · Take your medicines as directed. Dont skip doses.  · If you have diabetes, manage your blood sugar as directed by your healthcare provider.  Follow-up  Talk to your healthcare provider about treatment options. These may include an exercise program, medicines, angioplasty, or surgery.  When to call your healthcare provider  Call your provider right away or seek immediate medical care if you have any of  the following:  · Pain in your legs or a feeling that your legs are weak or giving out  · Constant tingling, numbness, weakness, or coldness in your feet  · Change in the color of your toes  · Open sores that wont heal on your toes, feet, or legs  · Chest pain  · Shortness of breath  · Trouble speaking or understanding   Date Last Reviewed: 6/1/2016  © 3878-7483 [employer]. [address] [place] [address], [place] [address]. All rights reserved. This information is not intended as a substitute for professional medical care. Always follow your healthcare professional's instructions.        Taking Cilostazol for Peripheral Arterial Disease (PAD)  Cilostazol is a medicine that can relieve claudication. Claudication is a common symptom of peripheral arterial disease (PAD). It causes pain in your legs when you walk or exercise. This happens because of reduced blood flow to your leg muscles.  How cilostazol works  Cilostazol works by improving blood flow in the smallest blood vessels. This lets you walk farther without pain. You can then be more active and do more of the things you like to do. Cilostazol can be used safely with many medicines. These include medicines used to reduce the risk of heart attack and stroke. But some medicines can affect how cilostazol works in your body. So your healthcare provider needs to know what other medicines you take. You may need to use cilostazol for 1 to 3 months before it starts to work. If you see no improvement after using it for 3 months, then your provider will likely take you off it. You should not use cilostazol if you have heart failure.  Taking cilostazol safely  Before taking cilostazol, tell your provider about any medicines you are taking. This includes:  · All prescription medicines  · Over-the-counter medicines such as aspirin or ibuprofen  · Herbs, vitamins, and other supplements  Avoid grapefruit or grapefruit juice when taking cilostazol. These may cause a  harmful interaction. Also tell your provider if you have a history of heart failure, liver disease, or any other health problems. Always take medicines as directed.  Tips for taking cilostazol  · Have a routine. Take cilostazol when you get up each morning. Or take it when getting ready for bed at night.  · Dont skip doses. You must take this medicine daily for it to work.  · Take on an empty stomach. Take it either 1 hour before a meal or 3 hours after, with a full glass of water.  · Keep track of what you take. A pillbox with the days of the week marked can help, especially if you take several medicines. Or use a list or chart to keep track.  When to call your healthcare provider  Cilostazol can cause side effects. If you have problems, changing your dose may help. Call your provider or get medical help right away if you have any of the following:  · Rapid or irregular heartbeat  · Chest pain  · Shortness of breath  · Loose bowel movements or diarrhea  · Bloody or painful urine  · Severe headache  · Fever, chills, or flu symptoms  · Upset stomach  · Allergic reaction such as hives, rash, or swelling of your face, lips, tongue, or throat   Date Last Reviewed: 6/1/2016  © 2326-4358 [employer]. [address] [place] [address], [place] [address]. All rights reserved. This information is not intended as a substitute for professional medical care. Always follow your healthcare professional's instructions.        Understanding Peripheral Arterial Disease    Peripheral arteries deliver oxygen-rich blood to the tissues outside the heart. As you age, your arteries become stiffer and thicker. In addition, risk factors, such as smoking and high cholesterol, can damage the artery lining. This allows a buildup of fat and other materials (plaque) to form within the artery walls. The buildup of plaque narrows the space inside the artery and sometimes blocks blood flow. Peripheral arterial disease (PAD) happens when blood  flow through the arteries is reduced because of plaque buildup. It often happens in the legs and feet, but can also happen elsewhere in the body. If this buildup happens in the a large artery in the neck (carotid artery), it can be a major contributor to stroke.  A healthy artery  An artery is a muscular tube that carries oxgen rich blood and nutrients from the heart to the rest of the body. It has a smooth lining and flexible walls that allow blood to pass freely. When active, muscles need more oxygen. This increases blood flow. Healthy arteries can adapt to meet this need.  A damaged artery    PAD begins when the lining of an artery is damaged. This is often because of risk factors, such as smoking, older age, or diabetes. Plaque then starts to form within the artery wall. At this stage, blood flows normally, so youre not likely to have symptoms.  A narrowed artery    If plaque continues to build up, the space inside the artery narrows. The artery walls become less able to expand. The artery still provides enough blood and oxygen to your muscles during rest. But when youre active, the increased demand for blood cant be met. As a result, your leg may cramp or ache when you walk.  A blocked artery    An artery can become blocked by plaque or by a blood clot lodged in a narrowed section. When this happens, oxygen cant reach the muscle below the blockage. Then you may feel pain when lying down or when you are not active (rest pain). This type of pain is especially common at night when youre lying flat. In time, the affected tissue can die. This can lead to the loss of a toe or foot.  Date Last Reviewed: 5/1/2016 © 2000-2017 [facility]. [address] [place] [address], [place] [address]. All rights reserved. This information is not intended as a substitute for professional medical care. Always follow your healthcare professional's instructions.

## 2019-06-11 DIAGNOSIS — F51.01 PRIMARY INSOMNIA: ICD-10-CM

## 2019-06-11 RX ORDER — TEMAZEPAM 7.5 MG/1
7.5 CAPSULE ORAL NIGHTLY PRN
Qty: 30 CAPSULE | Refills: 3 | Status: CANCELLED | OUTPATIENT
Start: 2019-06-11 | End: 2019-07-11

## 2019-06-11 NOTE — TELEPHONE ENCOUNTER
Refill request, Next appt 08/23/2019        ----- Message from Sangita Amaro sent at 6/11/2019 10:50 AM CDT -----  Contact: self   Patient need a refill on temazepam 7.5 MG 30 day supply please send to [facility], any questions please call back at [phone] (home)     [facility] [address] [facility], [place] - [address]  [address]  [place] [address]  Phone: [phone] Fax: [phone]

## 2019-06-12 ENCOUNTER — TELEPHONE (OUTPATIENT)
Dept: FAMILY MEDICINE | Facility: CLINIC | Age: 64
End: 2019-06-12

## 2019-06-12 NOTE — TELEPHONE ENCOUNTER
Spoke with salas with PA for patient Temazepam, she states that their is still 14 hr left in their determination for the medication and once this has been determined by the pharmacist they would fax a copy to the office, and then they would also mail a copy to the patient. All parties verbalized understanding.

## 2019-06-12 NOTE — TELEPHONE ENCOUNTER
----- Message from Isreal Bee sent at 6/12/2019 12:24 PM CDT -----  Contact: [employer]   [employer] need to speak with a nurse regarding a prior authorization for rx temazepam (RESTORIL) 7.5 MG Cap. Please call back at [phone]   Ref [id number]. Please call back today asap

## 2019-06-20 ENCOUNTER — HOSPITAL ENCOUNTER (OUTPATIENT)
Dept: RADIOLOGY | Facility: HOSPITAL | Age: 64
Discharge: HOME | End: 2019-06-20
Attending: SURGERY
Payer: MEDICARE

## 2019-06-20 DIAGNOSIS — I73.9 BILATERAL CLAUDICATION OF LOWER LIMB: ICD-10-CM

## 2019-06-20 PROCEDURE — 74174 CTA ABD&PLVS W/CONTRAST: CPT | Mod: 26,,, | Performed by: RADIOLOGY

## 2019-06-20 PROCEDURE — 74174 CTA ABD&PLVS W/CONTRAST: CPT | Mod: TC

## 2019-06-20 PROCEDURE — 25500020 PHARM REV CODE 255: Performed by: SURGERY

## 2019-06-20 PROCEDURE — 74174 CTA ABDOMEN AND PELVIS: ICD-10-PCS | Mod: 26,,, | Performed by: RADIOLOGY

## 2019-06-20 RX ADMIN — IOHEXOL 100 ML: 350 INJECTION, SOLUTION INTRAVENOUS at 09:06

## 2019-07-03 ENCOUNTER — OFFICE VISIT (OUTPATIENT)
Dept: FAMILY MEDICINE | Facility: CLINIC | Age: 64
End: 2019-07-03
Payer: MEDICARE

## 2019-07-03 VITALS
TEMPERATURE: 98 F | BODY MASS INDEX: 23.23 KG/M2 | HEART RATE: 82 BPM | WEIGHT: 148 LBS | HEIGHT: 67 IN | DIASTOLIC BLOOD PRESSURE: 68 MMHG | SYSTOLIC BLOOD PRESSURE: 127 MMHG

## 2019-07-03 DIAGNOSIS — F51.01 PRIMARY INSOMNIA: Primary | ICD-10-CM

## 2019-07-03 DIAGNOSIS — G25.81 RESTLESS LEG SYNDROME: ICD-10-CM

## 2019-07-03 DIAGNOSIS — I10 ESSENTIAL HYPERTENSION: ICD-10-CM

## 2019-07-03 DIAGNOSIS — G47.33 OSA (OBSTRUCTIVE SLEEP APNEA): ICD-10-CM

## 2019-07-03 PROCEDURE — 99213 OFFICE O/P EST LOW 20 MIN: CPT | Mod: S$GLB,,, | Performed by: NURSE PRACTITIONER

## 2019-07-03 PROCEDURE — 99213 PR OFFICE/OUTPT VISIT, EST, LEVL III, 20-29 MIN: ICD-10-PCS | Mod: S$GLB,,, | Performed by: NURSE PRACTITIONER

## 2019-07-03 RX ORDER — ROPINIROLE 1 MG/1
1 TABLET, FILM COATED ORAL NIGHTLY
Qty: 30 TABLET | Refills: 3 | Status: SHIPPED | OUTPATIENT
Start: 2019-07-03 | End: 2019-10-31 | Stop reason: SDUPTHER

## 2019-07-03 RX ORDER — TEMAZEPAM 15 MG/1
15 CAPSULE ORAL NIGHTLY PRN
Qty: 30 CAPSULE | Refills: 3 | Status: SHIPPED | OUTPATIENT
Start: 2019-07-03 | End: 2019-08-02

## 2019-07-03 RX ORDER — BENAZEPRIL HYDROCHLORIDE 20 MG/1
20 TABLET ORAL DAILY
Qty: 90 TABLET | Refills: 1 | Status: SHIPPED | OUTPATIENT
Start: 2019-07-03 | End: 2019-12-09 | Stop reason: SDUPTHER

## 2019-07-03 RX ORDER — ROPINIROLE 0.5 MG/1
TABLET, FILM COATED ORAL
Qty: 60 TABLET | Refills: 0 | Status: CANCELLED | OUTPATIENT
Start: 2019-07-03

## 2019-07-03 NOTE — PROGRESS NOTES
"Subjective:       Patient ID: Miles Vazquez is a 63 y.o. male.    Chief Complaint: Follow-up  h/o arterial vascular disease presents for follow up. Seen by Dr. Florian with angiogram scheduled for 7/17/19.  Also seen by Dr. Armas, note reviewed. Screening discussed with records to be requested. C/o Insomnia not controlled with current regimen. Reports being diagnosed with OMAR yet was not able to follow through with obtaining a device thus records to be requested. Medications discussed with patient unsure of current doses.     Past Medical History:   Diagnosis Date    Abdominal aortic aneurysm     Abnormal findings on esophagogastroduodenoscopy (EGD) 04/2016    "intestinal metaplasia" which is a "risk factor" for gastric cancer.      Alcoholism     Bursitis of both hips     COPD (chronic obstructive pulmonary disease)     Coronary artery calcification seen on CAT scan 4/24/2013    Depression     HTN (hypertension) 1/7/2013    Hx of colonoscopy 2017    Repeat 5 yrs     Hyperlipidemia     Hypertension     Lumbar disc disease 06/2019    grade 1 spondylolisthesis of L5 on S1    Macrocytic anemia 3/27/2016    Personal history of colonic polyps     Restless leg syndrome     Right hip pain     RLS (restless legs syndrome)     Sleep apnea 2 years ago    does not use cpap    Smoker 1/7/2013    Vitamin disease     Wheezing        Past Surgical History:   Procedure Laterality Date    APPENDECTOMY      APPENDECTOMY N/A 9/26/2018    [facility]    COLONOSCOPY      EGD (ESOPHAGOGASTRODUODENOSCOPY) N/A 9/26/2018    [facility]    HERNIA REPAIR      INJECTION-STEROID-EPIDURAL-TRANSFORAMINAL Bilateral 11/9/2015    [facility] PAIN MGT    Insertion,central venous access device N/A 9/26/2018    [facility]    PEG TUBE PLACEMENT/REPLACEMENT N/A 4/12/2016    Performed by " [facility] (2ND F[facility])    REPAIR, HERNIA N/A 9/26/2018    [facility]    SMALL INTESTINE SURGERY  09/2018    UPPER GASTROINTESTINAL ENDOSCOPY          Social History     Socioeconomic History    Marital status: Single     Spouse name: Not on file    Number of children: Not on file    Years of education: Not on file    Highest education level: Not on file   Occupational History    Not on file   Social Needs    Financial resource strain: Not on file    Food insecurity:     Worry: Not on file     Inability: Not on file    Transportation needs:     Medical: Not on file     Non-medical: Not on file   Tobacco Use    Smoking status: Current Every Day Smoker     Years: 50.00     Types: Cigarettes    Smokeless tobacco: Never Used    Tobacco comment: 1 or 2 cigarettes a day    Substance and Sexual Activity    Alcohol use: No     Frequency: Never     Comment: Quit drinking in March 2016    Drug use: No    Sexual activity: Not Currently     Partners: Female   Lifestyle    Physical activity:     Days per week: Not on file     Minutes per session: Not on file    Stress: Not on file   Relationships    Social connections:     Talks on phone: Not on file     Gets together: Not on file     Attends Yazidism service: Not on file     Active member of club or organization: Not on file     Attends meetings of clubs or organizations: Not on file     Relationship status: Not on file   Other Topics Concern    Not on file   Social History Narrative    Not on file       Family History   Problem Relation Age of Onset    Tuberculosis Maternal Grandmother     Stomach cancer Neg Hx     Colon cancer Neg Hx        Review of patient's allergies indicates:   Allergen Reactions    Seroquel [quetiapine]      RESTLESS LEGS          Current Outpatient Medications:     albuterol 90 mcg/actuation inhaler, Inhale 2 puffs into the lungs every 6 (six) hours as needed for  Wheezing., Disp: 1 Inhaler, Rfl: 3    amitriptyline (ELAVIL) 100 MG tablet, Take 1 tablet (100 mg total) by mouth every evening., Disp: 90 tablet, Rfl: 1    aspirin 81 MG Chew, Take 81 mg by mouth once daily., Disp: , Rfl:     atorvastatin (LIPITOR) 40 MG tablet, Take 1 tablet (40 mg total) by mouth once daily., Disp: 30 tablet, Rfl: 11    benazepril (LOTENSIN) 20 MG tablet, Take 1 tablet (20 mg total) by mouth once daily., Disp: 90 tablet, Rfl: 1    cilostazol (PLETAL) 100 MG Tab, Take 1 tablet (100 mg total) by mouth 2 (two) times daily before meals., Disp: 60 tablet, Rfl: 11    cyanocobalamin (VITAMIN B-12) 1000 MCG tablet, Take 0.5 tablets (500 mcg total) by mouth once daily., Disp: 60 tablet, Rfl: 12    ferrous sulfate (FEOSOL) 325 mg (65 mg iron) Tab tablet, Take 1 tablet (325 mg total) by mouth 2 (two) times daily. Try on empty stomach first, Disp: 60 tablet, Rfl: 11    multivit-min/FA/lycopen/lutein (CENTRUM SILVER MEN ORAL), Take by mouth., Disp: , Rfl:     rOPINIRole (REQUIP) 1 MG tablet, Take 1 tablet (1 mg total) by mouth every evening., Disp: 30 tablet, Rfl: 3    temazepam (RESTORIL) 15 mg Cap, Take 1 capsule (15 mg total) by mouth nightly as needed., Disp: 30 capsule, Rfl: 3    HPI  Review of Systems   Constitutional: Positive for fever. Negative for chills, diaphoresis and unexpected weight change.   HENT: Negative for dental problem and trouble swallowing.    Eyes: Negative for visual disturbance.   Respiratory: Negative for shortness of breath and wheezing.    Cardiovascular: Negative for chest pain and palpitations.        Claudication    Gastrointestinal: Negative for abdominal pain, constipation, diarrhea, nausea and vomiting.   Endocrine: Negative for polydipsia and polyuria.   Genitourinary: Negative for dysuria.   Musculoskeletal: Negative for joint swelling.   Skin: Negative for rash.   Neurological: Negative for syncope and headaches.   Psychiatric/Behavioral: Positive for sleep  "disturbance. Negative for agitation and confusion.       Objective:      Physical Exam   Constitutional: He is oriented to person, place, and time. He appears well-developed and well-nourished.   HENT:   Head: Normocephalic.   Eyes: Pupils are equal, round, and reactive to light. Conjunctivae are normal.   Neck: Normal range of motion. Neck supple. No thyromegaly present.   Cardiovascular: Normal rate, regular rhythm and normal heart sounds.   Pulmonary/Chest: Effort normal and breath sounds normal. He has no wheezes. He has no rales.   Abdominal: Soft. Bowel sounds are normal. He exhibits no distension. There is no tenderness.   Musculoskeletal: Normal range of motion. He exhibits no edema.   Neurological: He is alert and oriented to person, place, and time.   Skin: Skin is warm and dry. Capillary refill takes less than 2 seconds.   Psychiatric: He has a normal mood and affect. His behavior is normal. Judgment and thought content normal.   Vitals reviewed.      Assessment:       1. Primary insomnia    2. Essential hypertension    3. Restless leg syndrome    4. OMAR (obstructive sleep apnea)        Plan:     1- Staff to request stress test results and colonoscopy from [facility] in [facility]  2- Requip dose changed to 1 mg and pt to evaluated what he is actually taking at home  3- Restoril increased and pt encouraged to read on sleep hygiene.   4- Staff to request sleep study results from [facility] about 5 yrs ago    To staff "High light edu and sleep hygiene  Request stress test results and colonoscopy  from [facility] in [facility]  Request sleep study results from [facility] about 5 yrs ago"        Primary insomnia  -     temazepam (RESTORIL) 15 mg Cap; Take 1 capsule (15 mg total) by mouth nightly as needed.  Dispense: 30 capsule; Refill: 3    Essential hypertension  -     benazepril (LOTENSIN) 20 MG tablet; Take 1 tablet (20 mg total) by mouth once daily.  " Dispense: 90 tablet; Refill: 1    Restless leg syndrome  -     rOPINIRole (REQUIP) 1 MG tablet; Take 1 tablet (1 mg total) by mouth every evening.  Dispense: 30 tablet; Refill: 3    OMAR (obstructive sleep apnea)        Risks, benefits, and side effects were discussed with the patient. All questions were answered to the fullest satisfaction of the patient, and pt verbalized understanding and agreement to treatment plan. Pt was to call with any new or worsening symptoms, or present to the ER.

## 2019-07-03 NOTE — PATIENT INSTRUCTIONS
Encouraged to read up on sleep hygiene   -------------------------------------------------------  Obstructive Sleep Apnea  Obstructive sleep apnea is a condition that causes your air passages to become narrowed or blocked during sleep. As a result, breathing stops for short periods. Your body wakes up enough for breathing to begin again, though you don't remember it. The cycle of stopped breathing and brief awakenings can repeat dozens of times a night. This prevents the body from getting to the deeper stages of sleep that are needed for good rest and may cause your body's oxygen level to fall.  Signs of sleep apnea include loud snoring, noisy breathing, and gasping sounds during sleep. Daytime symptoms include waking up tired after a full night's sleep, waking up with headaches, feeling very sleepy or falling asleep during the day, and having problems with memory or concentration.  Risk factors for sleep apnea include:  · Being overweight  · Being a man, or a woman in menopause  · Smoking  · Using alcohol or sedating medicines  · Having enlarged structures in the nose or throat  Home care  Lifestyle changes that can help treat snoring and sleep apnea include the following:  · If you are overweight, lose weight. Talk to your healthcare provider about a weight-loss plan for you.  · Avoid alcohol for 3 to 4 hours before bedtime. Avoid sedating medications. Ask your healthcare provider about the medicines you take.  · If you smoke, talk to your healthcare provider about ways to quit.  · Sleep on your side. This can help prevent gravity from pulling relaxed throat tissues into your breathing passages.  · If you have allergies or sinus problems that block your nose, ask your healthcare provider for help.  Follow-up care  Follow up with your healthcare provider, or as advised. A diagnosis of sleep apnea is made with a sleep study. Your healthcare provider can tell you more about this test.  When to seek medical  advice  Sleep apnea can make you more likely to have certain health problems. These include high blood pressure, heart attack, stroke, and sexual dysfunction. If you have sleep apnea, talk to your healthcare provider about the best treatments for you.  Date Last Reviewed: 4/1/2017  © 1592-4401 [employer]. [address] [place] [address], [place] [address]. All rights reserved. This information is not intended as a substitute for professional medical care. Always follow your healthcare professional's instructions.

## 2019-07-26 ENCOUNTER — TELEPHONE (OUTPATIENT)
Dept: FAMILY MEDICINE | Facility: CLINIC | Age: 64
End: 2019-07-26

## 2019-07-26 NOTE — TELEPHONE ENCOUNTER
LVM to return call. Not sure about this message.  Ty      ----- Message from Asha Rivera sent at 7/26/2019  9:46 AM CDT -----  Contact: pt  Calling to schedule an angiogram and please advise. [phone] (home)

## 2019-08-08 ENCOUNTER — PATIENT OUTREACH (OUTPATIENT)
Dept: ADMINISTRATIVE | Facility: HOSPITAL | Age: 64
End: 2019-08-08

## 2019-08-12 ENCOUNTER — OFFICE VISIT (OUTPATIENT)
Dept: FAMILY MEDICINE | Facility: CLINIC | Age: 64
End: 2019-08-12
Payer: MEDICARE

## 2019-08-12 ENCOUNTER — HOSPITAL ENCOUNTER (OUTPATIENT)
Dept: RADIOLOGY | Facility: HOSPITAL | Age: 64
Discharge: HOME | End: 2019-08-12
Attending: NURSE PRACTITIONER
Payer: MEDICARE

## 2019-08-12 ENCOUNTER — PATIENT OUTREACH (OUTPATIENT)
Dept: ADMINISTRATIVE | Facility: HOSPITAL | Age: 64
End: 2019-08-12

## 2019-08-12 VITALS
OXYGEN SATURATION: 99 % | WEIGHT: 148.38 LBS | BODY MASS INDEX: 23.29 KG/M2 | HEIGHT: 67 IN | SYSTOLIC BLOOD PRESSURE: 111 MMHG | DIASTOLIC BLOOD PRESSURE: 69 MMHG | RESPIRATION RATE: 10 BRPM | TEMPERATURE: 97 F | HEART RATE: 93 BPM

## 2019-08-12 DIAGNOSIS — R07.81 RIB PAIN ON RIGHT SIDE: Primary | ICD-10-CM

## 2019-08-12 DIAGNOSIS — D50.9 IRON DEFICIENCY ANEMIA, UNSPECIFIED IRON DEFICIENCY ANEMIA TYPE: ICD-10-CM

## 2019-08-12 DIAGNOSIS — Z23 NEED FOR SHINGLES VACCINE: ICD-10-CM

## 2019-08-12 DIAGNOSIS — R07.81 RIB PAIN ON RIGHT SIDE: ICD-10-CM
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
## 2019-08-12 DIAGNOSIS — I73.9 INTERMITTENT CLAUDICATION: ICD-10-CM

## 2019-08-12 DIAGNOSIS — Z79.899 HIGH RISK MEDICATION USE: ICD-10-CM

## 2019-08-12 PROBLEM — E83.51 HYPOCALCEMIA: Status: RESOLVED | Noted: 2018-09-28 | Resolved: 2019-08-12

## 2019-08-12 PROBLEM — K56.609 SMALL BOWEL OBSTRUCTION: Status: RESOLVED | Noted: 2018-09-24 | Resolved: 2019-08-12

## 2019-08-12 PROCEDURE — 71046 X-RAY EXAM CHEST 2 VIEWS: CPT | Mod: TC,FY

## 2019-08-12 PROCEDURE — 71046 X-RAY EXAM CHEST 2 VIEWS: CPT | Mod: 26,,, | Performed by: RADIOLOGY

## 2019-08-12 PROCEDURE — 99213 OFFICE O/P EST LOW 20 MIN: CPT | Mod: S$GLB,,, | Performed by: NURSE PRACTITIONER

## 2019-08-12 PROCEDURE — 71046 XR CHEST PA AND LATERAL: ICD-10-PCS | Mod: 26,,, | Performed by: RADIOLOGY

## 2019-08-12 PROCEDURE — 99213 PR OFFICE/OUTPT VISIT, EST, LEVL III, 20-29 MIN: ICD-10-PCS | Mod: S$GLB,,, | Performed by: NURSE PRACTITIONER

## 2019-08-12 RX ORDER — HYDROCODONE BITARTRATE AND ACETAMINOPHEN 5; 325 MG/1; MG/1
1 TABLET ORAL EVERY 8 HOURS PRN
Qty: 30 TABLET | Refills: 0 | Status: SHIPPED | OUTPATIENT
Start: 2019-08-12 | End: 2019-09-09 | Stop reason: ALTCHOICE

## 2019-08-12 RX ORDER — ATORVASTATIN CALCIUM 40 MG/1
40 TABLET, FILM COATED ORAL NIGHTLY
COMMUNITY
End: 2019-12-09 | Stop reason: SDUPTHER

## 2019-08-12 RX ORDER — TEMAZEPAM 15 MG/1
1 CAPSULE ORAL NIGHTLY PRN
Refills: 3 | COMMUNITY
Start: 2019-08-04 | End: 2019-09-09 | Stop reason: SDUPTHER

## 2019-08-12 NOTE — PROGRESS NOTES
"Subjective:       Patient ID: Miles Vazquez is a 64 y.o. male.    Chief Complaint: Follow-up (Discuss Angiogram) and Fall (Tripped over something at home, fell into rocking chair, pain in left side of ribs)  presents for follow up.Reports falling 2 weeks ago, tripped over a rug and fell into the arm of a rocking chair hitting his right lateral around 7th rib.       Scheduled for angiogram tomorrow with Dr. Florian at [facility] for bilateral leg pain/PVD. Patient has not had labs yet thus will do today. History and chart updated by NP        Past Medical History:   Diagnosis Date    Abdominal aortic aneurysm (AAA) 05/2019    < 3cm    Abnormal findings on esophagogastroduodenoscopy (EGD) 04/2016    "intestinal metaplasia" which is a "risk factor" for gastric cancer.      Alcoholism 03/16/2016    QUIT     Bursitis of both hips     COPD (chronic obstructive pulmonary disease) 2016    Coronary artery calcification seen on CAT scan 4/24/2013    Depression 2016    Accompanied drinking problem    HTN (hypertension) 1/7/2013    Hyperlipidemia 2019    Lumbar disc disease 06/2019    grade 1 spondylolisthesis of L5 on S1    Macrocytic anemia 3/27/2016    Personal history of colonic polyps 2017    RLS (restless legs syndrome) 2014    Sleep apnea 2 years ago    does not use cpap       Past Surgical History:   Procedure Laterality Date    APPENDECTOMY N/A 9/26/2018    [facility] by [facility] OR    COLONOSCOPY  2017    [place]ps [place] 5 [place]    EGD (ESOPHAGOGASTRODUODENOSCOPY) N/A 9/26/2018    [facility] by [facility] at [facility] OR    INJECTION-STEROID-EPIDURAL-TRANSFORAMINAL Bilateral 11/9/2015    [facility] PAIN MGT    Insertion,central venous access device N/A 9/26/2018    Per[facility] by [facility]ner [facility] at [facility] OR    PEG TUBE PLACEMENT/REPLACEMENT N/A 4/12/2016    Performed by Andres Luong MD " at [facility] ENDO (2ND FLR)    REPAIR, HERNIA N/A 9/26/2018    [facility] OR    SMALL INTESTINE SURGERY  09/2018        Social History     Socioeconomic History    Marital status: Single     Spouse name: Not on file    Number of children: Not on file    Years of education: Not on file    Highest education level: Not on file   Occupational History    Not on file   Social Needs    Financial resource strain: Not on file    Food insecurity:     Worry: Not on file     Inability: Not on file    Transportation needs:     Medical: Not on file     Non-medical: Not on file   Tobacco Use    Smoking status: Current Every Day Smoker     Years: 50.00     Types: Cigarettes    Smokeless tobacco: Never Used    Tobacco comment: 1 or 2 cigarettes a day    Substance and Sexual Activity    Alcohol use: Not Currently     Frequency: Never     Comment: Quit drinking in March 2016    Drug use: Not Currently     Types: Marijuana     Comment: In the past.    Sexual activity: Not Currently     Partners: Female   Lifestyle    Physical activity:     Days per week: Not on file     Minutes per session: Not on file    Stress: Not on file   Relationships    Social connections:     Talks on phone: Not on file     Gets together: Not on file     Attends Sabianism service: Not on file     Active member of club or organization: Not on file     Attends meetings of clubs or organizations: Not on file     Relationship status: Not on file   Other Topics Concern    Not on file   Social History Narrative    Not on file       Family History   Problem Relation Age of Onset    Tuberculosis Maternal Grandmother     Stomach cancer Neg Hx     Colon cancer Neg Hx        Review of patient's allergies indicates:   Allergen Reactions    Seroquel [quetiapine]      RESTLESS LEGS          Current Outpatient Medications:     albuterol 90 mcg/actuation inhaler, Inhale 2 puffs into the lungs every 6 (six) hours as  needed for Wheezing., Disp: 1 Inhaler, Rfl: 3    aspirin 81 MG Chew, Take 81 mg by mouth once daily., Disp: , Rfl:     atorvastatin (LIPITOR) 40 MG tablet, Take 40 mg by mouth every evening., Disp: , Rfl:     benazepril (LOTENSIN) 20 MG tablet, Take 1 tablet (20 mg total) by mouth once daily., Disp: 90 tablet, Rfl: 1    cilostazol (PLETAL) 100 MG Tab, Take 1 tablet (100 mg total) by mouth 2 (two) times daily before meals., Disp: 60 tablet, Rfl: 11    cyanocobalamin (VITAMIN B-12) 1000 MCG tablet, Take 0.5 tablets (500 mcg total) by mouth once daily., Disp: 60 tablet, Rfl: 12    ferrous sulfate (FEOSOL) 325 mg (65 mg iron) Tab tablet, Take 1 tablet (325 mg total) by mouth 2 (two) times daily. Try on empty stomach first, Disp: 60 tablet, Rfl: 11    multivit-min/FA/lycopen/lutein (CENTRUM SILVER MEN ORAL), Take by mouth., Disp: , Rfl:     rOPINIRole (REQUIP) 1 MG tablet, Take 1 tablet (1 mg total) by mouth every evening., Disp: 30 tablet, Rfl: 3    temazepam (RESTORIL) 15 mg Cap, Take 1 capsule by mouth nightly as needed., Disp: , Rfl: 3    HYDROcodone-acetaminophen (NORCO) 5-325 mg per tablet, Take 1 tablet by mouth every 8 (eight) hours as needed for Pain., Disp: 30 tablet, Rfl: 0    Fall   The accident occurred more than 1 week ago. The fall occurred while walking. He landed on hard floor. Point of impact: right rib. Pain location: right rib. The pain is at a severity of 6/10. The symptoms are aggravated by movement and rotation. Pertinent negatives include no loss of consciousness, numbness or vomiting. He has tried ice, heat, NSAID and rest for the symptoms. The treatment provided no relief.   Pain   This is a new problem. The current episode started 1 to 4 weeks ago. The problem occurs intermittently. The problem has been waxing and waning. Pertinent negatives include no numbness or vomiting. The symptoms are aggravated by coughing, sneezing and twisting. He has tried NSAIDs, ice, lying down, position  changes and rest for the symptoms. The treatment provided no relief.     Review of Systems   Constitutional: Negative.    HENT: Negative.    Eyes: Negative.    Respiratory: Negative.    Cardiovascular: Negative.    Gastrointestinal: Negative.  Negative for vomiting.   Endocrine: Negative.    Genitourinary: Negative.    Musculoskeletal: Negative.         Right rib pain s/p fall   Skin: Negative.    Allergic/Immunologic: Negative.    Neurological: Negative.  Negative for loss of consciousness and numbness.   Hematological: Negative.    Psychiatric/Behavioral: Negative.        Objective:      Physical Exam   Constitutional: He is oriented to person, place, and time. He appears well-developed and well-nourished.   HENT:   Head: Normocephalic and atraumatic.   Mouth/Throat: Oropharynx is clear and moist.   Eyes: Pupils are equal, round, and reactive to light. Conjunctivae are normal. No scleral icterus.   Neck: Normal range of motion. Neck supple. No thyromegaly present.   Cardiovascular: Normal rate and regular rhythm.   No murmur heard.  Pulmonary/Chest: Effort normal. No respiratory distress.   Abdominal: Soft. Bowel sounds are normal. He exhibits no distension. There is no tenderness.   Musculoskeletal: Normal range of motion. He exhibits no edema.   No bruising or abnormalities of right lateral chest wall   Neurological: He is alert and oriented to person, place, and time. No sensory deficit. He exhibits normal muscle tone.   Skin: Skin is warm. Capillary refill takes less than 2 seconds. No rash noted.   Psychiatric: He has a normal mood and affect. His behavior is normal. Judgment and thought content normal.   Nursing note and vitals reviewed.      Assessment:       1. Rib pain on right side    2. Fall, sequela    3. Intermittent claudication    4. Need for shingles vaccine    5. Iron deficiency anemia, unspecified iron deficiency anemia type    6. High risk medication use        Plan:     1- norco prn for right  rib pain  2- RTC 1 month for angiogram follow up  3- fasting labs today    Rib pain on right side  -     X-Ray Chest PA And Lateral; Future; Expected date: 08/12/2019  -     HYDROcodone-acetaminophen (NORCO) 5-325 mg per tablet; Take 1 tablet by mouth every 8 (eight) hours as needed for Pain.  Dispense: 30 tablet; Refill: 0    Fall, sequela  -     X-Ray Chest PA And Lateral; Future; Expected date: 08/12/2019  -     HYDROcodone-acetaminophen (NORCO) 5-325 mg per tablet; Take 1 tablet by mouth every 8 (eight) hours as needed for Pain.  Dispense: 30 tablet; Refill: 0    Intermittent claudication    Need for shingles vaccine  -     Zoster Recombinant Vaccine    Iron deficiency anemia, unspecified iron deficiency anemia type  -     CBC auto differential; Future; Expected date: 08/12/2019    High risk medication use  -     Comprehensive metabolic panel; Future; Expected date: 08/12/2019  -     Magnesium; Future; Expected date: 08/12/2019        Risks, benefits, and side effects were discussed with the patient. All questions were answered to the fullest satisfaction of the patient, and pt verbalized understanding and agreement to treatment plan. Pt was to call with any new or worsening symptoms, or present to the ER.

## 2019-08-13 ENCOUNTER — TELEPHONE (OUTPATIENT)
Dept: ADMINISTRATIVE | Facility: HOSPITAL | Age: 64
End: 2019-08-13

## 2019-08-13 ENCOUNTER — PATIENT MESSAGE (OUTPATIENT)
Dept: FAMILY MEDICINE | Facility: CLINIC | Age: 64
End: 2019-08-13

## 2019-08-13 ENCOUNTER — TELEPHONE (OUTPATIENT)
Dept: FAMILY MEDICINE | Facility: CLINIC | Age: 64
End: 2019-08-13

## 2019-08-13 ENCOUNTER — HOSPITAL ENCOUNTER (OUTPATIENT)
Dept: PREADMISSION TESTING | Facility: HOSPITAL | Age: 64
Discharge: HOME | End: 2019-08-13
Attending: SURGERY
Payer: MEDICARE

## 2019-08-13 VITALS
OXYGEN SATURATION: 97 % | SYSTOLIC BLOOD PRESSURE: 101 MMHG | HEART RATE: 83 BPM | DIASTOLIC BLOOD PRESSURE: 66 MMHG | RESPIRATION RATE: 16 BRPM | HEIGHT: 67 IN | WEIGHT: 148.13 LBS | TEMPERATURE: 98 F | BODY MASS INDEX: 23.25 KG/M2

## 2019-08-13 DIAGNOSIS — Z01.818 PREOP TESTING: ICD-10-CM

## 2019-08-13 DIAGNOSIS — I73.9 PERIPHERAL VASCULAR DISEASE, UNSPECIFIED: Primary | ICD-10-CM

## 2019-08-13 DIAGNOSIS — I71.40 ABDOMINAL AORTIC ANEURYSM: ICD-10-CM

## 2019-08-13 LAB
APTT PPP: 30.7 SEC (ref 26.2–34.7)
INR PPP: 1.1
PROTHROMBIN TIME: 13.6 SEC (ref 11.7–14)

## 2019-08-13 PROCEDURE — 85610 PROTHROMBIN TIME: CPT

## 2019-08-13 PROCEDURE — 85730 THROMBOPLASTIN TIME PARTIAL: CPT

## 2019-08-13 PROCEDURE — 36415 COLL VENOUS BLD VENIPUNCTURE: CPT

## 2019-08-13 PROCEDURE — 93005 ELECTROCARDIOGRAM TRACING: CPT

## 2019-08-13 NOTE — TELEPHONE ENCOUNTER
Spoke with patient about his last Colonoscopy. I informed him that [facility] has no records of a colonoscopy there. Pt stated that he had been in the hospital there for a couple of months and was having memory problems. He did state that he has had a couple Colonoscopy exams in the past but was not sure when or where the last one was. He also stated that he did remember that he had some polyps in the past. He is wanting a referral to Dr Aldo Whitten for a Colonoscopy at this time.

## 2019-08-13 NOTE — PROGRESS NOTES
Notify later today or tomorrow neg for fracture. He is having an angiogram today at [facility]

## 2019-08-13 NOTE — DISCHARGE INSTRUCTIONS
 Nothing to eat or drink after midnight the night before your procedure.   Do not take any medications the morning of your procedure.   Bring all your medications with you in the original pill bottles from pharmacy.   If you take blood thinners, ask your doctor if you should stop taking them.   Do not take metformin 24 hours prior to your procedure.   Do your Hibiclens wash the night before and morning of your procedure.   If you use a CPAP or BiPAP at home, please bring it with you the day of your procedure.   Make arrangements for someone you know to drive you home after your procedure. Taxi and Uber are not acceptable.   Wednesday 08/14/2019 1000AM

## 2019-08-13 NOTE — TELEPHONE ENCOUNTER
----- Message from Puja Finney NP sent at 8/13/2019  8:30 AM CDT -----  Notify later today or tomorrow neg for fracture. He is having an angiogram today at [facility]

## 2019-08-14 ENCOUNTER — HOSPITAL ENCOUNTER (OUTPATIENT)
Facility: HOSPITAL | Age: 64
Discharge: HOME | End: 2019-08-15
Attending: SURGERY | Admitting: SURGERY
Payer: MEDICARE

## 2019-08-14 DIAGNOSIS — I71.40 ABDOMINAL AORTIC ANEURYSM (AAA) WITHOUT RUPTURE: ICD-10-CM

## 2019-08-14 DIAGNOSIS — I73.9 PERIPHERAL VASCULAR DISEASE, UNSPECIFIED: Primary | ICD-10-CM

## 2019-08-14 PROCEDURE — 99153 MOD SED SAME PHYS/QHP EA: CPT | Performed by: SURGERY

## 2019-08-14 PROCEDURE — 63600175 PHARM REV CODE 636 W HCPCS: Performed by: SURGERY

## 2019-08-14 PROCEDURE — 25000003 PHARM REV CODE 250: Performed by: SURGERY

## 2019-08-14 PROCEDURE — 94760 N-INVAS EAR/PLS OXIMETRY 1: CPT | Mod: 59

## 2019-08-14 PROCEDURE — C1894 INTRO/SHEATH, NON-LASER: HCPCS | Performed by: SURGERY

## 2019-08-14 PROCEDURE — 75630 X-RAY AORTA LEG ARTERIES: CPT | Performed by: SURGERY

## 2019-08-14 PROCEDURE — 99152 MOD SED SAME PHYS/QHP 5/>YRS: CPT | Performed by: SURGERY

## 2019-08-14 PROCEDURE — C1887 CATHETER, GUIDING: HCPCS | Performed by: SURGERY

## 2019-08-14 PROCEDURE — C1769 GUIDE WIRE: HCPCS | Performed by: SURGERY

## 2019-08-14 PROCEDURE — 99900035 HC TECH TIME PER 15 MIN (STAT)

## 2019-08-14 RX ORDER — NAPROXEN SODIUM 220 MG/1
81 TABLET, FILM COATED ORAL DAILY
Status: DISCONTINUED | OUTPATIENT
Start: 2019-08-14 | End: 2019-08-15 | Stop reason: HOSPADM

## 2019-08-14 RX ORDER — ALBUTEROL SULFATE 90 UG/1
2 AEROSOL, METERED RESPIRATORY (INHALATION) EVERY 6 HOURS PRN
Status: DISCONTINUED | OUTPATIENT
Start: 2019-08-14 | End: 2019-08-14

## 2019-08-14 RX ORDER — ZOLPIDEM TARTRATE 5 MG/1
5 TABLET ORAL NIGHTLY PRN
Status: DISCONTINUED | OUTPATIENT
Start: 2019-08-14 | End: 2019-08-15 | Stop reason: HOSPADM

## 2019-08-14 RX ORDER — BENAZEPRIL HYDROCHLORIDE 20 MG/1
20 TABLET ORAL DAILY
Status: DISCONTINUED | OUTPATIENT
Start: 2019-08-14 | End: 2019-08-15 | Stop reason: HOSPADM

## 2019-08-14 RX ORDER — ATORVASTATIN CALCIUM 40 MG/1
40 TABLET, FILM COATED ORAL NIGHTLY
Status: DISCONTINUED | OUTPATIENT
Start: 2019-08-14 | End: 2019-08-15 | Stop reason: HOSPADM

## 2019-08-14 RX ORDER — SODIUM CHLORIDE 9 MG/ML
INJECTION, SOLUTION INTRAVENOUS CONTINUOUS
Status: DISCONTINUED | OUTPATIENT
Start: 2019-08-14 | End: 2019-08-15 | Stop reason: HOSPADM

## 2019-08-14 RX ORDER — FENTANYL CITRATE 50 UG/ML
INJECTION, SOLUTION INTRAMUSCULAR; INTRAVENOUS
Status: DISCONTINUED | OUTPATIENT
Start: 2019-08-14 | End: 2019-08-15 | Stop reason: HOSPADM

## 2019-08-14 RX ORDER — MIDAZOLAM HYDROCHLORIDE 1 MG/ML
INJECTION INTRAMUSCULAR; INTRAVENOUS
Status: DISCONTINUED | OUTPATIENT
Start: 2019-08-14 | End: 2019-08-15 | Stop reason: HOSPADM

## 2019-08-14 RX ORDER — LIDOCAINE HYDROCHLORIDE 10 MG/ML
INJECTION, SOLUTION EPIDURAL; INFILTRATION; INTRACAUDAL; PERINEURAL
Status: DISCONTINUED | OUTPATIENT
Start: 2019-08-14 | End: 2019-08-15 | Stop reason: HOSPADM

## 2019-08-14 RX ORDER — CLOPIDOGREL BISULFATE 75 MG/1
75 TABLET ORAL DAILY
Status: DISCONTINUED | OUTPATIENT
Start: 2019-08-14 | End: 2019-08-15 | Stop reason: HOSPADM

## 2019-08-14 RX ORDER — ALBUTEROL SULFATE 90 UG/1
2 AEROSOL, METERED RESPIRATORY (INHALATION) EVERY 6 HOURS PRN
Status: DISCONTINUED | OUTPATIENT
Start: 2019-08-14 | End: 2019-08-15 | Stop reason: HOSPADM

## 2019-08-14 RX ORDER — FERROUS SULFATE 325(65) MG
325 TABLET ORAL 2 TIMES DAILY
Status: DISCONTINUED | OUTPATIENT
Start: 2019-08-14 | End: 2019-08-15 | Stop reason: HOSPADM

## 2019-08-14 RX ORDER — LANOLIN ALCOHOL/MO/W.PET/CERES
500 CREAM (GRAM) TOPICAL DAILY
Status: DISCONTINUED | OUTPATIENT
Start: 2019-08-14 | End: 2019-08-15 | Stop reason: HOSPADM

## 2019-08-14 RX ORDER — HYDROCODONE BITARTRATE AND ACETAMINOPHEN 5; 325 MG/1; MG/1
1 TABLET ORAL EVERY 8 HOURS PRN
Status: DISCONTINUED | OUTPATIENT
Start: 2019-08-14 | End: 2019-08-15 | Stop reason: HOSPADM

## 2019-08-14 RX ORDER — ROPINIROLE 1 MG/1
1 TABLET, FILM COATED ORAL NIGHTLY
Status: DISCONTINUED | OUTPATIENT
Start: 2019-08-14 | End: 2019-08-15 | Stop reason: HOSPADM

## 2019-08-14 RX ORDER — CILOSTAZOL 100 MG/1
100 TABLET ORAL
Status: DISCONTINUED | OUTPATIENT
Start: 2019-08-14 | End: 2019-08-15 | Stop reason: HOSPADM

## 2019-08-14 RX ADMIN — SODIUM CHLORIDE: 0.9 INJECTION, SOLUTION INTRAVENOUS at 10:08

## 2019-08-14 RX ADMIN — ASPIRIN 81 MG 81 MG: 81 TABLET ORAL at 04:08

## 2019-08-14 RX ADMIN — SODIUM CHLORIDE: 0.9 INJECTION, SOLUTION INTRAVENOUS at 03:08

## 2019-08-14 RX ADMIN — ROPINIROLE HYDROCHLORIDE 1 MG: 1 TABLET, FILM COATED ORAL at 10:08

## 2019-08-14 RX ADMIN — CLOPIDOGREL BISULFATE 75 MG: 75 TABLET, FILM COATED ORAL at 04:08

## 2019-08-14 RX ADMIN — BENAZEPRIL HYDROCHLORIDE 20 MG: 20 TABLET ORAL at 04:08

## 2019-08-14 RX ADMIN — CILOSTAZOL 100 MG: 100 TABLET ORAL at 04:08

## 2019-08-14 NOTE — Clinical Note
80 ml injected throughout the case. 20 mL total wasted during the case. 100 mL total used in the case.

## 2019-08-14 NOTE — PLAN OF CARE
Nursing Transfer Note      8/14/2019     Transfer To: 2513    Transfer via stretcher    Transported by Swati CHAMBERS RN    Medicines sent: none     Chart send with patient: Yes    Notified: no family    Patient reassessed at: 1500  08/14/2019 (date, time)    Upon arrival to floor: patient oriented to room, call bell in reach and bed in lowest position

## 2019-08-14 NOTE — Clinical Note
Angiography of the infrarenal, abdominal aorta and bilateral lower extremity performed with the catheter   power injection 80 mL contrast at 8 mL/s.

## 2019-08-14 NOTE — Clinical Note
Angiography of the bilateral lower extremity performed with the catheter   power injection 10 mL contrast at 8 mL/s. MULTIPLE SHOTS

## 2019-08-14 NOTE — Clinical Note
Angiography performed of the middle right . Angiography performed via power injection 10 mL contrast at 8 mL/s. is inserted in to the BILATERAL ILIACS.

## 2019-08-14 NOTE — PLAN OF CARE
Problem: Adult Inpatient Plan of Care  Goal: Plan of Care Review  Outcome: Ongoing (interventions implemented as appropriate)     08/14/19 2516   Plan of Care Review   Plan of Care Reviewed With patient   Progress improving     Cardiac, vital signs, and lab monitoring. IV hydration, possible discharge in am.

## 2019-08-14 NOTE — Clinical Note
Angiography of the infrarenal and abdominal aorta performed with the catheter   power injection 30 mL contrast at 15 mL/s.

## 2019-08-14 NOTE — Clinical Note
Angiography performed of the distal right . Angiography performed via hand injection with 5 mL of contrast.

## 2019-08-14 NOTE — Clinical Note
Angiography performed of the middle infrarenal . Angiography performed via power injection 20 mL contrast at 10 mL/s.

## 2019-08-15 ENCOUNTER — TELEPHONE (OUTPATIENT)
Dept: FAMILY MEDICINE | Facility: CLINIC | Age: 64
End: 2019-08-15

## 2019-08-15 VITALS
HEART RATE: 91 BPM | WEIGHT: 143.94 LBS | RESPIRATION RATE: 16 BRPM | OXYGEN SATURATION: 97 % | DIASTOLIC BLOOD PRESSURE: 67 MMHG | TEMPERATURE: 98 F | SYSTOLIC BLOOD PRESSURE: 136 MMHG | BODY MASS INDEX: 22.59 KG/M2 | HEIGHT: 67 IN

## 2019-08-15 DIAGNOSIS — Z12.11 COLON CANCER SCREENING: Primary | ICD-10-CM

## 2019-08-15 PROBLEM — I73.9 PERIPHERAL VASCULAR DISEASE, UNSPECIFIED: Status: RESOLVED | Noted: 2019-08-14 | Resolved: 2019-08-15

## 2019-08-15 LAB
ANION GAP SERPL CALC-SCNC: 8 MMOL/L (ref 8–16)
BASOPHILS # BLD AUTO: 0.04 K/UL (ref 0–0.2)
BASOPHILS NFR BLD: 0.4 % (ref 0–1.9)
BUN SERPL-MCNC: 26 MG/DL (ref 8–23)
CALCIUM SERPL-MCNC: 9.6 MG/DL (ref 8.7–10.5)
CHLORIDE SERPL-SCNC: 108 MMOL/L (ref 95–110)
CO2 SERPL-SCNC: 25 MMOL/L (ref 23–29)
CREAT SERPL-MCNC: 0.9 MG/DL (ref 0.5–1.4)
DIFFERENTIAL METHOD: ABNORMAL
EOSINOPHIL # BLD AUTO: 1 K/UL (ref 0–0.5)
EOSINOPHIL NFR BLD: 10.8 % (ref 0–8)
ERYTHROCYTE [DISTWIDTH] IN BLOOD BY AUTOMATED COUNT: 17 % (ref 11.5–14.5)
EST. GFR  (AFRICAN AMERICAN): >60 ML/MIN/1.73 M^2
EST. GFR  (NON AFRICAN AMERICAN): >60 ML/MIN/1.73 M^2
GLUCOSE SERPL-MCNC: 75 MG/DL (ref 70–110)
HCT VFR BLD AUTO: 38.6 % (ref 40–54)
HGB BLD-MCNC: 12.6 G/DL (ref 14–18)
IMM GRANULOCYTES # BLD AUTO: 0.02 K/UL (ref 0–0.04)
IMM GRANULOCYTES NFR BLD AUTO: 0.2 % (ref 0–0.5)
LYMPHOCYTES # BLD AUTO: 2.3 K/UL (ref 1–4.8)
LYMPHOCYTES NFR BLD: 25.7 % (ref 18–48)
MCH RBC QN AUTO: 30.4 PG (ref 27–31)
MCHC RBC AUTO-ENTMCNC: 32.6 G/DL (ref 32–36)
MCV RBC AUTO: 93 FL (ref 82–98)
MONOCYTES # BLD AUTO: 0.7 K/UL (ref 0.3–1)
MONOCYTES NFR BLD: 7.7 % (ref 4–15)
NEUTROPHILS # BLD AUTO: 5 K/UL (ref 1.8–7.7)
NEUTROPHILS NFR BLD: 55.2 % (ref 38–73)
NRBC BLD-RTO: 0 /100 WBC
PLATELET # BLD AUTO: 163 K/UL (ref 150–350)
PMV BLD AUTO: 11.5 FL (ref 9.2–12.9)
POTASSIUM SERPL-SCNC: 4.3 MMOL/L (ref 3.5–5.1)
RBC # BLD AUTO: 4.14 M/UL (ref 4.6–6.2)
SODIUM SERPL-SCNC: 141 MMOL/L (ref 136–145)
WBC # BLD AUTO: 9.09 K/UL (ref 3.9–12.7)

## 2019-08-15 PROCEDURE — 36415 COLL VENOUS BLD VENIPUNCTURE: CPT

## 2019-08-15 PROCEDURE — 80048 BASIC METABOLIC PNL TOTAL CA: CPT

## 2019-08-15 PROCEDURE — 25000003 PHARM REV CODE 250: Performed by: SURGERY

## 2019-08-15 PROCEDURE — 85025 COMPLETE CBC W/AUTO DIFF WBC: CPT

## 2019-08-15 RX ADMIN — CLOPIDOGREL BISULFATE 75 MG: 75 TABLET, FILM COATED ORAL at 08:08

## 2019-08-15 RX ADMIN — BENAZEPRIL HYDROCHLORIDE 20 MG: 20 TABLET ORAL at 08:08

## 2019-08-15 RX ADMIN — ASPIRIN 81 MG 81 MG: 81 TABLET ORAL at 08:08

## 2019-08-15 RX ADMIN — HYDROCODONE BITARTRATE AND ACETAMINOPHEN 1 TABLET: 5; 325 TABLET ORAL at 12:08

## 2019-08-15 RX ADMIN — FERROUS SULFATE TAB 325 MG (65 MG ELEMENTAL FE) 325 MG: 325 (65 FE) TAB at 08:08

## 2019-08-15 RX ADMIN — CILOSTAZOL 100 MG: 100 TABLET ORAL at 08:08

## 2019-08-15 RX ADMIN — CYANOCOBALAMIN TAB 1000 MCG 1000 MCG: 1000 TAB at 08:08

## 2019-08-15 NOTE — PLAN OF CARE
Problem: Adult Inpatient Plan of Care  Goal: Plan of Care Review  Outcome: Outcome(s) achieved Date Met: 08/15/19     08/15/19 0847   Plan of Care Review   Plan of Care Reviewed With patient   Progress improving     Discharge this am.

## 2019-08-15 NOTE — TELEPHONE ENCOUNTER
----- Message from Edith Tripathi LPN sent at 8/13/2019  2:50 PM CDT -----  Contact: Colonoscopy Referral  Spoke with patient about his last Colonoscopy. I informed him that [facility] has no records of a colonoscopy there. Pt stated that he had been in the hospital there for a couple of months and was having memory problems. He did state that he has had a couple Colonoscopy exams in the past but was not sure when or where the last one was. He also stated that he did remember that he had some polyps in the past. He is wanting a referral to Dr Aldo Whitten for a Colonoscopy at this time

## 2019-08-15 NOTE — PLAN OF CARE
08/14/19 1952   Patient Assessment/Suction   Respiratory Effort Unlabored   All Lung Fields Breath Sounds clear   Rhythm/Pattern, Respiratory pattern regular   PRE-TX-O2   O2 Device (Oxygen Therapy) room air   SpO2 (!) 94 %   Pulse Oximetry Type Intermittent   $ Pulse Oximetry - Single Charge Pulse Oximetry - Single   Pulse 64   Resp 16   Aerosol Therapy   $ Aerosol Therapy Charges PRN treatment not required   Respiratory Treatment Status (SVN) PRN treatment not required   continue to monitor for prn tx's

## 2019-08-15 NOTE — HOSPITAL COURSE
Patient underwent abdominal aortogram with bilateral lower extremity runoff.  He was observed over night with hydration.  Discharged home on postop day 1

## 2019-08-26 ENCOUNTER — PATIENT OUTREACH (OUTPATIENT)
Dept: ADMINISTRATIVE | Facility: HOSPITAL | Age: 64
End: 2019-08-26

## 2019-09-09 ENCOUNTER — TELEPHONE (OUTPATIENT)
Dept: FAMILY MEDICINE | Facility: CLINIC | Age: 64
End: 2019-09-09

## 2019-09-09 ENCOUNTER — OFFICE VISIT (OUTPATIENT)
Dept: FAMILY MEDICINE | Facility: CLINIC | Age: 64
End: 2019-09-09
Payer: MEDICARE

## 2019-09-09 VITALS
HEART RATE: 101 BPM | RESPIRATION RATE: 14 BRPM | BODY MASS INDEX: 22.76 KG/M2 | DIASTOLIC BLOOD PRESSURE: 69 MMHG | SYSTOLIC BLOOD PRESSURE: 130 MMHG | HEIGHT: 67 IN | WEIGHT: 145 LBS | OXYGEN SATURATION: 97 % | TEMPERATURE: 98 F

## 2019-09-09 DIAGNOSIS — R09.89 LEFT CAROTID BRUIT: ICD-10-CM
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
## 2019-09-09 DIAGNOSIS — I99.9 VASCULAR DISEASE: ICD-10-CM

## 2019-09-09 DIAGNOSIS — F51.01 PRIMARY INSOMNIA: ICD-10-CM

## 2019-09-09 DIAGNOSIS — R07.81 RIB PAIN ON RIGHT SIDE: ICD-10-CM

## 2019-09-09 DIAGNOSIS — G25.81 RESTLESS LEG SYNDROME: Primary | ICD-10-CM

## 2019-09-09 PROCEDURE — 99213 OFFICE O/P EST LOW 20 MIN: CPT | Mod: S$GLB,,, | Performed by: NURSE PRACTITIONER

## 2019-09-09 PROCEDURE — 99213 PR OFFICE/OUTPT VISIT, EST, LEVL III, 20-29 MIN: ICD-10-PCS | Mod: S$GLB,,, | Performed by: NURSE PRACTITIONER

## 2019-09-09 RX ORDER — HYDROCODONE BITARTRATE AND ACETAMINOPHEN 5; 325 MG/1; MG/1
1 TABLET ORAL EVERY 8 HOURS PRN
Qty: 30 TABLET | Refills: 0 | Status: SHIPPED | OUTPATIENT
Start: 2019-09-09 | End: 2019-12-09

## 2019-09-09 RX ORDER — TEMAZEPAM 15 MG/1
15 CAPSULE ORAL NIGHTLY PRN
Qty: 30 CAPSULE | Refills: 2 | Status: SHIPPED | OUTPATIENT
Start: 2019-09-09 | End: 2019-11-22

## 2019-09-09 NOTE — TELEPHONE ENCOUNTER
Please notify pt NP was able to see Anival's pre op note  indicating left carotid bruit and carotid US ordered yet not seen in the system thus I ordered one. Ask if he has already had this done? If so omit. If not please schedule. ty

## 2019-09-09 NOTE — TELEPHONE ENCOUNTER
"Patient states he had an "ultrasound of his neck" about 3 months ago at [facility]. I can't find it. I called [phone] and they had left for the day. I will call again tomorrow.  "

## 2019-09-11 ENCOUNTER — TELEPHONE (OUTPATIENT)
Dept: FAMILY MEDICINE | Facility: CLINIC | Age: 64
End: 2019-09-11

## 2019-09-11 ENCOUNTER — OFFICE VISIT (OUTPATIENT)
Dept: GASTROENTEROLOGY | Facility: CLINIC | Age: 64
End: 2019-09-11
Payer: MEDICARE

## 2019-09-11 VITALS
DIASTOLIC BLOOD PRESSURE: 78 MMHG | RESPIRATION RATE: 18 BRPM | OXYGEN SATURATION: 97 % | SYSTOLIC BLOOD PRESSURE: 158 MMHG | BODY MASS INDEX: 23.07 KG/M2 | HEART RATE: 90 BPM | WEIGHT: 147 LBS | HEIGHT: 67 IN

## 2019-09-11 DIAGNOSIS — Z86.010 HISTORY OF COLON POLYPS: ICD-10-CM

## 2019-09-11 DIAGNOSIS — Z01.818 PREOPERATIVE CLEARANCE: Primary | ICD-10-CM

## 2019-09-11 DIAGNOSIS — Z12.11 COLON CANCER SCREENING: ICD-10-CM

## 2019-09-11 PROBLEM — Z86.0100 HISTORY OF COLON POLYPS: Status: ACTIVE | Noted: 2019-09-11

## 2019-09-11 PROCEDURE — 99999 PR PBB SHADOW E&M-EST. PATIENT-LVL IV: ICD-10-PCS | Mod: PBBFAC,,, | Performed by: INTERNAL MEDICINE

## 2019-09-11 PROCEDURE — 99214 PR OFFICE/OUTPT VISIT, EST, LEVL IV, 30-39 MIN: ICD-10-PCS | Mod: S$PBB,,, | Performed by: INTERNAL MEDICINE

## 2019-09-11 PROCEDURE — 99999 PR PBB SHADOW E&M-EST. PATIENT-LVL IV: CPT | Mod: PBBFAC,,, | Performed by: INTERNAL MEDICINE

## 2019-09-11 PROCEDURE — 99214 OFFICE O/P EST MOD 30 MIN: CPT | Mod: PBBFAC,PN | Performed by: INTERNAL MEDICINE

## 2019-09-11 PROCEDURE — 99214 OFFICE O/P EST MOD 30 MIN: CPT | Mod: S$PBB,,, | Performed by: INTERNAL MEDICINE

## 2019-09-11 NOTE — TELEPHONE ENCOUNTER
Called patient back and LM to find out when he had it done because I do not yet see it in the imagine section of his chart.  However, he can call Dr. Florian's office to have the records sent over.

## 2019-09-11 NOTE — TELEPHONE ENCOUNTER
----- Message from Sangita Amaro sent at 9/11/2019  9:31 AM CDT -----  Contact: self   Patient want to inform office his ultra sound was done at Dr. Negrete office in [place] any questions please call back at [phone]

## 2019-09-11 NOTE — LETTER
September 11, 2019      Puja Finney, NP  [address]  [address]  [place] [address]           [facility]  [address], [address]  [place] [address]  Phone: [phone]  Fax: [phone]          Patient: Miles Vazquez   MR Number: [id number]   YOB: 1955   Date of Visit: 9/11/2019       Dear Puja Finney:    Thank you for referring Miles Vazquez to me for evaluation. Attached you will find relevant portions of my assessment and plan of care.    If you have questions, please do not hesitate to call me. I look forward to following Miles Vazquez along with you.    Sincerely,    Aldo Whitten MD    Enclosure  CC:  No Recipients    If you would like to receive this communication electronically, please contact [email or web address] or [phone] to request more information on [employer] Link access.    For providers and/or their staff who would like to refer a patient to [facility], please contact us through our one-stop-shop provider referral line, [facility], at [phone].    If you feel you have received this communication in error or would no longer like to receive these types of communications, please e-mail [email or web address]

## 2019-09-11 NOTE — PROGRESS NOTES
Subjective:       Patient ID: Miles Vazquez is a 64 y.o. male.    Chief Complaint: Colon Cancer Screening (repeat colo. from 5 yrs ago)    He is here for screening colonoscopy.  He had several colonoscopies in the past and was told that he had colon polyps.  He denies bowel irregularity.  He is having daily bowel movements.  He denies hematemesis hematochezia jaundice or bleeding.  He denies significant pyrosis or dyspepsia.  Last year was operated on for a small bowel obstruction.  He had an appendectomy at that time.      Allergies:  Review of patient's allergies indicates:   Allergen Reactions    Seroquel [quetiapine] Other (See Comments)     RESTLESS LEGS       Medications:    Current Outpatient Medications:     albuterol 90 mcg/actuation inhaler, Inhale 2 puffs into the lungs every 6 (six) hours as needed for Wheezing., Disp: 1 Inhaler, Rfl: 3    aspirin 81 MG Chew, Take 81 mg by mouth once daily., Disp: , Rfl:     atorvastatin (LIPITOR) 40 MG tablet, Take 40 mg by mouth every evening., Disp: , Rfl:     benazepril (LOTENSIN) 20 MG tablet, Take 1 tablet (20 mg total) by mouth once daily., Disp: 90 tablet, Rfl: 1    cilostazol (PLETAL) 100 MG Tab, Take 1 tablet (100 mg total) by mouth 2 (two) times daily before meals., Disp: 60 tablet, Rfl: 11    cyanocobalamin (VITAMIN B-12) 1000 MCG tablet, Take 0.5 tablets (500 mcg total) by mouth once daily., Disp: 60 tablet, Rfl: 12    ferrous sulfate (FEOSOL) 325 mg (65 mg iron) Tab tablet, Take 1 tablet (325 mg total) by mouth 2 (two) times daily. Try on empty stomach first, Disp: 60 tablet, Rfl: 11    HYDROcodone-acetaminophen (NORCO) 5-325 mg per tablet, Take 1 tablet by mouth every 8 (eight) hours as needed for Pain., Disp: 30 tablet, Rfl: 0    multivit-min/FA/lycopen/lutein (CENTRUM SILVER MEN ORAL), Take by mouth., Disp: , Rfl:     rOPINIRole (REQUIP) 1 MG tablet, Take 1 tablet (1 mg total) by mouth every evening., Disp: 30 tablet, Rfl: 3    temazepam  "(RESTORIL) 15 mg Cap, Take 1 capsule (15 mg total) by mouth nightly as needed., Disp: 30 capsule, Rfl: 2    Past Medical History:   Diagnosis Date    Abdominal aortic aneurysm (AAA) 05/2019    3.2 cm per CTA    Abnormal findings on esophagogastroduodenoscopy (EGD) 04/2016    "intestinal metaplasia" which is a "risk factor" for gastric cancer.      Bursitis of both hips     Colon polyp     COPD (chronic obstructive pulmonary disease) 2016    Coronary artery calcification seen on CAT scan 4/24/2013    Depression 2016    Accompanied drinking problem    HTN (hypertension) 1/7/2013    Hyperlipidemia 2019    Lumbar disc disease 06/2019    grade 1 spondylolisthesis of L5 on S1    Macrocytic anemia 3/27/2016    Personal history of colonic polyps 2017    Recovering alcoholic 03/16/2016    RLS (restless legs syndrome) 2014    Sleep apnea 2 years ago    does not use cpap       Past Surgical History:   Procedure Laterality Date    ANGIOGRAPHY OF LOWER EXTREMITY  08/14/2019    AORTOGRAM, WITH RUNOFF N/A 8/14/2019    [facility]    APPENDECTOMY N/A 9/26/2018    [facility]    COLONOSCOPY  2017    with [facility]    EGD (ESOPHAGOGASTRODUODENOSCOPY) N/A 9/26/2018    Per[facility] at [facility]    INJECTION-STEROID-EPIDURAL-TRANSFORAMINAL Bilateral 11/9/2015    [facility] PAIN MGT    Insertion,central venous access device N/A 9/26/2018    Per[facility] by [facility] at [facility] OR    PEG TUBE PLACEMENT/REPLACEMENT N/A 4/12/2016    [facility]ong [facility] at [facility] ENDO (2ND FLR)    REPAIR, HERNIA N/A 9/26/2018    [facility] by [facility] at [facility] OR    SMALL INTESTINE SURGERY  09/2018         Review of Systems   Constitutional: Negative for appetite change, fever and unexpected weight change.   HENT: Negative for " trouble swallowing.         No jaundice.   Respiratory: Negative for cough, shortness of breath and wheezing.         He is a cigarette smoker.  He has a history of chronic lung disease. He denies aspiration dysphagia or sputum production.  He has occasional coughing.   Cardiovascular: Negative for chest pain.        He states his hypertension hyperlipidemia well controlled.  His physically active.  He denies exertional chest pain or rhythm disturbances.  He saw cardiologist several years ago and he believes the evaluation was normal. Currently he is seen a vascular surgeon.  He has been found to have abdominal aneurysm.  He has severe claudication.  He has been evaluated for the bilateral carotid bruits   Gastrointestinal: Negative for abdominal distention, abdominal pain, anal bleeding, blood in stool, constipation and diarrhea.   Musculoskeletal: Positive for arthralgias and back pain. Negative for neck pain.   Skin: Negative for pallor and rash.   Neurological: Negative for dizziness, seizures, syncope, speech difficulty, weakness and numbness.   Hematological: Negative for adenopathy.   Psychiatric/Behavioral: Negative for confusion.       Objective:      Physical Exam   Constitutional: He is oriented to person, place, and time. He appears well-developed and well-nourished.   Well-nourished well-hydrated thin nonicteric white male.  Hand  is good   HENT:   Head: Normocephalic.   Eyes: Pupils are equal, round, and reactive to light. EOM are normal.   Neck: Normal range of motion. Neck supple. No tracheal deviation present. No thyromegaly present.   Bilateral carotid bruits   Cardiovascular: Normal rate, regular rhythm and normal heart sounds.   Pulmonary/Chest: Effort normal and breath sounds normal.   Abdominal: Soft. Bowel sounds are normal. He exhibits no distension and no mass. There is no tenderness. There is no rebound and no guarding. No hernia.   The abdomen is soft there healed scars.  There is a  bruit in the epigastrium and in both flanks.  Definite masses organomegaly not detected.  Bowel are normal.   Musculoskeletal: Normal range of motion.   He can ambulate the normally.  He can go from the sitting to the standing position.  He can get on the examination table without assistance and without difficulty.   Lymphadenopathy:     He has no cervical adenopathy.   Neurological: He is alert and oriented to person, place, and time. No cranial nerve deficit.   Skin: Skin is warm and dry.   Psychiatric: He has a normal mood and affect. His behavior is normal.   Vitals reviewed.        Plan:       Preoperative clearance  -     Ambulatory Referral to Cardiology    History of colon polyps    Colon cancer screening     He will plan to follow-up with the vascular surgeon.  He will need cardiac evaluation.  He has extensive peripheral vascular disease and probably has associated cardiovascular disease. He will continue his current medications vitamins and minerals.  He cannot have a colonoscopy until he is cleared by his vascular surgeon cardiologist.

## 2019-09-18 ENCOUNTER — TELEPHONE (OUTPATIENT)
Dept: FAMILY MEDICINE | Facility: CLINIC | Age: 64
End: 2019-09-18

## 2019-10-31 DIAGNOSIS — G25.81 RESTLESS LEG SYNDROME: ICD-10-CM

## 2019-11-01 RX ORDER — ROPINIROLE 1 MG/1
TABLET, FILM COATED ORAL
Qty: 30 TABLET | Refills: 3 | Status: SHIPPED | OUTPATIENT
Start: 2019-11-01 | End: 2019-12-09 | Stop reason: SDUPTHER

## 2019-11-09 ENCOUNTER — TELEPHONE (OUTPATIENT)
Dept: FAMILY MEDICINE | Facility: CLINIC | Age: 64
End: 2019-11-09

## 2019-11-09 NOTE — TELEPHONE ENCOUNTER
Please notify pt records received from Dr. Florian.     Aortogram shows only isolated left pop stenosis which does not account for s/s- referred NSGY Dr. Nando Foote. F/u 6mo and carotid US to be repeated 12/2019

## 2019-11-11 NOTE — TELEPHONE ENCOUNTER
Informed patient of receiving records from Dr Florian, also that Aortogram shows only isolated left pop stenosis which does not account for s/s- referred to Dr Nando Foote. Repeat carotid US 12/2019 and f/u 6 months. Verbalized understanding and given # to call Dr Foote's office for appt. as requested. Voiced no other questions or concerns at this time, encouraged to call for further questions.

## 2019-11-20 ENCOUNTER — TELEPHONE (OUTPATIENT)
Dept: FAMILY MEDICINE | Facility: CLINIC | Age: 64
End: 2019-11-20

## 2019-11-20 NOTE — TELEPHONE ENCOUNTER
Patient stated that new sleep meds he was prescribed is not working, only sleeping a couple hours a night.temazepam (RESTORIL) 15 mg Cap Requesting to be put back on Amitriptyline.last visit 9/09/19  Please advise  Thank you        ----- Message from Kyleigh Diego sent at 11/20/2019 10:34 AM CST -----  Contact: Patient  Type: Needs Medical Advice    Who Called:  Patient    Best Call Back Number: [phone]    Additional Information:   Patient stated that new sleep meds he was prescribed is not working, only sleeping a couple hours a night. Requesting to be put back on Amitriptyline.

## 2019-11-21 ENCOUNTER — TELEPHONE (OUTPATIENT)
Dept: FAMILY MEDICINE | Facility: CLINIC | Age: 64
End: 2019-11-21

## 2019-11-21 ENCOUNTER — PATIENT MESSAGE (OUTPATIENT)
Dept: FAMILY MEDICINE | Facility: CLINIC | Age: 64
End: 2019-11-21

## 2019-11-21 NOTE — TELEPHONE ENCOUNTER
Left message on pt portal as soon as provider answer , will give him a call.        ----- Message from Tabitha Hirsch sent at 11/21/2019  8:22 AM CST -----  Contact: patient  Type: Needs Medical Advice    Who Called:  patient  Best Call Back Number: [phone]  Additional Information: patient states that he called the office and sent a message yesterday and has not received a phone call yet. He would appreciate it if someone gives him a phone call today

## 2019-11-22 ENCOUNTER — TELEPHONE (OUTPATIENT)
Dept: FAMILY MEDICINE | Facility: CLINIC | Age: 64
End: 2019-11-22

## 2019-11-22 DIAGNOSIS — F51.01 PRIMARY INSOMNIA: Primary | ICD-10-CM

## 2019-11-22 RX ORDER — AMITRIPTYLINE HYDROCHLORIDE 100 MG/1
100 TABLET ORAL NIGHTLY
Qty: 90 TABLET | Refills: 3 | Status: SHIPPED | OUTPATIENT
Start: 2019-11-22 | End: 2019-12-09 | Stop reason: SDUPTHER

## 2019-11-22 NOTE — TELEPHONE ENCOUNTER
Talk to pt provider suggest pt to try  melatonin            ----- Message from Mica Costello sent at 11/21/2019  4:20 PM CST -----  Type: Needs Medical Advice    Who Called: patient - Miles     Pharmacy name and phone #:    [id number] - [place], [place] - [address]  [address]  [place] [address]  Phone: [phone] Fax: [phone]      Best Call Back Number: [phone]  Additional Information: patient is currently taking Temazepam and its not helping at all the way his previous meds were - would like to go back to theamitriptyline (ELAVIL) 100 MG tablet 100 mlgs please contact pt directly ( hes been communicating with Liliya on the portal ) She assigned it this morning to Puja and patient is checking on this ) please call upon sending Rx so he will know how to wait before leaving to go get it

## 2019-11-22 NOTE — TELEPHONE ENCOUNTER
Notified patient Merna will send to pharmacy. Would like to go back to Amitriptyline, the new med is not working.     ----- Message from Nessa Shira sent at 11/22/2019  4:05 PM CST -----  Contact: pt  Type: Needs Medical Advice    Who Called:  pt    Best Call Back Number    Additional Information: Requesting a call back from Nurse, regarding pt has been calling for a couples of days and leaving messages for a nurse or Dr to call him back about his Rx and is upset that no one has reached back out to him ,please call with advice

## 2019-11-25 ENCOUNTER — PATIENT OUTREACH (OUTPATIENT)
Dept: ADMINISTRATIVE | Facility: HOSPITAL | Age: 64
End: 2019-11-25

## 2019-11-25 NOTE — LETTER
December 3, 2019    Miles Vazquez  [address] [place] [address]             [facility] [address] [address]  Phone: [phone] Dear Michael [facility] is committed to your overall health and would like to ensure that you are up to date on your recommended test and/or procedures.   Puja Finney NP  has found that your chart shows you may be due for the following:     Shingles Vaccine(1 of 2) due on 07/22/2003   Colonoscopy due on 07/22/2003   Pneumococcal Vaccine (65+ Low/Medium Risk)(1 of 2 - PCV13) due on 07/22/2018   Influenza Vaccine(1) due on 09/01/2019     If you have had any of the above done at another facility, please let us know so that we may obtain copies from that facility.  If you have a copy of these records, please provide a copy for us to scan into your chart.  You are welcome to request that the report be faxed to us at  ([phone]).     Otherwise, please schedule these appointments at your earliest convenience by calling [phone] or going to [email or web address].     If you have an upcoming scheduled appointment for the item above, please disregard this letter.     Sincerely,   Your [facility]   MANUEL Fuller L.P.N. Clinical Care Coordinator   [address] [place] [address], [place] [address] [phone]

## 2019-12-05 ENCOUNTER — TELEPHONE (OUTPATIENT)
Dept: CARDIOLOGY | Facility: CLINIC | Age: 64
End: 2019-12-05

## 2019-12-05 NOTE — TELEPHONE ENCOUNTER
Spoke with patient and scheduled appointment on 12.11.2019. No other concerns were voiced during this encounter.       ----- Message from Ann Marie Fernandez sent at 12/5/2019 12:20 PM CST -----  Type:  Sooner Apoointment Request    Caller is requesting a sooner appointment.      Name of Caller:  Patient   When is the first available appointment?  01/13/2020  Symptoms:  Surgical clearance, sx scheduled 01/10/20  Best Call Back Number:  [phone]  Additional Information:

## 2019-12-09 ENCOUNTER — OFFICE VISIT (OUTPATIENT)
Dept: FAMILY MEDICINE | Facility: CLINIC | Age: 64
End: 2019-12-09
Payer: MEDICARE

## 2019-12-09 VITALS
DIASTOLIC BLOOD PRESSURE: 73 MMHG | HEIGHT: 67 IN | OXYGEN SATURATION: 98 % | BODY MASS INDEX: 22.76 KG/M2 | TEMPERATURE: 98 F | WEIGHT: 145 LBS | HEART RATE: 80 BPM | SYSTOLIC BLOOD PRESSURE: 121 MMHG | RESPIRATION RATE: 16 BRPM

## 2019-12-09 DIAGNOSIS — M54.16 LUMBAR RADICULOPATHY: ICD-10-CM

## 2019-12-09 DIAGNOSIS — Z12.5 PROSTATE CANCER SCREENING: ICD-10-CM

## 2019-12-09 DIAGNOSIS — D50.8 IRON DEFICIENCY ANEMIA SECONDARY TO INADEQUATE DIETARY IRON INTAKE: ICD-10-CM

## 2019-12-09 DIAGNOSIS — I73.9 PAD (PERIPHERAL ARTERY DISEASE): ICD-10-CM

## 2019-12-09 DIAGNOSIS — F51.01 PRIMARY INSOMNIA: ICD-10-CM

## 2019-12-09 DIAGNOSIS — Z01.818 PREOPERATIVE CLEARANCE: Primary | ICD-10-CM

## 2019-12-09 DIAGNOSIS — F10.21 RECOVERING ALCOHOLIC IN REMISSION: ICD-10-CM

## 2019-12-09 DIAGNOSIS — I10 ESSENTIAL HYPERTENSION: ICD-10-CM

## 2019-12-09 DIAGNOSIS — Z23 NEED FOR VACCINATION AGAINST STREPTOCOCCUS PNEUMONIAE: ICD-10-CM

## 2019-12-09 DIAGNOSIS — I73.9 CLAUDICATION OF BOTH LOWER EXTREMITIES: ICD-10-CM

## 2019-12-09 DIAGNOSIS — E78.2 MIXED HYPERLIPIDEMIA: Chronic | ICD-10-CM

## 2019-12-09 DIAGNOSIS — G25.81 RESTLESS LEG SYNDROME: ICD-10-CM

## 2019-12-09 DIAGNOSIS — Z23 NEED FOR VACCINATION FOR H FLU TYPE B: ICD-10-CM

## 2019-12-09 PROCEDURE — 90662 FLU VACCINE - HIGH DOSE (65+) PRESERVATIVE FREE IM: ICD-10-PCS | Mod: S$GLB,,, | Performed by: NURSE PRACTITIONER

## 2019-12-09 PROCEDURE — 90670 PCV13 VACCINE IM: CPT | Mod: S$GLB,,, | Performed by: NURSE PRACTITIONER

## 2019-12-09 PROCEDURE — G0009 PNEUMOCOCCAL CONJUGATE VACCINE 13-VALENT LESS THAN 5YO & GREATER THAN: ICD-10-PCS | Mod: S$GLB,,, | Performed by: NURSE PRACTITIONER

## 2019-12-09 PROCEDURE — 1126F PR PAIN SEVERITY QUANTIFIED, NO PAIN PRESENT: ICD-10-PCS | Mod: S$GLB,,, | Performed by: NURSE PRACTITIONER

## 2019-12-09 PROCEDURE — 99215 PR OFFICE/OUTPT VISIT, EST, LEVL V, 40-54 MIN: ICD-10-PCS | Mod: 25,S$GLB,, | Performed by: NURSE PRACTITIONER

## 2019-12-09 PROCEDURE — 1159F MED LIST DOCD IN RCRD: CPT | Mod: S$GLB,,, | Performed by: NURSE PRACTITIONER

## 2019-12-09 PROCEDURE — 90662 IIV NO PRSV INCREASED AG IM: CPT | Mod: S$GLB,,, | Performed by: NURSE PRACTITIONER

## 2019-12-09 PROCEDURE — G0008 ADMIN INFLUENZA VIRUS VAC: HCPCS | Mod: S$GLB,,, | Performed by: NURSE PRACTITIONER

## 2019-12-09 PROCEDURE — 1159F PR MEDICATION LIST DOCUMENTED IN MEDICAL RECORD: ICD-10-PCS | Mod: S$GLB,,, | Performed by: NURSE PRACTITIONER

## 2019-12-09 PROCEDURE — 90670 PNEUMOCOCCAL CONJUGATE VACCINE 13-VALENT LESS THAN 5YO & GREATER THAN: ICD-10-PCS | Mod: S$GLB,,, | Performed by: NURSE PRACTITIONER

## 2019-12-09 PROCEDURE — 99215 OFFICE O/P EST HI 40 MIN: CPT | Mod: 25,S$GLB,, | Performed by: NURSE PRACTITIONER

## 2019-12-09 PROCEDURE — 1126F AMNT PAIN NOTED NONE PRSNT: CPT | Mod: S$GLB,,, | Performed by: NURSE PRACTITIONER

## 2019-12-09 PROCEDURE — G0008 FLU VACCINE - HIGH DOSE (65+) PRESERVATIVE FREE IM: ICD-10-PCS | Mod: S$GLB,,, | Performed by: NURSE PRACTITIONER

## 2019-12-09 PROCEDURE — G0009 ADMIN PNEUMOCOCCAL VACCINE: HCPCS | Mod: S$GLB,,, | Performed by: NURSE PRACTITIONER

## 2019-12-09 RX ORDER — ATORVASTATIN CALCIUM 40 MG/1
40 TABLET, FILM COATED ORAL NIGHTLY
Qty: 90 TABLET | Refills: 3 | Status: SHIPPED | OUTPATIENT
Start: 2019-12-09 | End: 2022-04-08 | Stop reason: SDUPTHER

## 2019-12-09 RX ORDER — AMITRIPTYLINE HYDROCHLORIDE 100 MG/1
100 TABLET ORAL NIGHTLY
Qty: 90 TABLET | Refills: 3 | Status: SHIPPED | OUTPATIENT
Start: 2019-12-09 | End: 2020-11-11 | Stop reason: SDUPTHER

## 2019-12-09 RX ORDER — ALBUTEROL SULFATE 90 UG/1
2 AEROSOL, METERED RESPIRATORY (INHALATION) EVERY 6 HOURS PRN
Qty: 3 INHALER | Refills: 3 | Status: SHIPPED | OUTPATIENT
Start: 2019-12-09 | End: 2022-05-16

## 2019-12-09 RX ORDER — CILOSTAZOL 100 MG/1
100 TABLET ORAL
Qty: 180 TABLET | Refills: 3 | Status: SHIPPED | OUTPATIENT
Start: 2019-12-09 | End: 2021-10-26 | Stop reason: SDUPTHER

## 2019-12-09 RX ORDER — ROPINIROLE 1 MG/1
1 TABLET, FILM COATED ORAL NIGHTLY
Qty: 90 TABLET | Refills: 3 | Status: SHIPPED | OUTPATIENT
Start: 2019-12-09 | End: 2020-03-24

## 2019-12-09 RX ORDER — BENAZEPRIL HYDROCHLORIDE 20 MG/1
20 TABLET ORAL DAILY
Qty: 90 TABLET | Refills: 3 | Status: SHIPPED | OUTPATIENT
Start: 2019-12-09 | End: 2021-02-01 | Stop reason: SDUPTHER

## 2019-12-09 NOTE — PROGRESS NOTES
"Subjective:       Patient ID: Miles Vazquez is a 64 y.o. male.    Chief Complaint: Follow-up (3 mo ); Immunizations (pnuemoccoccal and flu pt would like. Shingles pt declined); and Colonoscopy (pt needs )    Presents for routine follow up. Reports pending L4-5 surgery with Dr. Foote 1/10/20 at [facility]. He will see Dr. Armas tomorrow for Cardiology clearance. Past records/scanned documents viewed unable to locate carotid US report yet note made to myself to repeat 12/2019, Dr. Armas may also require as documented in Dr. Florian's noted (scanned 11/29/19) right carotid stenosis 50-60% with December US already scheduled, pt denies knowledge of.    Denies SOB, CP or palpation. Reports not being active due to lumbar pain with radiculopathy.     Pre-op testing discussed to be done within a 30 day window, advised to stop smoking prior to surgery and not to use any form of nicotine/NSAIDS post op as it delays healing and fusion. Home Health discussed with nursing/PT/OT or ideal is IPR as he lives alone with limited help.      Past Medical History:   Diagnosis Date    Abdominal aortic aneurysm (AAA) 05/2019    3.2 cm per CTA    Abnormal findings on esophagogastroduodenoscopy (EGD) 04/2016    "intestinal metaplasia" which is a "risk factor" for gastric cancer.      Bursitis of both hips     Colon polyp     COPD (chronic obstructive pulmonary disease) 2016    Coronary artery calcification seen on CAT scan 4/24/2013    Depression 2016    Accompanied drinking problem    HTN (hypertension) 1/7/2013    Hyperlipidemia 2019    Lumbar disc disease 06/2019    grade 1 spondylolisthesis of L5 on S1    Macrocytic anemia 3/27/2016    Personal history of colonic polyps 2017    Recovering alcoholic 03/16/2016    RLS (restless legs syndrome) 2014    Sleep apnea 2 years ago    does not use cpap       Past Surgical History:   Procedure Laterality Date    ANGIOGRAPHY OF LOWER EXTREMITY  08/14/2019    AORTOGRAPHY WITH " SERIALOGRAPHY N/A 8/14/2019    Procedure: AORTOGRAM, WITH RUNOFF;  Surgeon: Alan Florian MD;  Location: [facility] CATH/EP LAB;  Service: Peripheral Vascular;  Laterality: N/A;    APPENDECTOMY N/A 9/26/2018    Procedure: APPENDECTOMY;  Surgeon: Ramírez Horner MD;  Location: [facility] OR;  Service: General;  Laterality: N/A;    COLONOSCOPY  2017    with polyps repeat 5 yrs- [place]    ESOPHAGOGASTRODUODENOSCOPY N/A 9/26/2018    Procedure: EGD (ESOPHAGOGASTRODUODENOSCOPY);  Surgeon: Ramírez Horner MD;  Location: [facility] OR;  Service: General;  Laterality: N/A;  WITH BX    HERNIA REPAIR N/A 9/26/2018    Procedure: REPAIR, HERNIA;  Surgeon: Ramírez Horner MD;  Location: [facility] OR;  Service: General;  Laterality: N/A;  INTERNAL LYSIS WITH REDUCTION OF INTERNAL HERNIA    SMALL INTESTINE SURGERY  09/2018        Social History     Socioeconomic History    Marital status: Single     Spouse name: Not on file    Number of children: Not on file    Years of education: Not on file    Highest education level: Not on file   Occupational History    Not on file   Social Needs    Financial resource strain: Not on file    Food insecurity:     Worry: Not on file     Inability: Not on file    Transportation needs:     Medical: Not on file     Non-medical: Not on file   Tobacco Use    Smoking status: Current Some Day Smoker     Years: 50.00     Types: Cigarettes    Smokeless tobacco: Never Used    Tobacco comment: 1 or 2 cigarettes a day    Substance and Sexual Activity    Alcohol use: Not Currently     Frequency: Never     Comment: Quit drinking in March 2016    Drug use: Not Currently     Types: Marijuana     Comment: In the past.    Sexual activity: Not Currently     Partners: Female   Lifestyle    Physical activity:     Days per week: Not on file     Minutes per session: Not on file    Stress: Not on file   Relationships    Social connections:     Talks on phone: Not on file      Gets together: Not on file     Attends Anglican service: Not on file     Active member of club or organization: Not on file     Attends meetings of clubs or organizations: Not on file     Relationship status: Not on file   Other Topics Concern    Not on file   Social History Narrative    Not on file       Family History   Problem Relation Age of Onset    Tuberculosis Maternal Grandmother     Stomach cancer Neg Hx     Colon cancer Neg Hx        Review of patient's allergies indicates:   Allergen Reactions    Seroquel [quetiapine] Other (See Comments)     RESTLESS LEGS          Current Outpatient Medications:     albuterol (PROVENTIL/VENTOLIN HFA) 90 mcg/actuation inhaler, Inhale 2 puffs into the lungs every 6 (six) hours as needed for Wheezing., Disp: 3 Inhaler, Rfl: 3    amitriptyline (ELAVIL) 100 MG tablet, Take 1 tablet (100 mg total) by mouth every evening., Disp: 90 tablet, Rfl: 3    aspirin 81 MG Chew, Take 81 mg by mouth once daily., Disp: , Rfl:     atorvastatin (LIPITOR) 40 MG tablet, Take 1 tablet (40 mg total) by mouth every evening., Disp: 90 tablet, Rfl: 3    benazepril (LOTENSIN) 20 MG tablet, Take 1 tablet (20 mg total) by mouth once daily., Disp: 90 tablet, Rfl: 3    cilostazol (PLETAL) 100 MG Tab, Take 1 tablet (100 mg total) by mouth 2 (two) times daily before meals., Disp: 180 tablet, Rfl: 3    cyanocobalamin (VITAMIN B-12) 1000 MCG tablet, Take 0.5 tablets (500 mcg total) by mouth once daily., Disp: 60 tablet, Rfl: 12    ferrous sulfate (FEOSOL) 325 mg (65 mg iron) Tab tablet, Take 1 tablet (325 mg total) by mouth 2 (two) times daily. Try on empty stomach first, Disp: 60 tablet, Rfl: 11    multivit-min/FA/lycopen/lutein (CENTRUM SILVER MEN ORAL), Take by mouth., Disp: , Rfl:     rOPINIRole (REQUIP) 1 MG tablet, Take 1 tablet (1 mg total) by mouth every evening., Disp: 90 tablet, Rfl: 3    temazepam (RESTORIL) 15 mg Cap, , Disp: , Rfl:     HPI  Review of Systems    Constitutional: Negative.    HENT: Negative.    Eyes: Negative.    Respiratory: Negative.    Cardiovascular: Negative.    Gastrointestinal: Negative.    Endocrine: Negative.    Genitourinary: Negative.    Musculoskeletal: Positive for back pain.        Bilateral leg pain   Skin: Negative.    Allergic/Immunologic: Negative.    Neurological: Negative.    Hematological: Negative.    Psychiatric/Behavioral: Negative.        Objective:      Physical Exam   Constitutional: He is oriented to person, place, and time. He appears well-developed and well-nourished.   HENT:   Head: Normocephalic and atraumatic.   Mouth/Throat: Oropharynx is clear and moist.   Eyes: Pupils are equal, round, and reactive to light. Conjunctivae are normal. No scleral icterus.   Neck: Normal range of motion. Neck supple. No thyromegaly present.   Cardiovascular: Normal rate, regular rhythm and normal heart sounds.   No murmur heard.  Pulmonary/Chest: Effort normal and breath sounds normal. No respiratory distress.   Abdominal: Soft. Bowel sounds are normal. He exhibits no distension. There is no tenderness.   Musculoskeletal: Normal range of motion. He exhibits no edema.   Neurological: He is alert and oriented to person, place, and time. No sensory deficit. He exhibits normal muscle tone.   Skin: Skin is warm. Capillary refill takes less than 2 seconds. No rash noted.   Psychiatric: He has a normal mood and affect. His behavior is normal. Judgment and thought content normal.   Nursing note and vitals reviewed.      Assessment:       1. Preoperative clearance    2. Need for vaccination against Streptococcus pneumoniae    3. Need for vaccination for H flu type B    4. Restless leg syndrome    5. Essential hypertension    6. Primary insomnia    7. Claudication of both lower extremities, onset 2015    8. PAD (peripheral artery disease)    9. Mixed hyperlipidemia    10. Recovering alcoholic in remission    11. Iron deficiency anemia secondary to  inadequate dietary iron intake    12. Prostate cancer screening    13. Lumbar radiculopathy        Plan:    1-   Please call Dr. Florian's office [phone] or fax request for copy of carotid US. Pt can not remember where he got it and I have a note to repeat 12/2019. Pending back surgery with cardiology clearance needed. Also please fax this note to Dr. Fragoso  2- with polyps repeat 5 yrs- [facility] WARREN Gutierrez- see 11/19/19 Dr. BHUPINDER Whitten as pt is not cleared for colonoscopy until cleared by Vascular  3- flu/pneumonia vaccines today    --------------------------------------------------  Addendum 1/2/2020- Labs essentially normal with anemia improved, and CXR consistent with COPD and EKG Normal sinus rhythm. Records not received from Dr. Fragoso thus will request again STAT as it needs to be clarified if carotid US needs to be done prior to back surgery or after? Noted in chart patient did not complete stress test or ECHO yet Dr. Armas's office to contact patient for scheduling.       Preoperative clearance  -     X-Ray Chest PA And Lateral; Future; Expected date: 12/09/2019  -     SCHEDULED EKG 12-LEAD (to Muse); Future  -     Comprehensive metabolic panel; Future; Expected date: 12/09/2019  -     CBC auto differential; Future; Expected date: 12/09/2019  -     Magnesium; Future; Expected date: 12/09/2019    Need for vaccination against Streptococcus pneumoniae  -     (In Office Administered) Pneumococcal Conjugate Vaccine (13 Valent) (IM)    Need for vaccination for H flu type B  -     Influenza - High Dose (65+) (PF) (IM)    Restless leg syndrome  -     rOPINIRole (REQUIP) 1 MG tablet; Take 1 tablet (1 mg total) by mouth every evening.  Dispense: 90 tablet; Refill: 3    Essential hypertension  -     benazepril (LOTENSIN) 20 MG tablet; Take 1 tablet (20 mg total) by mouth once daily.  Dispense: 90 tablet; Refill: 3  -     Comprehensive metabolic panel; Future; Expected date: 12/09/2019  -     CBC auto  differential; Future; Expected date: 12/09/2019  -     Magnesium; Future; Expected date: 12/09/2019    Primary insomnia  -     amitriptyline (ELAVIL) 100 MG tablet; Take 1 tablet (100 mg total) by mouth every evening.  Dispense: 90 tablet; Refill: 3    Claudication of both lower extremities, onset 2015  -     cilostazol (PLETAL) 100 MG Tab; Take 1 tablet (100 mg total) by mouth 2 (two) times daily before meals.  Dispense: 180 tablet; Refill: 3    PAD (peripheral artery disease)  -     cilostazol (PLETAL) 100 MG Tab; Take 1 tablet (100 mg total) by mouth 2 (two) times daily before meals.  Dispense: 180 tablet; Refill: 3    Mixed hyperlipidemia  -     atorvastatin (LIPITOR) 40 MG tablet; Take 1 tablet (40 mg total) by mouth every evening.  Dispense: 90 tablet; Refill: 3  -     Comprehensive metabolic panel; Future; Expected date: 12/09/2019  -     CBC auto differential; Future; Expected date: 12/09/2019  -     Magnesium; Future; Expected date: 12/09/2019    Recovering alcoholic in remission    Iron deficiency anemia secondary to inadequate dietary iron intake  -     Ferritin; Future; Expected date: 12/09/2019  -     Iron and TIBC; Future; Expected date: 12/09/2019  -     Reticulocytes; Future; Expected date: 12/09/2019    Prostate cancer screening  -     PSA, Screening; Future; Expected date: 12/09/2019    Lumbar radiculopathy    Other orders  -     albuterol (PROVENTIL/VENTOLIN HFA) 90 mcg/actuation inhaler; Inhale 2 puffs into the lungs every 6 (six) hours as needed for Wheezing.  Dispense: 3 Inhaler; Refill: 3        Risks, benefits, and side effects were discussed with the patient. All questions were answered to the fullest satisfaction of the patient, and pt verbalized understanding and agreement to treatment plan. Pt was to call with any new or worsening symptoms, or present to the ER.

## 2019-12-09 NOTE — LETTER
[facility]  [address] [place] [address] [place] [address]  Phone: [phone]  Fax: [phone]           December 9, 2019      Good afternoon,     I have attempted to call your office yet could not speak to anyone. Our mutual patient is having surgery in January. I have attached my office note from today. The patient can not remember where he obtained the carotid US study and my notes indicate he is scheduled for repeat study this month. Please fax a copy of his last US and I can have the patient do the repeat exam here at [facility].      Sincerely,             Puja Finney [facility]

## 2019-12-11 ENCOUNTER — OFFICE VISIT (OUTPATIENT)
Dept: CARDIOLOGY | Facility: CLINIC | Age: 64
End: 2019-12-11
Payer: MEDICARE

## 2019-12-11 VITALS
RESPIRATION RATE: 16 BRPM | WEIGHT: 147.63 LBS | HEART RATE: 101 BPM | HEIGHT: 67 IN | DIASTOLIC BLOOD PRESSURE: 63 MMHG | OXYGEN SATURATION: 98 % | TEMPERATURE: 98 F | BODY MASS INDEX: 23.17 KG/M2 | SYSTOLIC BLOOD PRESSURE: 94 MMHG

## 2019-12-11 DIAGNOSIS — Z01.810 PREOP CARDIOVASCULAR EXAM: Primary | ICD-10-CM

## 2019-12-11 DIAGNOSIS — M54.16 LUMBAR RADICULOPATHY: ICD-10-CM

## 2019-12-11 DIAGNOSIS — E78.2 MIXED HYPERLIPIDEMIA: Chronic | ICD-10-CM

## 2019-12-11 DIAGNOSIS — Z91.89 AT RISK FOR CARDIOVASCULAR EVENT: ICD-10-CM

## 2019-12-11 DIAGNOSIS — M54.31 BILATERAL SCIATICA: ICD-10-CM

## 2019-12-11 DIAGNOSIS — Z91.89 SEDENTARY LIFESTYLE: ICD-10-CM

## 2019-12-11 DIAGNOSIS — G47.33 OSA (OBSTRUCTIVE SLEEP APNEA): ICD-10-CM

## 2019-12-11 DIAGNOSIS — M54.32 BILATERAL SCIATICA: ICD-10-CM

## 2019-12-11 DIAGNOSIS — I73.9 PAD (PERIPHERAL ARTERY DISEASE): ICD-10-CM

## 2019-12-11 DIAGNOSIS — I25.10 CORONARY ARTERY CALCIFICATION SEEN ON CAT SCAN: Chronic | ICD-10-CM

## 2019-12-11 DIAGNOSIS — I11.9 LVH (LEFT VENTRICULAR HYPERTROPHY) DUE TO HYPERTENSIVE DISEASE, WITHOUT HEART FAILURE: ICD-10-CM

## 2019-12-11 DIAGNOSIS — F51.01 PRIMARY INSOMNIA: ICD-10-CM

## 2019-12-11 DIAGNOSIS — Z78.9 EXCESSIVE CAFFEINE INTAKE: ICD-10-CM

## 2019-12-11 DIAGNOSIS — I10 ESSENTIAL HYPERTENSION: ICD-10-CM

## 2019-12-11 DIAGNOSIS — F17.200 SMOKER: ICD-10-CM

## 2019-12-11 DIAGNOSIS — I71.40 ABDOMINAL AORTIC ANEURYSM (AAA) WITHOUT RUPTURE: ICD-10-CM

## 2019-12-11 PROCEDURE — 99999 PR PBB SHADOW E&M-EST. PATIENT-LVL III: CPT | Mod: PBBFAC,,, | Performed by: INTERNAL MEDICINE

## 2019-12-11 PROCEDURE — 1159F PR MEDICATION LIST DOCUMENTED IN MEDICAL RECORD: ICD-10-PCS | Mod: ,,, | Performed by: INTERNAL MEDICINE

## 2019-12-11 PROCEDURE — 99214 OFFICE O/P EST MOD 30 MIN: CPT | Mod: S$PBB,,, | Performed by: INTERNAL MEDICINE

## 2019-12-11 PROCEDURE — 1126F PR PAIN SEVERITY QUANTIFIED, NO PAIN PRESENT: ICD-10-PCS | Mod: ,,, | Performed by: INTERNAL MEDICINE

## 2019-12-11 PROCEDURE — 1126F AMNT PAIN NOTED NONE PRSNT: CPT | Mod: ,,, | Performed by: INTERNAL MEDICINE

## 2019-12-11 PROCEDURE — 1159F MED LIST DOCD IN RCRD: CPT | Mod: ,,, | Performed by: INTERNAL MEDICINE

## 2019-12-11 PROCEDURE — 99213 OFFICE O/P EST LOW 20 MIN: CPT | Mod: PBBFAC | Performed by: INTERNAL MEDICINE

## 2019-12-11 PROCEDURE — 99214 PR OFFICE/OUTPT VISIT, EST, LEVL IV, 30-39 MIN: ICD-10-PCS | Mod: S$PBB,,, | Performed by: INTERNAL MEDICINE

## 2019-12-11 PROCEDURE — 99999 PR PBB SHADOW E&M-EST. PATIENT-LVL III: ICD-10-PCS | Mod: PBBFAC,,, | Performed by: INTERNAL MEDICINE

## 2019-12-11 NOTE — PROGRESS NOTES
Subjective:    Patient ID:  Miles Vazquez is a 64 y.o. male who presents for evaluation of Pre-op Exam (CAD)  For Hips and Legs pain over the past 6 years, progressive, smoker, HLD now on statin, for pre-lumbar neurosurgery scheduled for 1/10/2020  PCP and referred by Puja Finney NP   Vascular: Dr. Florian  Neurosurgeon and referred by Dr. 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    Health literacy: medium   Activities: do ADL and , but very limited, have to stop after 5 minutes due to pains in hip and legs, stable for pass year  Nicotine: 2-3 cp week, 75+ py  Alcohol: none for 3.5 years, hospitalized for 2 months for alcoholism  Illicit drugs: none  Cardiac symptoms: none  Home BP: do not check  Medication compliance: yes  Diet: regular  Caffeine: 2 cpd of coffee and a quart of tea a day, have restless sleep  Labs: 1/2019, .6, not on Rx, normal CMP, CBC (Hgb 9.9), iron sat 7%, ferritin 11, normal TSH, no A1C  01 & 08/2019:  No Troponin, A1C, BNP; LDL 62.2L on 40 mg of atorvastatin; TSH 1.082; Sodium 141; K+ 4.3; GFR >60; Glucose 75; WBC 9.09; Hemoglobin 12.6L  Last Echo: 3/2016  Last stress test: 5/2013  Cardiovascular angiogram: none  ECG: NSR 88, RBBB with LAFB  Fundoscopic exam: none for 4-5 years    In 6/2019:  WM referred for PAD and claudication, now under the care of Dr. Florian, CT pending. Problem noted for over 4 years progressive over the past month. Starts with exertion, after about 5 minutes and start in the hips then the entire leg and have to stop. Better after 5 minute of rest. Feels very limited. Continue to smoke some and obvious vascular calcifications on X-rays.    4/2019 had X-ray of hip - 1. Mild degenerative osteoarthrosis of the bilateral hips.  2. Chronic vascular calcifications.     Abdominal US - Chronic  bilobed distal abdominal aortic aneurysm.  This should be evaluated further with a dedicated contrast enhanced CT of the abdomen and pelvis.   Distal dilatation measures 3.8 cm AP x 4.1 cm transverse.     JOEL and duplex of LE - Ankle-brachial index of 0.86 on the right and 0.78 on the left.    Decreased velocity within the right common femoral artery suggesting a proximal focus of atherosclerotic stenosis.    Elevated velocity within the left profundal femoral artery consistent with a focus of atherosclerotic stenosis.    Elevated velocity within the distal left SFA and left popliteal artery consistent with foci of atherosclerotic stenosis.     Echo 3/2016 - CONCLUSIONS     1 - Concentric remodeling.     2 - Normal left ventricular systolic function (EF 60-65%).     3 - Left ventricular diastolic dysfunction.     4 - Mild left atrial enlargement.     5 - Normal right ventricular systolic function .     6 - Pericardial space ( see text). pericardial space is circumferentially filled with echodense material of unknown significance in this patient with known pleural plaques suggestive of Asbestos exposure.    Lexiscan 5/2013 - Nuclear Quantitative Functional Analysis:   LVEF: >= 70 % (normal is >= 51%)  LVED Volume: 74 ml (normal is <=171)  LVES Volume: 10 ml (normal is <=70)    Impression: NORMAL MYOCARDIAL PERFUSION  1. The perfusion scan is free of evidence for myocardial ischemia or injury.   2. Resting wall motion is physiologic.   3. Resting LV function is normal.  (normal is >= 51%)  4. The ventricular volumes are normal at rest and stress.   5. The extracardiac distribution of radioactivity is normal.   6. There was no previous study available to compare.     HPI comments: in 12/2019, return for pre-op clearance for lower back operation. Denies any cardiac symptoms but quit sedentary due to hip pains. Continue to smoke some. No heart worries and did not proceed with cardiac workup from last visit.    Aortogram  by Dr. Florian 8/2019  Abdominal aorta - Infrarenal:  The vessel has minimal luminal irregularities. There is a 4 cm aneursymin the mid segment.    Peripheral angiogram - Popliteal - Left Vessel:  The vessel has minimal luminal irregularities. There is a 80% stenosis in the mid segment of the vessel.        Review of Systems   Constitution: Negative for diaphoresis, fever, malaise/fatigue, night sweats and weight gain.   HENT: Negative for nosebleeds and tinnitus.    Eyes: Negative for visual disturbance.   Cardiovascular: Positive for claudication. Negative for chest pain, cyanosis, dyspnea on exertion, irregular heartbeat, leg swelling, near-syncope, orthopnea, palpitations and paroxysmal nocturnal dyspnea.   Respiratory: Positive for sleep disturbances due to breathing. Negative for cough, shortness of breath, snoring and wheezing.         Homer score 0   Endocrine: Negative for polydipsia and polyuria.   Hematologic/Lymphatic: Does not bruise/bleed easily.   Skin: Positive for itching. Negative for color change, flushing, nail changes, poor wound healing and suspicious lesions.   Musculoskeletal: Positive for muscle cramps. Negative for arthritis, falls, gout, joint pain, joint swelling, muscle weakness and myalgias.   Gastrointestinal: Negative for heartburn, hematemesis, hematochezia, melena and nausea.   Genitourinary: Positive for decreased libido and hesitancy.   Neurological: Positive for difficulty with concentration, loss of balance, numbness, paresthesias and sensory change. Negative for disturbances in coordination, excessive daytime sleepiness, dizziness, focal weakness, headaches, light-headedness, vertigo and weakness.        RLS improved with Requip   Psychiatric/Behavioral: Positive for memory loss. Negative for depression and substance abuse. The patient has insomnia. The patient is not nervous/anxious.      Constitution: Negative.        Neck 14.5; Waist 37; Hip 38   HENT: Negative.    Eyes:  "Negative.    Cardiovascular: Negative.    Respiratory: Positive for shortness of breath (On extream exertion and from pain in lower extrimities).    Endocrine: Negative.    Hematologic/Lymphatic: Negative.    Skin: Negative.    Musculoskeletal: Positive for joint pain (Hips), muscle cramps (Bilateral lower extrimities) and muscle weakness (bilat legs).   Gastrointestinal: Negative.    Genitourinary: Negative.    Neurological: Positive for difficulty with concentration.   Psychiatric/Behavioral: Positive for memory loss (Short term).   Allergic/Immunologic: Negative.       Objective:    Physical Exam   Constitutional: He is oriented to person, place, and time. He appears well-developed and well-nourished.   HENT:   Head: Normocephalic.   Eyes: Pupils are equal, round, and reactive to light. Conjunctivae and EOM are normal.   Neck: Normal range of motion. Neck supple. No JVD present. No thyromegaly present.   Circumference 14.5"   Cardiovascular: Normal rate, regular rhythm and intact distal pulses. Exam reveals distant heart sounds. Exam reveals no gallop and no friction rub.   No murmur heard.  Pulses:       Carotid pulses are 1+ on the right side, and 1+ on the left side.       Radial pulses are 1+ on the right side, and 1+ on the left side.        Femoral pulses are 1+ on the right side, and 1+ on the left side.       Popliteal pulses are 1+ on the right side, and 1+ on the left side.        Dorsalis pedis pulses are 0 on the right side, and 1+ on the left side.        Posterior tibial pulses are 0 on the right side, and 0 on the left side.   Pulmonary/Chest: Effort normal and breath sounds normal. He has no rales. He exhibits no tenderness.   Diminished breath sounds and prolong expiration.   Abdominal: Soft. Bowel sounds are normal. There is no tenderness.   Waist 36" increased to 37", hip 35"   Musculoskeletal: Normal range of motion. He exhibits no edema.   Lymphadenopathy:     He has no cervical adenopathy. "   Neurological: He is alert and oriented to person, place, and time.   Skin: Skin is warm and dry. No rash noted.         Assessment:       1. Preop cardiovascular exam    2. Smoker, 2-3 cp week, 75+ py, started age 12    3. BMI 23.0-23.9, adult    4. Abdominal aortic aneurysm (AAA) without rupture    5. At risk for cardiovascular event    6. Coronary artery calcification seen on CAT scan    7. Bilateral sciatica    8. Essential hypertension, Dx 2013    9. Excessive caffeine intake    10. Mixed hyperlipidemia    11. Lumbar radiculopathy    12. LVH (left ventricular hypertrophy) due to hypertensive disease, without heart failure    13. OMAR (obstructive sleep apnea), postive sleep study, never underwent CPAP titration    14. Primary insomnia    15. PAD (peripheral artery disease)    16. Sedentary lifestyle         Plan:       Miles was seen today for pre-op exam.    Diagnoses and all orders for this visit:    Preop cardiovascular exam    Smoker, 2-3 cp week, 75+ py, started age 12    BMI 23.0-23.9, adult    Abdominal aortic aneurysm (AAA) without rupture    At risk for cardiovascular event  -     Stress test with myocardial perfusion    Coronary artery calcification seen on CAT scan  -     Stress test with myocardial perfusion  -     Transthoracic echo (TTE) 2D with Color Flow    Bilateral sciatica    Essential hypertension, Dx 2013  -     Transthoracic echo (TTE) 2D with Color Flow    Excessive caffeine intake    Mixed hyperlipidemia    Lumbar radiculopathy    LVH (left ventricular hypertrophy) due to hypertensive disease, without heart failure  -     Transthoracic echo (TTE) 2D with Color Flow    OMAR (obstructive sleep apnea), postive sleep study, never underwent CPAP titration    Primary insomnia    PAD (peripheral artery disease)  -     Stress test with myocardial perfusion    Sedentary lifestyle    - All medical issues reviewed, continue current Rx.  - Need annual fundoscopic examination  - Reduce caffeine  intake to help with restless sleep  - CV status stable, all medications reviewed, patient acknowledge good understanding.  - Recommend healthy living: stop nicotine, avoid alcohol, healthy diet and regular exercise aiming for fitness, and weight control   - Instruction for Mediterranean diet and heart healthy exercise given.  - Highly recommend 30 minutes of exercise / activities daily, can have Sunday off, with 2-3 sessions of muscle strengthening weekly. A  would be very helpful.  - Recommend at least annual cardiovascular evaluation in view of patient's significant risk factors.  - Phone review / encourage use of [facility], clearance after test results.      Greater than 50% of the time was spent in counseling and coordination of care. The above assessment and plan have been discussed at length. Labs and procedure over the last 6 months reviewed. Problem List updated. Asked to bring in all active medications / pills bottles with next visit.

## 2019-12-11 NOTE — PROGRESS NOTES
Patient ID:  Miles Vazquez is a 64 y.o. male who presents  Pre-op Exam (CAD)      Health literacy: Medium   Activities: ADL's,, unable r/t lower ext pain, does exercise with resistance bands  Nicotine: Smoker  1-2 cigarette/day x50 years   Alcohol: Denies   Illicit drugs: Denies  Cardiac symptoms: None at Present   Home BP: Yes - Avg 120'/70's  Medication compliance: Yes  Diet: regular  Caffeine: 2 cups coffee/day, 2-3 Ice tea/day  Labs: 01 & 08/2019:  No Troponin, A1C, BNP; LDL 62.2L; TSH 1.082; Sodium 141; K+ 4.3; GFR >60; Glucose 75; WBC 9.09; Hemoglobin 12.6L  Last Echo: 03.31.2019  Last stress test: 05.02.2013  Cardiovascular angiogram: 2018 ([place])   ECG:   Fundoscopic exam: 2014, No retinopathy noted    No flowsheet data found.      HPI    Review of Systems   Constitution: Negative.        Neck 14.5; Waist 37; Hip 38   HENT: Negative.    Eyes: Negative.    Cardiovascular: Negative.    Respiratory: Positive for shortness of breath (On extream exertion and from pain in lower extrimities).         [place] = 0   Endocrine: Negative.    Hematologic/Lymphatic: Negative.    Skin: Negative.    Musculoskeletal: Positive for joint pain (Hips), muscle cramps (Bilateral lower extrimities) and muscle weakness (bilat legs).   Gastrointestinal: Negative.    Genitourinary: Negative.    Neurological: Positive for difficulty with concentration.   Psychiatric/Behavioral: Positive for memory loss (Short term).   Allergic/Immunologic: Negative.         Objective:    Physical Exam      Assessment:       No diagnosis found.     Plan:         There are no diagnoses linked to this encounter.

## 2019-12-23 ENCOUNTER — HOSPITAL ENCOUNTER (OUTPATIENT)
Dept: CARDIOLOGY | Facility: HOSPITAL | Age: 64
Discharge: HOME | End: 2019-12-23
Attending: NURSE PRACTITIONER
Payer: MEDICARE

## 2019-12-23 ENCOUNTER — HOSPITAL ENCOUNTER (OUTPATIENT)
Dept: RADIOLOGY | Facility: HOSPITAL | Age: 64
Discharge: HOME | End: 2019-12-23
Attending: NURSE PRACTITIONER
Payer: MEDICARE

## 2019-12-23 DIAGNOSIS — Z01.818 PREOPERATIVE CLEARANCE: ICD-10-CM

## 2019-12-23 PROCEDURE — 93005 ELECTROCARDIOGRAM TRACING: CPT

## 2019-12-23 PROCEDURE — 71046 X-RAY EXAM CHEST 2 VIEWS: CPT | Mod: TC,FY

## 2019-12-23 PROCEDURE — 71046 XR CHEST PA AND LATERAL: ICD-10-PCS | Mod: 26,,, | Performed by: RADIOLOGY

## 2019-12-23 PROCEDURE — 71046 X-RAY EXAM CHEST 2 VIEWS: CPT | Mod: 26,,, | Performed by: RADIOLOGY

## 2020-01-02 ENCOUNTER — TELEPHONE (OUTPATIENT)
Dept: CARDIOLOGY | Facility: CLINIC | Age: 65
End: 2020-01-02

## 2020-01-02 ENCOUNTER — OFFICE VISIT (OUTPATIENT)
Dept: GASTROENTEROLOGY | Facility: CLINIC | Age: 65
End: 2020-01-02
Payer: MEDICARE

## 2020-01-02 VITALS
OXYGEN SATURATION: 95 % | DIASTOLIC BLOOD PRESSURE: 71 MMHG | WEIGHT: 146 LBS | BODY MASS INDEX: 22.91 KG/M2 | HEART RATE: 81 BPM | HEIGHT: 67 IN | RESPIRATION RATE: 16 BRPM | SYSTOLIC BLOOD PRESSURE: 126 MMHG

## 2020-01-02 DIAGNOSIS — Z86.010 HISTORY OF COLON POLYPS: Primary | ICD-10-CM

## 2020-01-02 PROCEDURE — 99214 OFFICE O/P EST MOD 30 MIN: CPT | Mod: PBBFAC,PN | Performed by: INTERNAL MEDICINE

## 2020-01-02 PROCEDURE — 99999 PR PBB SHADOW E&M-EST. PATIENT-LVL IV: CPT | Mod: PBBFAC,,, | Performed by: INTERNAL MEDICINE

## 2020-01-02 PROCEDURE — 99999 PR PBB SHADOW E&M-EST. PATIENT-LVL IV: ICD-10-PCS | Mod: PBBFAC,,, | Performed by: INTERNAL MEDICINE

## 2020-01-02 PROCEDURE — 1159F MED LIST DOCD IN RCRD: CPT | Mod: ,,, | Performed by: INTERNAL MEDICINE

## 2020-01-02 PROCEDURE — 99214 PR OFFICE/OUTPT VISIT, EST, LEVL IV, 30-39 MIN: ICD-10-PCS | Mod: S$PBB,,, | Performed by: INTERNAL MEDICINE

## 2020-01-02 PROCEDURE — 99214 OFFICE O/P EST MOD 30 MIN: CPT | Mod: S$PBB,,, | Performed by: INTERNAL MEDICINE

## 2020-01-02 PROCEDURE — 1159F PR MEDICATION LIST DOCUMENTED IN MEDICAL RECORD: ICD-10-PCS | Mod: ,,, | Performed by: INTERNAL MEDICINE

## 2020-01-02 RX ORDER — TEMAZEPAM 15 MG/1
CAPSULE ORAL
COMMUNITY
Start: 2019-12-26 | End: 2020-05-18

## 2020-01-02 NOTE — PROGRESS NOTES
Subjective:       Patient ID: Miles Vazquez is a 64 y.o. male.    Chief Complaint: Follow-up    He is scheduled for lumbar spine surgery in a couple weeks.  He has chronic pain. When he ambulates the pain radiates he from his back and to his extremities.  The pain is relieved by rest.  He has peripheral vascular disease and is followed by the surgeon.  It was determined that the back pain and weakness in the legs was not associated with the with the vascular system but with the back pain.  He has been evaluated and treated by Dr. Armas.  He has been cleared for his lumbar surgery.  Wants to wait to have his colonoscopy.  He has a history of diverticulosis and the colon polyps.  He is having daily bowel movements.  He denies hematemesis hematochezia jaundice or bleeding.  Last visit I reviewed the procedure of colonoscopy with him.  He understands the procedure and realizes there is an extremely small incidence of bleeding perforation or aspiration.      Allergies:  Review of patient's allergies indicates:   Allergen Reactions    Seroquel [quetiapine] Other (See Comments)     RESTLESS LEGS       Medications:    Current Outpatient Medications:     albuterol (PROVENTIL/VENTOLIN HFA) 90 mcg/actuation inhaler, Inhale 2 puffs into the lungs every 6 (six) hours as needed for Wheezing., Disp: 3 Inhaler, Rfl: 3    amitriptyline (ELAVIL) 100 MG tablet, Take 1 tablet (100 mg total) by mouth every evening., Disp: 90 tablet, Rfl: 3    aspirin 81 MG Chew, Take 81 mg by mouth once daily., Disp: , Rfl:     atorvastatin (LIPITOR) 40 MG tablet, Take 1 tablet (40 mg total) by mouth every evening., Disp: 90 tablet, Rfl: 3    benazepril (LOTENSIN) 20 MG tablet, Take 1 tablet (20 mg total) by mouth once daily., Disp: 90 tablet, Rfl: 3    cilostazol (PLETAL) 100 MG Tab, Take 1 tablet (100 mg total) by mouth 2 (two) times daily before meals., Disp: 180 tablet, Rfl: 3    cyanocobalamin (VITAMIN B-12) 1000 MCG tablet, Take 0.5  "tablets (500 mcg total) by mouth once daily., Disp: 60 tablet, Rfl: 12    ferrous sulfate (FEOSOL) 325 mg (65 mg iron) Tab tablet, Take 1 tablet (325 mg total) by mouth 2 (two) times daily. Try on empty stomach first, Disp: 60 tablet, Rfl: 11    multivit-min/FA/lycopen/lutein (CENTRUM SILVER MEN ORAL), Take by mouth., Disp: , Rfl:     rOPINIRole (REQUIP) 1 MG tablet, Take 1 tablet (1 mg total) by mouth every evening., Disp: 90 tablet, Rfl: 3    temazepam (RESTORIL) 15 mg Cap, , Disp: , Rfl:     Past Medical History:   Diagnosis Date    Abdominal aortic aneurysm (AAA) 05/2019    3.2 cm per CTA    Abnormal findings on esophagogastroduodenoscopy (EGD) 04/2016    "intestinal metaplasia" which is a "risk factor" for gastric cancer.      Bursitis of both hips     Colon polyp     COPD (chronic obstructive pulmonary disease) 2016    Coronary artery calcification seen on CAT scan 4/24/2013    Depression 2016    Accompanied drinking problem    HTN (hypertension) 1/7/2013    Hyperlipidemia 2019    Lumbar disc disease 06/2019    grade 1 spondylolisthesis of L5 on S1    Macrocytic anemia 3/27/2016    Personal history of colonic polyps 2017    Recovering alcoholic 03/16/2016    RLS (restless legs syndrome) 2014    Sleep apnea 2 years ago    does not use cpap       Past Surgical History:   Procedure Laterality Date    ANGIOGRAPHY OF LOWER EXTREMITY  08/14/2019    AORTOGRAPHY WITH SERIALOGRAPHY N/A 8/14/2019    Procedure: AORTOGRAM, WITH RUNOFF;  Surgeon: Alan Florian MD;  [facility] LAB;  Service: Peripheral Vascular;  Laterality: N/A;    APPENDECTOMY N/A 9/26/2018    Procedure: APPENDECTOMY;  Surgeon: Ramírez Horner MD;  Location[facility];  Service: General;  Laterality: N/A;    COLONOSCOPY  2017    with polyps repeat 5 yrs- [place]    ESOPHAGOGASTRODUODENOSCOPY N/A 9/26/2018    Procedure: EGD (ESOPHAGOGASTRODUODENOSCOPY);  Surgeon: Ramírez Horner, " MD;  Location: [facility] OR;  Service: General;  Laterality: N/A;  WITH BX    HERNIA REPAIR N/A 9/26/2018    Procedure: REPAIR, HERNIA;  Surgeon: Ramírez Horner MD;  Location: [facility] OR;  Service: General;  Laterality: N/A;  INTERNAL LYSIS WITH REDUCTION OF INTERNAL HERNIA    SMALL INTESTINE SURGERY  09/2018         Review of Systems   Constitutional: Negative for appetite change, fever and unexpected weight change.   HENT: Negative for trouble swallowing.         No jaundice.   Respiratory: Negative for cough, shortness of breath and wheezing.         He is a cigarette smoker.  The past he has been offered the smoking cessation program.  He denies dyspnea on exertion but he but he has limited physical activity due to the back pain a week legs.  He denies aspiration dysphagia hemoptysis chronic cough or chronic sputum production.   Cardiovascular: Negative for chest pain.        He denies exertional chest pain.  He is not as physically active.  He denies abnormal rhythm disturbance.  He has history of coronary artery disease and is followed by the cardiologist.   Gastrointestinal: Negative for abdominal distention, abdominal pain, anal bleeding, blood in stool and constipation.   Musculoskeletal: Positive for arthralgias and back pain. Negative for neck pain.   Skin: Negative for pallor and rash.   Neurological: Negative for dizziness, seizures, syncope, speech difficulty, weakness and numbness.   Hematological: Negative for adenopathy.   Psychiatric/Behavioral: Negative for confusion.       Objective:      Physical Exam   Constitutional: He is oriented to person, place, and time. He appears well-developed and well-nourished.   Thin elderly appearing nonicteric white male.  He has a firm hand .   HENT:   Head: Normocephalic.   Eyes: Pupils are equal, round, and reactive to light. EOM are normal.   Neck: Normal range of motion. Neck supple. No tracheal deviation present. No thyromegaly present.    Cardiovascular: Normal rate, regular rhythm and normal heart sounds.   Pulmonary/Chest: Effort normal and breath sounds normal.   Abdominal: Soft. Bowel sounds are normal. He exhibits no distension and no mass. There is no tenderness. There is no rebound and no guarding.   Musculoskeletal: Normal range of motion.   He can ambulate normally but slowly.  He can go from the sitting to the standing position without difficulty.   Lymphadenopathy:     He has no cervical adenopathy.   Neurological: He is alert and oriented to person, place, and time. No cranial nerve deficit.   Skin: Skin is warm and dry.   Psychiatric: He has a normal mood and affect. His behavior is normal.   Vitals reviewed.        Plan:       History of colon polyps     He will follow up after his back surgery and schedule the colonoscopy.  In the interval he continues his current medications diet and activities.  He was offered the smoking cessation program and will consider this option.  He follows up with his cardiologist.

## 2020-01-02 NOTE — PATIENT INSTRUCTIONS
He is scheduled for back surgery on January 10th.  He is having chronic back and extremity pain with ambulation.  He has a history of colon polyps but he denies gastrointestinal symptoms.  His colonoscopy will be postponed.  He has been evaluated by the vascular surgeon and the cardiologist and cleared for surgery.  He will continue his current medications diet and activity.

## 2020-01-02 NOTE — TELEPHONE ENCOUNTER
Spoke with pt and gave him the number to central scheduling.      ----- Message from Arik Lisa MA sent at 1/2/2020  4:31 PM CST -----  Contact: self [phone]      ----- Message -----  From: Kim Wang  Sent: 1/2/2020   4:02 PM CST  To: Paw[facility] ARANGO Staff    He said that he is having low back surgery with Dr Foote on 1/10.  Today they informed him that he needs a stress test and EKG.  Please call him to schedule.  Thank you!

## 2020-01-02 NOTE — TELEPHONE ENCOUNTER
Spoke with Nilda at Dr. Foote's office and explained that the patient has to complete the stress and the echo prior to Dr. Armas gives him a surgical clearance.  She verbalized an understanding and stated that she will call the patient and discuss same.  No other concerns voiced during this encounter.    ----- Message from Adelaida Chin sent at 1/2/2020  2:46 PM CST -----  Contact: Dr Foote's Office  Dr Foote's Office calling wanting to speak to the office regarding cardiac clearance for surgery 1/10/2020...[phone]

## 2020-01-02 NOTE — LETTER
January 2, 2020      Puja Finney, NP  [address]  [address]  [place] [address]           [facility]  [address], [address]  [place] [address]  Phone: [phone]  Fax: [phone]          Patient: Miles Vazquez   MR Number: [id number]   YOB: 1955   Date of Visit: 1/2/2020       Dear Puja Finney:    Thank you for referring Miles Vazquez to me for evaluation. Attached you will find relevant portions of my assessment and plan of care.    If you have questions, please do not hesitate to call me. I look forward to following Miles Vazquez along with you.    Sincerely,    Aldo Whitten MD    Enclosure  CC:  No Recipients    If you would like to receive this communication electronically, please contact [email or web address] or [phone] to request more information on [employer] Link access.    For providers and/or their staff who would like to refer a patient to [facility], please contact us through our one-stop-shop provider referral line, [facility], at [phone].    If you feel you have received this communication in error or would no longer like to receive these types of communications, please e-mail [email or web address]

## 2020-01-03 ENCOUNTER — DOCUMENTATION ONLY (OUTPATIENT)
Dept: FAMILY MEDICINE | Facility: CLINIC | Age: 65
End: 2020-01-03

## 2020-01-03 ENCOUNTER — TELEPHONE (OUTPATIENT)
Dept: FAMILY MEDICINE | Facility: CLINIC | Age: 65
End: 2020-01-03

## 2020-01-03 NOTE — PROGRESS NOTES
"Faxed office note to Dr. Florian's office for pre-op.    Confirmation received.    Your fax has been successfully sent to [phone] at [phone].  ------------------------------------------------------------  From: [id number]  ------------------------------------------------------------  1/3/2020 8:24:28 AM Transmission Record          Sent to [id number] with remote ID "[id number]"          Result: (0/339;2/2) Unknown          Page record: 1 - 6          Elapsed time: 03:00 on channel 43    1/3/2020 8:32:37 AM Transmission Record          Sent to [id number] with remote ID "[id number]"          Result: (0/339;0/0) Success          Page record: 1 - 14          Elapsed time: 05:17 on channel 7  "

## 2020-01-03 NOTE — TELEPHONE ENCOUNTER
----- Message from Promise Kerr sent at 1/3/2020  1:19 PM CST -----  Contact: patient  Type: Needs Medical Advice  Who Called:  patient  Best Call Back Number: [phone]  Additional Information: Patient states he would like to speak with Miya regarding his upcoming surgery.  Please call to advise.Thanks!

## 2020-01-09 ENCOUNTER — TELEPHONE (OUTPATIENT)
Dept: FAMILY MEDICINE | Facility: CLINIC | Age: 65
End: 2020-01-09

## 2020-01-09 DIAGNOSIS — I73.9 CLAUDICATION OF BOTH LOWER EXTREMITIES: ICD-10-CM

## 2020-01-09 DIAGNOSIS — F10.21 RECOVERING ALCOHOLIC IN REMISSION: ICD-10-CM

## 2020-01-09 DIAGNOSIS — F17.200 SMOKER: ICD-10-CM

## 2020-01-09 DIAGNOSIS — M54.16 LUMBAR RADICULOPATHY: Primary | ICD-10-CM

## 2020-01-09 DIAGNOSIS — M47.816 LUMBAR FACET ARTHROPATHY: ICD-10-CM

## 2020-01-09 NOTE — TELEPHONE ENCOUNTER
01/09/2020 Patient is requesting  Back and Spine in [place]. There are Neuroscience, Pain management and Physicial Med and Rehab. Not sure which one you want. I pended. Thanks Please advise        ----- Message from Burke Connolly sent at 1/9/2020 11:58 AM CST -----  Contact: pt  Type: Needs Medical Advice    Who Called:  pt    Best Call Back Number: [phone]  Additional Information: pt would like referral to Back and Spine team in [place] or [place] so can be seen by [facility] and have surgery done at [facility] instead. Please call when referral in to back and spine so can schedule. Thanks.

## 2020-01-10 ENCOUNTER — PATIENT MESSAGE (OUTPATIENT)
Dept: FAMILY MEDICINE | Facility: CLINIC | Age: 65
End: 2020-01-10

## 2020-01-10 ENCOUNTER — TELEPHONE (OUTPATIENT)
Dept: NEUROSURGERY | Facility: CLINIC | Age: 65
End: 2020-01-10

## 2020-01-10 NOTE — TELEPHONE ENCOUNTER
----- Message from Roxy Valderrama PA-C sent at 1/10/2020  2:08 PM CST -----  Please schedule this patient to see Dr. Harris is clinic this week, Monday or Tuesday. There is a referral in system. He should have some outside imaging.     Thanks!    Roxy

## 2020-01-10 NOTE — TELEPHONE ENCOUNTER
Miya, I know you faxed a request to Dr. Florian on 1/3/2020. We did not receive records. Please notify pt referral sent to the closest Neurosurgeon ([facility]) Let him know I used to work with Dr. Harris and I marked it urgent to get him in soon. I have also notified the physician myself.    Finally, please tell him I need him to complete the carotid US I ordered in September.

## 2020-01-10 NOTE — TELEPHONE ENCOUNTER
Called patient and LM to advise to have US done per NP note.      Discussed with NP that we did receive an US from Dr. Walker office last week and it has since been sent to scanning.

## 2020-01-13 ENCOUNTER — TELEPHONE (OUTPATIENT)
Dept: FAMILY MEDICINE | Facility: CLINIC | Age: 65
End: 2020-01-13

## 2020-01-13 ENCOUNTER — TELEPHONE (OUTPATIENT)
Dept: NEUROSURGERY | Facility: CLINIC | Age: 65
End: 2020-01-13

## 2020-01-13 ENCOUNTER — DOCUMENTATION ONLY (OUTPATIENT)
Dept: FAMILY MEDICINE | Facility: CLINIC | Age: 65
End: 2020-01-13

## 2020-01-13 NOTE — PROGRESS NOTES
06/27/2019 Carotid Ultrasound received from Dr. Florian's office, signed by provider, and sent to scanning.

## 2020-01-13 NOTE — TELEPHONE ENCOUNTER
CHUCKIE left with Dr. Florian's office to call NP office/cell related to recommended repeating of carotid US in 6 mo (done 6/2019).     Lumbar surgery anticipated by 1/23/2020 (pre-op expires) yet US not scheduled until 1/31/2020.

## 2020-01-14 ENCOUNTER — PATIENT MESSAGE (OUTPATIENT)
Dept: FAMILY MEDICINE | Facility: CLINIC | Age: 65
End: 2020-01-14

## 2020-01-14 ENCOUNTER — TELEPHONE (OUTPATIENT)
Dept: NEUROSURGERY | Facility: CLINIC | Age: 65
End: 2020-01-14

## 2020-01-14 ENCOUNTER — TELEPHONE (OUTPATIENT)
Dept: FAMILY MEDICINE | Facility: CLINIC | Age: 65
End: 2020-01-14

## 2020-01-14 ENCOUNTER — OFFICE VISIT (OUTPATIENT)
Dept: NEUROSURGERY | Facility: CLINIC | Age: 65
End: 2020-01-14
Payer: MEDICARE

## 2020-01-14 VITALS
SYSTOLIC BLOOD PRESSURE: 115 MMHG | WEIGHT: 147 LBS | DIASTOLIC BLOOD PRESSURE: 71 MMHG | HEART RATE: 89 BPM | BODY MASS INDEX: 23.07 KG/M2 | HEIGHT: 67 IN

## 2020-01-14 DIAGNOSIS — M54.16 LUMBAR RADICULOPATHY: ICD-10-CM

## 2020-01-14 DIAGNOSIS — M43.16 SPONDYLOLISTHESIS OF LUMBAR REGION: Primary | ICD-10-CM

## 2020-01-14 DIAGNOSIS — M43.07 SPONDYLOLYSIS OF LUMBOSACRAL REGION: ICD-10-CM

## 2020-01-14 PROCEDURE — 1126F AMNT PAIN NOTED NONE PRSNT: CPT | Mod: ,,, | Performed by: NEUROLOGICAL SURGERY

## 2020-01-14 PROCEDURE — 1159F MED LIST DOCD IN RCRD: CPT | Mod: ,,, | Performed by: NEUROLOGICAL SURGERY

## 2020-01-14 PROCEDURE — 1159F PR MEDICATION LIST DOCUMENTED IN MEDICAL RECORD: ICD-10-PCS | Mod: ,,, | Performed by: NEUROLOGICAL SURGERY

## 2020-01-14 PROCEDURE — 99999 PR PBB SHADOW E&M-EST. PATIENT-LVL III: CPT | Mod: PBBFAC,,, | Performed by: NEUROLOGICAL SURGERY

## 2020-01-14 PROCEDURE — 99999 PR PBB SHADOW E&M-EST. PATIENT-LVL III: ICD-10-PCS | Mod: PBBFAC,,, | Performed by: NEUROLOGICAL SURGERY

## 2020-01-14 PROCEDURE — 99204 PR OFFICE/OUTPT VISIT, NEW, LEVL IV, 45-59 MIN: ICD-10-PCS | Mod: S$PBB,,, | Performed by: NEUROLOGICAL SURGERY

## 2020-01-14 PROCEDURE — 1126F PR PAIN SEVERITY QUANTIFIED, NO PAIN PRESENT: ICD-10-PCS | Mod: ,,, | Performed by: NEUROLOGICAL SURGERY

## 2020-01-14 PROCEDURE — 99204 OFFICE O/P NEW MOD 45 MIN: CPT | Mod: S$PBB,,, | Performed by: NEUROLOGICAL SURGERY

## 2020-01-14 PROCEDURE — 99213 OFFICE O/P EST LOW 20 MIN: CPT | Mod: PBBFAC,PN | Performed by: NEUROLOGICAL SURGERY

## 2020-01-14 NOTE — TELEPHONE ENCOUNTER
Please telephone patient to schedule his carotid US to be done prior to spine surgery. NP talked with Dr. Florian's office yesterday.

## 2020-01-14 NOTE — TELEPHONE ENCOUNTER
01/14/2020 2:03pm-- Sent patient a portal message notifing him of CT orders placed and for him to call to schedule.

## 2020-01-14 NOTE — TELEPHONE ENCOUNTER
Hi! Can you please assist in getting this gentleman scheduled for EMG? The order is placed. Thank you!

## 2020-01-14 NOTE — LETTER
January 14, 2020      Puja Finney, NP  [address]  [address]  [facility] [facility] [place] [address]           [facility]  [address] [place] [address]  Phone: [phone]  Fax: [phone]          Patient: Miles Vazquez   MR Number: [id number]   YOB: 1955   Date of Visit: 1/14/2020       Dear Puja Finney:    Thank you for referring Miles Vazquez to me for evaluation. Attached you will find relevant portions of my assessment and plan of care.    If you have questions, please do not hesitate to call me. I look forward to following Miles Vazquez along with you.    Sincerely,    Raciel Harris MD    Enclosure  CC:  No Recipients    If you would like to receive this communication electronically, please contact [email or web address] or [phone] to request more information on [employer] Link access.    For providers and/or their staff who would like to refer a patient to [facility], please contact us through our one-stop-shop provider referral line, [facility], at [phone].    If you feel you have received this communication in error or would no longer like to receive these types of communications, please e-mail [email or web address]

## 2020-01-14 NOTE — PROGRESS NOTES
"Neurosurgery History and Physical    Patient ID: Miles Vazquez is a 64 y.o. male.    Chief Complaint   Patient presents with    Lumbar Spine Pain (L-Spine)     Pt presenting for evaluation of hip and leg pain onset 6-7 years. Pain is describes as burning. Pain starts in bilateral hips and extends down bilateral legs into both feet. Numbness at bottom of bilateral feet. No DM. Walking long distances onsets pain. Sitting relieves pain. PT 1.5 years ago no relief.        Review of Systems   Constitutional: Negative.    HENT: Negative.    Eyes: Negative.    Respiratory: Negative.    Cardiovascular: Negative.    Gastrointestinal: Negative.    Endocrine: Negative.    Musculoskeletal: Positive for arthralgias, back pain and gait problem.   Skin: Negative.    Allergic/Immunologic: Negative.    Neurological: Positive for numbness.   Hematological: Negative.    Psychiatric/Behavioral: Negative.        Past Medical History:   Diagnosis Date    Abdominal aortic aneurysm (AAA) 05/2019    3.2 cm per CTA    Abnormal findings on esophagogastroduodenoscopy (EGD) 04/2016    "intestinal metaplasia" which is a "risk factor" for gastric cancer.      Bursitis of both hips     Colon polyp     COPD (chronic obstructive pulmonary disease) 2016    Coronary artery calcification seen on CAT scan 4/24/2013    Depression 2016    Accompanied drinking problem    HTN (hypertension) 1/7/2013    Hyperlipidemia 2019    Lumbar disc disease 06/2019    grade 1 spondylolisthesis of L5 on S1    Macrocytic anemia 3/27/2016    Personal history of colonic polyps 2017    Recovering alcoholic 03/16/2016    RLS (restless legs syndrome) 2014    Sleep apnea 2 years ago    does not use cpap     Social History     Socioeconomic History    Marital status: Single     Spouse name: Not on file    Number of children: Not on file    Years of education: Not on file    Highest education level: Not on file   Occupational History    Not on file "   Social Needs    Financial resource strain: Not on file    Food insecurity:     Worry: Not on file     Inability: Not on file    Transportation needs:     Medical: Not on file     Non-medical: Not on file   Tobacco Use    Smoking status: Current Some Day Smoker     Years: 50.00     Types: Cigarettes    Smokeless tobacco: Never Used    Tobacco comment: 1 or 2 cigarettes a day    Substance and Sexual Activity    Alcohol use: Not Currently     Frequency: Never     Comment: Quit drinking in March 2016    Drug use: Not Currently     Types: Marijuana     Comment: In the past.    Sexual activity: Not Currently     Partners: Female   Lifestyle    Physical activity:     Days per week: Not on file     Minutes per session: Not on file    Stress: Not on file   Relationships    Social connections:     Talks on phone: Not on file     Gets together: Not on file     Attends Gnosticism service: Not on file     Active member of club or organization: Not on file     Attends meetings of clubs or organizations: Not on file     Relationship status: Not on file   Other Topics Concern    Not on file   Social History Narrative    Not on file     Family History   Problem Relation Age of Onset    Tuberculosis Maternal Grandmother     Stomach cancer Neg Hx     Colon cancer Neg Hx      Review of patient's allergies indicates:   Allergen Reactions    Seroquel [quetiapine] Other (See Comments)     RESTLESS LEGS       Current Outpatient Medications:     albuterol (PROVENTIL/VENTOLIN HFA) 90 mcg/actuation inhaler, Inhale 2 puffs into the lungs every 6 (six) hours as needed for Wheezing., Disp: 3 Inhaler, Rfl: 3    amitriptyline (ELAVIL) 100 MG tablet, Take 1 tablet (100 mg total) by mouth every evening., Disp: 90 tablet, Rfl: 3    aspirin 81 MG Chew, Take 81 mg by mouth once daily., Disp: , Rfl:     atorvastatin (LIPITOR) 40 MG tablet, Take 1 tablet (40 mg total) by mouth every evening., Disp: 90 tablet, Rfl: 3    benazepril  "(LOTENSIN) 20 MG tablet, Take 1 tablet (20 mg total) by mouth once daily., Disp: 90 tablet, Rfl: 3    cilostazol (PLETAL) 100 MG Tab, Take 1 tablet (100 mg total) by mouth 2 (two) times daily before meals., Disp: 180 tablet, Rfl: 3    cyanocobalamin (VITAMIN B-12) 1000 MCG tablet, Take 0.5 tablets (500 mcg total) by mouth once daily., Disp: 60 tablet, Rfl: 12    ferrous sulfate (FEOSOL) 325 mg (65 mg iron) Tab tablet, Take 1 tablet (325 mg total) by mouth 2 (two) times daily. Try on empty stomach first, Disp: 60 tablet, Rfl: 11    multivit-min/FA/lycopen/lutein (CENTRUM SILVER MEN ORAL), Take by mouth., Disp: , Rfl:     rOPINIRole (REQUIP) 1 MG tablet, Take 1 tablet (1 mg total) by mouth every evening., Disp: 90 tablet, Rfl: 3    temazepam (RESTORIL) 15 mg Cap, , Disp: , Rfl:   Blood pressure 115/71, pulse 89, height 5' 7" (1.702 m), weight 66.7 kg (147 lb).      Neurologic Exam     Mental Status   Oriented to person, place, and time.   Attention: normal. Concentration: normal.   Speech: speech is normal   Level of consciousness: alert  Knowledge: good.     Cranial Nerves     CN II   Visual acuity: normal    CN III, IV, VI   Pupils are equal, round, and reactive to light.  Extraocular motions are normal.     CN V   Facial sensation intact.     CN VII   Facial expression full, symmetric.     CN VIII   Hearing: intact    CN IX, X   Palate: symmetric    CN XI   CN XI normal.     CN XII   CN XII normal.     Motor Exam   Muscle bulk: normal  Overall muscle tone: normal  Right arm pronator drift: absent  Left arm pronator drift: absent    Strength   Right deltoid: 5/5  Left deltoid: 5/5  Right biceps: 5/5  Left biceps: 5/5  Right triceps: 5/5  Left triceps: 5/5  Right wrist flexion: 5/5  Left wrist flexion: 5/5  Right wrist extension: 5/5  Left wrist extension: 5/5  Right interossei: 5/5  Left interossei: 5/5  Right iliopsoas: 5/5  Left iliopsoas: 5/5  Right quadriceps: 5/5  Left quadriceps: 5/5  Right hamstring: " "5/5  Left hamstrin/5  Right anterior tibial: 5/5  Left anterior tibial: 5/5  Right posterior tibial: 5/5  Left posterior tibial: 5/5  Right peroneal: 5/5  Left peroneal: 5/5  Right gastroc: 5/5  Left gastroc: 5/5    Sensory Exam   Light touch normal.   Sensory deficit distribution on right: L5  Sensory deficit distribution on left: L5    Gait, Coordination, and Reflexes     Gait  Gait: normal    Coordination   Romberg: negative  Finger to nose coordination: normal    Tremor   Resting tremor: absent    Reflexes   Right brachioradialis: 2+  Left brachioradialis: 2+  Right biceps: 2+  Left biceps: 2+  Right triceps: 2+  Left triceps: 2+  Right patellar: 3+  Left patellar: 3+  Right achilles: 0  Left achilles: 0  Right plantar: upgoing  Left plantar: upgoing  Right Mcbride: absent  Left Mcbride: absent  Right ankle clonus: absent  Left ankle clonus: absent      Physical Exam   Constitutional: He is oriented to person, place, and time.   Eyes: Pupils are equal, round, and reactive to light. EOM are normal.   Neurological: He is oriented to person, place, and time. He has a normal Finger-Nose-Finger Test and a normal Romberg Test. Gait normal.   Reflex Scores:       Tricep reflexes are 2+ on the right side and 2+ on the left side.       Bicep reflexes are 2+ on the right side and 2+ on the left side.       Brachioradialis reflexes are 2+ on the right side and 2+ on the left side.       Patellar reflexes are 3+ on the right side and 3+ on the left side.       Achilles reflexes are 0 on the right side and 0 on the left side.  Psychiatric: His speech is normal.       Vital Signs  Pulse: 89  BP: 115/71  Pain Score: 0-No pain  Height and Weight  Height: 5' 7" (170.2 cm)  Weight: 66.7 kg (147 lb)  BSA (Calculated - sq m): 1.78 sq meters  BMI (Calculated): 23  Weight in (lb) to have BMI = 25: 159.3]    Provider dictation:  I reviewed the imaging.  There are bilateral L5 pars defects and a grade 1 L5-S1 spondylolisthesis with " associated severe bilateral foraminal stenosis.    Mr. Miles Vazquez is a 64 year old male who presents for neurosurgical consultation referred by Puja Finney NP. Patient reports B/L leg pain that starts with ambulating for about 3-4 minutes and he needs to sit down.. The pain starts in bilateral hips and shoots down both legs posteriorly into both feet. There is numbness/tingling in both feet that is constant. This started about 7 years ago. Pain has not worsened over time. He denies any pain with sitting. He completed PT in 2016 with some improvement. He reports prior B/L hip injections with no relief. Denies any prior DAVIE. Patient was previously seen by Dr. Foote who was recommending surgery. He continues to smoke about 2-3 cigarettes/week.  He denies any signs or symptoms of cervical myelopathy.    On exam patient has decreased sensation in B/L L5 distribution. Full strength. 3+ B/L patella reflex and absent B/L achilles reflex. + babinski B/L.     The etiology of his symptoms is most likely neurogenic claudication from bilateral L5 radiculopathy secondary to the foraminal stenosis and suspected instability at L5-S1.  There may also be a contribution to his numbness from peripheral neuropathy given his history of heavy alcohol uset.  He will need EMG/nerve conduction test of the lower extremities to better characterize his symptoms as well as dynamic x-rays of the lumbar spine to determine whether there is any dynamic instability at L5-S1.  I have also discussed that should he be a surgical candidate for L5-S1 fusion, he will absolutely need to completely quit smoking.  Follow-up after the above.    Visit Diagnosis:  Spondylolisthesis of lumbar region  -     X-Ray Lumbar Complete With Flex And Ext; Future; Expected date: 01/14/2020  -     EMG W/ ULTRASOUND AND NERVE CONDUCTION TEST 2 Extremities; Future    Lumbar radiculopathy  -     EMG W/ ULTRASOUND AND NERVE CONDUCTION TEST 2 Extremities;  Future    Spondylolysis of lumbosacral region

## 2020-01-15 ENCOUNTER — HOSPITAL ENCOUNTER (OUTPATIENT)
Dept: RADIOLOGY | Facility: HOSPITAL | Age: 65
Discharge: HOME | End: 2020-01-15
Attending: PHYSICIAN ASSISTANT
Payer: MEDICARE

## 2020-01-15 DIAGNOSIS — M43.16 SPONDYLOLISTHESIS OF LUMBAR REGION: ICD-10-CM

## 2020-01-15 PROCEDURE — 72110 XR LUMBAR SPINE 5 VIEW WITH FLEX AND EXT: ICD-10-PCS | Mod: 26,,, | Performed by: RADIOLOGY

## 2020-01-15 PROCEDURE — 72110 X-RAY EXAM L-2 SPINE 4/>VWS: CPT | Mod: TC,FY

## 2020-01-15 PROCEDURE — 72110 X-RAY EXAM L-2 SPINE 4/>VWS: CPT | Mod: 26,,, | Performed by: RADIOLOGY

## 2020-01-17 ENCOUNTER — TELEPHONE (OUTPATIENT)
Dept: FAMILY MEDICINE | Facility: CLINIC | Age: 65
End: 2020-01-17

## 2020-01-17 DIAGNOSIS — R09.89 BRUIT: ICD-10-CM

## 2020-01-17 DIAGNOSIS — I65.21 STENOSIS OF RIGHT CAROTID ARTERY: Primary | ICD-10-CM

## 2020-01-17 NOTE — TELEPHONE ENCOUNTER
I updated the order to a CTA of neck thus cancel the US      Please telephone patient about required carotid US to be done prior to surgery vs sending message via portal. Thank you.

## 2020-01-20 ENCOUNTER — HOSPITAL ENCOUNTER (OUTPATIENT)
Dept: RADIOLOGY | Facility: HOSPITAL | Age: 65
Discharge: HOME | End: 2020-01-20
Attending: NURSE PRACTITIONER
Payer: MEDICARE

## 2020-01-20 DIAGNOSIS — I65.21 STENOSIS OF RIGHT CAROTID ARTERY: ICD-10-CM

## 2020-01-20 DIAGNOSIS — R09.89 BRUIT: ICD-10-CM

## 2020-01-20 PROCEDURE — 70498 CT ANGIOGRAPHY NECK: CPT | Mod: TC

## 2020-01-20 PROCEDURE — 25500020 PHARM REV CODE 255: Performed by: NURSE PRACTITIONER

## 2020-01-20 PROCEDURE — 70498 CT ANGIOGRAPHY NECK: CPT | Mod: 26,,, | Performed by: RADIOLOGY

## 2020-01-20 PROCEDURE — 70498 CTA NECK: ICD-10-PCS | Mod: 26,,, | Performed by: RADIOLOGY

## 2020-01-20 RX ADMIN — IOHEXOL 75 ML: 350 INJECTION, SOLUTION INTRAVENOUS at 08:01

## 2020-01-22 ENCOUNTER — OFFICE VISIT (OUTPATIENT)
Dept: PHYSICAL MEDICINE AND REHAB | Facility: CLINIC | Age: 65
End: 2020-01-22
Payer: MEDICARE

## 2020-01-22 DIAGNOSIS — M54.16 LUMBAR RADICULOPATHY: ICD-10-CM

## 2020-01-22 DIAGNOSIS — M43.16 SPONDYLOLISTHESIS OF LUMBAR REGION: ICD-10-CM

## 2020-01-22 PROCEDURE — 99999 PR PBB SHADOW E&M-EST. PATIENT-LVL I: CPT | Mod: PBBFAC,,, | Performed by: PHYSICAL MEDICINE & REHABILITATION

## 2020-01-22 PROCEDURE — 99499 UNLISTED E&M SERVICE: CPT | Mod: S$PBB,,, | Performed by: PHYSICAL MEDICINE & REHABILITATION

## 2020-01-22 PROCEDURE — 99499 NO LOS: ICD-10-PCS | Mod: S$PBB,,, | Performed by: PHYSICAL MEDICINE & REHABILITATION

## 2020-01-22 PROCEDURE — 99999 PR PBB SHADOW E&M-EST. PATIENT-LVL I: ICD-10-PCS | Mod: PBBFAC,,, | Performed by: PHYSICAL MEDICINE & REHABILITATION

## 2020-01-22 PROCEDURE — 99211 OFF/OP EST MAY X REQ PHY/QHP: CPT | Mod: PBBFAC,PN | Performed by: PHYSICAL MEDICINE & REHABILITATION

## 2020-01-22 NOTE — LETTER
January 24, 2020      Roxy Valderrama PA-C  [address] [facility] [address] [place] [address]           [facility]  [address] [place] [address]  [address]  [place] [address]  Phone: [phone]  Fax: [phone]          Patient: Miles Vazquez   MR Number: [id number]   YOB: 1955   Date of Visit: 1/22/2020       Dear Roxy Valderrama:    Thank you for referring Miles Vazquez to me for evaluation. Attached you will find relevant portions of my assessment and plan of care.    If you have questions, please do not hesitate to call me. I look forward to following Miles Vazquez along with you.    Sincerely,    Junior Elizabeth MD    Enclosure  CC:  No Recipients    If you would like to receive this communication electronically, please contact [email or web address] or [phone] to request more information on [email or web address] Link access.    For providers and/or their staff who would like to refer a patient to [facility], please contact us through our one-stop-shop provider referral line, [facility], at [phone].    If you feel you have received this communication in error or would no longer like to receive these types of communications, please e-mail [email or web address]

## 2020-01-26 ENCOUNTER — PATIENT OUTREACH (OUTPATIENT)
Dept: ADMINISTRATIVE | Facility: OTHER | Age: 65
End: 2020-01-26

## 2020-01-28 ENCOUNTER — OFFICE VISIT (OUTPATIENT)
Dept: PHYSICAL MEDICINE AND REHAB | Facility: CLINIC | Age: 65
End: 2020-01-28
Payer: MEDICARE

## 2020-01-28 DIAGNOSIS — G62.9 SENSORY NEUROPATHY: ICD-10-CM

## 2020-01-28 DIAGNOSIS — M54.16 LUMBAR RADICULOPATHY: Primary | ICD-10-CM

## 2020-01-28 PROCEDURE — 95886 PR EMG COMPLETE, W/ NERVE CONDUCTION STUDIES, 5+ MUSCLES: ICD-10-PCS | Mod: 26,S$PBB,, | Performed by: PHYSICAL MEDICINE & REHABILITATION

## 2020-01-28 PROCEDURE — 99999 PR PBB SHADOW E&M-EST. PATIENT-LVL I: CPT | Mod: PBBFAC,,, | Performed by: PHYSICAL MEDICINE & REHABILITATION

## 2020-01-28 PROCEDURE — 95886 MUSC TEST DONE W/N TEST COMP: CPT | Mod: 26,S$PBB,, | Performed by: PHYSICAL MEDICINE & REHABILITATION

## 2020-01-28 PROCEDURE — 99499 NO LOS: ICD-10-PCS | Mod: S$PBB,,, | Performed by: PHYSICAL MEDICINE & REHABILITATION

## 2020-01-28 PROCEDURE — 95886 MUSC TEST DONE W/N TEST COMP: CPT | Mod: PBBFAC,PN | Performed by: PHYSICAL MEDICINE & REHABILITATION

## 2020-01-28 PROCEDURE — 99211 OFF/OP EST MAY X REQ PHY/QHP: CPT | Mod: PBBFAC,PN,25 | Performed by: PHYSICAL MEDICINE & REHABILITATION

## 2020-01-28 PROCEDURE — 99499 UNLISTED E&M SERVICE: CPT | Mod: S$PBB,,, | Performed by: PHYSICAL MEDICINE & REHABILITATION

## 2020-01-28 PROCEDURE — 95909 NRV CNDJ TST 5-6 STUDIES: CPT | Mod: 26,S$PBB,, | Performed by: PHYSICAL MEDICINE & REHABILITATION

## 2020-01-28 PROCEDURE — 95909 PR NERVE CONDUCTION STUDY; 5-6 STUDIES: ICD-10-PCS | Mod: 26,S$PBB,, | Performed by: PHYSICAL MEDICINE & REHABILITATION

## 2020-01-28 PROCEDURE — 95909 NRV CNDJ TST 5-6 STUDIES: CPT | Mod: PBBFAC,PN | Performed by: PHYSICAL MEDICINE & REHABILITATION

## 2020-01-28 PROCEDURE — 99999 PR PBB SHADOW E&M-EST. PATIENT-LVL I: ICD-10-PCS | Mod: PBBFAC,,, | Performed by: PHYSICAL MEDICINE & REHABILITATION

## 2020-01-28 NOTE — PROCEDURES
Procedures       [facility]  Physical Medicine and Rehabilitation   [address] [place] [address], [address]  [place] [address]             Full Name: Miles Saldaña Patient ID: [id number]      Visit Date: 1/28/2020 07:47  Examining Physician: Juniro Elizabeth D.O.       Sensory NCS      Nerve / Sites Rec. Site Onset Lat Peak Lat NP Amp Segments Distance Velocity     ms ms µV  cm m/s   L Sural - Ankle (Calf)      Calf Ankle NR NR NR Calf - Ankle 14 NR   R Sural - Ankle (Calf)      Calf Ankle NR NR NR Calf - Ankle 14 NR       Motor NCS      Nerve / Sites Muscle Latency Amplitude Amp % Duration Segments Distance Lat Diff Velocity     ms mV % ms  cm ms m/s   L Peroneal - EDB      Ankle EDB 5.00 2.5 100 5.99 Ankle - EDB 8        Fib head EDB 12.71 2.2 88 7.08 Fib head - Ankle 31 7.71 40   R Peroneal - EDB      Ankle EDB 5.31 3.4 100 6.20 Ankle - EDB 8        Fib head EDB 12.34 3.5 103 6.87 Fib head - Ankle 32 7.03 46   L Tibial - AH      Ankle AH 3.91 8.6 100 5.89 Ankle - AH 8        Pop fossa AH 11.30 6.3 73.8 6.82 Pop fossa - Ankle 32 7.40 43   R Tibial - AH      Ankle AH 4.22 6.0 100 6.98 Ankle - AH 8        Pop fossa AH 11.88 5.3 87.6 8.44 Pop fossa - Ankle 36 7.66 47       EMG Summary Table     Spontaneous MUAP Recruitment   Muscle IA Fib PSW Fasc Other Amp Dur. PPP Pattern   R. Vastus medialis N None None None . N N N N   R. Tibialis anterior INC None None None . N N N N   R. Peroneus longus INC None None None Myodis N INC 2+ DEC   R. Gastrocnemius (Medial head) N None None None . N N N N   R. Extensor hallucis longus INC None None None . N N N N   R. Dorsal interossei (pedis) N None None None . N N N N   R. Lumbosacral paraspinals INC 2+ 2+ None        L. Vastus medialis N None None None . N N N N   L. Tibialis anterior INC None None None . N N N N   L. Peroneus longus INC None None None Myodis N INC 2+ DEC   L. Gastrocnemius (Medial head) N None None None . N N N N   L. Extensor hallucis longus INC None None None  . DEC INC 2+ DEC   L. Dorsal interossei (pedis) N None None None . N N N N   L. Gluteus Medius INC None None None . N N N N   L. Gluteus Pedro INC None None None . N N N N   L. Lumbosacral paraspinals INC 2+ 2+ None -           Summary    The motor conduction test was performed on 4 nerve(s). The results were normal in 3 nerve(s): R Peroneal - EDB, L Tibial - AH, R Tibial - AH. Results outside the specified normal range were found in 1 nerve(s), as follows:   In the L Peroneal - EDB study  o the peak amplitude result was reduced for Ankle stimulation    The sensory conduction test had results outside of the specified normal range in all 2 of the tested nerves:   In the L Sural - Ankle (Calf) study  o the response was considered absent for Calf stimulation   In the R Sural - Ankle (Calf) study  o the response was considered absent for Calf stimulation    The EMG study showed chronic denervation and reinnervation in the right and left peroneus longus and extensor hallucis longus.  There was evidence of acute denervation in the lumbosacral paraspinals on the right and left.      Impression:  Abnormal examination.  There is electrodiagnostic evidence of:  1. Acute on chronic bilateral L5 radiculopathies  2. Sensory peripheral neuropathy affecting the lower extremities.    Clinical history:  The chief complaint of bilateral leg pain starting at the hips and radiating down the lower extremities is consistent with the above findings.  His symptoms are consistent with neurogenic claudication.  He has MRI evidence of grade 1-2 anterolisthesis of L5 on S1 with bilateral neural foraminal narrowing.  He also has findings of sensory neuropathy affecting the lower extremities.  This is likely secondary to history of alcohol abuse.  He will follow up with Dr. Harris scheduled.     ____________________________  Junior Elizabeth D.O.  Board-Certified by the American Board of Physical Medicine and  Rehabilitation  Board-Certified by the American Board of Electrodiagnostic Medicine

## 2020-02-05 ENCOUNTER — TELEPHONE (OUTPATIENT)
Dept: NEUROSURGERY | Facility: CLINIC | Age: 65
End: 2020-02-05

## 2020-02-05 NOTE — TELEPHONE ENCOUNTER
----- Message from Katharine Waters sent at 2/5/2020 12:01 PM CST -----  Contact: pt  Pt called and wanted to touch  base with  and see if he has to do any other test and see about setting up surgery    Can be reached at [phone]

## 2020-02-09 ENCOUNTER — PATIENT OUTREACH (OUTPATIENT)
Dept: ADMINISTRATIVE | Facility: OTHER | Age: 65
End: 2020-02-09

## 2020-02-17 ENCOUNTER — PATIENT OUTREACH (OUTPATIENT)
Dept: ADMINISTRATIVE | Facility: OTHER | Age: 65
End: 2020-02-17

## 2020-02-18 ENCOUNTER — OFFICE VISIT (OUTPATIENT)
Dept: NEUROSURGERY | Facility: CLINIC | Age: 65
End: 2020-02-18
Payer: MEDICARE

## 2020-02-18 VITALS
WEIGHT: 145.06 LBS | DIASTOLIC BLOOD PRESSURE: 66 MMHG | SYSTOLIC BLOOD PRESSURE: 122 MMHG | BODY MASS INDEX: 22.77 KG/M2 | HEIGHT: 67 IN

## 2020-02-18 DIAGNOSIS — M43.06 LUMBAR PARS DEFECT: ICD-10-CM

## 2020-02-18 DIAGNOSIS — M43.16 SPONDYLOLISTHESIS OF LUMBAR REGION: ICD-10-CM

## 2020-02-18 DIAGNOSIS — M54.16 LUMBAR RADICULOPATHY: Primary | ICD-10-CM

## 2020-02-18 PROCEDURE — 99213 OFFICE O/P EST LOW 20 MIN: CPT | Mod: PBBFAC,PN | Performed by: NEUROLOGICAL SURGERY

## 2020-02-18 PROCEDURE — 99999 PR PBB SHADOW E&M-EST. PATIENT-LVL III: ICD-10-PCS | Mod: PBBFAC,,, | Performed by: NEUROLOGICAL SURGERY

## 2020-02-18 PROCEDURE — 99214 OFFICE O/P EST MOD 30 MIN: CPT | Mod: S$PBB,,, | Performed by: NEUROLOGICAL SURGERY

## 2020-02-18 PROCEDURE — 99999 PR PBB SHADOW E&M-EST. PATIENT-LVL III: CPT | Mod: PBBFAC,,, | Performed by: NEUROLOGICAL SURGERY

## 2020-02-18 PROCEDURE — 99214 PR OFFICE/OUTPT VISIT, EST, LEVL IV, 30-39 MIN: ICD-10-PCS | Mod: S$PBB,,, | Performed by: NEUROLOGICAL SURGERY

## 2020-02-18 NOTE — PROGRESS NOTES
"Neurosurgery History and Physical    Patient ID: Miles Vazqeuz is a 64 y.o. male.    Chief Complaint   Patient presents with    Follow-up     Patient here for results of EMG and xray.       Review of Systems   Constitutional: Negative.    HENT: Negative.    Eyes: Negative.    Respiratory: Negative.    Cardiovascular: Negative.    Gastrointestinal: Negative.    Endocrine: Negative.    Musculoskeletal: Positive for arthralgias, back pain and gait problem.   Skin: Negative.    Allergic/Immunologic: Negative.    Neurological: Positive for numbness.   Hematological: Negative.    Psychiatric/Behavioral: Negative.        Past Medical History:   Diagnosis Date    Abdominal aortic aneurysm (AAA) 05/2019    3.2 cm per CTA    Abnormal findings on esophagogastroduodenoscopy (EGD) 04/2016    "intestinal metaplasia" which is a "risk factor" for gastric cancer.      Bursitis of both hips     Colon polyp     COPD (chronic obstructive pulmonary disease) 2016    Coronary artery calcification seen on CAT scan 4/24/2013    Depression 2016    Accompanied drinking problem    HTN (hypertension) 1/7/2013    Hyperlipidemia 2019    Lumbar disc disease 06/2019    grade 1 spondylolisthesis of L5 on S1    Macrocytic anemia 3/27/2016    Personal history of colonic polyps 2017    Recovering alcoholic 03/16/2016    RLS (restless legs syndrome) 2014    Sleep apnea 2 years ago    does not use cpap     Social History     Socioeconomic History    Marital status: Single     Spouse name: Not on file    Number of children: Not on file    Years of education: Not on file    Highest education level: Not on file   Occupational History    Not on file   Social Needs    Financial resource strain: Not on file    Food insecurity:     Worry: Not on file     Inability: Not on file    Transportation needs:     Medical: Not on file     Non-medical: Not on file   Tobacco Use    Smoking status: Current Some Day Smoker     Years: 50.00     " Types: Cigarettes    Smokeless tobacco: Never Used    Tobacco comment: 1 or 2 cigarettes a day    Substance and Sexual Activity    Alcohol use: Not Currently     Frequency: Never     Comment: Quit drinking in March 2016    Drug use: Not Currently     Types: Marijuana     Comment: In the past.    Sexual activity: Not Currently     Partners: Female   Lifestyle    Physical activity:     Days per week: Not on file     Minutes per session: Not on file    Stress: Not on file   Relationships    Social connections:     Talks on phone: Not on file     Gets together: Not on file     Attends Yarsanism service: Not on file     Active member of club or organization: Not on file     Attends meetings of clubs or organizations: Not on file     Relationship status: Not on file   Other Topics Concern    Not on file   Social History Narrative    Not on file     Family History   Problem Relation Age of Onset    Tuberculosis Maternal Grandmother     Stomach cancer Neg Hx     Colon cancer Neg Hx      Review of patient's allergies indicates:   Allergen Reactions    Seroquel [quetiapine] Other (See Comments)     RESTLESS LEGS       Current Outpatient Medications:     albuterol (PROVENTIL/VENTOLIN HFA) 90 mcg/actuation inhaler, Inhale 2 puffs into the lungs every 6 (six) hours as needed for Wheezing., Disp: 3 Inhaler, Rfl: 3    amitriptyline (ELAVIL) 100 MG tablet, Take 1 tablet (100 mg total) by mouth every evening., Disp: 90 tablet, Rfl: 3    aspirin 81 MG Chew, Take 81 mg by mouth once daily., Disp: , Rfl:     atorvastatin (LIPITOR) 40 MG tablet, Take 1 tablet (40 mg total) by mouth every evening., Disp: 90 tablet, Rfl: 3    benazepril (LOTENSIN) 20 MG tablet, Take 1 tablet (20 mg total) by mouth once daily., Disp: 90 tablet, Rfl: 3    cilostazol (PLETAL) 100 MG Tab, Take 1 tablet (100 mg total) by mouth 2 (two) times daily before meals., Disp: 180 tablet, Rfl: 3    cyanocobalamin (VITAMIN B-12) 1000 MCG tablet, Take  "0.5 tablets (500 mcg total) by mouth once daily., Disp: 60 tablet, Rfl: 12    ferrous sulfate (FEOSOL) 325 mg (65 mg iron) Tab tablet, Take 1 tablet (325 mg total) by mouth 2 (two) times daily. Try on empty stomach first, Disp: 60 tablet, Rfl: 11    multivit-min/FA/lycopen/lutein (CENTRUM SILVER MEN ORAL), Take by mouth., Disp: , Rfl:     rOPINIRole (REQUIP) 1 MG tablet, Take 1 tablet (1 mg total) by mouth every evening., Disp: 90 tablet, Rfl: 3    temazepam (RESTORIL) 15 mg Cap, , Disp: , Rfl:   Blood pressure 122/66, height 5' 7" (1.702 m), weight 65.8 kg (145 lb 1 oz).      Neurologic Exam     Mental Status   Oriented to person, place, and time.   Attention: normal. Concentration: normal.   Speech: speech is normal   Level of consciousness: alert  Knowledge: good.     Cranial Nerves     CN II   Visual acuity: normal    CN III, IV, VI   Pupils are equal, round, and reactive to light.  Extraocular motions are normal.     CN V   Facial sensation intact.     CN VII   Facial expression full, symmetric.     CN VIII   Hearing: intact    CN IX, X   Palate: symmetric    CN XI   CN XI normal.     CN XII   CN XII normal.     Motor Exam   Muscle bulk: normal  Overall muscle tone: normal  Right arm pronator drift: absent  Left arm pronator drift: absent    Strength   Right deltoid: 5/5  Left deltoid: 5/5  Right biceps: 5/5  Left biceps: 5/5  Right triceps: 5/5  Left triceps: 5/5  Right wrist flexion: 5/5  Left wrist flexion: 5/5  Right wrist extension: 5/5  Left wrist extension: 5/5  Right interossei: 5/5  Left interossei: 5/5  Right iliopsoas: 5/5  Left iliopsoas: 5/5  Right quadriceps: 5/5  Left quadriceps: 5/5  Right hamstrin/5  Left hamstrin/5  Right anterior tibial: 5/5  Left anterior tibial: 5/5  Right posterior tibial: 5/5  Left posterior tibial: 5/5  Right peroneal: 5/5  Left peroneal: 5/5  Right gastroc: 5/5  Left gastroc: 5/5    Sensory Exam   Light touch normal.   Sensory deficit distribution on right: " "L5  Sensory deficit distribution on left: L5    Gait, Coordination, and Reflexes     Gait  Gait: normal    Coordination   Romberg: negative  Finger to nose coordination: normal    Tremor   Resting tremor: absent    Reflexes   Right brachioradialis: 2+  Left brachioradialis: 2+  Right biceps: 2+  Left biceps: 2+  Right triceps: 2+  Left triceps: 2+  Right patellar: 3+  Left patellar: 3+  Right achilles: 0  Left achilles: 0  Right plantar: upgoing  Left plantar: upgoing  Right Mcbride: absent  Left Mcbride: absent  Right ankle clonus: absent  Left ankle clonus: absent      Physical Exam   Constitutional: He is oriented to person, place, and time.   Eyes: Pupils are equal, round, and reactive to light. EOM are normal.   Neurological: He is oriented to person, place, and time. He has a normal Finger-Nose-Finger Test and a normal Romberg Test. Gait normal.   Reflex Scores:       Tricep reflexes are 2+ on the right side and 2+ on the left side.       Bicep reflexes are 2+ on the right side and 2+ on the left side.       Brachioradialis reflexes are 2+ on the right side and 2+ on the left side.       Patellar reflexes are 3+ on the right side and 3+ on the left side.       Achilles reflexes are 0 on the right side and 0 on the left side.  Psychiatric: His speech is normal.       Vital Signs  BP: 122/66  Pain Score: 0-No pain  Height and Weight  Height: 5' 7" (170.2 cm)  Weight: 65.8 kg (145 lb 1 oz)  BSA (Calculated - sq m): 1.76 sq meters  BMI (Calculated): 22.7  Weight in (lb) to have BMI = 25: 159.3]    Provider dictation:  I reviewed the imaging.  There are bilateral L5 pars defects and a grade 1 L5-S1 spondylolisthesis with associated severe bilateral foraminal stenosis.  X-ray with flex/ext shows instability at L5-S1.   EMG/NCS shows bilateral L5 radiculopathy.     Mr. Miles Vazquez is a 64 year old male who presents for neurosurgical follow-up. During prior visit patient reported B/L leg pain that starts with " ambulating for about 3-4 minutes and he needs to sit down. The pain starts in bilateral hips and shoots down both legs posteriorly into both feet. There is numbness/tingling in both feet that is constant. This started about 7 years ago. Pain has not worsened over time. He denies any pain with sitting. He completed PT in 2016 with some improvement. He reports prior B/L hip injections with no relief. Denies any prior DAVIE. Patient was previously seen by Dr. Foote who was recommending surgery. He continues to smoke about 2-3 cigarettes/week.  He denies any signs or symptoms of cervical myelopathy.    Since last visit patient denies any changes in symptoms.     On exam patient has decreased sensation in B/L L5 distribution. Full strength. 3+ B/L patella reflex and absent B/L achilles reflex. + babinski B/L.     The etiology of his symptoms is due to neurogenic claudication from bilateral L5 radiculopathy secondary to the foraminal stenosis and instability at L5-S1.      He will absolutely need to completely quit smoking. Currently he smokes about 2 cigarettes/week and agrees to quit smoking prior to surgery.     I have offered the patient a right L5-S1 minimally invasive transforaminal lumbar interbody fusion and posterior instrumented fusion to decompress his nerve roots and stabilize his unstable segment. I have discussed the risks, benefits and alternatives to the proposed operation in detail. All questions were answered. Risks discussed include but are not limited to: bleeding, infection, pain, scarring, spinal fluid leak, injury to the spinal cord or nerves, injury to the blood vessels, stroke, heart attack, blood clots, malpositioning or failure of hardware, failure of fusion, pseudoarthrosis, adjacent level disease, no improvement or worsening of symptoms, need for more surgery, death. He wishes to proceed.      We will need clearance from cardiology vs. vascular surgery regarding abdominal aortic aneurysm which  has increased in size according to recent lumbar x-ray radiology read. We will also obtain PCP clearance prior to surgery.    Visit Diagnosis:  1. Lumbar radiculopathy     2. Spondylolisthesis of lumbar region     3. Lumbar pars defect

## 2020-02-26 ENCOUNTER — TELEPHONE (OUTPATIENT)
Dept: NEUROSURGERY | Facility: CLINIC | Age: 65
End: 2020-02-26

## 2020-02-28 ENCOUNTER — TELEPHONE (OUTPATIENT)
Dept: NEUROSURGERY | Facility: CLINIC | Age: 65
End: 2020-02-28

## 2020-02-28 NOTE — TELEPHONE ENCOUNTER
"Patient returned my call regarding confirming his surgery date. Patient stated that he would like to postpone surgery at this time. He said he wanted to " take care of a few things before surgery." Explained risks associated with delay of surgery and patient should call our office asap for increased weakness or new onset bladder or bowel incontinence.  "

## 2020-03-23 DIAGNOSIS — G25.81 RESTLESS LEG SYNDROME: ICD-10-CM

## 2020-03-24 RX ORDER — ROPINIROLE 1 MG/1
1 TABLET, FILM COATED ORAL NIGHTLY
Qty: 30 TABLET | Refills: 11 | Status: SHIPPED | OUTPATIENT
Start: 2020-03-24 | End: 2020-10-08 | Stop reason: SDUPTHER

## 2020-04-01 ENCOUNTER — PATIENT OUTREACH (OUTPATIENT)
Dept: ADMINISTRATIVE | Facility: OTHER | Age: 65
End: 2020-04-01

## 2020-05-05 ENCOUNTER — PATIENT MESSAGE (OUTPATIENT)
Dept: ADMINISTRATIVE | Facility: HOSPITAL | Age: 65
End: 2020-05-05

## 2020-05-15 ENCOUNTER — PATIENT MESSAGE (OUTPATIENT)
Dept: FAMILY MEDICINE | Facility: CLINIC | Age: 65
End: 2020-05-15

## 2020-05-18 ENCOUNTER — OFFICE VISIT (OUTPATIENT)
Dept: FAMILY MEDICINE | Facility: CLINIC | Age: 65
End: 2020-05-18
Payer: MEDICARE

## 2020-05-18 ENCOUNTER — TELEPHONE (OUTPATIENT)
Dept: FAMILY MEDICINE | Facility: CLINIC | Age: 65
End: 2020-05-18

## 2020-05-18 DIAGNOSIS — F51.01 PRIMARY INSOMNIA: Primary | ICD-10-CM

## 2020-05-18 PROCEDURE — 99213 PR OFFICE/OUTPT VISIT, EST, LEVL III, 20-29 MIN: ICD-10-PCS | Mod: 95,,, | Performed by: NURSE PRACTITIONER

## 2020-05-18 PROCEDURE — 99213 OFFICE O/P EST LOW 20 MIN: CPT | Mod: 95,,, | Performed by: NURSE PRACTITIONER

## 2020-05-18 RX ORDER — ZOLPIDEM TARTRATE 5 MG/1
TABLET ORAL
Qty: 60 TABLET | Refills: 0 | Status: SHIPPED | OUTPATIENT
Start: 2020-05-18 | End: 2020-06-29 | Stop reason: SDUPTHER

## 2020-05-18 NOTE — TELEPHONE ENCOUNTER
The patient sent a very vague message about inability to sleep.  Please call patient and offer a virtual visit for he and I to discuss this further

## 2020-05-18 NOTE — PROGRESS NOTES
"Subjective:       Patient ID: Miles Vazquez is a 64 y.o. male.    Chief Complaint: No chief complaint on file.    The patient location is: Home  The chief complaint leading to consultation is: Can't sleep    Visit type: audiovisual    Face to Face time with patient: 15 minutes of total time spent on the encounter, which includes face to face time and non-face to face time preparing to see the patient (eg, review of tests), Obtaining and/or reviewing separately obtained history, Documenting clinical information in the electronic or other health record, Independently interpreting results (not separately reported) and communicating results to the patient/family/caregiver, or Care coordination (not separately reported).         Each patient to whom he or she provides medical services by telemedicine is:  (1) informed of the relationship between the physician and patient and the respective role of any other health care provider with respect to management of the patient; and (2) notified that he or she may decline to receive medical services by telemedicine and may withdraw from such care at any time.    Notes:     The patient is a 64-year-old male that presents with complaints of inability to sleep ever since he quit drinking 4 years ago.  He has tried Seroquel which was very effective yet caused restless leg syndrome.  Recently taken amitriptyline which was somewhat effective yet still only getting 2 hr of sleep at a time.  He has tried Restoril which was not as effective as the amitriptyline.   He has never tried Ambien  He reports practicing good sleep hygiene and does not take daytime naps.     Past Medical History:   Diagnosis Date    Abdominal aortic aneurysm (AAA) 05/2019    3.2 cm per CTA    Abnormal findings on esophagogastroduodenoscopy (EGD) 04/2016    "intestinal metaplasia" which is a "risk factor" for gastric cancer.      Bursitis of both hips     Colon polyp     COPD (chronic obstructive pulmonary " disease) 2016    Coronary artery calcification seen on CAT scan 4/24/2013    Depression 2016    Accompanied drinking problem    HTN (hypertension) 1/7/2013    Hyperlipidemia 2019    Lumbar disc disease 06/2019    grade 1 spondylolisthesis of L5 on S1    Macrocytic anemia 3/27/2016    Personal history of colonic polyps 2017    Recovering alcoholic 03/16/2016    RLS (restless legs syndrome) 2014    Sleep apnea 2 years ago    does not use cpap       Past Surgical History:   Procedure Laterality Date    ANGIOGRAPHY OF LOWER EXTREMITY  08/14/2019    AORTOGRAPHY WITH SERIALOGRAPHY N/A 8/14/2019    Procedure: AORTOGRAM, WITH RUNOFF;  Surgeon: Alan Florian MD;  Location: [facility] CATH/EP LAB;  Service: Peripheral Vascular;  Laterality: N/A;    APPENDECTOMY N/A 9/26/2018    Procedure: APPENDECTOMY;  Surgeon: Ramírez Horner MD;  Location: [facility] OR;  Service: General;  Laterality: N/A;    COLONOSCOPY  2017    [place]ps repeat 5 yrs- [place]    ESOPHAGOGASTRODUODENOSCOPY N/A 9/26/2018    Procedure: EGD (ESOPHAGOGASTRODUODENOSCOPY);  Surgeon: Ramírez Horner MD;  Location: [facility] OR;  Service: General;  Laterality: N/A;  WITH BX    HERNIA REPAIR N/A 9/26/2018    Procedure: REPAIR, HERNIA;  Surgeon: Ramírez Horner MD;  Location: [facility];  Service: General;  Laterality: N/A;  INTERNAL LYSIS WITH REDUCTION OF INTERNAL HERNIA    SMALL INTESTINE SURGERY  09/2018        Social History     Socioeconomic History    Marital status: Single     Spouse name: Not on file    Number of children: Not on file    Years of education: Not on file    Highest education level: Not on file   Occupational History    Not on file   Social Needs    Financial resource strain: Not on file    Food insecurity:     Worry: Not on file     Inability: Not on file    Transportation needs:     Medical: Not on file     Non-medical: Not on file   Tobacco Use    Smoking status: Current Some Day  Smoker     Years: 50.00     Types: Cigarettes    Smokeless tobacco: Never Used    Tobacco comment: 1 or 2 cigarettes a day    Substance and Sexual Activity    Alcohol use: Not Currently     Frequency: Never     Comment: Quit drinking in March 2016    Drug use: Not Currently     Types: Marijuana     Comment: In the past.    Sexual activity: Not Currently     Partners: Female   Lifestyle    Physical activity:     Days per week: Not on file     Minutes per session: Not on file    Stress: Not on file   Relationships    Social connections:     Talks on phone: Not on file     Gets together: Not on file     Attends Church service: Not on file     Active member of club or organization: Not on file     Attends meetings of clubs or organizations: Not on file     Relationship status: Not on file   Other Topics Concern    Not on file   Social History Narrative    Not on file       Family History   Problem Relation Age of Onset    Tuberculosis Maternal Grandmother     Stomach cancer Neg Hx     Colon cancer Neg Hx        Review of patient's allergies indicates:   Allergen Reactions    Seroquel [quetiapine] Other (See Comments)     RESTLESS LEGS          Current Outpatient Medications:     albuterol (PROVENTIL/VENTOLIN HFA) 90 mcg/actuation inhaler, Inhale 2 puffs into the lungs every 6 (six) hours as needed for Wheezing., Disp: 3 Inhaler, Rfl: 3    amitriptyline (ELAVIL) 100 MG tablet, Take 1 tablet (100 mg total) by mouth every evening., Disp: 90 tablet, Rfl: 3    aspirin 81 MG Chew, Take 81 mg by mouth once daily., Disp: , Rfl:     atorvastatin (LIPITOR) 40 MG tablet, Take 1 tablet (40 mg total) by mouth every evening., Disp: 90 tablet, Rfl: 3    benazepril (LOTENSIN) 20 MG tablet, Take 1 tablet (20 mg total) by mouth once daily., Disp: 90 tablet, Rfl: 3    cilostazol (PLETAL) 100 MG Tab, Take 1 tablet (100 mg total) by mouth 2 (two) times daily before meals., Disp: 180 tablet, Rfl: 3    cyanocobalamin  (VITAMIN B-12) 1000 MCG tablet, Take 0.5 tablets (500 mcg total) by mouth once daily., Disp: 60 tablet, Rfl: 12    ferrous sulfate (FEOSOL) 325 mg (65 mg iron) Tab tablet, Take 1 tablet (325 mg total) by mouth 2 (two) times daily. Try on empty stomach first, Disp: 60 tablet, Rfl: 11    multivit-min/FA/lycopen/lutein (CENTRUM SILVER MEN ORAL), Take by mouth., Disp: , Rfl:     rOPINIRole (REQUIP) 1 MG tablet, Take 1 tablet (1 mg total) by mouth every evening., Disp: 30 tablet, Rfl: 11    zolpidem (AMBIEN) 5 MG Tab, 1-2 tabs as needed for insomnia., Disp: 60 tablet, Rfl: 0    HPI  Review of Systems   Constitutional: Negative for activity change and unexpected weight change.   HENT: Negative for hearing loss, rhinorrhea and trouble swallowing.    Eyes: Negative for discharge and visual disturbance.   Respiratory: Negative for chest tightness and wheezing.    Cardiovascular: Negative for chest pain and palpitations.   Gastrointestinal: Negative for blood in stool, constipation, diarrhea and vomiting.   Endocrine: Negative for polydipsia and polyuria.   Genitourinary: Negative for difficulty urinating, hematuria and urgency.   Musculoskeletal: Positive for arthralgias. Negative for joint swelling and neck pain.   Neurological: Negative for weakness and headaches.   Psychiatric/Behavioral: Positive for sleep disturbance. Negative for confusion and dysphoric mood.       Objective:      Physical Exam   Constitutional: He is oriented to person, place, and time. He appears well-developed and well-nourished. No distress.   HENT:   Head: Normocephalic.   Neck: Normal range of motion.   Pulmonary/Chest: Effort normal.   Musculoskeletal: Normal range of motion.   Neurological: He is alert and oriented to person, place, and time.   Psychiatric: He has a normal mood and affect. His behavior is normal. Judgment and thought content normal.       Assessment:       1. Primary insomnia        Plan:       1-start Ambien take 1-2 as  needed then report effects.  2-wean amitriptyline    Primary insomnia  -     zolpidem (AMBIEN) 5 MG Tab; 1-2 tabs as needed for insomnia.  Dispense: 60 tablet; Refill: 0        Risks, benefits, and side effects were discussed with the patient. All questions were answered to the fullest satisfaction of the patient, and pt verbalized understanding and agreement to treatment plan. Pt was to call with any new or worsening symptoms, or present to the ER.

## 2020-05-19 ENCOUNTER — PATIENT MESSAGE (OUTPATIENT)
Dept: FAMILY MEDICINE | Facility: CLINIC | Age: 65
End: 2020-05-19

## 2020-05-20 ENCOUNTER — PATIENT MESSAGE (OUTPATIENT)
Dept: ADMINISTRATIVE | Facility: HOSPITAL | Age: 65
End: 2020-05-20

## 2020-07-21 ENCOUNTER — DOCUMENTATION ONLY (OUTPATIENT)
Dept: FAMILY MEDICINE | Facility: CLINIC | Age: 65
End: 2020-07-21

## 2020-10-08 ENCOUNTER — PATIENT MESSAGE (OUTPATIENT)
Dept: FAMILY MEDICINE | Facility: CLINIC | Age: 65
End: 2020-10-08

## 2020-10-08 DIAGNOSIS — G25.81 RESTLESS LEG SYNDROME: ICD-10-CM

## 2020-10-08 RX ORDER — ROPINIROLE 1 MG/1
1 TABLET, FILM COATED ORAL NIGHTLY
Qty: 90 TABLET | Refills: 3 | Status: SHIPPED | OUTPATIENT
Start: 2020-10-08 | End: 2021-01-26 | Stop reason: SDUPTHER

## 2020-11-11 DIAGNOSIS — F51.01 PRIMARY INSOMNIA: ICD-10-CM

## 2020-11-13 ENCOUNTER — OFFICE VISIT (OUTPATIENT)
Dept: FAMILY MEDICINE | Facility: CLINIC | Age: 65
End: 2020-11-13
Payer: MEDICARE

## 2020-11-13 VITALS
BODY MASS INDEX: 23.67 KG/M2 | HEIGHT: 67 IN | DIASTOLIC BLOOD PRESSURE: 78 MMHG | OXYGEN SATURATION: 98 % | SYSTOLIC BLOOD PRESSURE: 138 MMHG | HEART RATE: 77 BPM | RESPIRATION RATE: 15 BRPM | TEMPERATURE: 97 F | WEIGHT: 150.81 LBS

## 2020-11-13 DIAGNOSIS — Z23 NEED FOR IMMUNIZATION AGAINST INFLUENZA: Primary | ICD-10-CM

## 2020-11-13 DIAGNOSIS — Z12.11 COLON CANCER SCREENING: ICD-10-CM

## 2020-11-13 DIAGNOSIS — I10 ESSENTIAL HYPERTENSION: ICD-10-CM

## 2020-11-13 DIAGNOSIS — D50.8 IRON DEFICIENCY ANEMIA SECONDARY TO INADEQUATE DIETARY IRON INTAKE: ICD-10-CM

## 2020-11-13 DIAGNOSIS — E78.2 MIXED HYPERLIPIDEMIA: ICD-10-CM

## 2020-11-13 DIAGNOSIS — I72.2 ANEURYSM OF RENAL ARTERY: ICD-10-CM

## 2020-11-13 PROCEDURE — 90694 FLU VACCINE - QUADRIVALENT - ADJUVANTED: ICD-10-PCS | Mod: S$GLB,,, | Performed by: NURSE PRACTITIONER

## 2020-11-13 PROCEDURE — G0008 ADMIN INFLUENZA VIRUS VAC: HCPCS | Mod: S$GLB,,, | Performed by: NURSE PRACTITIONER

## 2020-11-13 PROCEDURE — 99214 OFFICE O/P EST MOD 30 MIN: CPT | Mod: 25,S$GLB,, | Performed by: NURSE PRACTITIONER

## 2020-11-13 PROCEDURE — G0008 FLU VACCINE - QUADRIVALENT - ADJUVANTED: ICD-10-PCS | Mod: S$GLB,,, | Performed by: NURSE PRACTITIONER

## 2020-11-13 PROCEDURE — 99214 PR OFFICE/OUTPT VISIT, EST, LEVL IV, 30-39 MIN: ICD-10-PCS | Mod: 25,S$GLB,, | Performed by: NURSE PRACTITIONER

## 2020-11-13 PROCEDURE — 90694 VACC AIIV4 NO PRSRV 0.5ML IM: CPT | Mod: S$GLB,,, | Performed by: NURSE PRACTITIONER

## 2020-11-13 NOTE — PROGRESS NOTES
"Subjective:       Patient ID: Miles Vazquez is a 65 y.o. male.    Chief Complaint: Annual Exam, Sleep issues (unable to sleep for any significant amount of time), and Anuerism (wants to know if it's time to check)  -  64 y/o male presents for follow up. He asks if it is time to reassess his AAA and infarenal aneurysms thus CTA abd/pelvis ordered.      He continues to c/o back and leg pain but was evaluated for spinal sx yet he denied. He states when he walks distances his legs hurt but will rest and the pain will resolve. All vascular studies were ruled out as negative.  He c/o cramping in his legs, calfs and hands just started taking OTC potassium.      He continues to c/o insomnia and RLS. He reports ambien helps him fall asleep yet does not stay asleep. If he naps in the afternoon his RLS flares up.     Labs reviewed with h/o low iron but stopped taking them due to constipation.     Health maintenance discussed, will refer for colonoscopy. Shingles to be done at pharmacy  -  Past Medical History:   Diagnosis Date    Abdominal aortic aneurysm (AAA) 05/2019    3.2 cm per CTA    Abnormal findings on esophagogastroduodenoscopy (EGD) 04/2016    "intestinal metaplasia" which is a "risk factor" for gastric cancer.      Bursitis of both hips     Colon polyp     COPD (chronic obstructive pulmonary disease) 2016    Coronary artery calcification seen on CAT scan 4/24/2013    Depression 2016    Accompanied drinking problem    HTN (hypertension) 1/7/2013    Hyperlipidemia 2019    Lumbar disc disease 06/2019    grade 1 spondylolisthesis of L5 on S1    Macrocytic anemia 3/27/2016    Personal history of colonic polyps 2017    Recovering alcoholic 03/16/2016    RLS (restless legs syndrome) 2014    Sleep apnea 2 years ago    does not use cpap       Past Surgical History:   Procedure Laterality Date    ANGIOGRAPHY OF LOWER EXTREMITY  08/14/2019    AORTOGRAPHY WITH SERIALOGRAPHY N/A 8/14/2019    Procedure: " AORTOGRAM, WITH RUNOFF;  Surgeon: Alan Florian MD;  Location[facility]ATH[facility] LAB;  Service: Peripheral Vascular;  Laterality: N/A;    APPENDECTOMY N/A 9/26/2018    Procedure: APPENDECTOMY;  Surgeon: Ramírez Horner MD;  Location: [facility] OR;  Service: General;  Laterality: N/A;    COLONOSCOPY  2017    with polyps repeat 5 yrs- [place], [place]    ESOPHAGOGASTRODUODENOSCOPY N/A 9/26/2018    Procedure: EGD (ESOPHAGOGASTRODUODENOSCOPY);  Surgeon: Ramírez Horner MD;  Location: [facility] OR;  Service: General;  Laterality: N/A;  WITH BX    HERNIA REPAIR N/A 9/26/2018    Procedure: REPAIR, HERNIA;  Surgeon: Ramírez Horner MD;  Location[facility];  Service: General;  Laterality: N/A;  INTERNAL LYSIS WITH REDUCTION OF INTERNAL HERNIA    SMALL INTESTINE SURGERY  09/2018        Social History     Socioeconomic History    Marital status: Single     Spouse name: Not on file    Number of children: Not on file    Years of education: Not on file    Highest education level: Not on file   Occupational History    Not on file   Social Needs    Financial resource strain: Somewhat hard    Food insecurity     Worry: Sometimes true     Inability: Never true    Transportation needs     Medical: No     Non-medical: No   Tobacco Use    Smoking status: Current Some Day Smoker     Years: 50.00     Types: Cigarettes    Smokeless tobacco: Never Used    Tobacco comment: 1 or 2 cigarettes a day    Substance and Sexual Activity    Alcohol use: Not Currently     Frequency: Never     Comment: Quit drinking in March 2016    Drug use: Not Currently     Types: Marijuana     Comment: In the past.    Sexual activity: Not Currently     Partners: Female   Lifestyle    Physical activity     Days per week: Not on file     Minutes per session: Not on file    Stress: Very much   Relationships    Social connections     Talks on phone: Three times a week     Gets together: Never     Attends Bahai service:  Not on file     Active member of club or organization: No     Attends meetings of clubs or organizations: Not on file     Relationship status:    Other Topics Concern    Not on file   Social History Narrative    Not on file       Family History   Problem Relation Age of Onset    Tuberculosis Maternal Grandmother     Stomach cancer Neg Hx     Colon cancer Neg Hx        Review of patient's allergies indicates:   Allergen Reactions    Seroquel [quetiapine] Other (See Comments)     RESTLESS LEGS          Current Outpatient Medications:     albuterol (PROVENTIL/VENTOLIN HFA) 90 mcg/actuation inhaler, Inhale 2 puffs into the lungs every 6 (six) hours as needed for Wheezing., Disp: 3 Inhaler, Rfl: 3    amitriptyline (ELAVIL) 100 MG tablet, Take 1 tablet (100 mg total) by mouth every evening., Disp: 90 tablet, Rfl: 3    aspirin 81 MG Chew, Take 81 mg by mouth once daily., Disp: , Rfl:     atorvastatin (LIPITOR) 40 MG tablet, Take 1 tablet (40 mg total) by mouth every evening., Disp: 90 tablet, Rfl: 3    benazepril (LOTENSIN) 20 MG tablet, Take 1 tablet (20 mg total) by mouth once daily., Disp: 90 tablet, Rfl: 3    cilostazol (PLETAL) 100 MG Tab, Take 1 tablet (100 mg total) by mouth 2 (two) times daily before meals., Disp: 180 tablet, Rfl: 3    cyanocobalamin (VITAMIN B-12) 1000 MCG tablet, Take 0.5 tablets (500 mcg total) by mouth once daily., Disp: 60 tablet, Rfl: 12    ferrous sulfate (FEOSOL) 325 mg (65 mg iron) Tab tablet, Take 1 tablet (325 mg total) by mouth 2 (two) times daily. Try on empty stomach first, Disp: 60 tablet, Rfl: 11    multivit-min/FA/lycopen/lutein (CENTRUM SILVER MEN ORAL), Take by mouth., Disp: , Rfl:     rOPINIRole (REQUIP) 1 MG tablet, Take 1 tablet (1 mg total) by mouth every evening., Disp: 90 tablet, Rfl: 3    zolpidem (AMBIEN) 10 mg Tab, Take 1 tablet (10 mg total) by mouth nightly as needed. TAKE 1/2 TO 1 (ONE-HALF TO ONE) TABLET BY MOUTH EVERY DAY AT BEDTIME AS  NEEDED FOR INSOMNIA, Disp: 90 tablet, Rfl: 0    HPI  Review of Systems   Constitutional: Negative.    HENT: Negative.    Eyes: Negative.    Respiratory: Negative.    Cardiovascular: Negative.    Gastrointestinal: Negative.    Endocrine: Negative.    Genitourinary: Negative.    Musculoskeletal: Positive for back pain.        Leg pain   Skin: Negative.    Allergic/Immunologic: Negative.    Neurological: Negative.    Hematological: Negative.    Psychiatric/Behavioral: Negative.        Objective:      Physical Exam  Vitals signs reviewed.   Constitutional:       Appearance: Normal appearance. He is well-developed and normal weight.   HENT:      Head: Normocephalic.   Eyes:      Conjunctiva/sclera: Conjunctivae normal.      Pupils: Pupils are equal, round, and reactive to light.   Neck:      Musculoskeletal: Normal range of motion and neck supple.      Thyroid: No thyromegaly.   Cardiovascular:      Rate and Rhythm: Normal rate and regular rhythm.      Heart sounds: Normal heart sounds. No murmur.   Pulmonary:      Effort: Pulmonary effort is normal.      Breath sounds: Normal breath sounds. No wheezing or rales.   Abdominal:      General: Bowel sounds are normal. There is no distension.      Palpations: Abdomen is soft.      Tenderness: There is no abdominal tenderness.   Musculoskeletal: Normal range of motion.   Skin:     General: Skin is warm and dry.      Capillary Refill: Capillary refill takes less than 2 seconds.   Neurological:      Mental Status: He is alert and oriented to person, place, and time.   Psychiatric:         Mood and Affect: Mood normal.         Behavior: Behavior normal.         Thought Content: Thought content normal.         Judgment: Judgment normal.         Assessment:       1. Need for immunization against influenza    2. Aneurysm of renal artery    3. Colon cancer screening    4. Essential hypertension    5. Mixed hyperlipidemia    6. Iron deficiency anemia secondary to inadequate dietary  iron intake        Plan:     1- fasting labs  2- CTA abd and pelvis to assess aneurysms  3- refer to general surgery for colonoscopy screening    Need for immunization against influenza  -     Influenza (FLUAD) - Quadrivalent (Adjuvanted) *Preferred* (65+) (PF)    Aneurysm of renal artery  -     CTA Abdomen and Pelvis; Future; Expected date: 11/13/2020    Colon cancer screening  -     Ambulatory referral/consult to General Surgery; Future; Expected date: 11/20/2020    Essential hypertension  -     CBC Auto Differential; Future; Expected date: 11/13/2020  -     Comprehensive Metabolic Panel; Future; Expected date: 11/13/2020    Mixed hyperlipidemia  -     Lipid Panel; Future; Expected date: 11/13/2020    Iron deficiency anemia secondary to inadequate dietary iron intake  -     Iron and TIBC; Future; Expected date: 11/13/2020        Risks, benefits, and side effects were discussed with the patient. All questions were answered to the fullest satisfaction of the patient, and pt verbalized understanding and agreement to treatment plan. Pt was to call with any new or worsening symptoms, or present to the ER.

## 2020-11-16 RX ORDER — AMITRIPTYLINE HYDROCHLORIDE 100 MG/1
100 TABLET ORAL NIGHTLY
Qty: 90 TABLET | Refills: 3 | Status: SHIPPED | OUTPATIENT
Start: 2020-11-16 | End: 2021-02-12 | Stop reason: SDUPTHER

## 2020-11-30 ENCOUNTER — PATIENT MESSAGE (OUTPATIENT)
Dept: FAMILY MEDICINE | Facility: CLINIC | Age: 65
End: 2020-11-30

## 2020-12-16 ENCOUNTER — HOSPITAL ENCOUNTER (OUTPATIENT)
Dept: RADIOLOGY | Facility: HOSPITAL | Age: 65
Discharge: HOME | End: 2020-12-16
Attending: NURSE PRACTITIONER
Payer: MEDICARE

## 2020-12-16 DIAGNOSIS — I72.2 ANEURYSM OF RENAL ARTERY: ICD-10-CM

## 2020-12-16 PROCEDURE — 74174 CTA ABD&PLVS W/CONTRAST: CPT | Mod: 26,,, | Performed by: RADIOLOGY

## 2020-12-16 PROCEDURE — 74174 CTA ABDOMEN AND PELVIS: ICD-10-PCS | Mod: 26,,, | Performed by: RADIOLOGY

## 2020-12-16 PROCEDURE — 25500020 PHARM REV CODE 255: Performed by: NURSE PRACTITIONER

## 2020-12-16 PROCEDURE — 74174 CTA ABD&PLVS W/CONTRAST: CPT | Mod: TC

## 2020-12-16 RX ADMIN — IOHEXOL 75 ML: 350 INJECTION, SOLUTION INTRAVENOUS at 09:12

## 2020-12-28 ENCOUNTER — PATIENT OUTREACH (OUTPATIENT)
Dept: ADMINISTRATIVE | Facility: OTHER | Age: 65
End: 2020-12-28

## 2020-12-28 NOTE — PROGRESS NOTES
Chart was reviewed for overdue Proactive [facility] Encounters (S[facility])  topics  Updates were requested from care everywhere  Health Maintenance has been updated  LINKS immunization registry triggered  Immunizations were reconciled

## 2021-01-19 ENCOUNTER — OFFICE VISIT (OUTPATIENT)
Dept: SURGERY | Facility: CLINIC | Age: 66
End: 2021-01-19
Payer: MEDICARE

## 2021-01-19 VITALS
DIASTOLIC BLOOD PRESSURE: 78 MMHG | TEMPERATURE: 99 F | SYSTOLIC BLOOD PRESSURE: 142 MMHG | HEART RATE: 92 BPM | HEIGHT: 67 IN | RESPIRATION RATE: 16 BRPM | BODY MASS INDEX: 23.54 KG/M2 | OXYGEN SATURATION: 98 % | WEIGHT: 150 LBS

## 2021-01-19 DIAGNOSIS — Z12.11 COLON CANCER SCREENING: ICD-10-CM

## 2021-01-19 DIAGNOSIS — Z01.818 PRE-OP TESTING: ICD-10-CM

## 2021-01-19 DIAGNOSIS — Z12.11 SPECIAL SCREENING FOR MALIGNANT NEOPLASM OF COLON: Primary | ICD-10-CM

## 2021-01-19 DIAGNOSIS — Z86.010 HISTORY OF COLON POLYPS: Primary | ICD-10-CM

## 2021-01-19 PROCEDURE — 99214 PR OFFICE/OUTPT VISIT, EST, LEVL IV, 30-39 MIN: ICD-10-PCS | Mod: S$GLB,,, | Performed by: SURGERY

## 2021-01-19 PROCEDURE — 99214 OFFICE O/P EST MOD 30 MIN: CPT | Mod: S$GLB,,, | Performed by: SURGERY

## 2021-01-19 RX ORDER — SODIUM CHLORIDE 9 MG/ML
INJECTION, SOLUTION INTRAVENOUS CONTINUOUS
Status: CANCELLED | OUTPATIENT
Start: 2021-01-19

## 2021-02-03 ENCOUNTER — PATIENT MESSAGE (OUTPATIENT)
Dept: ADMINISTRATIVE | Facility: HOSPITAL | Age: 66
End: 2021-02-03

## 2021-02-11 DIAGNOSIS — F51.01 PRIMARY INSOMNIA: ICD-10-CM

## 2021-02-11 RX ORDER — AMITRIPTYLINE HYDROCHLORIDE 100 MG/1
100 TABLET ORAL NIGHTLY
Qty: 90 TABLET | Refills: 3 | Status: CANCELLED | OUTPATIENT
Start: 2021-02-11 | End: 2022-02-11

## 2021-02-14 RX ORDER — AMITRIPTYLINE HYDROCHLORIDE 100 MG/1
100 TABLET ORAL NIGHTLY
Qty: 90 TABLET | Refills: 3 | Status: SHIPPED | OUTPATIENT
Start: 2021-02-14 | End: 2022-01-04 | Stop reason: SDUPTHER

## 2021-02-26 ENCOUNTER — TELEPHONE (OUTPATIENT)
Dept: SURGERY | Facility: CLINIC | Age: 66
End: 2021-02-26

## 2021-02-26 ENCOUNTER — LAB VISIT (OUTPATIENT)
Dept: FAMILY MEDICINE | Facility: CLINIC | Age: 66
End: 2021-02-26
Payer: MEDICARE

## 2021-02-26 DIAGNOSIS — Z01.818 PRE-OP TESTING: ICD-10-CM

## 2021-02-26 PROCEDURE — U0005 INFEC AGEN DETEC AMPLI PROBE: HCPCS

## 2021-02-26 PROCEDURE — U0003 INFECTIOUS AGENT DETECTION BY NUCLEIC ACID (DNA OR RNA); SEVERE ACUTE RESPIRATORY SYNDROME CORONAVIRUS 2 (SARS-COV-2) (CORONAVIRUS DISEASE [COVID-19]), AMPLIFIED PROBE TECHNIQUE, MAKING USE OF HIGH THROUGHPUT TECHNOLOGIES AS DESCRIBED BY CMS-2020-01-R: HCPCS

## 2021-02-27 LAB — SARS-COV-2 RNA RESP QL NAA+PROBE: NOT DETECTED

## 2021-03-11 ENCOUNTER — TELEPHONE (OUTPATIENT)
Dept: SURGERY | Facility: CLINIC | Age: 66
End: 2021-03-11

## 2021-03-15 DIAGNOSIS — Z01.818 PRE-OP TESTING: Primary | ICD-10-CM

## 2021-03-15 RX ORDER — SODIUM CHLORIDE 9 MG/ML
INJECTION, SOLUTION INTRAVENOUS CONTINUOUS
Status: CANCELLED | OUTPATIENT
Start: 2021-03-15

## 2021-04-05 ENCOUNTER — PATIENT MESSAGE (OUTPATIENT)
Dept: FAMILY MEDICINE | Facility: CLINIC | Age: 66
End: 2021-04-05

## 2021-04-05 DIAGNOSIS — I71.40 AAA (ABDOMINAL AORTIC ANEURYSM) WITHOUT RUPTURE: Primary | ICD-10-CM

## 2021-04-16 ENCOUNTER — APPOINTMENT (OUTPATIENT)
Dept: FAMILY MEDICINE | Facility: CLINIC | Age: 66
End: 2021-04-16
Payer: MEDICARE

## 2021-04-16 DIAGNOSIS — Z01.818 PRE-OP TESTING: ICD-10-CM

## 2021-04-16 PROCEDURE — U0005 INFEC AGEN DETEC AMPLI PROBE: HCPCS | Performed by: SURGERY

## 2021-04-16 PROCEDURE — U0003 INFECTIOUS AGENT DETECTION BY NUCLEIC ACID (DNA OR RNA); SEVERE ACUTE RESPIRATORY SYNDROME CORONAVIRUS 2 (SARS-COV-2) (CORONAVIRUS DISEASE [COVID-19]), AMPLIFIED PROBE TECHNIQUE, MAKING USE OF HIGH THROUGHPUT TECHNOLOGIES AS DESCRIBED BY CMS-2020-01-R: HCPCS | Performed by: SURGERY

## 2021-04-17 LAB — SARS-COV-2 RNA RESP QL NAA+PROBE: NOT DETECTED

## 2021-04-20 ENCOUNTER — TELEPHONE (OUTPATIENT)
Dept: SURGERY | Facility: CLINIC | Age: 66
End: 2021-04-20

## 2021-04-21 ENCOUNTER — IMMUNIZATION (OUTPATIENT)
Dept: PRIMARY CARE CLINIC | Facility: CLINIC | Age: 66
End: 2021-04-21
Payer: MEDICARE

## 2021-04-21 DIAGNOSIS — Z23 NEED FOR VACCINATION: Primary | ICD-10-CM

## 2021-04-21 PROCEDURE — 91300 COVID-19, MRNA, LNP-S, PF, 30 MCG/0.3 ML DOSE VACCINE: ICD-10-PCS | Mod: S$GLB,,, | Performed by: FAMILY MEDICINE

## 2021-04-21 PROCEDURE — 0001A COVID-19, MRNA, LNP-S, PF, 30 MCG/0.3 ML DOSE VACCINE: CPT | Mod: CV19,S$GLB,, | Performed by: FAMILY MEDICINE

## 2021-04-21 PROCEDURE — 91300 COVID-19, MRNA, LNP-S, PF, 30 MCG/0.3 ML DOSE VACCINE: CPT | Mod: S$GLB,,, | Performed by: FAMILY MEDICINE

## 2021-04-21 PROCEDURE — 0001A COVID-19, MRNA, LNP-S, PF, 30 MCG/0.3 ML DOSE VACCINE: ICD-10-PCS | Mod: CV19,S$GLB,, | Performed by: FAMILY MEDICINE

## 2021-05-12 ENCOUNTER — IMMUNIZATION (OUTPATIENT)
Dept: PRIMARY CARE CLINIC | Facility: CLINIC | Age: 66
End: 2021-05-12
Payer: MEDICARE

## 2021-05-12 DIAGNOSIS — Z23 NEED FOR VACCINATION: Primary | ICD-10-CM

## 2021-05-12 PROCEDURE — 91300 COVID-19, MRNA, LNP-S, PF, 30 MCG/0.3 ML DOSE VACCINE: CPT | Mod: S$GLB,,, | Performed by: FAMILY MEDICINE

## 2021-05-12 PROCEDURE — 91300 COVID-19, MRNA, LNP-S, PF, 30 MCG/0.3 ML DOSE VACCINE: ICD-10-PCS | Mod: S$GLB,,, | Performed by: FAMILY MEDICINE

## 2021-05-12 PROCEDURE — 0002A COVID-19, MRNA, LNP-S, PF, 30 MCG/0.3 ML DOSE VACCINE: CPT | Mod: CV19,S$GLB,, | Performed by: FAMILY MEDICINE

## 2021-05-12 PROCEDURE — 0002A COVID-19, MRNA, LNP-S, PF, 30 MCG/0.3 ML DOSE VACCINE: ICD-10-PCS | Mod: CV19,S$GLB,, | Performed by: FAMILY MEDICINE

## 2021-10-26 ENCOUNTER — OFFICE VISIT (OUTPATIENT)
Dept: FAMILY MEDICINE | Facility: CLINIC | Age: 66
End: 2021-10-26
Payer: MEDICARE

## 2021-10-26 VITALS
OXYGEN SATURATION: 97 % | TEMPERATURE: 98 F | WEIGHT: 140 LBS | HEIGHT: 67 IN | DIASTOLIC BLOOD PRESSURE: 80 MMHG | HEART RATE: 91 BPM | BODY MASS INDEX: 21.97 KG/M2 | RESPIRATION RATE: 18 BRPM | SYSTOLIC BLOOD PRESSURE: 130 MMHG

## 2021-10-26 DIAGNOSIS — R10.84 GENERALIZED ABDOMINAL PAIN: Primary | ICD-10-CM

## 2021-10-26 DIAGNOSIS — I73.9 PAD (PERIPHERAL ARTERY DISEASE): ICD-10-CM

## 2021-10-26 DIAGNOSIS — Z12.11 COLON CANCER SCREENING: ICD-10-CM

## 2021-10-26 DIAGNOSIS — I73.9 CLAUDICATION OF BOTH LOWER EXTREMITIES: ICD-10-CM

## 2021-10-26 PROCEDURE — 99999 PR PBB SHADOW E&M-EST. PATIENT-LVL IV: ICD-10-PCS | Mod: PBBFAC,,, | Performed by: NURSE PRACTITIONER

## 2021-10-26 PROCEDURE — G0008 ADMIN INFLUENZA VIRUS VAC: HCPCS | Mod: PBBFAC

## 2021-10-26 PROCEDURE — 99999 PR PBB SHADOW E&M-EST. PATIENT-LVL IV: CPT | Mod: PBBFAC,,, | Performed by: NURSE PRACTITIONER

## 2021-10-26 PROCEDURE — 99214 OFFICE O/P EST MOD 30 MIN: CPT | Mod: PBBFAC,25 | Performed by: NURSE PRACTITIONER

## 2021-10-26 PROCEDURE — 99214 PR OFFICE/OUTPT VISIT, EST, LEVL IV, 30-39 MIN: ICD-10-PCS | Mod: S$PBB,,, | Performed by: NURSE PRACTITIONER

## 2021-10-26 PROCEDURE — 90653 IIV ADJUVANT VACCINE IM: CPT | Mod: PBBFAC

## 2021-10-26 PROCEDURE — 99214 OFFICE O/P EST MOD 30 MIN: CPT | Mod: S$PBB,,, | Performed by: NURSE PRACTITIONER

## 2021-10-26 RX ORDER — SUCRALFATE 1 G/1
1 TABLET ORAL
Qty: 120 TABLET | Refills: 0 | Status: SHIPPED | OUTPATIENT
Start: 2021-10-26 | End: 2022-09-21 | Stop reason: SDUPTHER

## 2021-10-26 RX ORDER — CILOSTAZOL 100 MG/1
100 TABLET ORAL
Qty: 180 TABLET | Refills: 3
Start: 2021-10-26 | End: 2022-05-16

## 2021-11-10 ENCOUNTER — LAB VISIT (OUTPATIENT)
Dept: LAB | Facility: HOSPITAL | Age: 66
End: 2021-11-10
Attending: NURSE PRACTITIONER
Payer: MEDICARE

## 2021-11-10 DIAGNOSIS — Z12.11 COLON CANCER SCREENING: ICD-10-CM

## 2021-11-10 PROCEDURE — 82274 ASSAY TEST FOR BLOOD FECAL: CPT | Performed by: NURSE PRACTITIONER

## 2021-11-23 LAB — HEMOCCULT STL QL IA: NEGATIVE

## 2021-12-17 ENCOUNTER — IMMUNIZATION (OUTPATIENT)
Dept: FAMILY MEDICINE | Facility: CLINIC | Age: 66
End: 2021-12-17
Payer: MEDICARE

## 2021-12-17 DIAGNOSIS — Z23 NEED FOR VACCINATION: Primary | ICD-10-CM

## 2021-12-17 PROCEDURE — 0004A COVID-19, MRNA, LNP-S, PF, 30 MCG/0.3 ML DOSE VACCINE: CPT | Mod: PBBFAC

## 2021-12-22 DIAGNOSIS — I10 ESSENTIAL HYPERTENSION: ICD-10-CM

## 2022-01-05 ENCOUNTER — PATIENT MESSAGE (OUTPATIENT)
Dept: FAMILY MEDICINE | Facility: CLINIC | Age: 67
End: 2022-01-05
Payer: MEDICARE

## 2022-02-23 ENCOUNTER — TELEPHONE (OUTPATIENT)
Dept: FAMILY MEDICINE | Facility: CLINIC | Age: 67
End: 2022-02-23
Payer: MEDICARE

## 2022-02-23 NOTE — TELEPHONE ENCOUNTER
Writer spoke to pt, pt stated appt with MANUEL Jacob on February 28, 2022 was not needed. He informed he was currently seeing Alan Florian MD and had a test done and was waiting to hear back from them. He asked how we could get those records. I stated that their office could send us the results when tests were completed. Pt was instructed if he needed anything at all to give our office a call.

## 2022-02-24 NOTE — LETTER
Called Eli from Dermatology-is aware patient is not taking HCQ January 28, 2020      Roxy Valderrama PA-C  [address] [facility] [address] [place] [address]           [facility]  [address] [place] [address]  [address]  [place] [address]  Phone: [phone]  Fax: [phone]          Patient: Miles Vazquez   MR Number: [id number]   YOB: 1955   Date of Visit: 1/28/2020       Dear Roxy Valderrama:    Thank you for referring Miles Vazquez to me for evaluation. Attached you will find relevant portions of my assessment and plan of care.    If you have questions, please do not hesitate to call me. I look forward to following Miles Vazquez along with you.    Sincerely,    Junior Elizabeth MD    Enclosure  CC:  No Recipients    If you would like to receive this communication electronically, please contact [email or web address] or [phone] to request more information on [facility] access.    For providers and/or their staff who would like to refer a patient to [facility], please contact us through our one-stop-shop provider referral line, [facility], at [phone].    If you feel you have received this communication in error or would no longer like to receive these types of communications, please e-mail [email or web address]

## 2022-03-07 ENCOUNTER — LAB VISIT (OUTPATIENT)
Dept: LAB | Facility: HOSPITAL | Age: 67
End: 2022-03-07
Attending: SURGERY
Payer: MEDICARE

## 2022-03-07 DIAGNOSIS — Z01.812 PRE-OPERATIVE LABORATORY EXAMINATION: Primary | ICD-10-CM

## 2022-03-07 LAB
BUN SERPL-MCNC: 17 MG/DL (ref 8–23)
CREAT SERPL-MCNC: 0.9 MG/DL (ref 0.5–1.4)
EST. GFR  (AFRICAN AMERICAN): >60 ML/MIN/1.73 M^2
EST. GFR  (NON AFRICAN AMERICAN): >60 ML/MIN/1.73 M^2

## 2022-03-07 PROCEDURE — 82565 ASSAY OF CREATININE: CPT | Performed by: SURGERY

## 2022-03-07 PROCEDURE — 36415 COLL VENOUS BLD VENIPUNCTURE: CPT | Performed by: SURGERY

## 2022-03-07 PROCEDURE — 84520 ASSAY OF UREA NITROGEN: CPT | Performed by: SURGERY

## 2022-03-09 ENCOUNTER — HOSPITAL ENCOUNTER (OUTPATIENT)
Dept: RADIOLOGY | Facility: HOSPITAL | Age: 67
Discharge: HOME | End: 2022-03-09
Attending: SURGERY
Payer: MEDICARE

## 2022-03-09 DIAGNOSIS — R09.89 LEFT CAROTID BRUIT: ICD-10-CM

## 2022-03-09 PROCEDURE — 70498 CTA NECK: ICD-10-PCS | Mod: 26,,, | Performed by: RADIOLOGY

## 2022-03-09 PROCEDURE — 25500020 PHARM REV CODE 255: Performed by: SURGERY

## 2022-03-09 PROCEDURE — 70498 CT ANGIOGRAPHY NECK: CPT | Mod: 26,,, | Performed by: RADIOLOGY

## 2022-03-09 PROCEDURE — 70498 CT ANGIOGRAPHY NECK: CPT | Mod: TC

## 2022-03-09 RX ADMIN — IOHEXOL 75 ML: 350 INJECTION, SOLUTION INTRAVENOUS at 07:03

## 2022-03-15 ENCOUNTER — TELEPHONE (OUTPATIENT)
Dept: FAMILY MEDICINE | Facility: CLINIC | Age: 67
End: 2022-03-15
Payer: MEDICARE

## 2022-03-15 DIAGNOSIS — E78.2 MIXED HYPERLIPIDEMIA: ICD-10-CM

## 2022-03-15 DIAGNOSIS — I71.40 ABDOMINAL AORTIC ANEURYSM (AAA) WITHOUT RUPTURE: Primary | ICD-10-CM

## 2022-03-15 DIAGNOSIS — I10 ESSENTIAL HYPERTENSION: ICD-10-CM

## 2022-03-15 NOTE — TELEPHONE ENCOUNTER
The patient had a CTA Neck and the radiologist is recommending a CT Chest w/o contrast within 12 months if the patient is high risk due to the findings of small pulmonary nodules of the right lung apex  If you feel this is needed, please place order!  Thanks!

## 2022-03-15 NOTE — TELEPHONE ENCOUNTER
----- Message from RT Pedro sent at 3/15/2022  3:41 PM CDT -----  Good afternoon. The patient had a CTA Neck and the radiologist is recommending a CT Chest w/o contrast within 12 months if the patient is high risk due to the findings of small pulmonary nodules of the right lung apex. Could you please follow up with the patient and order and schedule as needed? Thanks.

## 2022-03-16 ENCOUNTER — TELEPHONE (OUTPATIENT)
Dept: FAMILY MEDICINE | Facility: CLINIC | Age: 67
End: 2022-03-16
Payer: MEDICARE

## 2022-03-16 NOTE — TELEPHONE ENCOUNTER
Spoke with patient. Informed patient Ms. Bauman would like to schedule a CT  scan of the abdomin and pelvis and also schedule you for fasting labs. advised patient to look on portal for upcoming appointment. Patient voiced understanding.

## 2022-03-16 NOTE — TELEPHONE ENCOUNTER
NP made note of lung nodule. Please let pt know I have ordered a CTA of his Abd/Pelvis for known enlarging AAA. If I would have known he had neck scanned we could have done together. Please remind him he has to be seen every 6 mo for ambien refill and I do not see upcoming appt. Also due for fasting lipid thus labs ordered. Thanks

## 2022-03-21 ENCOUNTER — HOSPITAL ENCOUNTER (OUTPATIENT)
Dept: RADIOLOGY | Facility: HOSPITAL | Age: 67
Discharge: HOME | End: 2022-03-21
Attending: NURSE PRACTITIONER
Payer: MEDICARE

## 2022-03-21 DIAGNOSIS — I71.40 ABDOMINAL AORTIC ANEURYSM (AAA) WITHOUT RUPTURE: ICD-10-CM

## 2022-03-21 PROCEDURE — 25500020 PHARM REV CODE 255: Performed by: NURSE PRACTITIONER

## 2022-03-21 PROCEDURE — 74174 CTA ABD&PLVS W/CONTRAST: CPT | Mod: TC

## 2022-03-21 PROCEDURE — 74174 CTA ABD&PLVS W/CONTRAST: CPT | Mod: 26,,, | Performed by: RADIOLOGY

## 2022-03-21 PROCEDURE — 74174 CTA ABDOMEN AND PELVIS: ICD-10-PCS | Mod: 26,,, | Performed by: RADIOLOGY

## 2022-03-21 RX ADMIN — IOHEXOL 100 ML: 350 INJECTION, SOLUTION INTRAVENOUS at 07:03

## 2022-03-23 DIAGNOSIS — I10 ESSENTIAL HYPERTENSION: ICD-10-CM

## 2022-03-23 DIAGNOSIS — G25.81 RESTLESS LEG SYNDROME: ICD-10-CM

## 2022-03-24 ENCOUNTER — TELEPHONE (OUTPATIENT)
Dept: FAMILY MEDICINE | Facility: CLINIC | Age: 67
End: 2022-03-24
Payer: MEDICARE

## 2022-03-24 DIAGNOSIS — G25.81 RESTLESS LEG SYNDROME: ICD-10-CM

## 2022-03-24 DIAGNOSIS — I10 ESSENTIAL HYPERTENSION: Primary | ICD-10-CM

## 2022-03-24 DIAGNOSIS — I73.9 PAD (PERIPHERAL ARTERY DISEASE): ICD-10-CM

## 2022-03-25 ENCOUNTER — PATIENT MESSAGE (OUTPATIENT)
Dept: FAMILY MEDICINE | Facility: CLINIC | Age: 67
End: 2022-03-25
Payer: MEDICARE

## 2022-03-25 RX ORDER — BENAZEPRIL HYDROCHLORIDE 20 MG/1
20 TABLET ORAL DAILY
Qty: 90 TABLET | Refills: 1 | Status: SHIPPED | OUTPATIENT
Start: 2022-03-25 | End: 2022-04-08

## 2022-03-25 RX ORDER — AMITRIPTYLINE HYDROCHLORIDE 100 MG/1
100 TABLET ORAL NIGHTLY
Qty: 90 TABLET | Refills: 1 | Status: SHIPPED | OUTPATIENT
Start: 2022-03-25 | End: 2022-04-08

## 2022-03-25 RX ORDER — ROPINIROLE 1 MG/1
1 TABLET, FILM COATED ORAL NIGHTLY
Qty: 90 TABLET | Refills: 1 | Status: SHIPPED | OUTPATIENT
Start: 2022-03-25 | End: 2022-09-23 | Stop reason: SDUPTHER

## 2022-03-25 RX ORDER — BENAZEPRIL HYDROCHLORIDE 20 MG/1
20 TABLET ORAL DAILY
Qty: 90 TABLET | Refills: 3 | OUTPATIENT
Start: 2022-03-25

## 2022-03-25 RX ORDER — ROPINIROLE 1 MG/1
1 TABLET, FILM COATED ORAL NIGHTLY
Qty: 90 TABLET | Refills: 3 | OUTPATIENT
Start: 2022-03-25

## 2022-03-25 NOTE — TELEPHONE ENCOUNTER
----- Message from Brenda Julien sent at 3/25/2022  3:57 PM CDT -----  Regarding: Call back  Type:  Patient Call Back    Who Called:pt  What this is regarding?:rOPINIRole (REQUIP) 1 MG tablet  amitriptyline (ELAVIL) 100 MG tablet  benazepriL (LOTENSIN) 20 MG tablet  Would the patient rather a call back or a response via [facility]? Call back  Best Call Back Number:  [address] [address]  Phone: [phone] Fax: [phone]    Additional Information:     Advised to call back directly if there are further questions, or if these symptoms fail to improve as anticipated or worsen.

## 2022-03-25 NOTE — TELEPHONE ENCOUNTER
Spoke with patient to confirm requested medications. Patient stated he is completely out of the Requip. Informed patient that his request will be sent to his provider.

## 2022-03-26 DIAGNOSIS — F51.01 PRIMARY INSOMNIA: ICD-10-CM

## 2022-03-26 DIAGNOSIS — G25.81 RESTLESS LEG SYNDROME: Primary | ICD-10-CM

## 2022-03-27 RX ORDER — ZOLPIDEM TARTRATE 10 MG/1
TABLET ORAL
Qty: 90 TABLET | Refills: 1 | Status: CANCELLED | OUTPATIENT
Start: 2022-03-27

## 2022-03-30 RX ORDER — ZOLPIDEM TARTRATE 10 MG/1
TABLET ORAL
Qty: 90 TABLET | Refills: 0 | Status: SHIPPED | OUTPATIENT
Start: 2022-03-30 | End: 2022-07-03

## 2022-04-06 ENCOUNTER — TELEPHONE (OUTPATIENT)
Dept: FAMILY MEDICINE | Facility: CLINIC | Age: 67
End: 2022-04-06
Payer: MEDICARE

## 2022-04-06 NOTE — TELEPHONE ENCOUNTER
Pt was scheduled for virtual today yet thought the staff was to call him prior to visit thus did not sign in. NP discussed ABD/Pelvis CTA needs vascular consult thus pt to make his own follow up with .    Please schedule pt for virtual on Friday 4/8/22 @ 530 (double book) but I told him not to sign in til 545. Then let him know appt was made. Thanks

## 2022-04-08 ENCOUNTER — OFFICE VISIT (OUTPATIENT)
Dept: FAMILY MEDICINE | Facility: CLINIC | Age: 67
End: 2022-04-08
Payer: MEDICARE

## 2022-04-08 ENCOUNTER — TELEPHONE (OUTPATIENT)
Dept: FAMILY MEDICINE | Facility: CLINIC | Age: 67
End: 2022-04-08

## 2022-04-08 DIAGNOSIS — I10 ESSENTIAL HYPERTENSION: ICD-10-CM

## 2022-04-08 DIAGNOSIS — I73.9 INTERMITTENT CLAUDICATION: ICD-10-CM

## 2022-04-08 DIAGNOSIS — E78.2 MIXED HYPERLIPIDEMIA: Chronic | ICD-10-CM

## 2022-04-08 DIAGNOSIS — I71.40 ABDOMINAL AORTIC ANEURYSM (AAA) WITHOUT RUPTURE: Primary | ICD-10-CM

## 2022-04-08 DIAGNOSIS — I73.9 PERIPHERAL VASCULAR DISEASE, UNSPECIFIED: ICD-10-CM

## 2022-04-08 DIAGNOSIS — F51.01 PRIMARY INSOMNIA: ICD-10-CM

## 2022-04-08 PROBLEM — Z01.810 PREOP CARDIOVASCULAR EXAM: Status: RESOLVED | Noted: 2019-12-11 | Resolved: 2022-04-08

## 2022-04-08 PROBLEM — Z01.818 PREOPERATIVE CLEARANCE: Status: RESOLVED | Noted: 2019-09-11 | Resolved: 2022-04-08

## 2022-04-08 PROCEDURE — 99213 PR OFFICE/OUTPT VISIT, EST, LEVL III, 20-29 MIN: ICD-10-PCS | Mod: 95,,, | Performed by: NURSE PRACTITIONER

## 2022-04-08 PROCEDURE — 99213 OFFICE O/P EST LOW 20 MIN: CPT | Mod: 95,,, | Performed by: NURSE PRACTITIONER

## 2022-04-08 RX ORDER — ATORVASTATIN CALCIUM 40 MG/1
40 TABLET, FILM COATED ORAL NIGHTLY
Qty: 90 TABLET | Refills: 1 | Status: SHIPPED | OUTPATIENT
Start: 2022-04-08 | End: 2024-01-31 | Stop reason: SDUPTHER

## 2022-04-08 NOTE — PROGRESS NOTES
"Subjective:       Patient ID: Miles Vazquez is a 66 y.o. male.    Chief Complaint: No chief complaint on file.  The patient location is: [place]  The chief complaint leading to consultation is: imaging results     Visit type: audiovisual    Face to Face time with patient: 15 minutes of total time spent on the encounter, which includes face to face time and non-face to face time preparing to see the patient (eg, review of tests), Obtaining and/or reviewing separately obtained history, Documenting clinical information in the electronic or other health record, Independently interpreting results (not separately reported) and communicating results to the patient/family/caregiver, or Care coordination (not separately reported).     Each patient to whom he or she provides medical services by telemedicine is:  (1) informed of the relationship between the physician and patient and the respective role of any other health care provider with respect to management of the patient; and (2) notified that he or she may decline to receive medical services by telemedicine and may withdraw from such care at any time.    Notes:   65 y/o male presents for CTA abd results thus encouraged f/u with Dr. Florian for "bilobed fusiform mid and distal infrarenal abdominal aortic aneurysm which measures approximately 9.2 cm in sagittal length.  The upper moiety of the infrarenal abdominal aortic aneurysm measures 4.2 x 4.5 cm prior study 12/2020 (previously 4.1 x 3.9 cm). There is a lumbar artery observed perfusing the posterior aspect of the aneurysm sac (approximately 5 o'clock) which enters the aneurysm sac at approximately the L3-L4 intervertebral disc plane. No mention of High-grade stenosis of the SMA in this study yet in 12/2020. CTA neck 59-67% stenosis bilateral ICA.   Incidental right lung apex nodule 3-5 mm, PCP to follow and repeat 1 yr.     C/o BLE pain with walking relieved with rest yet thinks is it r/t to his lower back as he as seen " "by NSGY 2/2020, EMG showed active bilateral L5 radiculopathy recommend L5-S lumbar fusion yet pt declined d/t fear of spine surgery. Is agreeable to CTA BLE d/t severe generalized vascular disease. Stopped stain yet not sure why.     Has concerns for memory loss, forgets why he walks in to room and finds himself talking all the time and repeating himself. NP made note over a year ago needs MME thus agreeable at next visit.  continues to take Ambien, somewhat effective-denies s/e or am lethargy.  consistent, reminded of q6 mo required visits. Does not check home BP yet continues taking Lotensin.  -    Past Medical History:   Diagnosis Date    Abdominal aortic aneurysm (AAA) 05/2019    3.2 cm per CTA.--12/2020 4.3 x 4.7 by 4.5 cm--3/2022  4.8 x 4.5 cm    Abnormal findings on esophagogastroduodenoscopy (EGD) 04/2016    "intestinal metaplasia" which is a "risk factor" for gastric cancer.      Apical lung nodule 03/2022    Right    Bursitis of both hips     Colon polyp     COPD (chronic obstructive pulmonary disease) 2016    Coronary artery calcification seen on CAT scan 04/24/2013    Depression 2016    Accompanied drinking problem    HTN (hypertension) 01/07/2013    Hyperlipidemia 2019    Lumbar disc disease 06/2019    grade 1 spondylolisthesis of L5 on S1    Macrocytic anemia 03/27/2016    Personal history of colonic polyps 2017    Recovering alcoholic 03/16/2016    RLS (restless legs syndrome) 2014    Sleep apnea 2 years ago    does not use cpap       Past Surgical History:   Procedure Laterality Date    ANGIOGRAPHY OF LOWER EXTREMITY  08/14/2019    AORTOGRAPHY WITH SERIALOGRAPHY N/A 8/14/2019    Procedure: AORTOGRAM, WITH RUNOFF;  Surgeon: Alan Florian MD;  Location: [facility] CATH/EP LAB;  Service: Peripheral Vascular;  Laterality: N/A;    APPENDECTOMY N/A 9/26/2018    Procedure: APPENDECTOMY;  Surgeon: Ramírez Horner MD;  Location: [facility] OR;  Service: General;  Laterality: N/A;    COLON " SURGERY  09/2018    COLONOSCOPY  2017    with polyps repeat 5 yrs- [facility] WARREN Gutierrez    ESOPHAGOGASTRODUODENOSCOPY N/A 9/26/2018    Procedure: EGD (ESOPHAGOGASTRODUODENOSCOPY);  Surgeon: Ramírez Horner MD;  Location: [facility] OR;  Service: General;  Laterality: N/A;  WITH BX    HERNIA REPAIR N/A 9/26/2018    Procedure: REPAIR, HERNIA;  Surgeon: Ramírez Horner MD;  Location: [facility] OR;  Service: General;  Laterality: N/A;  INTERNAL LYSIS WITH REDUCTION OF INTERNAL HERNIA    SMALL INTESTINE SURGERY  09/2018        Social History     Socioeconomic History    Marital status: Single   Tobacco Use    Smoking status: Current Some Day Smoker     Packs/day: 0.25     Years: 50.00     Pack years: 12.50     Types: Cigarettes    Smokeless tobacco: Never Used    Tobacco comment: 1 or 2 cigarettes a day    Substance and Sexual Activity    Alcohol use: Not Currently     Alcohol/week: 0.0 standard drinks     Comment: Sobriety date 03/16/2016    Drug use: Not Currently     Types: Marijuana     Comment: In the past.    Sexual activity: Not Currently     Partners: Female     Birth control/protection: Condom     Social Determinants of Health     Financial Resource Strain: Medium Risk    Difficulty of Paying Living Expenses: Somewhat hard   Food Insecurity: Food Insecurity Present    Worried About Running Out of Food in the Last Year: Sometimes true    Ran Out of Food in the Last Year: Never true   Transportation Needs: No Transportation Needs    Lack of Transportation (Medical): No    Lack of Transportation (Non-Medical): No   Physical Activity: Sufficiently Active    Days of Exercise per Week: 5 days    Minutes of Exercise per Session: 150+ min   Stress: Stress Concern Present    Feeling of Stress : To some extent   Social Connections: Unknown    Frequency of Communication with Friends and Family: More than three times a week    Frequency of Social Gatherings with Friends and Family:  Patient refused    Active Member of Clubs or Organizations: No    Attends Club or Organization Meetings: Patient refused    Marital Status:    Housing Stability: Low Risk     Unable to Pay for Housing in the Last Year: No    Number of Places Lived in the Last Year: 1    Unstable Housing in the Last Year: No       Family History   Problem Relation Age of Onset    Alcohol abuse Mother         Cancer    Alcohol abuse Father     Tuberculosis Maternal Grandmother     Stomach cancer Neg Hx     Colon cancer Neg Hx        Review of patient's allergies indicates:   Allergen Reactions    Seroquel [quetiapine] Other (See Comments)     RESTLESS LEGS          Current Outpatient Medications:     albuterol (PROVENTIL/VENTOLIN HFA) 90 mcg/actuation inhaler, Inhale 2 puffs into the lungs every 6 (six) hours as needed for Wheezing., Disp: 3 Inhaler, Rfl: 3    amitriptyline (ELAVIL) 100 MG tablet, Take 1 tablet (100 mg total) by mouth every evening., Disp: 90 tablet, Rfl: 3    aspirin 81 MG Chew, Take 81 mg by mouth once daily., Disp: , Rfl:     atorvastatin (LIPITOR) 40 MG tablet, Take 1 tablet (40 mg total) by mouth every evening., Disp: 90 tablet, Rfl: 1    benazepriL (LOTENSIN) 20 MG tablet, Take 1 tablet (20 mg total) by mouth once daily., Disp: 90 tablet, Rfl: 3    cilostazoL (PLETAL) 100 MG Tab, Take 1 tablet (100 mg total) by mouth 2 (two) times daily before meals., Disp: 180 tablet, Rfl: 3    cyanocobalamin (VITAMIN B-12) 1000 MCG tablet, Take 0.5 tablets (500 mcg total) by mouth once daily., Disp: 60 tablet, Rfl: 12    ferrous sulfate (FEOSOL) 325 mg (65 mg iron) Tab tablet, Take 1 tablet (325 mg total) by mouth 2 (two) times daily. Try on empty stomach first, Disp: 60 tablet, Rfl: 11    multivit-min/FA/lycopen/lutein (CENTRUM SILVER MEN ORAL), Take by mouth., Disp: , Rfl:     rOPINIRole (REQUIP) 1 MG tablet, Take 1 tablet (1 mg total) by mouth every evening., Disp: 90 tablet, Rfl: 1     sucralfate (CARAFATE) 1 gram tablet, Take 1 tablet (1 g total) by mouth 4 (four) times daily before meals and nightly., Disp: 120 tablet, Rfl: 0    zolpidem (AMBIEN) 10 mg Tab, TAKE 1/2 TO 1 (ONE-HALF TO ONE) TABLET BY MOUTH AT BEDTIME AS NEEDED FOR INSOMNIA, Disp: 90 tablet, Rfl: 0    HPI  Review of Systems   Constitutional: Negative.  Negative for activity change and unexpected weight change.   HENT: Positive for rhinorrhea. Negative for hearing loss and trouble swallowing.    Eyes: Negative.  Negative for discharge and visual disturbance.   Respiratory: Negative.  Negative for chest tightness and wheezing.    Cardiovascular: Negative.  Negative for chest pain and palpitations.   Gastrointestinal: Negative.  Negative for blood in stool, constipation, diarrhea and vomiting.   Endocrine: Negative.  Negative for polydipsia and polyuria.   Genitourinary: Negative.  Negative for difficulty urinating, hematuria and urgency.   Musculoskeletal: Positive for arthralgias. Negative for joint swelling and neck pain.        Leg pain   Skin: Negative.    Allergic/Immunologic: Negative.    Neurological: Negative.  Negative for weakness and headaches.   Hematological: Negative.    Psychiatric/Behavioral: Positive for confusion. Negative for dysphoric mood.       Objective:      Physical Exam  Constitutional:       General: He is not in acute distress.     Appearance: Normal appearance. He is normal weight. He is not ill-appearing.   HENT:      Head: Normocephalic.   Eyes:      Conjunctiva/sclera: Conjunctivae normal.   Pulmonary:      Effort: Pulmonary effort is normal.   Musculoskeletal:         General: Normal range of motion.      Cervical back: Normal range of motion.   Skin:     Coloration: Skin is not jaundiced or pale.   Neurological:      Mental Status: He is alert and oriented to person, place, and time. Mental status is at baseline.   Psychiatric:         Mood and Affect: Mood normal.         Behavior: Behavior normal.          Thought Content: Thought content normal.         Judgment: Judgment normal.         Assessment:       1. Abdominal aortic aneurysm (AAA) without rupture    2. Primary insomnia    3. Peripheral vascular disease, unspecified    4. Intermittent claudication    5. Mixed hyperlipidemia        Plan:     1- CTA BLE  2-resume statin  3- staff to call Dr. Florian's office for f/u appt and schedule CTA BLE  4-pt to follow up with NP after seen by Vascular  5- CC Dr. Fragoso  6- MME next visit  -     Abdominal aortic aneurysm (AAA) without rupture    Primary insomnia    Peripheral vascular disease, unspecified  -     CTA Lower Extremity; Future; Expected date: 04/08/2022  -     atorvastatin (LIPITOR) 40 MG tablet; Take 1 tablet (40 mg total) by mouth every evening.  Dispense: 90 tablet; Refill: 1    Intermittent claudication  -     CTA Lower Extremity; Future; Expected date: 04/08/2022    Mixed hyperlipidemia  -     atorvastatin (LIPITOR) 40 MG tablet; Take 1 tablet (40 mg total) by mouth every evening.  Dispense: 90 tablet; Refill: 1        Risks, benefits, and side effects were discussed with the patient. All questions were answered to the fullest satisfaction of the patient, and pt verbalized understanding and agreement to treatment plan. Pt was to call with any new or worsening symptoms, or present to the ER.

## 2022-04-09 NOTE — TELEPHONE ENCOUNTER
Please call Dr. Florian [facility] in [place] to assist pt in getting an appt for abnormal CTA abd. MD was cc on NP visit note. Also please schedule CTA BLE for pt. Thanks

## 2022-04-11 ENCOUNTER — PATIENT MESSAGE (OUTPATIENT)
Dept: FAMILY MEDICINE | Facility: CLINIC | Age: 67
End: 2022-04-11
Payer: MEDICARE

## 2022-04-11 NOTE — TELEPHONE ENCOUNTER
Office note, CTA imaging, And referral has been fax to Dr. Walker office. I have asked them to fax back the Date and time the patient will be seen.

## 2022-04-12 DIAGNOSIS — E78.2 MIXED HYPERLIPIDEMIA: Chronic | ICD-10-CM

## 2022-04-12 DIAGNOSIS — I73.9 PERIPHERAL VASCULAR DISEASE, UNSPECIFIED: ICD-10-CM

## 2022-04-12 DIAGNOSIS — F51.01 PRIMARY INSOMNIA: ICD-10-CM

## 2022-04-12 RX ORDER — AMITRIPTYLINE HYDROCHLORIDE 100 MG/1
100 TABLET ORAL NIGHTLY
Qty: 90 TABLET | Refills: 1 | Status: CANCELLED | OUTPATIENT
Start: 2022-04-12

## 2022-04-12 RX ORDER — ATORVASTATIN CALCIUM 40 MG/1
40 TABLET, FILM COATED ORAL NIGHTLY
Qty: 90 TABLET | Refills: 1 | Status: CANCELLED | OUTPATIENT
Start: 2022-04-12

## 2022-04-19 ENCOUNTER — HOSPITAL ENCOUNTER (OUTPATIENT)
Dept: RADIOLOGY | Facility: HOSPITAL | Age: 67
Discharge: HOME | End: 2022-04-19
Attending: NURSE PRACTITIONER
Payer: MEDICARE

## 2022-04-19 DIAGNOSIS — I73.9 INTERMITTENT CLAUDICATION: ICD-10-CM

## 2022-04-19 DIAGNOSIS — I73.9 PERIPHERAL VASCULAR DISEASE, UNSPECIFIED: ICD-10-CM

## 2022-04-19 PROCEDURE — 25500020 PHARM REV CODE 255: Performed by: NURSE PRACTITIONER

## 2022-04-19 PROCEDURE — 73706 CT ANGIO LWR EXTR W/O&W/DYE: CPT | Mod: TC

## 2022-04-19 PROCEDURE — 73706 CT ANGIO LWR EXTR W/O&W/DYE: CPT | Mod: 26,50,, | Performed by: RADIOLOGY

## 2022-04-19 PROCEDURE — 73706 CTA LOWER EXTREMITY: ICD-10-PCS | Mod: 26,50,, | Performed by: RADIOLOGY

## 2022-04-19 RX ADMIN — IOHEXOL 125 ML: 350 INJECTION, SOLUTION INTRAVENOUS at 07:04

## 2022-05-11 DIAGNOSIS — I71.40 ABDOMINAL AORTIC ANEURYSM WITHOUT RUPTURE: Primary | ICD-10-CM

## 2022-05-13 ENCOUNTER — TELEPHONE (OUTPATIENT)
Dept: CARDIOLOGY | Facility: HOSPITAL | Age: 67
End: 2022-05-13

## 2022-05-15 ENCOUNTER — TELEPHONE (OUTPATIENT)
Dept: FAMILY MEDICINE | Facility: CLINIC | Age: 67
End: 2022-05-15
Payer: MEDICARE

## 2022-05-15 NOTE — TELEPHONE ENCOUNTER
Pt sent a message on 4/11/22 and is scheduled for vascular surgery on 5/18/22. Please call radiology to ensure images are sharable for Dr. Florian in [place]. Thanks,

## 2022-05-16 ENCOUNTER — HOSPITAL ENCOUNTER (OUTPATIENT)
Dept: RADIOLOGY | Facility: HOSPITAL | Age: 67
Discharge: HOME | End: 2022-05-16
Attending: SURGERY
Payer: MEDICARE

## 2022-05-16 ENCOUNTER — HOSPITAL ENCOUNTER (OUTPATIENT)
Dept: PREADMISSION TESTING | Facility: HOSPITAL | Age: 67
Discharge: HOME | End: 2022-05-16
Attending: SURGERY
Payer: MEDICARE

## 2022-05-16 VITALS — HEIGHT: 67 IN | BODY MASS INDEX: 22.76 KG/M2 | WEIGHT: 145 LBS

## 2022-05-16 DIAGNOSIS — I71.40 ABDOMINAL AORTIC ANEURYSM WITHOUT RUPTURE: ICD-10-CM

## 2022-05-16 DIAGNOSIS — Z01.818 PRE-OP EXAM: Primary | ICD-10-CM

## 2022-05-16 LAB
ANION GAP SERPL CALC-SCNC: 11 MMOL/L (ref 8–16)
APTT PPP: 29.1 SEC (ref 23.3–35.1)
BASOPHILS # BLD AUTO: 0.03 K/UL (ref 0–0.2)
BASOPHILS NFR BLD: 0.4 % (ref 0–1.9)
BUN SERPL-MCNC: 22 MG/DL (ref 8–23)
CALCIUM SERPL-MCNC: 9.4 MG/DL (ref 8.7–10.5)
CHLORIDE SERPL-SCNC: 105 MMOL/L (ref 95–110)
CO2 SERPL-SCNC: 21 MMOL/L (ref 23–29)
CREAT SERPL-MCNC: 1.1 MG/DL (ref 0.5–1.4)
DIFFERENTIAL METHOD: NORMAL
EOSINOPHIL # BLD AUTO: 0.3 K/UL (ref 0–0.5)
EOSINOPHIL NFR BLD: 4.2 % (ref 0–8)
ERYTHROCYTE [DISTWIDTH] IN BLOOD BY AUTOMATED COUNT: 13.7 % (ref 11.5–14.5)
EST. GFR  (AFRICAN AMERICAN): >60 ML/MIN/1.73 M^2
EST. GFR  (NON AFRICAN AMERICAN): >60 ML/MIN/1.73 M^2
GLUCOSE SERPL-MCNC: 70 MG/DL (ref 70–110)
HCT VFR BLD AUTO: 44.4 % (ref 40–54)
HGB BLD-MCNC: 14.9 G/DL (ref 14–18)
IMM GRANULOCYTES # BLD AUTO: 0.02 K/UL (ref 0–0.04)
IMM GRANULOCYTES NFR BLD AUTO: 0.3 % (ref 0–0.5)
INR PPP: 1.1
LYMPHOCYTES # BLD AUTO: 1.8 K/UL (ref 1–4.8)
LYMPHOCYTES NFR BLD: 26.4 % (ref 18–48)
MCH RBC QN AUTO: 30.9 PG (ref 27–31)
MCHC RBC AUTO-ENTMCNC: 33.6 G/DL (ref 32–36)
MCV RBC AUTO: 92 FL (ref 82–98)
MONOCYTES # BLD AUTO: 0.7 K/UL (ref 0.3–1)
MONOCYTES NFR BLD: 9.4 % (ref 4–15)
NEUTROPHILS # BLD AUTO: 4.1 K/UL (ref 1.8–7.7)
NEUTROPHILS NFR BLD: 59.3 % (ref 38–73)
NRBC BLD-RTO: 0 /100 WBC
PLATELET # BLD AUTO: 173 K/UL (ref 150–450)
PMV BLD AUTO: 11.7 FL (ref 9.2–12.9)
POTASSIUM SERPL-SCNC: 4.4 MMOL/L (ref 3.5–5.1)
PROTHROMBIN TIME: 13.8 SEC (ref 11.4–13.7)
RBC # BLD AUTO: 4.82 M/UL (ref 4.6–6.2)
SODIUM SERPL-SCNC: 137 MMOL/L (ref 136–145)
WBC # BLD AUTO: 6.89 K/UL (ref 3.9–12.7)

## 2022-05-16 PROCEDURE — 36415 COLL VENOUS BLD VENIPUNCTURE: CPT | Performed by: SURGERY

## 2022-05-16 PROCEDURE — 85025 COMPLETE CBC W/AUTO DIFF WBC: CPT | Performed by: SURGERY

## 2022-05-16 PROCEDURE — 71046 X-RAY EXAM CHEST 2 VIEWS: CPT | Mod: TC

## 2022-05-16 PROCEDURE — 85730 THROMBOPLASTIN TIME PARTIAL: CPT | Performed by: SURGERY

## 2022-05-16 PROCEDURE — 80048 BASIC METABOLIC PNL TOTAL CA: CPT | Performed by: SURGERY

## 2022-05-16 PROCEDURE — 93010 EKG 12-LEAD: ICD-10-PCS | Mod: ,,, | Performed by: SPECIALIST

## 2022-05-16 PROCEDURE — 93010 ELECTROCARDIOGRAM REPORT: CPT | Mod: ,,, | Performed by: SPECIALIST

## 2022-05-16 PROCEDURE — 85610 PROTHROMBIN TIME: CPT | Performed by: SURGERY

## 2022-05-16 PROCEDURE — 93005 ELECTROCARDIOGRAM TRACING: CPT | Performed by: SPECIALIST

## 2022-05-16 NOTE — DISCHARGE INSTRUCTIONS
Nothing to eat or drink after midnight the night before your procedure.  Do not take any medications the morning of your procedure  Bring all your medications with you in the original pill bottles from pharmacy.  If you take blood thinners, ask your doctor if you should stop taking them.  Do your chlorhexidine wash the night before and morning of your procedure.  Make arrangements for someone you know to drive you home after your procedure. Taxi and Uber are not acceptable.

## 2022-05-18 ENCOUNTER — HOSPITAL ENCOUNTER (OUTPATIENT)
Facility: HOSPITAL | Age: 67
Discharge: HOME | End: 2022-05-18
Attending: SURGERY | Admitting: SURGERY
Payer: MEDICARE

## 2022-05-18 VITALS
RESPIRATION RATE: 18 BRPM | OXYGEN SATURATION: 99 % | HEART RATE: 75 BPM | SYSTOLIC BLOOD PRESSURE: 141 MMHG | DIASTOLIC BLOOD PRESSURE: 64 MMHG

## 2022-05-18 DIAGNOSIS — I73.9 CLAUDICATION: Primary | ICD-10-CM

## 2022-05-18 DIAGNOSIS — I71.40 ABDOMINAL AORTIC ANEURYSM WITHOUT RUPTURE: ICD-10-CM

## 2022-05-18 LAB
POC ACTIVATED CLOTTING TIME K: 196 SEC (ref 74–137)
SAMPLE: ABNORMAL

## 2022-05-18 PROCEDURE — C1887 CATHETER, GUIDING: HCPCS | Performed by: SURGERY

## 2022-05-18 PROCEDURE — 25500020 PHARM REV CODE 255: Performed by: SURGERY

## 2022-05-18 PROCEDURE — 27201423 OPTIME MED/SURG SUP & DEVICES STERILE SUPPLY: Performed by: SURGERY

## 2022-05-18 PROCEDURE — 25000003 PHARM REV CODE 250: Performed by: SURGERY

## 2022-05-18 PROCEDURE — C1884 EMBOLIZATION PROTECT SYST: HCPCS | Performed by: SURGERY

## 2022-05-18 PROCEDURE — 75630 X-RAY AORTA LEG ARTERIES: CPT | Mod: XU | Performed by: SURGERY

## 2022-05-18 PROCEDURE — C1769 GUIDE WIRE: HCPCS | Performed by: SURGERY

## 2022-05-18 PROCEDURE — C1725 CATH, TRANSLUMIN NON-LASER: HCPCS | Performed by: SURGERY

## 2022-05-18 PROCEDURE — 63600175 PHARM REV CODE 636 W HCPCS: Performed by: SURGERY

## 2022-05-18 PROCEDURE — C1894 INTRO/SHEATH, NON-LASER: HCPCS | Performed by: SURGERY

## 2022-05-18 PROCEDURE — 99152 MOD SED SAME PHYS/QHP 5/>YRS: CPT | Performed by: SURGERY

## 2022-05-18 PROCEDURE — 99153 MOD SED SAME PHYS/QHP EA: CPT | Performed by: SURGERY

## 2022-05-18 PROCEDURE — 37224 HC FEM/POPL REVAS W/TLA: CPT | Mod: LT | Performed by: SURGERY

## 2022-05-18 RX ORDER — LIDOCAINE HYDROCHLORIDE 10 MG/ML
INJECTION, SOLUTION EPIDURAL; INFILTRATION; INTRACAUDAL; PERINEURAL
Status: DISCONTINUED | OUTPATIENT
Start: 2022-05-18 | End: 2022-05-18 | Stop reason: HOSPADM

## 2022-05-18 RX ORDER — SODIUM CHLORIDE 9 MG/ML
INJECTION, SOLUTION INTRAVENOUS CONTINUOUS
Status: DISCONTINUED | OUTPATIENT
Start: 2022-05-18 | End: 2022-05-18 | Stop reason: HOSPADM

## 2022-05-18 RX ORDER — CLOPIDOGREL BISULFATE 75 MG/1
75 TABLET ORAL DAILY
Status: DISCONTINUED | OUTPATIENT
Start: 2022-05-18 | End: 2022-05-18 | Stop reason: HOSPADM

## 2022-05-18 RX ORDER — ONDANSETRON 4 MG/1
8 TABLET, ORALLY DISINTEGRATING ORAL EVERY 8 HOURS PRN
Status: DISCONTINUED | OUTPATIENT
Start: 2022-05-18 | End: 2022-05-18 | Stop reason: HOSPADM

## 2022-05-18 RX ORDER — CLOPIDOGREL BISULFATE 75 MG/1
75 TABLET ORAL DAILY
Qty: 90 TABLET | Refills: 0 | Status: SHIPPED | OUTPATIENT
Start: 2022-05-18 | End: 2024-01-23

## 2022-05-18 RX ORDER — FENTANYL CITRATE 50 UG/ML
INJECTION, SOLUTION INTRAMUSCULAR; INTRAVENOUS
Status: DISCONTINUED | OUTPATIENT
Start: 2022-05-18 | End: 2022-05-18 | Stop reason: HOSPADM

## 2022-05-18 RX ORDER — IODIXANOL 320 MG/ML
INJECTION, SOLUTION INTRAVASCULAR
Status: DISCONTINUED | OUTPATIENT
Start: 2022-05-18 | End: 2022-05-18 | Stop reason: HOSPADM

## 2022-05-18 RX ORDER — SODIUM CHLORIDE 9 MG/ML
INJECTION, SOLUTION INTRAVENOUS ONCE
Status: COMPLETED | OUTPATIENT
Start: 2022-05-18 | End: 2022-05-18

## 2022-05-18 RX ORDER — ACETAMINOPHEN 325 MG/1
650 TABLET ORAL EVERY 4 HOURS PRN
Status: DISCONTINUED | OUTPATIENT
Start: 2022-05-18 | End: 2022-05-18 | Stop reason: HOSPADM

## 2022-05-18 RX ORDER — CLOPIDOGREL BISULFATE 75 MG/1
75 TABLET ORAL DAILY
Qty: 90 TABLET | Refills: 0 | Status: SHIPPED | OUTPATIENT
Start: 2022-05-18 | End: 2022-09-21

## 2022-05-18 RX ORDER — HEPARIN SODIUM 10000 [USP'U]/ML
INJECTION, SOLUTION INTRAVENOUS; SUBCUTANEOUS
Status: DISCONTINUED | OUTPATIENT
Start: 2022-05-18 | End: 2022-05-18 | Stop reason: HOSPADM

## 2022-05-18 RX ORDER — MIDAZOLAM HYDROCHLORIDE 1 MG/ML
INJECTION INTRAMUSCULAR; INTRAVENOUS
Status: DISCONTINUED | OUTPATIENT
Start: 2022-05-18 | End: 2022-05-18 | Stop reason: HOSPADM

## 2022-05-18 RX ADMIN — CLOPIDOGREL BISULFATE 75 MG: 75 TABLET, FILM COATED ORAL at 03:05

## 2022-05-18 RX ADMIN — SODIUM CHLORIDE: 0.9 INJECTION, SOLUTION INTRAVENOUS at 08:05

## 2022-05-18 NOTE — OP NOTE
[facility]  Surgery Department  Operative Note    SUMMARY     Date of Procedure: 5/18/2022     Procedure: Procedure(s) (LRB):  AORTOGRAM, WITH RUNOFF (N/A)  PTA, ARTERY, FEMOROPOPLITEAL, WITH ATHERECTOMY (Left)     Surgeon(s) and Role:     * Alan Florian MD - Primary    Assisting Surgeon: None    Pre-Operative Diagnosis: Abdominal aortic aneurysm without rupture [I71.4]  Claudication [I73.9]    Post-Operative Diagnosis: Post-Op Diagnosis Codes:     * Abdominal aortic aneurysm without rupture [I71.4]     * Claudication [I73.9]    Anesthesia: RN IV Sedation    Operative Findings (including complications, if any):  Abdominal aortic aneurysm.  Bilateral common femoral moderate stenosis.  Bilateral diffuse iliac atheromatous disease without focal severe stenosis.  Left popliteal short-segment occlusion with anterior tib and peroneal runoff.  Right lower extremity with moderate atheromatous disease but no severe stenosis with anterior tib and peroneal runoff.    Description of Technical Procedures:  Abdominal aortogram.  Bilateral lower extremity angiogram.  Left popliteal recanalization with 5 x 4 balloon and 5 x 80 drug coated balloon.  Embolic protection.    Right femoral artery accessed over the femoral head.  Guidewire passed 5 Polish sheath placed Omni Flush catheter advanced over the wire to the renal arteries abdominal aortogram performed catheter pulled down to the bifurcation multiple obliques of the iliac vessels was performed.  Catheter advanced over the bifurcation and left lower extremity angiogram performed.  Right lower extremity angiogram performed.  Findings are bilateral patent single renal arteries infrarenal aorta has a short-segment of normal diameter that distally the abdominal aorta is dilated the aorta tapers down to normal diameter the common internal external iliac arteries are patent with diffuse atheromatous disease but no focal severe stenosis.  The right common femoral has  moderate stenosis profunda is patent the right SFA is atherosclerotic sclerotic but patent popliteal is patent anterior tib and peroneal runoff to the right foot.  The left common femoral has some moderate stenosis the profunda is diminutive and the main branch appears to be occluded.  The SFA is diffusely atherosclerotic but patent.  The popliteal is a short segment occlusion and then reconstitutes several cm above the bifurcation and the anterior tib and peroneal are patent.  We then went up and over the bifurcation with a 6 Mongolian sheath cross the popliteal occlusion confirm we were within the true lumen placed a 4 mm spider wire.  We then dilated the occlusion with a 5 mm balloon which gave us actually a really good post PTA result we then did a drug coated balloon with 5 x 80 balloon for 3 minutes repeat angiogram showed excellent flow through the popliteal no residual stenosis no perforation with rapid runoff through the anterior tib and peroneal with out evidence of embolization.  Patient had received 3000 units of IV heparin and then the sheath was pulled after checking a CT.    Significant Surgical Tasks Conducted by the Assistant(s), if Applicable:     Estimated Blood Loss (EBL): * No values recorded between 5/18/2022 11:19 AM and 5/18/2022 12:12 PM *           Implants: * No implants in log *    Specimens:   Specimen (24h ago, onward)            None                  Condition: Good    Disposition: PACU - hemodynamically stable.    Attestation: I was present and scrubbed for the entire procedure.

## 2022-05-18 NOTE — Clinical Note
An angiography was performed of the distal left infrapopliteal artery, anterior tibial artery, tibio-peroneal trunk, peroneal artery and posterior tibial artery via hand injection with 10 mL of contrast

## 2022-05-18 NOTE — Clinical Note
An angiography was performed of the ostial and proximal left infrapopliteal artery and anterior tibial artery

## 2022-05-18 NOTE — Clinical Note
An angiography was performed of the distal left superficial femoral artery via hand injection with 10 mL of contrast

## 2022-05-18 NOTE — Clinical Note
An angiography of the  abdominal aorta was performed with the catheter and hand injected with 10 mL of contrast

## 2022-05-18 NOTE — Clinical Note
An angiography was performed of the mid left popliteal artery via hand injection with 3 mL of contrast

## 2022-05-18 NOTE — Clinical Note
An angiography was performed of the ostial, proximal, mid and distal left popliteal arterypost intervention  via hand injection with 3 mL of contrast

## 2022-05-18 NOTE — Clinical Note
An angiography was performed of the ostial, proximal, mid and distal left external iliac artery via hand injection with 10 mL of contrast

## 2022-05-20 NOTE — PHYSICIAN QUERY
PT Name: Miles Vazquez  MR #: [id number]     Documentation Clarification      CDS/: Tiana Camacho               Contact information: [phone]    This form is a permanent document in the medical record.     Query Date: May 20, 2022    By submitting this query, we are merely seeking further clarification of documentation. Please utilize your independent clinical judgment when addressing the question(s) below.    The Medical Record reflects the following:    Supporting Clinical Findings Location in Medical Record   ATHERECTOMY     Embolic protection.      11:39 -  an embolic protection device was inserted into the distal popliteal. OP Report  - 05/18/2022          Cath lab procedure log 05/18/2022                                                                            Provider, please clarify if the above listed Atherectomy was performed; the description of it is not documented in the body of the OP Report.     [   ] Yes -  atherectomy was performed - ( please complete an addendum to your OP Report)   [  x ] No -  atherectomy was not performed

## 2022-09-21 ENCOUNTER — OFFICE VISIT (OUTPATIENT)
Dept: FAMILY MEDICINE | Facility: CLINIC | Age: 67
End: 2022-09-21
Payer: MEDICARE

## 2022-09-21 ENCOUNTER — LAB VISIT (OUTPATIENT)
Dept: LAB | Facility: HOSPITAL | Age: 67
End: 2022-09-21
Attending: NURSE PRACTITIONER
Payer: MEDICARE

## 2022-09-21 VITALS
TEMPERATURE: 98 F | OXYGEN SATURATION: 96 % | HEART RATE: 86 BPM | HEIGHT: 67 IN | RESPIRATION RATE: 14 BRPM | WEIGHT: 141.81 LBS | BODY MASS INDEX: 22.26 KG/M2 | SYSTOLIC BLOOD PRESSURE: 126 MMHG | DIASTOLIC BLOOD PRESSURE: 68 MMHG

## 2022-09-21 DIAGNOSIS — Z79.899 HIGH RISK MEDICATION USE: ICD-10-CM

## 2022-09-21 DIAGNOSIS — R10.33 PERIUMBILICAL ABDOMINAL PAIN: ICD-10-CM

## 2022-09-21 DIAGNOSIS — F51.01 PRIMARY INSOMNIA: ICD-10-CM

## 2022-09-21 DIAGNOSIS — E78.2 MIXED HYPERLIPIDEMIA: Chronic | ICD-10-CM

## 2022-09-21 DIAGNOSIS — Z12.5 PROSTATE CANCER SCREENING: ICD-10-CM

## 2022-09-21 DIAGNOSIS — R10.33 PERIUMBILICAL ABDOMINAL PAIN: Primary | ICD-10-CM

## 2022-09-21 DIAGNOSIS — F10.21 RECOVERING ALCOHOLIC IN REMISSION: ICD-10-CM

## 2022-09-21 DIAGNOSIS — R10.13 EPIGASTRIC PAIN: ICD-10-CM

## 2022-09-21 DIAGNOSIS — R41.3 MEMORY CHANGES: ICD-10-CM

## 2022-09-21 LAB
ALBUMIN SERPL BCP-MCNC: 3.7 G/DL (ref 3.5–5.2)
ALP SERPL-CCNC: 137 U/L (ref 55–135)
ALT SERPL W/O P-5'-P-CCNC: 17 U/L (ref 10–44)
ANION GAP SERPL CALC-SCNC: 14 MMOL/L (ref 8–16)
AST SERPL-CCNC: 18 U/L (ref 10–40)
BASOPHILS # BLD AUTO: 0.04 K/UL (ref 0–0.2)
BASOPHILS NFR BLD: 0.3 % (ref 0–1.9)
BILIRUB SERPL-MCNC: 0.2 MG/DL (ref 0.1–1)
BUN SERPL-MCNC: 31 MG/DL (ref 8–23)
CALCIUM SERPL-MCNC: 9.5 MG/DL (ref 8.7–10.5)
CHLORIDE SERPL-SCNC: 106 MMOL/L (ref 95–110)
CHOLEST SERPL-MCNC: 168 MG/DL (ref 120–199)
CHOLEST/HDLC SERPL: 4.9 {RATIO} (ref 2–5)
CO2 SERPL-SCNC: 19 MMOL/L (ref 23–29)
COMPLEXED PSA SERPL-MCNC: 0.63 NG/ML (ref 0–4)
CREAT SERPL-MCNC: 1.1 MG/DL (ref 0.5–1.4)
DIFFERENTIAL METHOD: ABNORMAL
EOSINOPHIL # BLD AUTO: 1.1 K/UL (ref 0–0.5)
EOSINOPHIL NFR BLD: 8.5 % (ref 0–8)
ERYTHROCYTE [DISTWIDTH] IN BLOOD BY AUTOMATED COUNT: 13 % (ref 11.5–14.5)
EST. GFR  (NO RACE VARIABLE): >60 ML/MIN/1.73 M^2
GLUCOSE SERPL-MCNC: 81 MG/DL (ref 70–110)
HCT VFR BLD AUTO: 41 % (ref 40–54)
HDLC SERPL-MCNC: 34 MG/DL (ref 40–75)
HDLC SERPL: 20.2 % (ref 20–50)
HGB BLD-MCNC: 13.7 G/DL (ref 14–18)
IMM GRANULOCYTES # BLD AUTO: 0.07 K/UL (ref 0–0.04)
IMM GRANULOCYTES NFR BLD AUTO: 0.5 % (ref 0–0.5)
LDLC SERPL CALC-MCNC: 107 MG/DL (ref 63–159)
LYMPHOCYTES # BLD AUTO: 2.7 K/UL (ref 1–4.8)
LYMPHOCYTES NFR BLD: 21.2 % (ref 18–48)
MCH RBC QN AUTO: 31.1 PG (ref 27–31)
MCHC RBC AUTO-ENTMCNC: 33.4 G/DL (ref 32–36)
MCV RBC AUTO: 93 FL (ref 82–98)
MONOCYTES # BLD AUTO: 0.8 K/UL (ref 0.3–1)
MONOCYTES NFR BLD: 6.2 % (ref 4–15)
NEUTROPHILS # BLD AUTO: 8.1 K/UL (ref 1.8–7.7)
NEUTROPHILS NFR BLD: 63.3 % (ref 38–73)
NONHDLC SERPL-MCNC: 134 MG/DL
NRBC BLD-RTO: 0 /100 WBC
PLATELET # BLD AUTO: 232 K/UL (ref 150–450)
PMV BLD AUTO: 10.7 FL (ref 9.2–12.9)
POTASSIUM SERPL-SCNC: 4.4 MMOL/L (ref 3.5–5.1)
PROT SERPL-MCNC: 7.6 G/DL (ref 6–8.4)
RBC # BLD AUTO: 4.4 M/UL (ref 4.6–6.2)
SODIUM SERPL-SCNC: 139 MMOL/L (ref 136–145)
T4 FREE SERPL-MCNC: 0.96 NG/DL (ref 0.71–1.51)
TRIGL SERPL-MCNC: 135 MG/DL (ref 30–150)
TSH SERPL DL<=0.005 MIU/L-ACNC: 1.64 UIU/ML (ref 0.4–4)
WBC # BLD AUTO: 12.75 K/UL (ref 3.9–12.7)

## 2022-09-21 PROCEDURE — 84153 ASSAY OF PSA TOTAL: CPT | Performed by: NURSE PRACTITIONER

## 2022-09-21 PROCEDURE — 84439 ASSAY OF FREE THYROXINE: CPT | Performed by: NURSE PRACTITIONER

## 2022-09-21 PROCEDURE — 80061 LIPID PANEL: CPT | Performed by: NURSE PRACTITIONER

## 2022-09-21 PROCEDURE — 99214 OFFICE O/P EST MOD 30 MIN: CPT | Mod: PBBFAC | Performed by: NURSE PRACTITIONER

## 2022-09-21 PROCEDURE — 99214 PR OFFICE/OUTPT VISIT, EST, LEVL IV, 30-39 MIN: ICD-10-PCS | Mod: S$PBB,,, | Performed by: NURSE PRACTITIONER

## 2022-09-21 PROCEDURE — 99214 OFFICE O/P EST MOD 30 MIN: CPT | Mod: S$PBB,,, | Performed by: NURSE PRACTITIONER

## 2022-09-21 PROCEDURE — 84443 ASSAY THYROID STIM HORMONE: CPT | Performed by: NURSE PRACTITIONER

## 2022-09-21 PROCEDURE — 85025 COMPLETE CBC W/AUTO DIFF WBC: CPT | Performed by: NURSE PRACTITIONER

## 2022-09-21 PROCEDURE — 99999 PR PBB SHADOW E&M-EST. PATIENT-LVL IV: ICD-10-PCS | Mod: PBBFAC,,, | Performed by: NURSE PRACTITIONER

## 2022-09-21 PROCEDURE — 99999 PR PBB SHADOW E&M-EST. PATIENT-LVL IV: CPT | Mod: PBBFAC,,, | Performed by: NURSE PRACTITIONER

## 2022-09-21 PROCEDURE — 80053 COMPREHEN METABOLIC PANEL: CPT | Performed by: NURSE PRACTITIONER

## 2022-09-21 PROCEDURE — 36415 COLL VENOUS BLD VENIPUNCTURE: CPT | Performed by: NURSE PRACTITIONER

## 2022-09-21 RX ORDER — SUCRALFATE 1 G/1
1 TABLET ORAL
Qty: 120 TABLET | Refills: 1 | Status: SHIPPED | OUTPATIENT
Start: 2022-09-21 | End: 2022-09-23 | Stop reason: SDUPTHER

## 2022-09-21 RX ORDER — AMITRIPTYLINE HYDROCHLORIDE 100 MG/1
100 TABLET ORAL NIGHTLY
Qty: 90 TABLET | Refills: 3 | Status: SHIPPED | OUTPATIENT
Start: 2022-09-21 | End: 2022-09-23 | Stop reason: SDUPTHER

## 2022-09-21 RX ORDER — OMEPRAZOLE 40 MG/1
40 CAPSULE, DELAYED RELEASE ORAL DAILY
Qty: 30 CAPSULE | Refills: 1 | Status: SHIPPED | OUTPATIENT
Start: 2022-09-21 | End: 2022-09-23 | Stop reason: SDUPTHER

## 2022-09-21 NOTE — PROGRESS NOTES
"Subjective:       Patient ID: Miles Vazquez is a 67 y.o. male.    Chief Complaint: Abdominal Pain (Pt is here for stomach pains that he said he has been having off and on for a bout 2 months pt was given sucralfate but he states it not working)  -  68 y/o male presents with c/o periumbilical/epigastric pain x 6 mo. Has taken Carafate for 2 months without relief. Does c/o reflux and heartburn not on PPI or H2 blocker. CTA abd 3/2022 benign other than all vascular issues. Does have intermittent diarrhea denies foul odor, mucus or blood.     Concerns for memory yet MME score was 29 thus reassured his memory may have declined some but is not poor.  -  Abdominal Pain  This is a chronic problem. Episode onset: 6 months. The onset quality is gradual. The problem occurs constantly. The most recent episode lasted 6 hours. The problem has been unchanged. The pain is located in the epigastric region and periumbilical region. The pain is at a severity of 8/10. The pain is severe. The quality of the pain is cramping. The abdominal pain does not radiate. Associated symptoms include anorexia. Pertinent negatives include no hematochezia, melena, nausea, vomiting or weight loss. Associated symptoms comments: belching. The pain is aggravated by eating. The pain is relieved by Nothing. Treatments tried: carafate. The treatment provided no relief. His past medical history is significant for abdominal surgery and GERD. There is no history of PUD.   -  Past Medical History:   Diagnosis Date    Abdominal aortic aneurysm (AAA) 05/2019    3.2 cm per CTA.--12/2020 4.3 x 4.7 by 4.5 cm--3/2022  4.8 x 4.5 cm    Abnormal findings on esophagogastroduodenoscopy (EGD) 04/2016    "intestinal metaplasia" which is a "risk factor" for gastric cancer.      Apical lung nodule 03/2022    Right    Bursitis of both hips     Colon polyp     COPD (chronic obstructive pulmonary disease) 2016    Coronary artery calcification seen on CAT scan 04/24/2013    " Depression 2016    Accompanied drinking problem    HTN (hypertension) 01/07/2013    Hyperlipidemia 2019    Lumbar disc disease 06/2019    grade 1 spondylolisthesis of L5 on S1    Macrocytic anemia 03/27/2016    Personal history of colonic polyps 2017    Recovering alcoholic 03/16/2016    RLS (restless legs syndrome) 2014    Sleep apnea 2 years ago    does not use cpap       Past Surgical History:   Procedure Laterality Date    ANGIOGRAPHY OF LOWER EXTREMITY  08/14/2019    AORTOGRAPHY WITH SERIALOGRAPHY N/A 8/14/2019    Procedure: AORTOGRAM, WITH RUNOFF;  Surgeon: Alan Florian MD;  Location: [facility] CATH/EP LAB;  Service: Peripheral Vascular;  Laterality: N/A;    AORTOGRAPHY WITH SERIALOGRAPHY N/A 5/18/2022    Procedure: AORTOGRAM, WITH RUNOFF;  Surgeon: Alan Florian MD;  Location: [facility] CATH/EP LAB;  Service: Cardiovascular;  Laterality: N/A;    APPENDECTOMY N/A 9/26/2018    Procedure: APPENDECTOMY;  Surgeon: Ramírez Horner MD;  Location: [facility] OR;  Service: General;  Laterality: N/A;    COLON SURGERY  09/2018    COLONOSCOPY  2017    with polyps repeat 5 yrs- [place]    ESOPHAGOGASTRODUODENOSCOPY N/A 9/26/2018    Procedure: EGD (ESOPHAGOGASTRODUODENOSCOPY);  Surgeon: Ramírez Horner MD;  Location: [facility] OR;  Service: General;  Laterality: N/A;  WITH BX    HERNIA REPAIR N/A 9/26/2018    Procedure: REPAIR, HERNIA;  Surgeon: Ramírez Horner MD;  Location: [facility] OR;  Service: General;  Laterality: N/A;  INTERNAL LYSIS WITH REDUCTION OF INTERNAL HERNIA    SMALL INTESTINE SURGERY  09/2018        Social History     Socioeconomic History    Marital status: Single   Tobacco Use    Smoking status: Some Days     Packs/day: 0.25     Years: 54.00     Pack years: 13.50     Types: Cigarettes    Smokeless tobacco: Never    Tobacco comments:     1 or 2 cigarettes a day    Substance and Sexual Activity    Drug use: Not Currently     Types: Marijuana     Comment: In the past.    Sexual  activity: Not Currently     Partners: Female     Birth control/protection: Condom     Social Determinants of Health     Financial Resource Strain: Medium Risk    Difficulty of Paying Living Expenses: Somewhat hard   Food Insecurity: Food Insecurity Present    Worried About Running Out of Food in the Last Year: Sometimes true    Ran Out of Food in the Last Year: Never true   Transportation Needs: No Transportation Needs    Lack of Transportation (Medical): No    Lack of Transportation (Non-Medical): No   Physical Activity: Sufficiently Active    Days of Exercise per Week: 5 days    Minutes of Exercise per Session: 150+ min   Stress: Stress Concern Present    Feeling of Stress : To some extent   Social Connections: Unknown    Frequency of Communication with Friends and Family: More than three times a week    Frequency of Social Gatherings with Friends and Family: Patient refused    Active Member of Clubs or Organizations: No    Attends Club or Organization Meetings: Patient refused    Marital Status:    Housing Stability: Low Risk     Unable to Pay for Housing in the Last Year: No    Number of Places Lived in the Last Year: 1    Unstable Housing in the Last Year: No       Family History   Problem Relation Age of Onset    Alcohol abuse Mother         Cancer    Alcohol abuse Father     Tuberculosis Maternal Grandmother     Stomach cancer Neg Hx     Colon cancer Neg Hx        Review of patient's allergies indicates:   Allergen Reactions    Seroquel [quetiapine] Other (See Comments)     RESTLESS LEGS          Current Outpatient Medications:     aspirin 81 MG Chew, Take 81 mg by mouth once daily., Disp: , Rfl:     atorvastatin (LIPITOR) 40 MG tablet, Take 1 tablet (40 mg total) by mouth every evening., Disp: 90 tablet, Rfl: 1    benazepriL (LOTENSIN) 20 MG tablet, Take 1 tablet (20 mg total) by mouth once daily., Disp: 90 tablet, Rfl: 3    clopidogreL (PLAVIX) 75 mg tablet, Take 1 tablet (75 mg total) by mouth once  daily., Disp: 90 tablet, Rfl: 0    cyanocobalamin (VITAMIN B-12) 1000 MCG tablet, Take 0.5 tablets (500 mcg total) by mouth once daily., Disp: 60 tablet, Rfl: 12    ferrous sulfate (FEOSOL) 325 mg (65 mg iron) Tab tablet, Take 1 tablet (325 mg total) by mouth 2 (two) times daily. Try on empty stomach first, Disp: 60 tablet, Rfl: 11    multivit-min/FA/lycopen/lutein (CENTRUM SILVER MEN ORAL), Take by mouth., Disp: , Rfl:     rOPINIRole (REQUIP) 1 MG tablet, Take 1 tablet (1 mg total) by mouth every evening., Disp: 90 tablet, Rfl: 1    zolpidem (AMBIEN) 10 mg Tab, TAKE 1/2 TO 1  TABLET BY MOUTH AT BEDTIME AS NEEDED FOR INSOMNIA, Disp: 90 tablet, Rfl: 0    amitriptyline (ELAVIL) 100 MG tablet, Take 1 tablet (100 mg total) by mouth every evening., Disp: 90 tablet, Rfl: 3    omeprazole (PRILOSEC) 40 MG capsule, Take 1 capsule (40 mg total) by mouth once daily., Disp: 30 capsule, Rfl: 1    sucralfate (CARAFATE) 1 gram tablet, Take 1 tablet (1 g total) by mouth 4 (four) times daily before meals and nightly., Disp: 120 tablet, Rfl: 1      Review of Systems   Constitutional:         Memory concerns   HENT: Negative.     Eyes: Negative.    Respiratory: Negative.     Cardiovascular: Negative.    Gastrointestinal:  Positive for abdominal pain. Negative for nausea and vomiting.   Endocrine: Negative.    Genitourinary: Negative.    Musculoskeletal: Negative.    Skin: Negative.    Allergic/Immunologic: Negative.    Neurological: Negative.    Hematological: Negative.    Psychiatric/Behavioral: Negative.       Objective:      Physical Exam  Vitals reviewed.   Constitutional:       General: He is not in acute distress.     Appearance: Normal appearance. He is well-developed and normal weight. He is not ill-appearing.   HENT:      Head: Normocephalic.   Eyes:      Conjunctiva/sclera: Conjunctivae normal.   Neck:      Thyroid: No thyromegaly.   Cardiovascular:      Rate and Rhythm: Normal rate and regular rhythm.      Heart sounds:  Normal heart sounds. No murmur heard.  Pulmonary:      Effort: Pulmonary effort is normal.      Breath sounds: Normal breath sounds. No wheezing or rales.   Abdominal:      General: Abdomen is flat. Bowel sounds are normal.      Palpations: Abdomen is soft. There is no mass.      Tenderness: There is abdominal tenderness. There is no rebound.      Comments: Periumbilical and epigastric tenderness   Musculoskeletal:         General: Normal range of motion.      Cervical back: Normal range of motion.      Right lower leg: No edema.      Left lower leg: No edema.   Skin:     General: Skin is warm and dry.   Neurological:      Mental Status: He is alert and oriented to person, place, and time. Mental status is at baseline.   Psychiatric:         Mood and Affect: Mood normal.         Behavior: Behavior normal.         Thought Content: Thought content normal.         Judgment: Judgment normal.       Assessment:       1. Periumbilical abdominal pain    2. Epigastric pain    3. Memory changes    4. Primary insomnia    5. Prostate cancer screening    6. Recovering alcoholic in remission    7. Mixed hyperlipidemia    8. High risk medication use        Plan:     1- Refer to Dr. Horner  2-Continue Carafate and add omprazole  3- blood work today    Periumbilical abdominal pain  -     sucralfate (CARAFATE) 1 gram tablet; Take 1 tablet (1 g total) by mouth 4 (four) times daily before meals and nightly.  Dispense: 120 tablet; Refill: 1  -     omeprazole (PRILOSEC) 40 MG capsule; Take 1 capsule (40 mg total) by mouth once daily.  Dispense: 30 capsule; Refill: 1  -     Ambulatory referral/consult to General Surgery; Future; Expected date: 09/28/2022  -     CBC Auto Differential; Future; Expected date: 09/21/2022  -     Comprehensive Metabolic Panel; Future; Expected date: 09/21/2022    Epigastric pain  -     sucralfate (CARAFATE) 1 gram tablet; Take 1 tablet (1 g total) by mouth 4 (four) times daily before meals and nightly.   Dispense: 120 tablet; Refill: 1  -     omeprazole (PRILOSEC) 40 MG capsule; Take 1 capsule (40 mg total) by mouth once daily.  Dispense: 30 capsule; Refill: 1  -     Ambulatory referral/consult to General Surgery; Future; Expected date: 09/28/2022  -     CBC Auto Differential; Future; Expected date: 09/21/2022  -     Comprehensive Metabolic Panel; Future; Expected date: 09/21/2022    Memory changes    Primary insomnia  -     amitriptyline (ELAVIL) 100 MG tablet; Take 1 tablet (100 mg total) by mouth every evening.  Dispense: 90 tablet; Refill: 3    Prostate cancer screening  -     PSA, Screening; Future; Expected date: 09/21/2022    Recovering alcoholic in remission    Mixed hyperlipidemia  -     Lipid Panel; Future; Expected date: 09/21/2022    High risk medication use  -     TSH; Future; Expected date: 09/21/2022  -     T4, Free; Future; Expected date: 09/21/2022      Risks, benefits, and side effects were discussed with the patient. All questions were answered to the fullest satisfaction of the patient, and pt verbalized understanding and agreement to treatment plan. Pt was to call with any new or worsening symptoms, or present to the ER.

## 2022-09-23 DIAGNOSIS — R10.33 PERIUMBILICAL ABDOMINAL PAIN: ICD-10-CM

## 2022-09-23 DIAGNOSIS — F51.01 PRIMARY INSOMNIA: ICD-10-CM

## 2022-09-23 DIAGNOSIS — G25.81 RESTLESS LEG SYNDROME: ICD-10-CM

## 2022-09-23 DIAGNOSIS — I10 ESSENTIAL HYPERTENSION: ICD-10-CM

## 2022-09-23 DIAGNOSIS — R10.13 EPIGASTRIC PAIN: ICD-10-CM

## 2022-09-24 RX ORDER — ZOLPIDEM TARTRATE 10 MG/1
10 TABLET ORAL NIGHTLY PRN
Qty: 90 TABLET | Refills: 1 | Status: SHIPPED | OUTPATIENT
Start: 2022-09-24 | End: 2023-03-24

## 2022-09-24 RX ORDER — OMEPRAZOLE 40 MG/1
40 CAPSULE, DELAYED RELEASE ORAL DAILY
Qty: 90 CAPSULE | Refills: 1 | Status: SHIPPED | OUTPATIENT
Start: 2022-09-24 | End: 2022-11-02 | Stop reason: SDUPTHER

## 2022-09-24 RX ORDER — BENAZEPRIL HYDROCHLORIDE 20 MG/1
20 TABLET ORAL DAILY
Qty: 90 TABLET | Refills: 3 | Status: SHIPPED | OUTPATIENT
Start: 2022-09-24 | End: 2022-11-02 | Stop reason: SDUPTHER

## 2022-09-24 RX ORDER — SUCRALFATE 1 G/1
1 TABLET ORAL
Qty: 120 TABLET | Refills: 1 | Status: SHIPPED | OUTPATIENT
Start: 2022-09-24

## 2022-09-24 RX ORDER — ROPINIROLE 1 MG/1
1 TABLET, FILM COATED ORAL NIGHTLY
Qty: 90 TABLET | Refills: 3 | Status: SHIPPED | OUTPATIENT
Start: 2022-09-24 | End: 2023-09-29 | Stop reason: SDUPTHER

## 2022-09-24 RX ORDER — AMITRIPTYLINE HYDROCHLORIDE 100 MG/1
100 TABLET ORAL NIGHTLY
Qty: 90 TABLET | Refills: 3 | Status: SHIPPED | OUTPATIENT
Start: 2022-09-24 | End: 2023-02-11 | Stop reason: SDUPTHER

## 2022-10-21 ENCOUNTER — OFFICE VISIT (OUTPATIENT)
Dept: SURGERY | Facility: CLINIC | Age: 67
End: 2022-10-21
Payer: MEDICARE

## 2022-10-21 VITALS
DIASTOLIC BLOOD PRESSURE: 77 MMHG | WEIGHT: 140 LBS | HEART RATE: 84 BPM | HEIGHT: 67 IN | RESPIRATION RATE: 16 BRPM | BODY MASS INDEX: 21.97 KG/M2 | SYSTOLIC BLOOD PRESSURE: 137 MMHG

## 2022-10-21 DIAGNOSIS — R10.13 EPIGASTRIC PAIN: ICD-10-CM

## 2022-10-21 DIAGNOSIS — R10.33 PERIUMBILICAL ABDOMINAL PAIN: Primary | ICD-10-CM

## 2022-10-21 PROCEDURE — 99999 PR PBB SHADOW E&M-EST. PATIENT-LVL III: CPT | Mod: PBBFAC,,, | Performed by: SURGERY

## 2022-10-21 PROCEDURE — 99214 OFFICE O/P EST MOD 30 MIN: CPT | Mod: S$PBB,,, | Performed by: SURGERY

## 2022-10-21 PROCEDURE — 99214 PR OFFICE/OUTPT VISIT, EST, LEVL IV, 30-39 MIN: ICD-10-PCS | Mod: S$PBB,,, | Performed by: SURGERY

## 2022-10-21 PROCEDURE — 99213 OFFICE O/P EST LOW 20 MIN: CPT | Mod: PBBFAC | Performed by: SURGERY

## 2022-10-21 PROCEDURE — 99999 PR PBB SHADOW E&M-EST. PATIENT-LVL III: ICD-10-PCS | Mod: PBBFAC,,, | Performed by: SURGERY

## 2022-10-21 RX ORDER — OMEPRAZOLE 40 MG/1
40 CAPSULE, DELAYED RELEASE ORAL DAILY
Qty: 30 CAPSULE | Refills: 6 | Status: SHIPPED | OUTPATIENT
Start: 2022-10-21 | End: 2022-11-02

## 2022-10-21 NOTE — PROGRESS NOTES
"[facility]  Follow-up    Subjective:     Chief Complaint:  Evaluation for epigastric pain and periumbilical pain.    HPI:  Miles Vazquez is a 67 y.o. male presents today as established patient surgery Clinic for evaluation.  Patient has previously undergone laparotomy for internal hernia reduction appendectomy in the last 4 years.  Patient with evidence of epigastric pain and periumbilical pain.  He saw Merna Jacob NP within the last few weeks, had worsening epigastric pain, placed on Prilosec with resolution of his pain.  He states he feels much better now.  Patient also has mild periumbilical pain in previous incision.  Additionally he has epigastric bulges.  Desires further evaluation for this as well.    Review of Systems   Constitutional:  Negative for appetite change, chills and fever.   HENT:  Negative for congestion, dental problem and drooling.    Eyes:  Negative for photophobia, discharge and itching.   Respiratory:  Negative for apnea and chest tightness.    Cardiovascular:  Negative for chest pain, palpitations and leg swelling.   Gastrointestinal:  Negative for abdominal distention and abdominal pain.   Endocrine: Negative for cold intolerance and heat intolerance.   Genitourinary:  Negative for difficulty urinating and dysuria.   Musculoskeletal:  Negative for arthralgias and back pain.   Skin:  Negative for color change and pallor.   Neurological:  Negative for dizziness, facial asymmetry and headaches.   Hematological:  Negative for adenopathy. Does not bruise/bleed easily.   Psychiatric/Behavioral:  Negative for agitation, behavioral problems and confusion.      Objective:      Vitals:    10/21/22 0821   BP: 137/77   BP Location: Right arm   Patient Position: Sitting   BP Method: Medium (Automatic)   Pulse: 84   Resp: 16   Weight: 63.5 kg (140 lb)   Height: 5' 7" (1.702 m)     Physical Exam  Constitutional:       Appearance: He is well-developed. He is not diaphoretic.   HENT:    "   Head: Normocephalic and atraumatic.   Eyes:      Pupils: Pupils are equal, round, and reactive to light.   Neck:      Thyroid: No thyromegaly.   Cardiovascular:      Rate and Rhythm: Normal rate and regular rhythm.      Heart sounds: No murmur heard.  Pulmonary:      Effort: Pulmonary effort is normal. No respiratory distress.      Breath sounds: Normal breath sounds.   Abdominal:      General: Bowel sounds are normal. There is no distension.      Palpations: Abdomen is soft.      Tenderness: There is no abdominal tenderness.          Comments: Bulge in the epigastrium, reducible.    Periumbilical fullness however no bulge palpated.   Musculoskeletal:         General: Normal range of motion.      Cervical back: Normal range of motion and neck supple.   Skin:     General: Skin is warm.      Capillary Refill: Capillary refill takes less than 2 seconds.      Findings: No erythema or rash.   Neurological:      Mental Status: He is alert and oriented to person, place, and time.      Cranial Nerves: No cranial nerve deficit.        Assessment:       1. Periumbilical abdominal pain    2. Epigastric pain          Plan:   Periumbilical abdominal pain  -     Ambulatory referral/consult to General Surgery  -     omeprazole (PRILOSEC) 40 MG capsule; Take 1 capsule (40 mg total) by mouth once daily.  Dispense: 30 capsule; Refill: 6  -     CT Abdomen Pelvis With Contrast; Future; Expected date: 10/21/2022  -     Comprehensive Metabolic Panel; Future; Expected date: 11/21/2022    Epigastric pain  -     Ambulatory referral/consult to General Surgery  -     omeprazole (PRILOSEC) 40 MG capsule; Take 1 capsule (40 mg total) by mouth once daily.  Dispense: 30 capsule; Refill: 6  -     CT Abdomen Pelvis With Contrast; Future; Expected date: 10/21/2022        Medical Decision Making/Counseling:  Periumbilical pain, epigastric pain.  Hernia related?  Previous pain has improved significantly from initiation of Prilosec.  Possibly related  to gastroesophageal reflux disease gastritis.  Patient offered EGD declined.  Will recommend patient undergo CT scan further evaluation.  Follow up 1 CT scan complete.    Follow up:  1 CT scan complete    Patient instructed that best way to communicate with my office staff is for patient to get on the [facility] epic patient portal to expedite communication and communication issues that may occur.  Patient was given instructions on how to get on the portal.  I encouraged patient to obtain portal access as well.  Ultimately it is up to the patient to obtain access.  Patient voiced understanding.

## 2022-10-28 ENCOUNTER — PATIENT MESSAGE (OUTPATIENT)
Dept: FAMILY MEDICINE | Facility: CLINIC | Age: 67
End: 2022-10-28
Payer: MEDICARE

## 2022-10-28 ENCOUNTER — HOSPITAL ENCOUNTER (OUTPATIENT)
Dept: RADIOLOGY | Facility: HOSPITAL | Age: 67
Discharge: HOME | End: 2022-10-28
Attending: SURGERY
Payer: MEDICARE

## 2022-10-28 DIAGNOSIS — R10.13 EPIGASTRIC PAIN: ICD-10-CM

## 2022-10-28 DIAGNOSIS — R10.33 PERIUMBILICAL ABDOMINAL PAIN: ICD-10-CM

## 2022-10-28 PROCEDURE — 74177 CT ABD & PELVIS W/CONTRAST: CPT | Mod: TC

## 2022-10-28 PROCEDURE — 74177 CT ABDOMEN PELVIS WITH CONTRAST: ICD-10-PCS | Mod: 26,,, | Performed by: RADIOLOGY

## 2022-10-28 PROCEDURE — A9698 NON-RAD CONTRAST MATERIALNOC: HCPCS | Performed by: SURGERY

## 2022-10-28 PROCEDURE — 25500020 PHARM REV CODE 255: Performed by: SURGERY

## 2022-10-28 PROCEDURE — 74177 CT ABD & PELVIS W/CONTRAST: CPT | Mod: 26,,, | Performed by: RADIOLOGY

## 2022-10-28 RX ADMIN — IOHEXOL 75 ML: 350 INJECTION, SOLUTION INTRAVENOUS at 08:10

## 2022-10-28 RX ADMIN — Medication 900 ML: at 08:10

## 2022-11-02 ENCOUNTER — OFFICE VISIT (OUTPATIENT)
Dept: FAMILY MEDICINE | Facility: CLINIC | Age: 67
End: 2022-11-02
Payer: MEDICARE

## 2022-11-02 VITALS
BODY MASS INDEX: 22.18 KG/M2 | DIASTOLIC BLOOD PRESSURE: 80 MMHG | SYSTOLIC BLOOD PRESSURE: 134 MMHG | HEART RATE: 85 BPM | WEIGHT: 141.63 LBS | OXYGEN SATURATION: 95 %

## 2022-11-02 DIAGNOSIS — R10.13 EPIGASTRIC PAIN: ICD-10-CM

## 2022-11-02 DIAGNOSIS — Z23 NEED FOR VACCINATION FOR H FLU TYPE B: ICD-10-CM

## 2022-11-02 DIAGNOSIS — F51.01 PRIMARY INSOMNIA: ICD-10-CM

## 2022-11-02 DIAGNOSIS — F17.200 SMOKER: ICD-10-CM

## 2022-11-02 DIAGNOSIS — R10.33 PERIUMBILICAL ABDOMINAL PAIN: Primary | ICD-10-CM

## 2022-11-02 DIAGNOSIS — I10 ESSENTIAL HYPERTENSION: ICD-10-CM

## 2022-11-02 PROCEDURE — 99213 OFFICE O/P EST LOW 20 MIN: CPT | Mod: PBBFAC | Performed by: NURSE PRACTITIONER

## 2022-11-02 PROCEDURE — 99999 PR PBB SHADOW E&M-EST. PATIENT-LVL III: ICD-10-PCS | Mod: PBBFAC,,, | Performed by: NURSE PRACTITIONER

## 2022-11-02 PROCEDURE — G0008 ADMIN INFLUENZA VIRUS VAC: HCPCS | Mod: PBBFAC

## 2022-11-02 PROCEDURE — 99999 PR PBB SHADOW E&M-EST. PATIENT-LVL III: CPT | Mod: PBBFAC,,, | Performed by: NURSE PRACTITIONER

## 2022-11-02 PROCEDURE — 99213 OFFICE O/P EST LOW 20 MIN: CPT | Mod: S$PBB,,, | Performed by: NURSE PRACTITIONER

## 2022-11-02 PROCEDURE — 99213 PR OFFICE/OUTPT VISIT, EST, LEVL III, 20-29 MIN: ICD-10-PCS | Mod: S$PBB,,, | Performed by: NURSE PRACTITIONER

## 2022-11-02 RX ORDER — BENAZEPRIL HYDROCHLORIDE 20 MG/1
20 TABLET ORAL DAILY
Qty: 90 TABLET | Refills: 3 | Status: SHIPPED | OUTPATIENT
Start: 2022-11-02 | End: 2022-11-20 | Stop reason: SDUPTHER

## 2022-11-02 RX ORDER — OMEPRAZOLE 40 MG/1
40 CAPSULE, DELAYED RELEASE ORAL DAILY
Qty: 90 CAPSULE | Refills: 3 | Status: SHIPPED | OUTPATIENT
Start: 2022-11-02 | End: 2022-11-20 | Stop reason: SDUPTHER

## 2022-11-02 NOTE — PROGRESS NOTES
"Subjective:       Patient ID: Miles Vazquez is a 67 y.o. male.    Chief Complaint: Follow-up  -  The patient is a 66 y/o male that presents for follow up. At last visit he c/o periumbilical and epigastric pain which has resolved completely with Prilosec. He was seen by General Surgery for mucus stools which have also resolved. He underwent Abd CT which was essentially benign and AAA is stable.    Takes Ambien for insomnia and still takes an hour to fall asleep. Seroquel worked well but increased his RLS.     HTN well controlled. Is still smoking, discussed smoking cessation program.   -    Past Medical History:   Diagnosis Date    Abdominal aortic aneurysm (AAA) 05/2019    3.2 cm per CTA.--12/2020 4.3 x 4.7 by 4.5 cm--3/2022  4.8 x 4.5 cm    Abnormal findings on esophagogastroduodenoscopy (EGD) 04/2016    "intestinal metaplasia" which is a "risk factor" for gastric cancer.      Apical lung nodule 03/2022    Right    Bursitis of both hips     Colon polyp     COPD (chronic obstructive pulmonary disease) 2016    Coronary artery calcification seen on CAT scan 04/24/2013    Depression 2016    Accompanied drinking problem    HTN (hypertension) 01/07/2013    Hyperlipidemia 2019    Lumbar disc disease 06/2019    grade 1 spondylolisthesis of L5 on S1    Macrocytic anemia 03/27/2016    Personal history of colonic polyps 2017    Recovering alcoholic 03/16/2016    RLS (restless legs syndrome) 2014    Sleep apnea 2 years ago    does not use cpap       Past Surgical History:   Procedure Laterality Date    ANGIOGRAPHY OF LOWER EXTREMITY  08/14/2019    AORTOGRAPHY WITH SERIALOGRAPHY N/A 8/14/2019    Procedure: AORTOGRAM, WITH RUNOFF;  Surgeon: Alan Florian MD;  Location: [facility] CATH/EP LAB;  Service: Peripheral Vascular;  Laterality: N/A;    AORTOGRAPHY WITH SERIALOGRAPHY N/A 5/18/2022    Procedure: AORTOGRAM, WITH RUNOFF;  Surgeon: Alan Florian MD;  Location: [facility] CATH/EP LAB;  Service: Cardiovascular;  Laterality: N/A; "    APPENDECTOMY N/A 9/26/2018    Procedure: APPENDECTOMY;  Surgeon: Ramírez Horner MD;  Location: [facility] OR;  Service: General;  Laterality: N/A;    COLON SURGERY  09/2018    COLONOSCOPY  2017    with polyps repeat 5 yrs- [facility]    ESOPHAGOGASTRODUODENOSCOPY N/A 9/26/2018    Procedure: EGD (ESOPHAGOGASTRODUODENOSCOPY);  Surgeon: Ramírez Horner MD;  Location: [facility] OR;  Service: General;  Laterality: N/A;  WITH BX    HERNIA REPAIR N/A 9/26/2018    Procedure: REPAIR, HERNIA;  Surgeon: Ramírez Horner MD;  Location: [facility] OR;  Service: General;  Laterality: N/A;  INTERNAL LYSIS WITH REDUCTION OF INTERNAL HERNIA    SMALL INTESTINE SURGERY  09/2018        Social History     Socioeconomic History    Marital status: Single   Tobacco Use    Smoking status: Some Days     Packs/day: 0.25     Years: 54.00     Pack years: 13.50     Types: Cigarettes    Smokeless tobacco: Never    Tobacco comments:     1 or 2 cigarettes a day    Substance and Sexual Activity    Drug use: Not Currently     Types: Marijuana     Comment: In the past.    Sexual activity: Not Currently     Partners: Female     Birth control/protection: Condom     Social Determinants of Health     Financial Resource Strain: Medium Risk    Difficulty of Paying Living Expenses: Somewhat hard   Food Insecurity: Food Insecurity Present    Worried About Running Out of Food in the Last Year: Sometimes true    Ran Out of Food in the Last Year: Never true   Transportation Needs: No Transportation Needs    Lack of Transportation (Medical): No    Lack of Transportation (Non-Medical): No   Physical Activity: Sufficiently Active    Days of Exercise per Week: 5 days    Minutes of Exercise per Session: 150+ min   Stress: Stress Concern Present    Feeling of Stress : To some extent   Social Connections: Unknown    Frequency of Communication with Friends and Family: More than three times a week    Frequency of Social Gatherings with  Friends and Family: Patient refused    Active Member of Clubs or Organizations: No    Attends Club or Organization Meetings: Patient refused    Marital Status:    Housing Stability: Low Risk     Unable to Pay for Housing in the Last Year: No    Number of Places Lived in the Last Year: 1    Unstable Housing in the Last Year: No       Family History   Problem Relation Age of Onset    Alcohol abuse Mother         Cancer    Alcohol abuse Father     Tuberculosis Maternal Grandmother     Stomach cancer Neg Hx     Colon cancer Neg Hx        Review of patient's allergies indicates:   Allergen Reactions    Seroquel [quetiapine] Other (See Comments)     RESTLESS LEGS          Current Outpatient Medications:     amitriptyline (ELAVIL) 100 MG tablet, Take 1 tablet (100 mg total) by mouth every evening., Disp: 90 tablet, Rfl: 3    aspirin 81 MG Chew, Take 81 mg by mouth once daily., Disp: , Rfl:     atorvastatin (LIPITOR) 40 MG tablet, Take 1 tablet (40 mg total) by mouth every evening., Disp: 90 tablet, Rfl: 1    clopidogreL (PLAVIX) 75 mg tablet, Take 1 tablet (75 mg total) by mouth once daily., Disp: 90 tablet, Rfl: 0    cyanocobalamin (VITAMIN B-12) 1000 MCG tablet, Take 0.5 tablets (500 mcg total) by mouth once daily., Disp: 60 tablet, Rfl: 12    ferrous sulfate (FEOSOL) 325 mg (65 mg iron) Tab tablet, Take 1 tablet (325 mg total) by mouth 2 (two) times daily. Try on empty stomach first, Disp: 60 tablet, Rfl: 11    multivit-min/FA/lycopen/lutein (CENTRUM SILVER MEN ORAL), Take by mouth., Disp: , Rfl:     rOPINIRole (REQUIP) 1 MG tablet, Take 1 tablet (1 mg total) by mouth every evening., Disp: 90 tablet, Rfl: 3    sucralfate (CARAFATE) 1 gram tablet, Take 1 tablet (1 g total) by mouth 4 (four) times daily before meals and nightly., Disp: 120 tablet, Rfl: 1    zolpidem (AMBIEN) 10 mg Tab, Take 1 tablet (10 mg total) by mouth nightly as needed., Disp: 90 tablet, Rfl: 1    benazepriL (LOTENSIN) 20 MG tablet, Take 1  tablet (20 mg total) by mouth once daily., Disp: 90 tablet, Rfl: 3    omeprazole (PRILOSEC) 40 MG capsule, Take 1 capsule (40 mg total) by mouth once daily., Disp: 90 capsule, Rfl: 3    Follow-up    Review of Systems   Constitutional: Negative.    HENT: Negative.     Eyes: Negative.    Respiratory: Negative.     Cardiovascular: Negative.    Gastrointestinal: Negative.    Endocrine: Negative.    Genitourinary: Negative.    Musculoskeletal: Negative.    Skin: Negative.    Allergic/Immunologic: Negative.    Neurological: Negative.    Hematological: Negative.    Psychiatric/Behavioral:  Positive for sleep disturbance.      Objective:      Physical Exam  Vitals and nursing note reviewed.   Constitutional:       General: He is not in acute distress.     Appearance: Normal appearance. He is well-developed and normal weight. He is not ill-appearing.   HENT:      Head: Normocephalic.   Eyes:      Conjunctiva/sclera: Conjunctivae normal.   Neck:      Thyroid: No thyromegaly.   Cardiovascular:      Rate and Rhythm: Normal rate and regular rhythm.      Heart sounds: Normal heart sounds. No murmur heard.  Pulmonary:      Effort: Pulmonary effort is normal.      Breath sounds: Normal breath sounds. No wheezing or rales.   Musculoskeletal:         General: Normal range of motion.      Cervical back: Normal range of motion.      Right lower leg: No edema.      Left lower leg: No edema.   Skin:     General: Skin is warm and dry.   Neurological:      Mental Status: He is alert and oriented to person, place, and time. Mental status is at baseline.   Psychiatric:         Mood and Affect: Mood normal.         Behavior: Behavior normal.         Thought Content: Thought content normal.         Judgment: Judgment normal.       Assessment:       1. Periumbilical abdominal pain    2. Essential hypertension    3. Epigastric pain    4. Need for vaccination for H flu type B    5. Primary insomnia          Plan:     1- meds refilled  2- flu shot  today  3- RTC around 3/1/22 for ambien refill    Periumbilical abdominal pain  -     omeprazole (PRILOSEC) 40 MG capsule; Take 1 capsule (40 mg total) by mouth once daily.  Dispense: 90 capsule; Refill: 3    Essential hypertension  -     benazepriL (LOTENSIN) 20 MG tablet; Take 1 tablet (20 mg total) by mouth once daily.  Dispense: 90 tablet; Refill: 3    Epigastric pain  -     omeprazole (PRILOSEC) 40 MG capsule; Take 1 capsule (40 mg total) by mouth once daily.  Dispense: 90 capsule; Refill: 3    Need for vaccination for H flu type B  -     Influenza (FLUAD) - Quadrivalent (Adjuvanted) *Preferred* (65+) (PF)    Primary insomnia      Risks, benefits, and side effects were discussed with the patient. All questions were answered to the fullest satisfaction of the patient, and pt verbalized understanding and agreement to treatment plan. Pt was to call with any new or worsening symptoms, or present to the ER.

## 2022-11-02 NOTE — PROGRESS NOTES
Miles Vazquez arrive to clinic awake and alert.  Pt verified name and .   Allergies reviewed with patient.  Pt given flu vaccine via intramuscular per provider orders.    Pt tolerated well and denies further needs.    Patient instructed to wait 15 mins after injection.   Patient states no vaccines in the last 14 days.  Vaccine information sheet was given to patient. Patient voiced understanding.    Shanita Bass LPN

## 2022-11-03 ENCOUNTER — OFFICE VISIT (OUTPATIENT)
Dept: SURGERY | Facility: CLINIC | Age: 67
End: 2022-11-03
Payer: MEDICARE

## 2022-11-03 VITALS
HEART RATE: 101 BPM | SYSTOLIC BLOOD PRESSURE: 139 MMHG | DIASTOLIC BLOOD PRESSURE: 73 MMHG | WEIGHT: 139 LBS | BODY MASS INDEX: 21.82 KG/M2 | HEIGHT: 67 IN | TEMPERATURE: 98 F

## 2022-11-03 DIAGNOSIS — K29.70 GASTRITIS, PRESENCE OF BLEEDING UNSPECIFIED, UNSPECIFIED CHRONICITY, UNSPECIFIED GASTRITIS TYPE: ICD-10-CM

## 2022-11-03 DIAGNOSIS — K43.2 INCISIONAL HERNIA, WITHOUT OBSTRUCTION OR GANGRENE: Primary | ICD-10-CM

## 2022-11-03 PROCEDURE — 99999 PR PBB SHADOW E&M-EST. PATIENT-LVL III: ICD-10-PCS | Mod: PBBFAC,,, | Performed by: SURGERY

## 2022-11-03 PROCEDURE — 99213 OFFICE O/P EST LOW 20 MIN: CPT | Mod: PBBFAC | Performed by: SURGERY

## 2022-11-03 PROCEDURE — 99213 PR OFFICE/OUTPT VISIT, EST, LEVL III, 20-29 MIN: ICD-10-PCS | Mod: S$PBB,,, | Performed by: SURGERY

## 2022-11-03 PROCEDURE — 99999 PR PBB SHADOW E&M-EST. PATIENT-LVL III: CPT | Mod: PBBFAC,,, | Performed by: SURGERY

## 2022-11-03 PROCEDURE — 99213 OFFICE O/P EST LOW 20 MIN: CPT | Mod: S$PBB,,, | Performed by: SURGERY

## 2022-11-03 NOTE — PROGRESS NOTES
"[facility] General Surgery  Follow-up    Subjective:     Chief Complaint:  Follow-up CT scan.    HPI:  Miles Vazquez is a 67 y.o. male presents today for follow-up examination of CT scan.  Patient since last visit has done well.  No issues.  The Prilosec previous administered to the patient for which he is ongoing treatment has resolved the patient's abdominal symptomatology.  Feels good.  Offered EGD.  Declined.  CT scan shows evidence of small incisional hernias.  Not symptomatic.  Patient presents today for follow-up examination.    Review of Systems   Constitutional:  Negative for appetite change, chills and fever.   HENT:  Negative for congestion, dental problem and drooling.    Eyes:  Negative for photophobia, discharge and itching.   Respiratory:  Negative for apnea and chest tightness.    Cardiovascular:  Negative for chest pain, palpitations and leg swelling.   Gastrointestinal:  Negative for abdominal distention and abdominal pain.   Endocrine: Negative for cold intolerance and heat intolerance.   Genitourinary:  Negative for difficulty urinating and dysuria.   Musculoskeletal:  Negative for arthralgias and back pain.   Skin:  Negative for color change and pallor.   Neurological:  Negative for dizziness, facial asymmetry and headaches.   Hematological:  Negative for adenopathy. Does not bruise/bleed easily.   Psychiatric/Behavioral:  Negative for agitation, behavioral problems and confusion.      Objective:      Vitals:    11/03/22 0934   BP: 139/73   Pulse: 101   Temp: 98.4 °F (36.9 °C)   Weight: 63 kg (139 lb)   Height: 5' 7" (1.702 m)     Physical Exam  Constitutional:       Appearance: He is well-developed. He is not diaphoretic.   HENT:      Head: Normocephalic and atraumatic.   Eyes:      Pupils: Pupils are equal, round, and reactive to light.   Neck:      Thyroid: No thyromegaly.   Cardiovascular:      Rate and Rhythm: Normal rate and regular rhythm.      Heart sounds: No murmur " heard.  Pulmonary:      Effort: Pulmonary effort is normal. No respiratory distress.      Breath sounds: Normal breath sounds.   Abdominal:      General: Bowel sounds are normal. There is no distension.      Palpations: Abdomen is soft.      Tenderness: There is no abdominal tenderness.   Musculoskeletal:         General: Normal range of motion.      Cervical back: Normal range of motion and neck supple.   Skin:     General: Skin is warm.      Capillary Refill: Capillary refill takes less than 2 seconds.      Findings: No erythema or rash.   Neurological:      Mental Status: He is alert and oriented to person, place, and time.      Cranial Nerves: No cranial nerve deficit.        Assessment:       1. Incisional hernia, without obstruction or gangrene    2. Gastritis, presence of bleeding unspecified, unspecified chronicity, unspecified gastritis type          Plan:   Incisional hernia, without obstruction or gangrene    Gastritis, presence of bleeding unspecified, unspecified chronicity, unspecified gastritis type        Medical Decision Making/Counseling:  Incisional hernias.  Small.  Minimally symptomatic.  Continue observation at this time.      Gastritis.  Continue Prilosec.  EGD offered.  Patient declined.  Follow-up surgery clinic in 1 year otherwise before as clinically indicated.    Follow up:  1 year    Patient instructed that best way to communicate with my office staff is for patient to get on the [email or web address] patient portal to expedite communication and communication issues that may occur.  Patient was given instructions on how to get on the portal.  I encouraged patient to obtain portal access as well.  Ultimately it is up to the patient to obtain access.  Patient voiced understanding.

## 2023-01-17 ENCOUNTER — HOSPITAL ENCOUNTER (OUTPATIENT)
Dept: RADIOLOGY | Facility: HOSPITAL | Age: 68
Discharge: HOME | End: 2023-01-17
Attending: SURGERY
Payer: MEDICARE

## 2023-01-17 DIAGNOSIS — I71.41 PARARENAL ABDOMINAL AORTIC ANEURYSM, WITHOUT RUPTURE: ICD-10-CM

## 2023-01-17 PROCEDURE — 74176 CT ABD & PELVIS W/O CONTRAST: CPT | Mod: TC

## 2023-01-17 PROCEDURE — 74176 CT ABDOMEN PELVIS WITHOUT CONTRAST: ICD-10-PCS | Mod: 26,,, | Performed by: RADIOLOGY

## 2023-01-17 PROCEDURE — 74176 CT ABD & PELVIS W/O CONTRAST: CPT | Mod: 26,,, | Performed by: RADIOLOGY

## 2023-03-28 DIAGNOSIS — G25.81 RESTLESS LEG SYNDROME: ICD-10-CM

## 2023-03-28 DIAGNOSIS — F51.01 PRIMARY INSOMNIA: ICD-10-CM

## 2023-03-29 RX ORDER — ZOLPIDEM TARTRATE 10 MG/1
10 TABLET ORAL NIGHTLY PRN
Qty: 90 TABLET | Refills: 1 | Status: SHIPPED | OUTPATIENT
Start: 2023-03-29 | End: 2023-09-29 | Stop reason: SDUPTHER

## 2023-04-11 ENCOUNTER — PATIENT MESSAGE (OUTPATIENT)
Dept: ADMINISTRATIVE | Facility: HOSPITAL | Age: 68
End: 2023-04-11
Payer: MEDICARE

## 2023-06-02 ENCOUNTER — PATIENT MESSAGE (OUTPATIENT)
Dept: FAMILY MEDICINE | Facility: CLINIC | Age: 68
End: 2023-06-02
Payer: MEDICARE

## 2023-09-29 DIAGNOSIS — F51.01 PRIMARY INSOMNIA: ICD-10-CM

## 2023-09-29 DIAGNOSIS — G25.81 RESTLESS LEG SYNDROME: ICD-10-CM

## 2023-09-29 DIAGNOSIS — R10.13 EPIGASTRIC PAIN: ICD-10-CM

## 2023-09-29 DIAGNOSIS — R10.33 PERIUMBILICAL ABDOMINAL PAIN: ICD-10-CM

## 2023-10-02 RX ORDER — OMEPRAZOLE 40 MG/1
40 CAPSULE, DELAYED RELEASE ORAL DAILY
Qty: 90 CAPSULE | Refills: 3 | Status: SHIPPED | OUTPATIENT
Start: 2023-10-02

## 2023-10-02 RX ORDER — ROPINIROLE 1 MG/1
1 TABLET, FILM COATED ORAL NIGHTLY
Qty: 90 TABLET | Refills: 3 | Status: SHIPPED | OUTPATIENT
Start: 2023-10-02

## 2023-10-02 RX ORDER — ZOLPIDEM TARTRATE 10 MG/1
10 TABLET ORAL NIGHTLY PRN
Qty: 90 TABLET | Refills: 1 | Status: SHIPPED | OUTPATIENT
Start: 2023-10-02

## 2023-10-13 ENCOUNTER — PATIENT MESSAGE (OUTPATIENT)
Dept: ADMINISTRATIVE | Facility: HOSPITAL | Age: 68
End: 2023-10-13
Payer: MEDICARE

## 2023-11-01 NOTE — TELEPHONE ENCOUNTER
Will see NSGY tomorrow, patient has disc  
Packs/day: 1.50     Years: 44.00     Additional pack years: 0.00     Total pack years: 66.00     Types: Cigarettes     Start date: 63     Quit date: 10/28/2019     Years since quittin.0    Smokeless tobacco: Never   Vaping Use    Vaping Use: Former   Substance and Sexual Activity    Alcohol use: Yes     Comment: occ/social    Drug use: Never    Sexual activity: Not on file   Other Topics Concern    Not on file   Social History Narrative    Not on file     Social Determinants of Health     Financial Resource Strain: Low Risk  (3/28/2023)    Overall Financial Resource Strain (CARDIA)     Difficulty of Paying Living Expenses: Not hard at all   Food Insecurity: No Food Insecurity (3/28/2023)    Hunger Vital Sign     Worried About Running Out of Food in the Last Year: Never true     [address] in the Last Year: Never true   Transportation Needs: Unknown (3/28/2023)    PRAPARE - Transportation     Lack of Transportation (Medical): Not on file     Lack of Transportation (Non-Medical):  No   Physical Activity: Inactive (2022)    Exercise Vital Sign     Days of Exercise per Week: 0 days     Minutes of Exercise per Session: 0 min   Stress: Not on file   Social Connections: Not on file   Intimate Partner Violence: Not on file   Housing Stability: Unknown (3/28/2023)    Housing Stability Vital Sign     Unable to Pay for Housing in the Last Year: Not on file     Number of [address] in the Last Year: Not on file     Unstable Housing in the Last Year: No       Family History   Problem Relation Age of Onset    Heart Attack Brother        Current Outpatient Medications on File Prior to Visit   Medication Sig Dispense Refill    carvedilol (COREG) 12.5 MG tablet Take 0.5 tablets by mouth 2 times daily (with meals) 180 tablet 3    spironolactone (ALDACTONE) 50 MG tablet Take 1 tablet by mouth daily 30 tablet 3    sacubitril-valsartan (ENTRESTO) 24-26 MG per tablet Take 1 tab in the am then take 2 tabs in the pm 60

## 2024-01-17 DIAGNOSIS — I10 ESSENTIAL HYPERTENSION: ICD-10-CM

## 2024-01-18 DIAGNOSIS — I10 ESSENTIAL HYPERTENSION: ICD-10-CM

## 2024-01-18 RX ORDER — BENAZEPRIL HYDROCHLORIDE 20 MG/1
20 TABLET ORAL DAILY
Qty: 30 TABLET | Refills: 1 | Status: SHIPPED | OUTPATIENT
Start: 2024-01-18 | End: 2024-03-18

## 2024-01-18 RX ORDER — BENAZEPRIL HYDROCHLORIDE 20 MG/1
20 TABLET ORAL
Qty: 90 TABLET | Refills: 0 | OUTPATIENT
Start: 2024-01-18

## 2024-01-18 RX ORDER — BENAZEPRIL HYDROCHLORIDE 20 MG/1
20 TABLET ORAL DAILY
Qty: 90 TABLET | Refills: 3 | OUTPATIENT
Start: 2024-01-18

## 2024-01-18 NOTE — TELEPHONE ENCOUNTER
Dear Dr. garcia,     In the absence of Puja Finney, RAZIA, can you please address this patients concern or request?    Thank you,  Amy Walton LPN

## 2024-01-18 NOTE — TELEPHONE ENCOUNTER
I am not able to prescribe for a patient that I have never seen.  In his provider's absence I would recommend that this refill request be forwarded to either his cardiology/vascular provider if it is not able to be addressed via current provider on call/round elana.

## 2024-01-23 ENCOUNTER — LAB VISIT (OUTPATIENT)
Dept: LAB | Facility: HOSPITAL | Age: 69
End: 2024-01-23
Attending: NURSE PRACTITIONER
Payer: MEDICARE

## 2024-01-23 ENCOUNTER — OFFICE VISIT (OUTPATIENT)
Dept: FAMILY MEDICINE | Facility: CLINIC | Age: 69
End: 2024-01-23
Payer: MEDICARE

## 2024-01-23 VITALS
SYSTOLIC BLOOD PRESSURE: 118 MMHG | DIASTOLIC BLOOD PRESSURE: 62 MMHG | OXYGEN SATURATION: 96 % | BODY MASS INDEX: 22.13 KG/M2 | WEIGHT: 141.31 LBS | HEART RATE: 72 BPM

## 2024-01-23 DIAGNOSIS — R20.9 UNSPECIFIED DISTURBANCES OF SKIN SENSATION: ICD-10-CM

## 2024-01-23 DIAGNOSIS — Z12.5 PROSTATE CANCER SCREENING: ICD-10-CM

## 2024-01-23 DIAGNOSIS — I10 ESSENTIAL HYPERTENSION: ICD-10-CM

## 2024-01-23 DIAGNOSIS — E78.2 MIXED HYPERLIPIDEMIA: ICD-10-CM

## 2024-01-23 DIAGNOSIS — D50.8 IRON DEFICIENCY ANEMIA SECONDARY TO INADEQUATE DIETARY IRON INTAKE: ICD-10-CM

## 2024-01-23 DIAGNOSIS — Z23 NEED FOR VACCINATION: Primary | ICD-10-CM

## 2024-01-23 DIAGNOSIS — Z71.9 ENCOUNTER FOR HEALTH EDUCATION: ICD-10-CM

## 2024-01-23 DIAGNOSIS — F10.21 RECOVERING ALCOHOLIC IN REMISSION: ICD-10-CM

## 2024-01-23 DIAGNOSIS — F51.01 PRIMARY INSOMNIA: ICD-10-CM

## 2024-01-23 DIAGNOSIS — Z12.11 SCREENING FOR COLON CANCER: ICD-10-CM

## 2024-01-23 LAB
ALBUMIN SERPL BCP-MCNC: 3.6 G/DL (ref 3.5–5.2)
ALP SERPL-CCNC: 98 U/L (ref 55–135)
ALT SERPL W/O P-5'-P-CCNC: 15 U/L (ref 10–44)
ANION GAP SERPL CALC-SCNC: 10 MMOL/L (ref 8–16)
AST SERPL-CCNC: 20 U/L (ref 10–40)
BASOPHILS # BLD AUTO: 0.05 K/UL (ref 0–0.2)
BASOPHILS NFR BLD: 0.7 % (ref 0–1.9)
BILIRUB SERPL-MCNC: 0.2 MG/DL (ref 0.1–1)
BUN SERPL-MCNC: 24 MG/DL (ref 8–23)
CALCIUM SERPL-MCNC: 9.2 MG/DL (ref 8.7–10.5)
CHLORIDE SERPL-SCNC: 106 MMOL/L (ref 95–110)
CHOLEST SERPL-MCNC: 197 MG/DL (ref 120–199)
CHOLEST/HDLC SERPL: 6 {RATIO} (ref 2–5)
CO2 SERPL-SCNC: 23 MMOL/L (ref 23–29)
COMPLEXED PSA SERPL-MCNC: 0.72 NG/ML (ref 0–4)
CREAT SERPL-MCNC: 1 MG/DL (ref 0.5–1.4)
DIFFERENTIAL METHOD BLD: ABNORMAL
EOSINOPHIL # BLD AUTO: 0.1 K/UL (ref 0–0.5)
EOSINOPHIL NFR BLD: 1.8 % (ref 0–8)
ERYTHROCYTE [DISTWIDTH] IN BLOOD BY AUTOMATED COUNT: 13.5 % (ref 11.5–14.5)
EST. GFR  (NO RACE VARIABLE): >60 ML/MIN/1.73 M^2
FERRITIN SERPL-MCNC: 50 NG/ML (ref 20–300)
GLUCOSE SERPL-MCNC: 93 MG/DL (ref 70–110)
HCT VFR BLD AUTO: 41.1 % (ref 40–54)
HDLC SERPL-MCNC: 33 MG/DL (ref 40–75)
HDLC SERPL: 16.8 % (ref 20–50)
HGB BLD-MCNC: 13.7 G/DL (ref 14–18)
IMM GRANULOCYTES # BLD AUTO: 0.02 K/UL (ref 0–0.04)
IMM GRANULOCYTES NFR BLD AUTO: 0.3 % (ref 0–0.5)
IRON SERPL-MCNC: 96 UG/DL (ref 45–160)
LDLC SERPL CALC-MCNC: 114.4 MG/DL (ref 63–159)
LYMPHOCYTES # BLD AUTO: 1.6 K/UL (ref 1–4.8)
LYMPHOCYTES NFR BLD: 20.9 % (ref 18–48)
MCH RBC QN AUTO: 31 PG (ref 27–31)
MCHC RBC AUTO-ENTMCNC: 33.3 G/DL (ref 32–36)
MCV RBC AUTO: 93 FL (ref 82–98)
MONOCYTES # BLD AUTO: 0.7 K/UL (ref 0.3–1)
MONOCYTES NFR BLD: 8.5 % (ref 4–15)
NEUTROPHILS # BLD AUTO: 5.2 K/UL (ref 1.8–7.7)
NEUTROPHILS NFR BLD: 67.8 % (ref 38–73)
NONHDLC SERPL-MCNC: 164 MG/DL
NRBC BLD-RTO: 0 /100 WBC
PLATELET # BLD AUTO: 175 K/UL (ref 150–450)
PMV BLD AUTO: 10.8 FL (ref 9.2–12.9)
POTASSIUM SERPL-SCNC: 4.1 MMOL/L (ref 3.5–5.1)
PROT SERPL-MCNC: 7.1 G/DL (ref 6–8.4)
RBC # BLD AUTO: 4.42 M/UL (ref 4.6–6.2)
SATURATED IRON: 28 % (ref 20–50)
SODIUM SERPL-SCNC: 139 MMOL/L (ref 136–145)
TOTAL IRON BINDING CAPACITY: 346 UG/DL (ref 250–450)
TRANSFERRIN SERPL-MCNC: 234 MG/DL (ref 200–375)
TRIGL SERPL-MCNC: 248 MG/DL (ref 30–150)
WBC # BLD AUTO: 7.61 K/UL (ref 3.9–12.7)

## 2024-01-23 PROCEDURE — 80053 COMPREHEN METABOLIC PANEL: CPT | Performed by: NURSE PRACTITIONER

## 2024-01-23 PROCEDURE — 85025 COMPLETE CBC W/AUTO DIFF WBC: CPT | Performed by: NURSE PRACTITIONER

## 2024-01-23 PROCEDURE — 84153 ASSAY OF PSA TOTAL: CPT | Performed by: NURSE PRACTITIONER

## 2024-01-23 PROCEDURE — 36415 COLL VENOUS BLD VENIPUNCTURE: CPT | Performed by: NURSE PRACTITIONER

## 2024-01-23 PROCEDURE — 99999 PR PBB SHADOW E&M-EST. PATIENT-LVL III: CPT | Mod: PBBFAC,,, | Performed by: NURSE PRACTITIONER

## 2024-01-23 PROCEDURE — 82607 VITAMIN B-12: CPT | Performed by: NURSE PRACTITIONER

## 2024-01-23 PROCEDURE — 90694 VACC AIIV4 NO PRSRV 0.5ML IM: CPT | Mod: PBBFAC,PN

## 2024-01-23 PROCEDURE — 82728 ASSAY OF FERRITIN: CPT | Performed by: NURSE PRACTITIONER

## 2024-01-23 PROCEDURE — 80061 LIPID PANEL: CPT | Performed by: NURSE PRACTITIONER

## 2024-01-23 PROCEDURE — 83540 ASSAY OF IRON: CPT | Performed by: NURSE PRACTITIONER

## 2024-01-23 PROCEDURE — 99999PBSHW FLU VACCINE - QUADRIVALENT - ADJUVANTED: Mod: PBBFAC,,,

## 2024-01-23 PROCEDURE — 99214 OFFICE O/P EST MOD 30 MIN: CPT | Mod: S$PBB,,, | Performed by: NURSE PRACTITIONER

## 2024-01-23 PROCEDURE — 99213 OFFICE O/P EST LOW 20 MIN: CPT | Mod: PBBFAC,PN | Performed by: NURSE PRACTITIONER

## 2024-01-23 PROCEDURE — G0008 ADMIN INFLUENZA VIRUS VAC: HCPCS | Mod: PBBFAC,PN

## 2024-01-23 NOTE — PROGRESS NOTES
Subjective:       Patient ID: Miles Vazquez is a 68 y.o. male.    Chief Complaint: Follow-up    Miles Vazquez presents to the clinic today for blood work orders and discussion of medical marijuana program   Unknown patient to myself  Under the care of the following:  PCP: JANET Jacob NP  Cardiology: Dr. FROYLAN Chatman, pending new patient appointment with Dr. Armas   Vascular: Dr. Florian   Patient Active Problem List  Diagnosis  · Essential hypertension, Dx 2013  · Restless leg syndrome  · Hyperlipidemia,. baseline   · Smoker, 2-3 cp week, 75+ py, started age 12  · Coronary artery calcification seen on CAT scan  · Lumbar radiculopathy  · Lumbar facet arthropathy  · Recovering alcoholic in remission  · Abdominal aortic aneurysm (AAA) without rupture  · Primary insomnia  · Trochanteric bursitis of both hips  · LVH (left ventricular hypertrophy) due to hypertensive disease, without heart failure  · Claudication  · OMAR (obstructive sleep apnea), postive sleep study, never underwent CPAP titration  · At risk for cardiovascular event  · PAD (peripheral artery disease)  · Excessive caffeine intake  · Iron deficiency anemia secondary to inadequate dietary iron intake  · Colon cancer screening  · History of colon polyps  · BMI 23.0-23.9, adult  · Bilateral sciatica  · Sedentary lifestyle  · Spondylolysis of lumbosacral region    Insomnia:  Long standing history. Reports recovering alcoholic in remission, developed issues sleeping following alcohol cessation. Takes Ambien 10 mg nightly. Reports averages about 4 hours nightly. Would like to discuss process of obtaining medical marijuana to replace Ambien to assist with sleep.   Reports trial of medical marijuana x1 event while an out of town friend visited him. Reports slept very well this night.    AAA/ claudication  Has been stable.  Monitored by Dr. Florian  Has pending follow up imaging next month  Pending new patient appointment with Dr. Armas       Review of Systems    Constitutional: Negative.    HENT: Negative.     Eyes: Negative.    Respiratory:  Negative for chest tightness, shortness of breath and wheezing.    Cardiovascular:  Negative for chest pain, palpitations and leg swelling.   Gastrointestinal:  Negative for abdominal pain, blood in stool, constipation and diarrhea.   Endocrine: Negative.    Genitourinary: Negative.    Musculoskeletal: Negative.    Skin: Negative.    Allergic/Immunologic: Negative.    Neurological:  Positive for weakness (leg pain/heavy). Negative for dizziness and light-headedness.   Hematological: Negative.    Psychiatric/Behavioral:  Positive for sleep disturbance.        Patient Active Problem List   Diagnosis    Essential hypertension, Dx 2013    Restless leg syndrome    Hyperlipidemia,. baseline     Smoker, 2-3 cp week, 75+ py, started age 12    Coronary artery calcification seen on CAT scan    Lumbar radiculopathy    Lumbar facet arthropathy    Recovering alcoholic in remission    Abdominal aortic aneurysm (AAA) without rupture    Primary insomnia    Trochanteric bursitis of both hips    LVH (left ventricular hypertrophy) due to hypertensive disease, without heart failure    Claudication    OMAR (obstructive sleep apnea), postive sleep study, never underwent CPAP titration    At risk for cardiovascular event    PAD (peripheral artery disease)    Excessive caffeine intake    Iron deficiency anemia secondary to inadequate dietary iron intake    Colon cancer screening    History of colon polyps    BMI 23.0-23.9, adult    Bilateral sciatica    Sedentary lifestyle    Spondylolysis of lumbosacral region       Objective:      Physical Exam  Vitals and nursing note reviewed.   Constitutional:       General: He is not in acute distress.     Appearance: Normal appearance. He is well-developed. He is not ill-appearing.   Eyes:      Extraocular Movements: Extraocular movements intact.      Conjunctiva/sclera: Conjunctivae normal.   Neck:       Thyroid: No thyromegaly.   Cardiovascular:      Rate and Rhythm: Normal rate and regular rhythm.      Heart sounds: Normal heart sounds. No murmur heard.  Pulmonary:      Effort: Pulmonary effort is normal. No respiratory distress.      Breath sounds: Normal breath sounds. No wheezing.   Abdominal:      General: Bowel sounds are normal.   Musculoskeletal:      Right lower leg: No edema.      Left lower leg: No edema.   Skin:     General: Skin is warm and dry.   Neurological:      Mental Status: He is alert and oriented to person, place, and time. Mental status is at baseline.   Psychiatric:         Mood and Affect: Mood normal.         Behavior: Behavior normal.         Thought Content: Thought content normal.         Judgment: Judgment normal.         Lab Results   Component Value Date    WBC 12.75 (H) 09/21/2022    HGB 13.7 (L) 09/21/2022    HCT 41.0 09/21/2022     09/21/2022    CHOL 168 09/21/2022    TRIG 135 09/21/2022    HDL 34 (L) 09/21/2022    ALT 10 10/28/2022    AST 17 10/28/2022     10/28/2022    K 4.0 10/28/2022     10/28/2022    CREATININE 1.1 10/28/2022    BUN 16 10/28/2022    CO2 23 10/28/2022    TSH 1.637 09/21/2022    PSA 0.63 09/21/2022    INR 1.1 05/16/2022    HGBA1C 5.3 01/03/2013     The 10-year ASCVD risk score (Taurus FUENTES, et al., 2019) is: 23.9%    Values used to calculate the score:      Age: 68 years      Sex: Male      Is Non- : No      Diabetic: No      Tobacco smoker: Yes      Systolic Blood Pressure: 118 mmHg      Is BP treated: Yes      HDL Cholesterol: 34 mg/dL      Total Cholesterol: 168 mg/dL  Visit Vitals  /62 (BP Location: Left arm, Patient Position: Sitting, BP Method: Medium (Manual))   Pulse 72   Wt 64.1 kg (141 lb 4.8 oz)   SpO2 96%   BMI 22.13 kg/m²      Assessment:       1. Essential hypertension    2. Mixed hyperlipidemia    3. Prostate cancer screening    4. Primary insomnia    5. Recovering alcoholic in remission    6.  Screening for colon cancer    7. Iron deficiency anemia secondary to inadequate dietary iron intake    8. Unspecified disturbances of skin sensation    9. Need for vaccination    10. Encounter for health education        Plan:       1. Essential hypertension  -     Comprehensive Metabolic Panel; Future; Expected date: 01/23/2024    2. Mixed hyperlipidemia  -     Lipid Panel; Future; Expected date: 01/23/2024    3. Prostate cancer screening  -     PSA, Screening; Future; Expected date: 01/23/2024    4. Primary insomnia   reviewed: last fill 1/2/2024 x90 days   5. Recovering alcoholic in remission  -     CBC Auto Differential; Future; Expected date: 01/23/2024  -     Iron and TIBC; Future; Expected date: 01/23/2024  -     Ferritin; Future; Expected date: 01/23/2024  -     Vitamin B12; Future; Expected date: 01/23/2024    6. Screening for colon cancer  -     Fecal Immunochemical Test (iFOBT); Future; Expected date: 01/23/2024  Refused colonoscopy at this time- wishes to complete FITKIT, understanding positive result needs to be followed up with dx colonoscopy   7. Iron deficiency anemia secondary to inadequate dietary iron intake  -     Iron and TIBC; Future; Expected date: 01/23/2024  -     Ferritin; Future; Expected date: 01/23/2024  -     Vitamin B12; Future; Expected date: 01/23/2024    8. Unspecified disturbances of skin sensation  -     Vitamin B12; Future; Expected date: 01/23/2024    9. Need for vaccination  -     Influenza - Quadrivalent *Preferred* (6 months+) (PF)    10. Encounter for health education  >15 minutes spent with patient. Discussed [place] medical marijuana program/process  Patient was provided with a list of known local certifying providers.  All questions/concerns answered at this time.      Obtain fasting blood work for review   F/U PCP 3 months for Ambien review/refill   Follow up in about 3 months (around 4/23/2024).      Future Appointments       Date Provider Specialty Appt Notes     1/23/2024  Lab Lab    1/31/2024 Oziel Armas MD Cardiology aortic anuerysm *lvm    4/23/2024 Yoly Scherer MD Family Medicine 3 Month Follow Up

## 2024-01-24 LAB — VIT B12 SERPL-MCNC: 414 PG/ML (ref 210–950)

## 2024-01-31 ENCOUNTER — OFFICE VISIT (OUTPATIENT)
Dept: CARDIOLOGY | Facility: CLINIC | Age: 69
End: 2024-01-31
Payer: MEDICARE

## 2024-01-31 VITALS
SYSTOLIC BLOOD PRESSURE: 153 MMHG | HEIGHT: 67 IN | HEART RATE: 83 BPM | DIASTOLIC BLOOD PRESSURE: 69 MMHG | BODY MASS INDEX: 22.02 KG/M2 | WEIGHT: 140.31 LBS | OXYGEN SATURATION: 100 %

## 2024-01-31 DIAGNOSIS — Z78.9 EXCESSIVE CAFFEINE INTAKE: ICD-10-CM

## 2024-01-31 DIAGNOSIS — Z91.199 PERSONAL HISTORY OF NONCOMPLIANCE WITH MEDICAL TREATMENT, PRESENTING HAZARDS TO HEALTH: ICD-10-CM

## 2024-01-31 DIAGNOSIS — D64.9 ANEMIA, UNSPECIFIED TYPE: ICD-10-CM

## 2024-01-31 DIAGNOSIS — E78.2 MIXED HYPERLIPIDEMIA: Chronic | ICD-10-CM

## 2024-01-31 DIAGNOSIS — R79.89 PRERENAL AZOTEMIA: ICD-10-CM

## 2024-01-31 DIAGNOSIS — F17.200 SMOKER: ICD-10-CM

## 2024-01-31 DIAGNOSIS — I73.9 PERIPHERAL VASCULAR DISEASE, UNSPECIFIED: ICD-10-CM

## 2024-01-31 DIAGNOSIS — I73.9 CLAUDICATION: ICD-10-CM

## 2024-01-31 DIAGNOSIS — R09.89 LEFT CAROTID BRUIT: ICD-10-CM

## 2024-01-31 DIAGNOSIS — G47.33 OSA (OBSTRUCTIVE SLEEP APNEA): ICD-10-CM

## 2024-01-31 DIAGNOSIS — E78.00 PURE HYPERCHOLESTEROLEMIA: Chronic | ICD-10-CM

## 2024-01-31 DIAGNOSIS — I10 ESSENTIAL HYPERTENSION: ICD-10-CM

## 2024-01-31 DIAGNOSIS — I74.09 OTHER ARTERIAL EMBOLISM AND THROMBOSIS OF ABDOMINAL AORTA: ICD-10-CM

## 2024-01-31 DIAGNOSIS — E78.1 HYPERTRIGLYCERIDEMIA: ICD-10-CM

## 2024-01-31 DIAGNOSIS — F13.20 BENZODIAZEPINE DEPENDENCE: ICD-10-CM

## 2024-01-31 DIAGNOSIS — I71.42 JUXTARENAL ABDOMINAL AORTIC ANEURYSM (AAA) WITHOUT RUPTURE: Primary | ICD-10-CM

## 2024-01-31 DIAGNOSIS — Z91.89 AT RISK FOR CARDIOVASCULAR EVENT: ICD-10-CM

## 2024-01-31 DIAGNOSIS — G25.81 RESTLESS LEG SYNDROME: ICD-10-CM

## 2024-01-31 DIAGNOSIS — I73.9 PAD (PERIPHERAL ARTERY DISEASE): ICD-10-CM

## 2024-01-31 PROCEDURE — 99213 OFFICE O/P EST LOW 20 MIN: CPT | Mod: PBBFAC | Performed by: INTERNAL MEDICINE

## 2024-01-31 PROCEDURE — 93005 ELECTROCARDIOGRAM TRACING: CPT | Mod: PBBFAC | Performed by: INTERNAL MEDICINE

## 2024-01-31 PROCEDURE — 99205 OFFICE O/P NEW HI 60 MIN: CPT | Mod: S$PBB,25,, | Performed by: INTERNAL MEDICINE

## 2024-01-31 PROCEDURE — 99999 PR PBB SHADOW E&M-EST. PATIENT-LVL III: CPT | Mod: PBBFAC,,, | Performed by: INTERNAL MEDICINE

## 2024-01-31 PROCEDURE — 93010 ELECTROCARDIOGRAM REPORT: CPT | Mod: S$PBB,,, | Performed by: INTERNAL MEDICINE

## 2024-01-31 RX ORDER — ATORVASTATIN CALCIUM 40 MG/1
40 TABLET, FILM COATED ORAL NIGHTLY
Qty: 90 TABLET | Refills: 3 | Status: SHIPPED | OUTPATIENT
Start: 2024-01-31

## 2024-01-31 NOTE — PROGRESS NOTES
Subjective:    Patient ID:  Miles Vazquez is a 68 y.o. male who presents for evaluation of No chief complaint on file.  For Hips and Legs pain over the past 10 years, progressive, smoker, HLD now on statin, AAA 4.8 cm, to change CV care back to [facility]  PCP: Puja Finney NP   Prior cardiologist: Bryan Chatman MD, last seen 5/2023  Vascular: Dr. Florian, last seen 5/2023, see annually for AAA  Neurosurgeon: Dr. Nando Foote  Lives alone, no pets, closest relative, sister, Hannah, at [place], [place]  Children grown, not close to them  Disabled due to chronic hip problem, 6/2016, prior manager of animal hospital    Last seen in 12/2019  Patient is consider a new patient to me.     Social Determinate of Health: Do you have any problem with the following: none  Health Literacy (understanding): high  Vaccination: up to date yes, covid yes, covid infection no  Activities: works outside at home, only walk for about 3-4 minutes before severe pain in hips and legs. Has had this pain for about 10 years. Problem after 50 yards  Nicotine: smoker  (1/2 ppd x 50 yrs), started age 12  Alcohol: How often? None hospitalized 8 years ago because of alcohol and stopped then.   Illicit Drugs: none, on Rx Ambient nightly since 2021  Cardiac Symptoms: none  Home BP: do not check  Medication Compliance: no, stopped statin on his own for over a year, felt taking too many pills.  Diet: Regular, use salt  Caffeine: Coffee 2 cups per day, tea per day 3-4 glasses, have RLS  Labs: 1/2019, .6, not on Rx, normal CMP, CBC (Hgb 9.9), iron sat 7%, ferritin 11, normal TSH, no A1C  01 & 08/2019:  No Troponin, A1C, BNP; LDL 62.2L on 40 mg of atorvastatin; TSH 1.082; Sodium 141; K+ 4.3; GFR >60; Glucose 75; WBC 9.09; Hemoglobin 12.6L  Lab Results   Component Value Date    TSH 1.637 09/21/2022        Lab Results   Component Value Date    HGBA1C 5.3 01/03/2013       Lab Results   Component Value Date    WBC 7.61 01/23/2024    HGB  13.7 (L) 01/23/2024    HCT 41.1 01/23/2024    MCV 93 01/23/2024     01/23/2024       CMP  Sodium   Date Value Ref Range Status   01/23/2024 139 136 - 145 mmol/L Final   10/28/2022 138 136 - 145 mmol/L Final   09/21/2022 139 136 - 145 mmol/L Final     Potassium   Date Value Ref Range Status   01/23/2024 4.1 3.5 - 5.1 mmol/L Final   10/28/2022 4.0 3.5 - 5.1 mmol/L Final   09/21/2022 4.4 3.5 - 5.1 mmol/L Final     Chloride   Date Value Ref Range Status   01/23/2024 106 95 - 110 mmol/L Final   10/28/2022 104 95 - 110 mmol/L Final   09/21/2022 106 95 - 110 mmol/L Final     CO2   Date Value Ref Range Status   01/23/2024 23 23 - 29 mmol/L Final   10/28/2022 23 23 - 29 mmol/L Final   09/21/2022 19 (L) 23 - 29 mmol/L Final     Glucose   Date Value Ref Range Status   01/23/2024 93 70 - 110 mg/dL Final   10/28/2022 110 70 - 110 mg/dL Final   09/21/2022 81 70 - 110 mg/dL Final     BUN   Date Value Ref Range Status   01/23/2024 24 (H) 8 - 23 mg/dL Final   10/28/2022 16 8 - 23 mg/dL Final   09/21/2022 31 (H) 8 - 23 mg/dL Final     Creatinine   Date Value Ref Range Status   01/23/2024 1.0 0.5 - 1.4 mg/dL Final   10/28/2022 1.1 0.5 - 1.4 mg/dL Final   09/21/2022 1.1 0.5 - 1.4 mg/dL Final     Calcium   Date Value Ref Range Status   01/23/2024 9.2 8.7 - 10.5 mg/dL Final   10/28/2022 9.4 8.7 - 10.5 mg/dL Final   09/21/2022 9.5 8.7 - 10.5 mg/dL Final     Total Protein   Date Value Ref Range Status   01/23/2024 7.1 6.0 - 8.4 g/dL Final   10/28/2022 7.5 6.0 - 8.4 g/dL Final   09/21/2022 7.6 6.0 - 8.4 g/dL Final     Albumin   Date Value Ref Range Status   01/23/2024 3.6 3.5 - 5.2 g/dL Final   10/28/2022 3.7 3.5 - 5.2 g/dL Final   09/21/2022 3.7 3.5 - 5.2 g/dL Final     Total Bilirubin   Date Value Ref Range Status   01/23/2024 0.2 0.1 - 1.0 mg/dL Final     Comment:     For infants and newborns, interpretation of results should be based  on gestational age, weight and in agreement with clinical  observations.    Premature Infant  recommended reference ranges:  Up to 24 hours.............<8.0 mg/dL  Up to 48 hours............<12.0 mg/dL  3-5 days..................<15.0 mg/dL  6-29 days.................<15.0 mg/dL     10/28/2022 0.3 0.1 - 1.0 mg/dL Final     Comment:     For infants and newborns, interpretation of results should be based  on gestational age, weight and in agreement with clinical  observations.    Premature Infant recommended reference ranges:  Up to 24 hours.............<8.0 mg/dL  Up to 48 hours............<12.0 mg/dL  3-5 days..................<15.0 mg/dL  6-29 days.................<15.0 mg/dL     09/21/2022 0.2 0.1 - 1.0 mg/dL Final     Comment:     For infants and newborns, interpretation of results should be based  on gestational age, weight and in agreement with clinical  observations.    Premature Infant recommended reference ranges:  Up to 24 hours.............<8.0 mg/dL  Up to 48 hours............<12.0 mg/dL  3-5 days..................<15.0 mg/dL  6-29 days.................<15.0 mg/dL       Alkaline Phosphatase   Date Value Ref Range Status   01/23/2024 98 55 - 135 U/L Final   10/28/2022 119 55 - 135 U/L Final   09/21/2022 137 (H) 55 - 135 U/L Final     AST   Date Value Ref Range Status   01/23/2024 20 10 - 40 U/L Final   10/28/2022 17 10 - 40 U/L Final   09/21/2022 18 10 - 40 U/L Final     ALT   Date Value Ref Range Status   01/23/2024 15 10 - 44 U/L Final   10/28/2022 10 10 - 44 U/L Final   09/21/2022 17 10 - 44 U/L Final     Anion Gap   Date Value Ref Range Status   01/23/2024 10 8 - 16 mmol/L Final   10/28/2022 11 8 - 16 mmol/L Final   09/21/2022 14 8 - 16 mmol/L Final     eGFR   Date Value Ref Range Status   01/23/2024 >60.0 >60 mL/min/1.73 m^2 Final   10/28/2022 >60.0 >60 mL/min/1.73 m^2 Final   09/21/2022 >60.0 >60 mL/min/1.73 m^2 Final     @labrcntip(troponini)@    BNP   Date Value Ref Range Status   04/20/2016 43 0 - 99 pg/mL Final     Comment:     Values of less than 100 pg/ml are consistent with non-CHF  populations.   }   Lab Results   Component Value Date    CHOL 197 01/23/2024    CHOL 168 09/21/2022    CHOL 192 03/21/2022     Lab Results   Component Value Date    HDL 33 (L) 01/23/2024    HDL 34 (L) 09/21/2022    HDL 37 (L) 03/21/2022     Lab Results   Component Value Date    LDLCALC 114.4 01/23/2024    LDLCALC 107.0 09/21/2022    LDLCALC 128.8 03/21/2022     Lab Results   Component Value Date    TRIG 248 (H) 01/23/2024    TRIG 135 09/21/2022    TRIG 131 03/21/2022     Lab Results   Component Value Date    CHOLHDL 16.8 (L) 01/23/2024    CHOLHDL 20.2 09/21/2022    CHOLHDL 19.3 (L) 03/21/2022       Last Echo: 3/2016  Last stress test: 5/2023. Lexiscan  Cardiovascular angiogram: none, except abdominal aorta, 5/2022  ECG: NSR 87, RBBB with LAFB  Fundoscopic exam: due    In 6/2019:  WM referred for PAD and claudication, now under the care of Dr. Florian, CT pending. Problem noted for over 4 years progressive over the past month. Starts with exertion, after about 5 minutes and start in the hips then the entire leg and have to stop. Better after 5 minute of rest. Feels very limited. Continue to smoke some and obvious vascular calcifications on X-rays.    4/2019 had X-ray of hip - 1. Mild degenerative osteoarthrosis of the bilateral hips.  2. Chronic vascular calcifications.     Abdominal US - Chronic bilobed distal abdominal aortic aneurysm.  This should be evaluated further with a dedicated contrast enhanced CT of the abdomen and pelvis.   Distal dilatation measures 3.8 cm AP x 4.1 cm transverse.     JOEL and duplex of LE - Ankle-brachial index of 0.86 on the right and 0.78 on the left.    Decreased velocity within the right common femoral artery suggesting a proximal focus of atherosclerotic stenosis.    Elevated velocity within the left profundal femoral artery consistent with a focus of atherosclerotic stenosis.    Elevated velocity within the distal left SFA and left popliteal artery consistent with foci of  "atherosclerotic stenosis.     Echo 3/2016 - CONCLUSIONS     1 - Concentric remodeling.     2 - Normal left ventricular systolic function (EF 60-65%).     3 - Left ventricular diastolic dysfunction.     4 - Mild left atrial enlargement.     5 - Normal right ventricular systolic function .     6 - Pericardial space ( see text). pericardial space is circumferentially filled with echodense material of unknown significance in this patient with known pleural plaques suggestive of Asbestos exposure.    Lexiscan 5/2013 - Nuclear Quantitative Functional Analysis:   LVEF: >= 70 % (normal is >= 51%)  LVED Volume: 74 ml (normal is <=171)  LVES Volume: 10 ml (normal is <=70)    Impression: NORMAL MYOCARDIAL PERFUSION  1. The perfusion scan is free of evidence for myocardial ischemia or injury.   2. Resting wall motion is physiologic.   3. Resting LV function is normal.  (normal is >= 51%)  4. The ventricular volumes are normal at rest and stress.   5. The extracardiac distribution of radioactivity is normal.   6. There was no previous study available to compare.     In 12/2019, return for pre-op clearance for lower back operation. Denies any cardiac symptoms but quit sedentary due to hip pains. Continue to smoke some. No heart worries and did not proceed with cardiac workup from last visit.    Aortogram by Dr. Florian 8/2019  Abdominal aorta - Infrarenal:  The vessel has minimal luminal irregularities. There is a 4 cm aneursymin the mid segment.    Peripheral angiogram - Popliteal - Left Vessel:  The vessel has minimal luminal irregularities. There is a 80% stenosis in the mid segment of the vessel.      HPI comments: in 1/2024, return on own to re-establish [facility] CV care. Concern of billing and continue with significant claudication with PAD and also smoking half ppd. Try to remain.     Bryan Chatman MD noted 5/2023 "Chief Complaint: test results, needs AAA repair, possibly open  HPI  Had Lexiscan stress test " 5/3/23--right BBB noted, diaphragatic attenuation noted, negative for reversible ischemia. EF 57%  No chest pain, usual SOB with act at times but mostly limited by leg pain.  Will see Dr Florian 5/18/23 to disc surgery for the AAA.    Preoperative cardiovascular examination;   Right bundle branch block (RBBB);   Essential hypertension;   Pure hypercholesterolemia;   Abdominal aortic aneurysm (AAA) without rupture, unspecified part (CMS/HCC);   PVD (peripheral vascular disease)    CTA 1/2023 - Chronic stable fusiform aneurysmal dilatation the mid abdominal aorta again measuring 4.7 cm AP x 4.4 cm transverse. There is also chronic stable fusiform aneurysmal dilatation of the distal abdominal aorta just proximal to the bifurcation measuring 4.5 cm AP x 4.8 cm transverse. There is a normal distal aortic bifurcation.    Review of Systems   Constitutional: Positive for weight loss (down 6-7 lbs from 12/2019). Negative for diaphoresis, fever, malaise/fatigue, night sweats and weight gain.   HENT:  Negative for hearing loss, nosebleeds and tinnitus.    Eyes:  Negative for discharge and visual disturbance.   Cardiovascular:  Positive for claudication (bilateral, after about 50 yards). Negative for chest pain, cyanosis, dyspnea on exertion, irregular heartbeat, leg swelling, near-syncope, orthopnea, palpitations and paroxysmal nocturnal dyspnea.   Respiratory:  Positive for sleep disturbances due to breathing. Negative for cough, shortness of breath, snoring and wheezing.         Ringsted score 0, today a 1, awaken tired.    Endocrine: Negative for polydipsia and polyuria.   Hematologic/Lymphatic: Does not bruise/bleed easily.   Skin:  Negative for color change, flushing, nail changes, poor wound healing and suspicious lesions.   Musculoskeletal:  Positive for muscle cramps. Negative for arthritis, falls, gout, joint pain, joint swelling, muscle weakness and myalgias.   Gastrointestinal:  Negative for constipation, diarrhea,  "heartburn, hematemesis, hematochezia, melena, nausea and vomiting.   Genitourinary:  Positive for decreased libido and hesitancy. Negative for hematuria and urgency.   Neurological:  Positive for difficulty with concentration. Negative for disturbances in coordination, excessive daytime sleepiness, dizziness, focal weakness, headaches, light-headedness, loss of balance, numbness, vertigo and weakness.        RLS improved with Requip   Psychiatric/Behavioral:  Positive for memory loss. Negative for depression and substance abuse. The patient has insomnia. The patient is not nervous/anxious.      Answers submitted by the patient for this visit:  Review of Systems Questionnaire (Submitted on 1/29/2024)  activity change: No  unexpected weight change: No  neck pain: Yes  hearing loss: No  rhinorrhea: Yes  trouble swallowing: No  eye discharge: No  visual disturbance: No  chest tightness: No  wheezing: No  chest pain: No  palpitations: No  blood in stool: No  constipation: No  vomiting: No  diarrhea: No  polydipsia: Yes  polyuria: No  difficulty urinating: No  urgency: No  hematuria: No  joint swelling: No  arthralgias: Yes  headaches: No  weakness: No  confusion: No  dysphoric mood: No      Objective:    Physical Exam  Constitutional:       Appearance: He is well-developed.      Comments: RA O2 sat 100%  Orthostatic VS: sitting 165/79, standing 152/69   HENT:      Head: Normocephalic.   Eyes:      Conjunctiva/sclera: Conjunctivae normal.      Pupils: Pupils are equal, round, and reactive to light.   Neck:      Thyroid: No thyromegaly.      Vascular: No JVD.      Comments: Circumference 14.5"  Cardiovascular:      Rate and Rhythm: Normal rate and regular rhythm.      Pulses: Intact distal pulses.           Carotid pulses are 1+ on the right side and 1+ on the left side.       Radial pulses are 1+ on the right side and 1+ on the left side.         Right dorsalis pedis pulse not accessible and left dorsalis pedis pulse not " "accessible.         Right posterior tibial pulse not accessible and left posterior tibial pulse not accessible.      Heart sounds: Heart sounds are distant. No murmur heard.     No friction rub. No gallop.      Comments: Boots on  Pulmonary:      Effort: Pulmonary effort is normal.      Breath sounds: No rales.      Comments: Diminished breath sounds and prolong expiration.       Chest:      Chest wall: No tenderness.   Abdominal:      General: Bowel sounds are normal.      Palpations: Abdomen is soft.      Tenderness: There is no abdominal tenderness.      Comments: Waist 36" increased to 37", hip 35"   Musculoskeletal:         General: Normal range of motion.      Cervical back: Normal range of motion and neck supple.   Lymphadenopathy:      Cervical: No cervical adenopathy.   Skin:     General: Skin is warm and dry.      Findings: No rash.   Neurological:      Mental Status: He is alert and oriented to person, place, and time.           Assessment:       1. Juxtarenal abdominal aortic aneurysm (AAA) without rupture    2. PAD (peripheral artery disease)    3. At risk for cardiovascular event    4. Pure hypercholesterolemia    5. Essential hypertension, Dx 2013    6. Smoker, half ppd, 75+ py, started age 12    7. Claudication, bilateral, after 50 yards    8. OMAR (obstructive sleep apnea), postive sleep study, never underwent CPAP titration    9. Benzodiazepine dependence, Ambient nightly since 2021    10. Excessive caffeine intake    11. Restless leg syndrome    12. Anemia, unspecified type    13. Prerenal azotemia    14. Personal history of noncompliance with medical treatment, presenting hazards to health    15. Hypertriglyceridemia    16. Other arterial embolism and thrombosis of abdominal aorta    17. Left carotid bruit    18. Peripheral vascular disease, unspecified    19. Mixed hyperlipidemia         Plan:       Diagnoses and all orders for this visit:    Juxtarenal abdominal aortic aneurysm (AAA) without " rupture  -     CTA Abdomen; Future    PAD (peripheral artery disease)  -     atorvastatin (LIPITOR) 40 MG tablet; Take 1 tablet (40 mg total) by mouth every evening.  -     Lipid Panel; Future  -     CRP, High Sensitivity; Future    At risk for cardiovascular event  -     IN OFFICE EKG 12-LEAD (to Muse)    Pure hypercholesterolemia    Essential hypertension, Dx 2013  -     Microalbumin/Creatinine Ratio, Urine; Future  -     Echo; Future    Smoker, half ppd, 75+ py, started age 12    Claudication, bilateral, after 50 yards  -     CTA Abdomen; Future    OMAR (obstructive sleep apnea), postive sleep study, never underwent CPAP titration    Benzodiazepine dependence, Ambient nightly since 2021    Excessive caffeine intake    Restless leg syndrome    Anemia, unspecified type    Prerenal azotemia    Personal history of noncompliance with medical treatment, presenting hazards to health    Hypertriglyceridemia    Other arterial embolism and thrombosis of abdominal aorta    Left carotid bruit  -     US Carotid Bilateral; Future  -     atorvastatin (LIPITOR) 40 MG tablet; Take 1 tablet (40 mg total) by mouth every evening.  -     Lipid Panel; Future  -     CRP, High Sensitivity; Future    Peripheral vascular disease, unspecified    Mixed hyperlipidemia  -     atorvastatin (LIPITOR) 40 MG tablet; Take 1 tablet (40 mg total) by mouth every evening.  -     Lipid Panel; Future  -     CRP, High Sensitivity; Future    - All medical issues reviewed, willing to restart high-intensity statin Rx.  - Warning signs of MI and stroke given, if symptoms last more than 5 minutes, stop immediately and call 911, then chew 2-4 low-dose ASA (81 mg).   - Need annual fundoscopic examination  - Need to remain well hydrated but avoid drinking within 4 hours of bedtime. Recommend at least 110 oz of fluid daily per IOM (institute of Medicine) suggestion.   - Reduce caffeine intake to help with restless sleep  - CV status and all medications reviewed,  patient acknowledge good understanding.  - Recommend healthy living: stop nicotine, avoid alcohol, healthy diet and regular exercise aiming for fitness, and weight control   - Need good exercise program, 4 key elements: 1. Aerobic (walking, swimming, dancing, jogging, biking, etc, 2. Muscle strengthening / resistance exercise, need to do 2-3 times weekly, 3. Stretching daily, good stretch takes a whole  total minute. 4. Balance exercise daily.   - Encourage activities as much as tolerated. Any activity is better than none!   - Instruction for Mediterranean diet and heart healthy exercise given.  - Highly recommend 30-60 minutes of exercise / activities daily, can have Sunday off, with 2-3 sessions of muscle strengthening weekly. A  would be very helpful.  - Recommend at least annual cardiovascular evaluation in view of patient's significant risk factors.  - Phone review / encourage use of [facility], clearance after test results.      Total time spend including review of record prior to face-to-face visit is 60 minutes. Greater than 50% of the time was spent in counseling and coordination of care. The above assessment and plan have been discussed at length. Referring provider's note reviewed. Labs and procedure over the last 6 months reviewed. Problem List updated. Asked to bring in all active medications / pills bottles with next visit. Will send note to referring / PCP.

## 2024-01-31 NOTE — PATIENT INSTRUCTIONS
Recommended Mediterranean dietEating Heart-Healthy Food: Using the DASH Plan  Eating for your heart doesnt have to be hard or boring. You just need to know how to make healthier choices. The DASH eating plan has been developed to help you do just that. DASH stands for Dietary Approaches to Stop Hypertension. It is a plan that has been proven to be healthier for your heart and to lower your risk for high blood pressure. It can also help lower your risk for cancer, heart disease, osteoporosis, and diabetes.  Choosing from Each Food Group  Choose foods from each of the food groups below each day. Try to get the recommended number of servings for each food group. The serving numbers are based on a diet of 2,000 calories a day. Talk to your doctor if youre unsure about your calorie needs.  Grains   Servings: 7-8 a day  A serving is:  1 slice bread  1 ounce dry cereal  half a cup cooked rice or pasta  Best choices: Whole grains and any grains high in fiber.  Vegetables   Servings: 4-5 a day  A serving is:  1 cup raw leafy vegetable  Half a cup cooked vegetable  Three-quarter cup vegetable juice  Best choices: Fresh or frozen vegetable prepared without too much added salt or fat.    Fruits   Servings: 4-5 a day  A serving is:  Three-quarter cup fruit juice  1 medium fruit  One-quarter cup dried fruit  One-half cup fresh, frozen, or canned fruit  Best choices: A variety of fresh fruits of different colors. Whole fruits are a much better choice than fruit juices.  Low-fat or Fat Free Dairy   Servings: 2-3 a day  A serving is:  8 ounces milk  1 cup yogurt  One and a half ounces cheese  Best choices: Skim or 1% milk, low-fat or fat free yogurt or buttermilk, and low-fat cheeses.       Meat, Poultry, Fish   Servings: 2 or fewer a day  A serving is:  3 ounces cooked meat, poultry, or fish  Best choices: Lean meats and fish. Trim away visible fat. Broil, roast, or boil instead of frying. Remove skin from poultry before eating.   Nuts, Seeds, Beans   Servings: 4-5 a week  A serving is:  One third cup nuts (or one and a half ounces)  2 tablespoons sunflower seeds  Half a cup cooked beans  Best choices: Dry roasted nuts with no salt added, lentils, kidney beans, garbanzo beans, and whole ortega beans.    Fats and Oils   Servings: 2 a day  A serving is:  1 teaspoon vegetable oil  1 teaspoon soft margarine  1 tablespoon low-fat mayonnaise  1 teaspoon regular mayonnaise  2 tablespoons light salad dressing  1 tablespoon regular salad dressing  Best choices: Monounsaturated and polyunsaturated fats such as olive, canola, or safflower oil.  Sweets   Servings: 5 a week or fewer  A serving is:  1 tablespoon sugar, maple syrup, or honey  1 tablespoon jam or jelly  1 half-ounce jelly beans (about 15)  8 ounces lemonade  Best choices: Dried fruit can be a satisfying sweet. Choose low-fat sweets when possible. And watch your serving sizes!       Aerobic Exercise for a Healthy Heart  Exercise is a lot more than an energy booster and a stress reliever. It also strengthens your heart muscle, lowers your blood pressure and blood cholesterol, and burns calories.      Remember, some activity is better than none.     Choose an Aerobic Activity  Choose a nonstop activity that makes your heart and lungs work harder than they do when you rest or walk normally. This aerobic exercise can improve the way your heart and other muscles use oxygen. Make it fun by exercising with a friend and choosing an activity you enjoy. Here are some ideas:  Walking  Swimming  Bicycling  Stair climbing  Dancing  Jogging  Exercise Regularly  If you havent been exercising regularly,  get your doctors okay first. Then start slowly.  Here are some tips:  Begin exercising 3 times a week for 5-10 minutes at a time.  When you feel comfortable, add a few minutes each week.  Slowly build up to exercising 3-4 times each week for 20-40 minutes. Aim for a total of 150 or more minutes a  week.  Be sure to carry your nitroglycerin with you when you exercise.  If you get angina when youre exercising, stop what youre doing, take your nitroglycerin, and call your doctor.  © 1589-8035 [employer], [address] [place] [address], [place] [address]. All rights reserved. This information is not intended as a substitute for professional medical care. Always follow your healthcare professional's instructions.

## 2024-02-06 ENCOUNTER — LAB VISIT (OUTPATIENT)
Dept: LAB | Facility: HOSPITAL | Age: 69
End: 2024-02-06
Payer: MEDICARE

## 2024-02-06 DIAGNOSIS — Z12.11 SCREENING FOR COLON CANCER: ICD-10-CM

## 2024-02-06 PROCEDURE — 82274 ASSAY TEST FOR BLOOD FECAL: CPT | Performed by: NURSE PRACTITIONER

## 2024-02-07 LAB — HEMOCCULT STL QL IA: NEGATIVE

## 2024-02-19 DIAGNOSIS — F51.01 PRIMARY INSOMNIA: ICD-10-CM

## 2024-02-20 RX ORDER — AMITRIPTYLINE HYDROCHLORIDE 100 MG/1
100 TABLET ORAL NIGHTLY
Qty: 90 TABLET | Refills: 3 | Status: SHIPPED | OUTPATIENT
Start: 2024-02-20

## 2024-02-29 ENCOUNTER — HOSPITAL ENCOUNTER (OUTPATIENT)
Dept: RADIOLOGY | Facility: HOSPITAL | Age: 69
Discharge: HOME | End: 2024-02-29
Attending: INTERNAL MEDICINE
Payer: MEDICARE

## 2024-02-29 ENCOUNTER — TELEPHONE (OUTPATIENT)
Dept: CARDIOLOGY | Facility: CLINIC | Age: 69
End: 2024-02-29
Payer: MEDICARE

## 2024-02-29 ENCOUNTER — HOSPITAL ENCOUNTER (OUTPATIENT)
Dept: CARDIOLOGY | Facility: HOSPITAL | Age: 69
Discharge: HOME | End: 2024-02-29
Attending: INTERNAL MEDICINE
Payer: MEDICARE

## 2024-02-29 VITALS — WEIGHT: 140 LBS | HEIGHT: 67 IN | BODY MASS INDEX: 21.97 KG/M2

## 2024-02-29 DIAGNOSIS — I10 ESSENTIAL HYPERTENSION: ICD-10-CM

## 2024-02-29 DIAGNOSIS — R09.89 LEFT CAROTID BRUIT: ICD-10-CM

## 2024-02-29 DIAGNOSIS — I71.42 JUXTARENAL ABDOMINAL AORTIC ANEURYSM (AAA) WITHOUT RUPTURE: ICD-10-CM

## 2024-02-29 DIAGNOSIS — I73.9 CLAUDICATION: ICD-10-CM

## 2024-02-29 LAB
AORTIC ROOT ANNULUS: 2.91 CM
AORTIC VALVE CUSP SEPERATION: 0.89 CM
ASCENDING AORTA: 2.83 CM
AV INDEX (PROSTH): 0.45
AV MEAN GRADIENT: 8 MMHG
AV PEAK GRADIENT: 13 MMHG
AV REGURGITATION PRESSURE HALF TIME: 373.26 MS
AV VALVE AREA BY VELOCITY RATIO: 1.5 CM²
AV VALVE AREA: 1.44 CM²
AV VELOCITY RATIO: 0.46
BSA FOR ECHO PROCEDURE: 1.73 M2
CV ECHO LV RWT: 0.59 CM
DOP CALC AO PEAK VEL: 1.83 M/S
DOP CALC AO VTI: 37 CM
DOP CALC LVOT AREA: 3.2 CM2
DOP CALC LVOT DIAMETER: 2.03 CM
DOP CALC LVOT PEAK VEL: 0.85 M/S
DOP CALC LVOT STROKE VOLUME: 53.38 CM3
DOP CALC MV VTI: 27.1 CM
DOP CALCLVOT PEAK VEL VTI: 16.5 CM
E WAVE DECELERATION TIME: 164.86 MSEC
E/A RATIO: 0.74
E/E' RATIO: 17.8 M/S
ECHO LV POSTERIOR WALL: 1.23 CM (ref 0.6–1.1)
EJECTION FRACTION: 62 %
FRACTIONAL SHORTENING: 33 % (ref 28–44)
GLOBAL LONGITUIDAL STRAIN: -20 %
INTERVENTRICULAR SEPTUM: 1.19 CM (ref 0.6–1.1)
IVC DIAMETER: 1.8 CM
LA MAJOR: 3.71 CM
LA MINOR: 2.7 CM
LEFT ATRIUM SIZE: 3.7 CM
LEFT ATRIUM VOLUME INDEX MOD: 11.2 ML/M2
LEFT ATRIUM VOLUME MOD: 19.41 CM3
LEFT INTERNAL DIMENSION IN SYSTOLE: 2.76 CM (ref 2.1–4)
LEFT VENTRICLE DIASTOLIC VOLUME INDEX: 43.71 ML/M2
LEFT VENTRICLE DIASTOLIC VOLUME: 76.06 ML
LEFT VENTRICLE MASS INDEX: 101 G/M2
LEFT VENTRICLE SYSTOLIC VOLUME INDEX: 16.5 ML/M2
LEFT VENTRICLE SYSTOLIC VOLUME: 28.63 ML
LEFT VENTRICULAR INTERNAL DIMENSION IN DIASTOLE: 4.14 CM (ref 3.5–6)
LEFT VENTRICULAR MASS: 176.44 G
LV LATERAL E/E' RATIO: 17.8 M/S
LV SEPTAL E/E' RATIO: 17.8 M/S
LVOT MG: 1.4 MMHG
LVOT MV: 0.55 CM/S
MV MEAN GRADIENT: 3 MMHG
MV PEAK A VEL: 1.2 M/S
MV PEAK E VEL: 0.89 M/S
MV PEAK GRADIENT: 6 MMHG
MV STENOSIS PRESSURE HALF TIME: 51.43 MS
MV VALVE AREA BY CONTINUITY EQUATION: 1.97 CM2
MV VALVE AREA P 1/2 METHOD: 4.28 CM2
PISA AR MAX VEL: 1.97 M/S
PISA MRMAX VEL: 3.98 M/S
PISA TR MAX VEL: 1.25 M/S
PULM VEIN S/D RATIO: 1.34
PV MV: 0.47 M/S
PV PEAK D VEL: 0.44 M/S
PV PEAK GRADIENT: 1 MMHG
PV PEAK S VEL: 0.59 M/S
PV PEAK VELOCITY: 0.61 M/S
RA MAJOR: 3.39 CM
RA PRESSURE ESTIMATED: 3 MMHG
RA WIDTH: 2.73 CM
RIGHT VENTRICULAR END-DIASTOLIC DIMENSION: 3.32 CM
RIGHT VENTRICULAR LENGTH IN DIASTOLE (APICAL 4-CHAMBER VIEW): 5.13 CM
RV MID DIAMA: 2.02 CM
RV TB RVSP: 4 MMHG
SINUS: 2.72 CM
STJ: 2.71 CM
TDI LATERAL: 0.05 M/S
TDI SEPTAL: 0.05 M/S
TDI: 0.05 M/S
TR MAX PG: 6 MMHG
TRICUSPID ANNULAR PLANE SYSTOLIC EXCURSION: 1.4 CM
TRICUSPID VALVE PEAK A WAVE VELOCITY: 0.26 M/S
TV PEAK E VEL: 0.4 M/S
TV REST PULMONARY ARTERY PRESSURE: 9 MMHG
Z-SCORE OF LEFT VENTRICULAR DIMENSION IN END DIASTOLE: -1.51
Z-SCORE OF LEFT VENTRICULAR DIMENSION IN END SYSTOLE: -0.61

## 2024-02-29 PROCEDURE — 93306 TTE W/DOPPLER COMPLETE: CPT | Mod: 26,,, | Performed by: INTERNAL MEDICINE

## 2024-02-29 PROCEDURE — 25500020 PHARM REV CODE 255: Performed by: INTERNAL MEDICINE

## 2024-02-29 PROCEDURE — 93880 EXTRACRANIAL BILAT STUDY: CPT | Mod: 26,,, | Performed by: RADIOLOGY

## 2024-02-29 PROCEDURE — 93880 EXTRACRANIAL BILAT STUDY: CPT | Mod: TC

## 2024-02-29 PROCEDURE — 74174 CTA ABD&PLVS W/CONTRAST: CPT | Mod: 26,,, | Performed by: RADIOLOGY

## 2024-02-29 PROCEDURE — 93306 TTE W/DOPPLER COMPLETE: CPT

## 2024-02-29 PROCEDURE — 74174 CTA ABD&PLVS W/CONTRAST: CPT | Mod: TC

## 2024-02-29 RX ADMIN — IOHEXOL 75 ML: 350 INJECTION, SOLUTION INTRAVENOUS at 11:02

## 2024-02-29 NOTE — TELEPHONE ENCOUNTER
LVM to go over test results.     ----- Message from Oziel Armas MD sent at 2/29/2024 11:46 AM CST -----  Progression of left carotid stenosis, will refer to Alan Florian MD for review. Thanks,    Dr. Armas    ----- Message -----  From: Interface, Rad Results In  Sent: 2/29/2024  10:26 AM CST  To: Oziel Armas MD      
1

## 2024-03-06 LAB
CREAT SERPL-MCNC: 1.1 MG/DL (ref 0.5–1.4)
SAMPLE: NORMAL

## 2024-03-16 DIAGNOSIS — I10 ESSENTIAL HYPERTENSION: ICD-10-CM

## 2024-03-18 RX ORDER — BENAZEPRIL HYDROCHLORIDE 20 MG/1
20 TABLET ORAL
Qty: 30 TABLET | Refills: 3 | Status: SHIPPED | OUTPATIENT
Start: 2024-03-18 | End: 2024-04-08 | Stop reason: SDUPTHER

## 2024-03-18 NOTE — TELEPHONE ENCOUNTER
Refill Routing Note   Medication(s) are not appropriate for processing by [facility] for the following reason(s):        Non-participating provider    [facility] action(s):  Route               Appointments  past 12m or future 3m with PCP    Date Provider   Last Visit   11/2/2022 Puja Fineny NP   Next Visit   Visit date not found Puja Fineny NP   ED visits in past 90 days: 0        Note composed:12:15 PM 03/18/2024

## 2024-04-05 ENCOUNTER — PATIENT MESSAGE (OUTPATIENT)
Dept: FAMILY MEDICINE | Facility: CLINIC | Age: 69
End: 2024-04-05
Payer: MEDICARE

## 2024-04-08 DIAGNOSIS — I10 ESSENTIAL HYPERTENSION: ICD-10-CM

## 2024-04-08 RX ORDER — BENAZEPRIL HYDROCHLORIDE 20 MG/1
20 TABLET ORAL DAILY
Qty: 30 TABLET | Refills: 3 | Status: SHIPPED | OUTPATIENT
Start: 2024-04-08 | End: 2024-05-01

## 2024-04-08 NOTE — TELEPHONE ENCOUNTER
Refill Routing Note   Medication(s) are not appropriate for processing by [facility] for the following reason(s):        Non-participating provider    [facility] action(s):  Route               Appointments  past 12m or future 3m with PCP    Date Provider   Last Visit   11/2/2022 Puja Finney NP   Next Visit   Visit date not found Puja Finney NP   ED visits in past 90 days: 0        Note composed:3:40 PM 04/08/2024

## 2024-04-23 ENCOUNTER — OFFICE VISIT (OUTPATIENT)
Dept: FAMILY MEDICINE | Facility: CLINIC | Age: 69
End: 2024-04-23
Payer: MEDICARE

## 2024-04-23 ENCOUNTER — TELEPHONE (OUTPATIENT)
Dept: FAMILY MEDICINE | Facility: CLINIC | Age: 69
End: 2024-04-23

## 2024-04-23 VITALS
OXYGEN SATURATION: 100 % | HEART RATE: 76 BPM | SYSTOLIC BLOOD PRESSURE: 160 MMHG | BODY MASS INDEX: 21.66 KG/M2 | HEIGHT: 67 IN | WEIGHT: 138 LBS | DIASTOLIC BLOOD PRESSURE: 72 MMHG

## 2024-04-23 DIAGNOSIS — F17.218 NICOTINE DEPENDENCE, CIGARETTES, WITH OTHER NICOTINE-INDUCED DISORDERS: ICD-10-CM

## 2024-04-23 DIAGNOSIS — F51.01 PRIMARY INSOMNIA: Primary | ICD-10-CM

## 2024-04-23 PROCEDURE — 99215 OFFICE O/P EST HI 40 MIN: CPT | Mod: PBBFAC | Performed by: STUDENT IN AN ORGANIZED HEALTH CARE EDUCATION/TRAINING PROGRAM

## 2024-04-23 PROCEDURE — 99999 PR PBB SHADOW E&M-EST. PATIENT-LVL V: CPT | Mod: PBBFAC,,, | Performed by: STUDENT IN AN ORGANIZED HEALTH CARE EDUCATION/TRAINING PROGRAM

## 2024-04-23 PROCEDURE — 99406 BEHAV CHNG SMOKING 3-10 MIN: CPT | Mod: S$PBB,ICN,, | Performed by: STUDENT IN AN ORGANIZED HEALTH CARE EDUCATION/TRAINING PROGRAM

## 2024-04-23 PROCEDURE — 99214 OFFICE O/P EST MOD 30 MIN: CPT | Mod: 25,S$PBB,ICN, | Performed by: STUDENT IN AN ORGANIZED HEALTH CARE EDUCATION/TRAINING PROGRAM

## 2024-04-23 RX ORDER — RAMELTEON 8 MG/1
8 TABLET ORAL NIGHTLY
Qty: 30 TABLET | Refills: 3 | Status: SHIPPED | OUTPATIENT
Start: 2024-04-23 | End: 2024-05-23

## 2024-04-23 NOTE — PROGRESS NOTES
[facility]    Subjective:    The HPI and pertinent ROS is included in the Diagnostic Impression Remarks section at the end of the note. Please see below for further details. Chief complaint is at end of note.     Miles is a pleasant intelligent patient who is here for evaluation.     Modified Medications    No medications on file       Data reviewed 274}  Previous medical records reviewed and summarized in plan section at end of note.      If you are due for any health screening(s) below please notify me so we can arrange them to be ordered and scheduled. Most healthy patients at your age complete them, but you are free to accept or refuse. If you can't do it, I'll definitely understand. If you can, I'd certainly appreciate it!     Tests to Keep You Healthy    Colon Cancer Screening: Met on 2/6/2024  Last Blood Pressure <= 139/89 (4/23/2024): NO      The following portions of the patient's history were reviewed and updated as appropriate: allergies, current medications, past family history, past medical history, past social history, past surgical history and problem list.    He  has a past medical history of Abdominal aortic aneurysm (AAA) (05/2019), Abnormal findings on esophagogastroduodenoscopy (EGD) (04/2016), Apical lung nodule (03/2022), Bursitis of both hips, Colon polyp, COPD (chronic obstructive pulmonary disease) (2016), Coronary artery calcification seen on CAT scan (04/24/2013), Depression (2016), HTN (hypertension) (01/07/2013), Hyperlipidemia (2019), Lumbar disc disease (06/2019), Macrocytic anemia (03/27/2016), Personal history of colonic polyps (2017), Recovering alcoholic (03/16/2016), RLS (restless legs syndrome) (2014), and Sleep apnea (2 years ago).  He  has a past surgical history that includes Esophagogastroduodenoscopy (N/A, 9/26/2018); Hernia repair (N/A, 9/26/2018); Appendectomy (N/A, 9/26/2018); Small intestine surgery (09/2018); Colonoscopy (2017); Angiography of  "lower extremity (08/14/2019); Aortography with serialography (N/A, 8/14/2019); Colon surgery (09/2018); and Aortography with serialography (N/A, 5/18/2022).    He  reports that he has been smoking cigarettes. He has a 13.5 pack-year smoking history. He has never used smokeless tobacco. He reports that he does not currently use drugs after having used the following drugs: Marijuana.  He family history includes Alcohol abuse in his father and mother; Tuberculosis in his maternal grandmother.    Review of patient's allergies indicates:   Allergen Reactions    Seroquel [quetiapine] Other (See Comments)     RESTLESS LEGS       Tobacco Use: High Risk (4/23/2024)    Patient History     Smoking Tobacco Use: Every Day     Smokeless Tobacco Use: Never     Passive Exposure: Not on file     Physical Examination  General appearance: alert, cooperative, no distress  Neck: diminished on the carotid pulses on b/l neck   Lungs: clear to auscultation, no wheezes, rales or rhonchi, symmetric air entry  Heart: normal rate, regular rhythm, normal S1, S2, no murmurs, rubs, clicks or gallops  Abdomen: soft, nontender, nondistended, no rigidity, rebound, or guarding.   Back: no point tenderness over spine  Extremities: peripheral pulses normal, no unilateral leg swelling or calf tenderness   Neurological:alert, oriented, normal speech, no new focal findings or movement disorder noted from baseline    BP Readings from Last 3 Encounters:   04/23/24 (!) 160/72   01/31/24 (!) 153/69   01/23/24 118/62     Wt Readings from Last 3 Encounters:   04/23/24 62.6 kg (138 lb)   02/29/24 63.5 kg (140 lb)   01/31/24 63.6 kg (140 lb 4.8 oz)     BP (!) 160/72   Pulse 76   Ht 5' 7" (1.702 m)   Wt 62.6 kg (138 lb)   SpO2 100%   BMI 21.61 kg/m²    274}  Laboratory: I have reviewed old labs below:    274}    Lab Results   Component Value Date    WBC 7.61 01/23/2024    HGB 13.7 (L) 01/23/2024    HCT 41.1 01/23/2024    MCV 93 01/23/2024     " "01/23/2024     01/23/2024    K 4.1 01/23/2024     01/23/2024    CALCIUM 9.2 01/23/2024    PHOS 4.7 (H) 05/24/2016    CO2 23 01/23/2024    GLU 93 01/23/2024    BUN 24 (H) 01/23/2024    CREATININE 1.0 01/23/2024    EGFRNORACEVR >60.0 01/23/2024    ANIONGAP 10 01/23/2024    PROT 7.1 01/23/2024    ALBUMIN 3.6 01/23/2024    BILITOT 0.2 01/23/2024    ALKPHOS 98 01/23/2024    ALT 15 01/23/2024    AST 20 01/23/2024    INR 1.1 05/16/2022    CHOL 197 01/23/2024    TRIG 248 (H) 01/23/2024    HDL 33 (L) 01/23/2024    LDLCALC 114.4 01/23/2024    TSH 1.637 09/21/2022    PSA 0.72 01/23/2024    HGBA1C 5.3 01/03/2013      Lab reviewed by me: Particular labs of significance that I will monitor, workup, or treat to improve are mentioned below in diagnostic impression remarks.    Imaging/EKG: I have reviewed the pertinent results and my findings are noted in remarks.  274}    CC:   Chief Complaint   Patient presents with    Follow-up     3 month    Leg Pain     Can only walk short distances before stopping due to pain, years        274}    Assessment/Plan  Miles Vazquez is a 68 y.o. male who presents to clinic with:  1. Primary insomnia    2. Nicotine dependence, cigarettes, with other nicotine-induced disorders       274}  Diagnostic Impression Remarks + HPI     Documentation entered by me for this encounter may have been done in part using speech-recognition technology. Although I have made an effort to ensure accuracy, "sound like" errors may exist and should be interpreted in context.      Patient here for follow up  Patient received a message from PCP to discuss ambien use  Patient has chronic insomnia since being sober  Has tried numerous sleep medications w/o improvement  Currently on ambien 10mg which patient said is not helping   The only medication that worked with seroquel at 400mg but worsened his RLS  Patient to consider seroquel at 200 mg   Will start rozerem trial  Patient has upcoming appt w/ vascular " surgeon. Patient not on plavix currently. Advised patient to call to see if he still needs it. Unable to find if he was taken off of it. Possible temperarily for cath  Advised patient to follow up to see if there is any intervention to be done d/t patient has pain w/ ambuluation in LE. Patient is a vasculopath.     Additional workup planned: see labs ordered below.    See below for labs and meds ordered with associated diagnosis      1. Primary insomnia  - ramelteon (ROZEREM) 8 mg tablet; Take 1 tablet (8 mg total) by mouth every evening.  Dispense: 30 tablet; Refill: 3    2. Nicotine dependence, cigarettes, with other nicotine-induced disorders  - Ambulatory referral/consult to Smoking Cessation Program; Future  - CT Chest Lung Screening Low Dose; Future      Yoly Scherer MD   274}    If you are due for any health screening(s) below please notify me so we can arrange them to be ordered and scheduled. Most healthy patients at your age complete them, but you are free to accept or refuse.     If you can't do it, I'll definitely understand. If you can, I'd certainly appreciate it!   Tests to Keep You Healthy    Colon Cancer Screening: Met on 2/6/2024  Last Blood Pressure <= 139/89 (4/23/2024): NO      Tobacco counseling 8 minutes

## 2024-04-23 NOTE — TELEPHONE ENCOUNTER
----- Message from Yoly Scherer MD sent at 4/23/2024 11:22 AM CDT -----  Please follow up on the sleep specialist referral  
Faxed to [facility]  
5

## 2024-04-23 NOTE — PATIENT INSTRUCTIONS
Iam Aquino,     If you are due for any health screening(s) below please notify me so we can arrange them to be ordered and scheduled. Most healthy patients at your age complete them, but you are free to accept or refuse.     If you can't do it, I'll definitely understand. If you can, I'd certainly appreciate it!    Tests to Keep You Healthy    Colon Cancer Screening: Met on 2/6/2024  Last Blood Pressure <= 139/89 (4/23/2024): NO      Lets manage your high blood pressure     Your blood pressure was above 140/90 today during your visit. We recommend that you schedule a nurse visit in two weeks to check your blood pressure and discuss ways to support your health goals.     You can also manage your health and record your blood pressure from the comfort of home by keeping a daily blood pressure log. These results are shared with and reviewed by your provider. Please print this form (Daily Blood Pressure Log) to assist you in keeping track of your blood pressure at home.     Schedule your nurse visit in two weeks to learn more about how to track and manage high blood pressure.    Daily Blood Pressure Log    Name:__________________________________                  Date of Birth:_________    Average Blood Pressure:  __________      Date: Time  (a.m.) Blood  Pressure: Pulse  Rate: Time  (p.m.) Blood  Pressure : Pulse  Rate:   Sample 8:37 127/83 84                                                                                                                                                                                   Were here to help you quit smoking     Our records indicated that you are still smoking. One of the best things you can do for your health is to stop smoking and we are here to help.     Talk with your provider about our Smoking Cessation Program and how we can support you on your journey.

## 2024-04-24 ENCOUNTER — TELEPHONE (OUTPATIENT)
Dept: FAMILY MEDICINE | Facility: CLINIC | Age: 69
End: 2024-04-24
Payer: MEDICARE

## 2024-04-24 NOTE — TELEPHONE ENCOUNTER
----- Message from Christi Gilliland sent at 4/24/2024  3:28 PM CDT -----  Contact: nessa  Type: Needs Medical Advice  Who Called:  nessa [facility] Call Back Number: [phone]  Additional Information: Nessa is calling the office received referral few missing information their office needs signed order and signed clinic notes.Please call back and advise.   Fax #[phone]

## 2024-04-30 ENCOUNTER — HOSPITAL ENCOUNTER (OUTPATIENT)
Dept: RADIOLOGY | Facility: HOSPITAL | Age: 69
Discharge: HOME | End: 2024-04-30
Attending: STUDENT IN AN ORGANIZED HEALTH CARE EDUCATION/TRAINING PROGRAM
Payer: MEDICARE

## 2024-04-30 DIAGNOSIS — F17.218 NICOTINE DEPENDENCE, CIGARETTES, WITH OTHER NICOTINE-INDUCED DISORDERS: ICD-10-CM

## 2024-04-30 DIAGNOSIS — F17.218 NICOTINE DEPENDENCE, CIGARETTES, WITH OTHER NICOTINE-INDUCED DISORDERS: Primary | ICD-10-CM

## 2024-04-30 PROCEDURE — 71271 CT THORAX LUNG CANCER SCR C-: CPT | Mod: TC

## 2024-04-30 PROCEDURE — 71271 CT THORAX LUNG CANCER SCR C-: CPT | Mod: 26,,, | Performed by: RADIOLOGY

## 2024-05-01 ENCOUNTER — TELEPHONE (OUTPATIENT)
Dept: CARDIOLOGY | Facility: CLINIC | Age: 69
End: 2024-05-01
Payer: MEDICARE

## 2024-05-01 DIAGNOSIS — I71.42 JUXTARENAL ABDOMINAL AORTIC ANEURYSM (AAA) WITHOUT RUPTURE: ICD-10-CM

## 2024-05-01 DIAGNOSIS — R80.9 MICROALBUMINURIA: Primary | ICD-10-CM

## 2024-05-01 DIAGNOSIS — I10 ESSENTIAL HYPERTENSION: ICD-10-CM

## 2024-05-01 RX ORDER — BENAZEPRIL HYDROCHLORIDE 40 MG/1
40 TABLET ORAL DAILY
Qty: 90 TABLET | Refills: 2 | Status: SHIPPED | OUTPATIENT
Start: 2024-05-01

## 2024-05-01 NOTE — TELEPHONE ENCOUNTER
----- Message from Oziel Armas MD sent at 5/1/2024  6:14 AM CDT -----  Have HTN, vascular disease and now microalbuminuria. Need to consider maximizing dose of benazepril (Lotensin) to 40 mg daily. Thanks.    Dr. Armas  ----- Message -----  From: Victor Manuel, [employer]  Sent: 4/30/2024   6:16 PM CDT  To: Oziel Armas MD

## 2024-05-01 NOTE — TELEPHONE ENCOUNTER
Spoke to pt results given, verbalized understanding.  In agreement to increasing Benazepril,  Pharmacy updated

## 2024-05-07 ENCOUNTER — CLINICAL SUPPORT (OUTPATIENT)
Dept: FAMILY MEDICINE | Facility: CLINIC | Age: 69
End: 2024-05-07
Payer: MEDICARE

## 2024-05-07 DIAGNOSIS — I10 ESSENTIAL HYPERTENSION: Primary | ICD-10-CM

## 2024-05-07 NOTE — PROGRESS NOTES
Miles POPE George 68 y.o. male is here today for Blood Pressure check.   History of HTN yes.    Review of patient's allergies indicates:   Allergen Reactions    Seroquel [quetiapine] Other (See Comments)     RESTLESS LEGS     Creatinine   Date Value Ref Range Status   01/23/2024 1.0 0.5 - 1.4 mg/dL Final     Sodium   Date Value Ref Range Status   01/23/2024 139 136 - 145 mmol/L Final     Potassium   Date Value Ref Range Status   01/23/2024 4.1 3.5 - 5.1 mmol/L Final   ]  Patient verifies taking blood pressure medications on a regular basis at the same time of the day.     Current Outpatient Medications:     amitriptyline (ELAVIL) 100 MG tablet, Take 1 tablet (100 mg total) by mouth every evening., Disp: 90 tablet, Rfl: 3    aspirin 81 MG Chew, Take 81 mg by mouth once daily., Disp: , Rfl:     atorvastatin (LIPITOR) 40 MG tablet, Take 1 tablet (40 mg total) by mouth every evening., Disp: 90 tablet, Rfl: 3    benazepriL (LOTENSIN) 40 MG tablet, Take 1 tablet (40 mg total) by mouth once daily., Disp: 90 tablet, Rfl: 2    clopidogreL (PLAVIX) 75 mg tablet, Take 1 tablet (75 mg total) by mouth once daily. (Patient not taking: Reported on 4/23/2024), Disp: 90 tablet, Rfl: 0    cyanocobalamin (VITAMIN B-12) 1000 MCG tablet, Take 0.5 tablets (500 mcg total) by mouth once daily. (Patient not taking: Reported on 1/23/2024), Disp: 60 tablet, Rfl: 12    ferrous sulfate (FEOSOL) 325 mg (65 mg iron) Tab tablet, Take 1 tablet (325 mg total) by mouth 2 (two) times daily. Try on empty stomach first (Patient not taking: Reported on 1/23/2024), Disp: 60 tablet, Rfl: 11    multivit-min/FA/lycopen/lutein (CENTRUM SILVER MEN ORAL), Take by mouth., Disp: , Rfl:     omeprazole (PRILOSEC) 40 MG capsule, Take 1 capsule (40 mg total) by mouth once daily., Disp: 90 capsule, Rfl: 3    ramelteon (ROZEREM) 8 mg tablet, Take 1 tablet (8 mg total) by mouth every evening., Disp: 30 tablet, Rfl: 3    rOPINIRole (REQUIP) 1 MG tablet, Take 1 tablet (1  mg total) by mouth every evening., Disp: 90 tablet, Rfl: 3    sucralfate (CARAFATE) 1 gram tablet, Take 1 tablet (1 g total) by mouth 4 (four) times daily before meals and nightly., Disp: 120 tablet, Rfl: 1    zolpidem (AMBIEN) 10 mg Tab, Take 1 tablet (10 mg total) by mouth nightly as needed (insomnia)., Disp: 90 tablet, Rfl: 0  Does patient have record of home blood pressure readings no.   Last dose of blood pressure medication was taken at 9:00 pm yesterday.  Patient is asymptomatic.   No complaints.        Blood pressure reading after 15 minutes was 132/80, Pulse 82.  Merna Jacob NP was notified.

## 2024-05-23 ENCOUNTER — CLINICAL SUPPORT (OUTPATIENT)
Dept: SMOKING CESSATION | Facility: CLINIC | Age: 69
End: 2024-05-23

## 2024-05-23 ENCOUNTER — OFFICE VISIT (OUTPATIENT)
Dept: FAMILY MEDICINE | Facility: CLINIC | Age: 69
End: 2024-05-23
Payer: MEDICARE

## 2024-05-23 VITALS
WEIGHT: 133.81 LBS | BODY MASS INDEX: 21 KG/M2 | HEIGHT: 67 IN | OXYGEN SATURATION: 97 % | HEART RATE: 86 BPM | SYSTOLIC BLOOD PRESSURE: 112 MMHG | DIASTOLIC BLOOD PRESSURE: 68 MMHG

## 2024-05-23 DIAGNOSIS — F17.210 MODERATE SMOKER (20 OR LESS PER DAY): Primary | ICD-10-CM

## 2024-05-23 DIAGNOSIS — F51.01 PRIMARY INSOMNIA: ICD-10-CM

## 2024-05-23 PROCEDURE — 99999 PR PBB SHADOW E&M-EST. PATIENT-LVL III: CPT | Mod: PBBFAC,,, | Performed by: STUDENT IN AN ORGANIZED HEALTH CARE EDUCATION/TRAINING PROGRAM

## 2024-05-23 PROCEDURE — 99213 OFFICE O/P EST LOW 20 MIN: CPT | Mod: PBBFAC | Performed by: STUDENT IN AN ORGANIZED HEALTH CARE EDUCATION/TRAINING PROGRAM

## 2024-05-23 PROCEDURE — 99214 OFFICE O/P EST MOD 30 MIN: CPT | Mod: S$PBB,,, | Performed by: STUDENT IN AN ORGANIZED HEALTH CARE EDUCATION/TRAINING PROGRAM

## 2024-05-23 RX ORDER — VARENICLINE TARTRATE 0.5 (11)-1
KIT ORAL
Qty: 53 EACH | Refills: 0 | Status: SHIPPED | OUTPATIENT
Start: 2024-05-23 | End: 2024-06-18 | Stop reason: DRUGHIGH

## 2024-05-23 RX ORDER — IBUPROFEN 200 MG
1 TABLET ORAL DAILY
Qty: 28 PATCH | Refills: 0 | Status: SHIPPED | OUTPATIENT
Start: 2024-05-23

## 2024-05-23 RX ORDER — MIRTAZAPINE 45 MG/1
45 TABLET, FILM COATED ORAL NIGHTLY
Qty: 30 TABLET | Refills: 11 | Status: SHIPPED | OUTPATIENT
Start: 2024-05-23 | End: 2025-05-23

## 2024-05-23 NOTE — Clinical Note
Patient seen for the tobacco cessation program. This is his first attempt to stop smoking through our program. He is a cigarette smoker of 1 PPD X 57 years. He is motivated to quit the use of tobacco and has agreed to attend our 6 week tobacco cessation program.

## 2024-05-23 NOTE — PROGRESS NOTES
[facility]    Subjective:    The HPI and pertinent ROS is included in the Diagnostic Impression Remarks section at the end of the note. Please see below for further details. Chief complaint is at end of note.     Miles is a pleasant intelligent patient who is here for evaluation.     Modified Medications    No medications on file       Data reviewed 274}  Previous medical records reviewed and summarized in plan section at end of note.      If you are due for any health screening(s) below please notify me so we can arrange them to be ordered and scheduled. Most healthy patients at your age complete them, but you are free to accept or refuse. If you can't do it, I'll definitely understand. If you can, I'd certainly appreciate it!     Tests to Keep You Healthy    Colon Cancer Screening: Met on 2/6/2024  Last Blood Pressure <= 139/89 (5/23/2024): NO      The following portions of the patient's history were reviewed and updated as appropriate: allergies, current medications, past family history, past medical history, past social history, past surgical history and problem list.    He  has a past medical history of Abdominal aortic aneurysm (AAA) (05/2019), Abnormal findings on esophagogastroduodenoscopy (EGD) (04/2016), Apical lung nodule (03/2022), Bursitis of both hips, Colon polyp, COPD (chronic obstructive pulmonary disease) (2016), Coronary artery calcification seen on CAT scan (04/24/2013), Depression (2016), HTN (hypertension) (01/07/2013), Hyperlipidemia (2019), Lumbar disc disease (06/2019), Macrocytic anemia (03/27/2016), Personal history of colonic polyps (2017), Recovering alcoholic (03/16/2016), RLS (restless legs syndrome) (2014), and Sleep apnea (2 years ago).  He  has a past surgical history that includes Esophagogastroduodenoscopy (N/A, 9/26/2018); Hernia repair (N/A, 9/26/2018); Appendectomy (N/A, 9/26/2018); Small intestine surgery (09/2018); Colonoscopy (2017); Angiography of  "lower extremity (08/14/2019); Aortography with serialography (N/A, 8/14/2019); Colon surgery (09/2018); and Aortography with serialography (N/A, 5/18/2022).    He  reports that he has been smoking cigarettes. He started smoking about 57 years ago. He has a 70.9 pack-year smoking history. He has never used smokeless tobacco. He reports that he does not currently use drugs after having used the following drugs: Marijuana.  He family history includes Alcohol abuse in his father and mother; Tuberculosis in his maternal grandmother.    Review of patient's allergies indicates:   Allergen Reactions    Seroquel [quetiapine] Other (See Comments)     RESTLESS LEGS       Tobacco Use: High Risk (5/23/2024)    Patient History     Smoking Tobacco Use: Every Day     Smokeless Tobacco Use: Never     Passive Exposure: Not on file     Physical Examination  General appearance: alert, cooperative, no distress  Neck: no thyromegaly, no neck stiffness  Lungs: clear to auscultation, no wheezes, rales or rhonchi, symmetric air entry  Heart: normal rate, regular rhythm, normal S1, S2, no murmurs, rubs, clicks or gallops  Abdomen: soft, nontender, nondistended, no rigidity, rebound, or guarding.   Back: no point tenderness over spine  Extremities: peripheral pulses normal, no unilateral leg swelling or calf tenderness   Neurological:alert, oriented, normal speech, no new focal findings or movement disorder noted from baseline    BP Readings from Last 3 Encounters:   05/23/24 112/68   04/23/24 (!) 160/72   01/31/24 (!) 153/69     Wt Readings from Last 3 Encounters:   05/23/24 60.7 kg (133 lb 12.8 oz)   04/23/24 62.6 kg (138 lb)   02/29/24 63.5 kg (140 lb)     /68 (BP Location: Left arm, Patient Position: Sitting, BP Method: Medium (Manual))   Pulse 86   Ht 5' 7" (1.702 m)   Wt 60.7 kg (133 lb 12.8 oz)   SpO2 97%   BMI 20.96 kg/m²    274}  Laboratory: I have reviewed old labs below:    274}    Lab Results   Component Value Date    " "WBC 7.61 01/23/2024    HGB 13.7 (L) 01/23/2024    HCT 41.1 01/23/2024    MCV 93 01/23/2024     01/23/2024     01/23/2024    K 4.1 01/23/2024     01/23/2024    CALCIUM 9.2 01/23/2024    PHOS 4.7 (H) 05/24/2016    CO2 23 01/23/2024    GLU 93 01/23/2024    BUN 24 (H) 01/23/2024    CREATININE 1.0 01/23/2024    EGFRNORACEVR >60.0 01/23/2024    ANIONGAP 10 01/23/2024    PROT 7.1 01/23/2024    ALBUMIN 3.6 01/23/2024    BILITOT 0.2 01/23/2024    ALKPHOS 98 01/23/2024    ALT 15 01/23/2024    AST 20 01/23/2024    INR 1.1 05/16/2022    CHOL 128 04/30/2024    TRIG 152 (H) 04/30/2024    HDL 34 (L) 04/30/2024    LDLCALC 63.6 04/30/2024    TSH 1.637 09/21/2022    PSA 0.72 01/23/2024    HGBA1C 5.3 01/03/2013      Lab reviewed by me: Particular labs of significance that I will monitor, workup, or treat to improve are mentioned below in diagnostic impression remarks.    Imaging/EKG: I have reviewed the pertinent results and my findings are noted in remarks.  274}    CC: No chief complaint on file.       274}    Assessment/Plan  Miles Vazquez is a 68 y.o. male who presents to clinic with:  1. Primary insomnia       274}  Diagnostic Impression Remarks + HPI     Documentation entered by me for this encounter may have been done in part using speech-recognition technology. Although I have made an effort to ensure accuracy, "sound like" errors may exist and should be interpreted in context.      Patient with insomnia present today for follow up. Patient was started on ramelton last visit but did not receive the medication. Patient previously on seroquel at 400 mg which caused rls worsening. Since stopping seroquel rls has improved.patient's benazpril has been increased by vascular surgeon who recommend to do cta of the carotid.     Additional workup planned: see labs ordered below.    See below for labs and meds ordered with associated diagnosis      1. Primary insomnia  - mirtazapine (REMERON) 45 MG tablet; Take 1 " tablet (45 mg total) by mouth every evening.  Dispense: 30 tablet; Refill: 11      Yoly Scherer MD   274}    If you are due for any health screening(s) below please notify me so we can arrange them to be ordered and scheduled. Most healthy patients at your age complete them, but you are free to accept or refuse.     If you can't do it, I'll definitely understand. If you can, I'd certainly appreciate it!   Tests to Keep You Healthy    Colon Cancer Screening: Met on 2/6/2024  Last Blood Pressure <= 139/89 (5/23/2024): NO

## 2024-06-03 ENCOUNTER — TELEPHONE (OUTPATIENT)
Dept: FAMILY MEDICINE | Facility: CLINIC | Age: 69
End: 2024-06-03
Payer: MEDICARE

## 2024-06-03 DIAGNOSIS — Z91.89 AT RISK FOR OBSTRUCTIVE SLEEP APNEA: Primary | ICD-10-CM

## 2024-06-03 DIAGNOSIS — G47.36 SLEEP-RELATED HYPOVENTILATION DUE TO MEDICAL CONDITION: ICD-10-CM

## 2024-06-03 DIAGNOSIS — R06.83 SNORING: ICD-10-CM

## 2024-06-03 DIAGNOSIS — R06.81 APNEIC EPISODE: ICD-10-CM

## 2024-06-03 NOTE — TELEPHONE ENCOUNTER
----- Message from Olman Rivera sent at 6/3/2024 12:10 PM CDT -----  Regarding: advice  Type: Needs Medical Advice  Who Called: Nessa from [facility]   Symptoms (please be specific):    How long has patient had these symptoms:    Pharmacy name and phone #:    [facility] Call Back Number: [phone] Fax:[phone]   Additional Information: She needs signed  order for the pt. Please call to advise. Thanks

## 2024-06-11 ENCOUNTER — HOSPITAL ENCOUNTER (OUTPATIENT)
Dept: RADIOLOGY | Facility: HOSPITAL | Age: 69
Discharge: HOME | End: 2024-06-11
Attending: SURGERY
Payer: MEDICARE

## 2024-06-11 DIAGNOSIS — I65.29 CAROTID STENOSIS: ICD-10-CM

## 2024-06-11 LAB
ALLENS TEST: NORMAL
CREAT SERPL-MCNC: 1 MG/DL (ref 0.5–1.4)
SAMPLE: NORMAL
SITE: NORMAL

## 2024-06-11 PROCEDURE — 70498 CT ANGIOGRAPHY NECK: CPT | Mod: 26,,, | Performed by: RADIOLOGY

## 2024-06-11 PROCEDURE — 25500020 PHARM REV CODE 255: Performed by: SURGERY

## 2024-06-11 PROCEDURE — 70498 CT ANGIOGRAPHY NECK: CPT | Mod: TC

## 2024-06-11 RX ADMIN — IOHEXOL 75 ML: 350 INJECTION, SOLUTION INTRAVENOUS at 08:06

## 2024-06-18 ENCOUNTER — CLINICAL SUPPORT (OUTPATIENT)
Dept: SMOKING CESSATION | Facility: CLINIC | Age: 69
End: 2024-06-18

## 2024-06-18 DIAGNOSIS — F17.210 MODERATE SMOKER (20 OR LESS PER DAY): Primary | ICD-10-CM

## 2024-06-18 RX ORDER — VARENICLINE TARTRATE 1 MG/1
1 TABLET, FILM COATED ORAL 2 TIMES DAILY
Qty: 56 TABLET | Refills: 0 | Status: SHIPPED | OUTPATIENT
Start: 2024-06-18

## 2024-06-26 ENCOUNTER — CLINICAL SUPPORT (OUTPATIENT)
Dept: SMOKING CESSATION | Facility: CLINIC | Age: 69
End: 2024-06-26

## 2024-06-26 DIAGNOSIS — F17.210 MODERATE SMOKER (20 OR LESS PER DAY): Primary | ICD-10-CM

## 2024-06-26 NOTE — Clinical Note
Patient reports decreasing cigarette smoking from 1 pack per day for 57 years to <1/2 pack per day. He is pleased with his progress and intends to be tobacco free soon. Completion of TCRS (Tobacco Cessation Rating Scale) reviewed strategies, cues, and triggers. Introduced the negative impact of tobacco on health, the health advantages of discontinuing the use of tobacco, time line improved health changes after a quit, withdrawal issues to expect from nicotine and habit, and ways to achieve the goal of a quit.The patient remains on the prescribed tobacco cessation medication regimen of 1 mg Chantix BID without any negative side effects at this time. The patient denies any abnormal behavioral or mental changes at this time. The patient will continue with therapy sessions and medication monitoring by CTTS. Prescribed medication management will be by physician.

## 2024-06-26 NOTE — PROGRESS NOTES
Individual Follow-Up Form    6/26/2024    Quit Date: none    Clinical Status of Patient: Outpatient    Length of Service: 45 minutes    Continuing Medication: yes  Chantix    Other Medications: none     Target Symptoms: Withdrawal and medication side effects. The following were rated moderate (3) to severe (4) on TCRS:  Moderate (3): none  Severe (4): none    Comments: #1  Patient reports decreasing cigarette smoking from 1 pack per day for 57 years to <1/2 pack per day. He is pleased with his progress and intends to be tobacco free soon. Completion of TCRS (Tobacco Cessation Rating Scale) reviewed strategies, cues, and triggers. Introduced the negative impact of tobacco on health, the health advantages of discontinuing the use of tobacco, time line improved health changes after a quit, withdrawal issues to expect from nicotine and habit, and ways to achieve the goal of a quit.The patient remains on the prescribed tobacco cessation medication regimen of 1 mg Chantix BID without any negative side effects at this time. The patient denies any abnormal behavioral or mental changes at this time. The patient will continue with therapy sessions and medication monitoring by CTTS. Prescribed medication management will be by physician.     Diagnosis: F17.210    Next Visit: 1 week

## 2024-06-28 ENCOUNTER — PROCEDURE VISIT (OUTPATIENT)
Dept: SLEEP MEDICINE | Facility: HOSPITAL | Age: 69
End: 2024-06-28
Attending: STUDENT IN AN ORGANIZED HEALTH CARE EDUCATION/TRAINING PROGRAM
Payer: MEDICARE

## 2024-06-28 DIAGNOSIS — G47.36 SLEEP-RELATED HYPOVENTILATION DUE TO MEDICAL CONDITION: ICD-10-CM

## 2024-06-28 DIAGNOSIS — R06.83 SNORING: ICD-10-CM

## 2024-06-28 DIAGNOSIS — Z91.89 AT RISK FOR OBSTRUCTIVE SLEEP APNEA: ICD-10-CM

## 2024-06-28 PROCEDURE — 95810 POLYSOM 6/> YRS 4/> PARAM: CPT

## 2024-07-03 ENCOUNTER — TELEPHONE (OUTPATIENT)
Dept: FAMILY MEDICINE | Facility: CLINIC | Age: 69
End: 2024-07-03
Payer: MEDICARE

## 2024-07-03 ENCOUNTER — OFFICE VISIT (OUTPATIENT)
Dept: FAMILY MEDICINE | Facility: CLINIC | Age: 69
End: 2024-07-03
Payer: MEDICARE

## 2024-07-03 ENCOUNTER — HOSPITAL ENCOUNTER (OUTPATIENT)
Dept: RADIOLOGY | Facility: HOSPITAL | Age: 69
Discharge: HOME | End: 2024-07-03
Attending: NURSE PRACTITIONER
Payer: MEDICARE

## 2024-07-03 VITALS
WEIGHT: 128 LBS | SYSTOLIC BLOOD PRESSURE: 96 MMHG | OXYGEN SATURATION: 95 % | DIASTOLIC BLOOD PRESSURE: 64 MMHG | RESPIRATION RATE: 14 BRPM | HEART RATE: 105 BPM | HEIGHT: 67 IN | BODY MASS INDEX: 20.09 KG/M2

## 2024-07-03 DIAGNOSIS — R10.84 GENERALIZED ABDOMINAL PAIN: ICD-10-CM

## 2024-07-03 DIAGNOSIS — I71.40 ABDOMINAL AORTIC ANEURYSM (AAA) WITHOUT RUPTURE, UNSPECIFIED PART: ICD-10-CM

## 2024-07-03 DIAGNOSIS — R10.84 GENERALIZED ABDOMINAL PAIN: Primary | ICD-10-CM

## 2024-07-03 DIAGNOSIS — F51.01 PRIMARY INSOMNIA: ICD-10-CM

## 2024-07-03 DIAGNOSIS — E86.0 DEHYDRATION: ICD-10-CM

## 2024-07-03 PROCEDURE — 74177 CT ABD & PELVIS W/CONTRAST: CPT | Mod: TC

## 2024-07-03 PROCEDURE — 74177 CT ABD & PELVIS W/CONTRAST: CPT | Mod: 26,,, | Performed by: RADIOLOGY

## 2024-07-03 PROCEDURE — 99215 OFFICE O/P EST HI 40 MIN: CPT | Mod: PBBFAC,25 | Performed by: NURSE PRACTITIONER

## 2024-07-03 PROCEDURE — A9698 NON-RAD CONTRAST MATERIALNOC: HCPCS | Performed by: NURSE PRACTITIONER

## 2024-07-03 PROCEDURE — 99999 PR PBB SHADOW E&M-EST. PATIENT-LVL V: CPT | Mod: PBBFAC,,, | Performed by: NURSE PRACTITIONER

## 2024-07-03 PROCEDURE — 99214 OFFICE O/P EST MOD 30 MIN: CPT | Mod: S$PBB,,, | Performed by: NURSE PRACTITIONER

## 2024-07-03 PROCEDURE — 25500020 PHARM REV CODE 255: Performed by: NURSE PRACTITIONER

## 2024-07-03 RX ORDER — MIRTAZAPINE 15 MG/1
15 TABLET, FILM COATED ORAL NIGHTLY
Qty: 30 TABLET | Refills: 11 | Status: SHIPPED | OUTPATIENT
Start: 2024-07-03 | End: 2025-07-03

## 2024-07-03 RX ADMIN — IOHEXOL 1000 ML: 9 SOLUTION ORAL at 05:07

## 2024-07-03 RX ADMIN — IOHEXOL 75 ML: 350 INJECTION, SOLUTION INTRAVENOUS at 05:07

## 2024-07-03 NOTE — TELEPHONE ENCOUNTER
Appt scheduled for stomach pain. Pt c/o pain comes and goes. Appt scheduled with NP. Pt voiced understanding.

## 2024-07-03 NOTE — PROGRESS NOTES
"Subjective:       Patient ID: Miles Vazquez is a 68 y.o. male.    Chief Complaint: Abdominal Pain (Roughly a week) and Dizziness (Standing up causes)  -  The pt is a 69 y/o male that presents with c/o generalized abd pain x 1 week. Dizziness that started yesterday but has not been eating or drinking d/t worsening abd pain thus dehydrated.    Past Medical History:   Diagnosis Date    Abdominal aortic aneurysm (AAA) 05/2019    3.2 cm per CTA.--12/2020 4.3 x 4.7 by 4.5 cm--3/2022  4.8 x 4.5 cm    Abnormal findings on esophagogastroduodenoscopy (EGD) 04/2016    "intestinal metaplasia" which is a "risk factor" for gastric cancer.      Apical lung nodule 03/2022    Right    Bursitis of both hips     Colon polyp     COPD (chronic obstructive pulmonary disease) 2016    Coronary artery calcification seen on CAT scan 04/24/2013    Depression 2016    Accompanied drinking problem    HTN (hypertension) 01/07/2013    Hyperlipidemia 2019    Lumbar disc disease 06/2019    grade 1 spondylolisthesis of L5 on S1    Macrocytic anemia 03/27/2016    Personal history of colonic polyps 2017    Recovering alcoholic 03/16/2016    RLS (restless legs syndrome) 2014    Sleep apnea 2 years ago    does not use cpap       Past Surgical History:   Procedure Laterality Date    ANGIOGRAPHY OF LOWER EXTREMITY  08/14/2019    AORTOGRAPHY WITH SERIALOGRAPHY N/A 8/14/2019    Procedure: AORTOGRAM, WITH RUNOFF;  Surgeon: Alan Florian MD;  Location: [facility] CATH/EP LAB;  Service: Peripheral Vascular;  Laterality: N/A;    AORTOGRAPHY WITH SERIALOGRAPHY N/A 5/18/2022    Procedure: AORTOGRAM, WITH RUNOFF;  Surgeon: Alan Florian MD;  Location: [facility] CATH/EP LAB;  Service: Cardiovascular;  Laterality: N/A;    APPENDECTOMY N/A 9/26/2018    Procedure: APPENDECTOMY;  Surgeon: Ramírez Horner MD;  Location: [facility] OR;  Service: General;  Laterality: N/A;    COLON SURGERY  09/2018    COLONOSCOPY  2017    with polyps repeat 5 yrs- [facility] " WARREN Gutierrez    ESOPHAGOGASTRODUODENOSCOPY N/A 9/26/2018    Procedure: EGD (ESOPHAGOGASTRODUODENOSCOPY);  Surgeon: Ramírez Horner MD;  Location: [facility] OR;  Service: General;  Laterality: N/A;  WITH BX    HERNIA REPAIR N/A 9/26/2018    Procedure: REPAIR, HERNIA;  Surgeon: Ramírez Horner MD;  Location: [facility] OR;  Service: General;  Laterality: N/A;  INTERNAL LYSIS WITH REDUCTION OF INTERNAL HERNIA    SMALL INTESTINE SURGERY  09/2018        Social History     Socioeconomic History    Marital status: Single   Tobacco Use    Smoking status: Every Day     Current packs/day: 1.00     Average packs/day: 0.6 packs/day for 111.5 years (71.0 ttl pk-yrs)     Types: Cigarettes     Start date: 1967    Smokeless tobacco: Never    Tobacco comments:     1 or 2 cigarettes a day 5/23/24 Active participant with smoking cessation. 1X57 yrs   Substance and Sexual Activity    Drug use: Not Currently     Types: Marijuana     Comment: In the past.    Sexual activity: Not Currently     Partners: Female     Birth control/protection: Condom     Social Determinants of Health     Financial Resource Strain: High Risk (1/22/2024)    Overall Financial Resource Strain (CARDIA)     Difficulty of Paying Living Expenses: Very hard   Food Insecurity: Food Insecurity Present (1/22/2024)    Hunger Vital Sign     Worried About Running Out of Food in the Last Year: Sometimes true     Ran Out of Food in the Last Year: Sometimes true   Transportation Needs: No Transportation Needs (1/22/2024)    PRAPARE - Transportation     Lack of Transportation (Medical): No     Lack of Transportation (Non-Medical): No   Physical Activity: Unknown (1/22/2024)    Exercise Vital Sign     Days of Exercise per Week: 5 days   Stress: Stress Concern Present (1/22/2024)    [employer] [place] [employer] - Occupational Stress Questionnaire     Feeling of Stress : To some extent   Housing Stability: Unknown (1/22/2024)    Housing Stability Vital Sign      Unable to Pay for Housing in the Last Year: No     Unstable Housing in the Last Year: No       Family History   Problem Relation Name Age of Onset    Alcohol abuse Mother Veronica Resendiz         Cancer    Alcohol abuse Father Ramon Vazquez     Tuberculosis Maternal Grandmother      Stomach cancer Neg Hx      Colon cancer Neg Hx         Review of patient's allergies indicates:   Allergen Reactions    Seroquel [quetiapine] Other (See Comments)     RESTLESS LEGS          Current Outpatient Medications:     amitriptyline (ELAVIL) 100 MG tablet, Take 1 tablet (100 mg total) by mouth every evening., Disp: 90 tablet, Rfl: 3    aspirin 81 MG Chew, Take 81 mg by mouth once daily., Disp: , Rfl:     atorvastatin (LIPITOR) 40 MG tablet, Take 1 tablet (40 mg total) by mouth every evening., Disp: 90 tablet, Rfl: 3    benazepriL (LOTENSIN) 40 MG tablet, Take 1 tablet (40 mg total) by mouth once daily., Disp: 90 tablet, Rfl: 2    clopidogreL (PLAVIX) 75 mg tablet, Take 1 tablet (75 mg total) by mouth once daily., Disp: 90 tablet, Rfl: 0    multivit-min/FA/lycopen/lutein (CENTRUM SILVER MEN ORAL), Take by mouth., Disp: , Rfl:     omeprazole (PRILOSEC) 40 MG capsule, Take 1 capsule (40 mg total) by mouth once daily., Disp: 90 capsule, Rfl: 3    rOPINIRole (REQUIP) 1 MG tablet, Take 1 tablet (1 mg total) by mouth every evening., Disp: 90 tablet, Rfl: 3    sucralfate (CARAFATE) 1 gram tablet, Take 1 tablet (1 g total) by mouth 4 (four) times daily before meals and nightly., Disp: 120 tablet, Rfl: 1    varenicline (CHANTIX) 1 mg Tab, Take 1 tablet (1 mg total) by mouth 2 (two) times daily., Disp: 56 tablet, Rfl: 0    zolpidem (AMBIEN) 10 mg Tab, Take 1 tablet (10 mg total) by mouth nightly as needed (insomnia)., Disp: 90 tablet, Rfl: 0    mirtazapine (REMERON) 15 MG tablet, Take 1 tablet (15 mg total) by mouth every evening., Disp: 30 tablet, Rfl: 11  No current facility-administered medications for this visit.    Facility-Administered  Medications Ordered in Other Visits:     iohexoL (OMNIPAQUE 350) injection 75 mL, 75 mL, Intravenous, ONCE PRN, Puja Finney NP    iohexoL (OMNIPAQUE 9) oral solution 1,000 mL, 1,000 mL, Oral, ONCE PRN, Puja Finney NP    Abdominal Pain  This is a new problem. The current episode started in the past 7 days. The onset quality is undetermined. The problem occurs constantly. The problem has been unchanged. The pain is located in the generalized abdominal region. The pain is at a severity of 7/10. The quality of the pain is aching. The abdominal pain does not radiate. Pertinent negatives include no constipation, diarrhea, dysuria or melena. The pain is aggravated by movement and palpation. The pain is relieved by Being still. He has tried proton pump inhibitors (carafate) for the symptoms. His past medical history is significant for abdominal surgery and GERD.     Review of Systems   Constitutional: Negative.    HENT: Negative.     Eyes: Negative.    Respiratory: Negative.     Cardiovascular: Negative.    Gastrointestinal:  Positive for abdominal pain. Negative for constipation, diarrhea and melena.   Endocrine: Negative.    Genitourinary: Negative.  Negative for dysuria.   Musculoskeletal: Negative.    Skin: Negative.    Allergic/Immunologic: Negative.    Neurological: Negative.    Hematological: Negative.    Psychiatric/Behavioral: Negative.         Objective:      Physical Exam  Vitals and nursing note reviewed.   Constitutional:       General: He is not in acute distress.     Appearance: Normal appearance. He is well-developed and normal weight. He is not ill-appearing.   HENT:      Head: Normocephalic.   Eyes:      Conjunctiva/sclera: Conjunctivae normal.   Neck:      Thyroid: No thyromegaly.   Cardiovascular:      Rate and Rhythm: Normal rate and regular rhythm.      Heart sounds: Normal heart sounds. No murmur heard.  Pulmonary:      Effort: Pulmonary effort is normal.      Breath  sounds: Normal breath sounds. No wheezing or rales.   Abdominal:      General: Bowel sounds are normal. There is no distension.      Palpations: There is no mass.      Tenderness: There is abdominal tenderness.      Hernia: No hernia is present.      Comments: Pulsating    Musculoskeletal:         General: Normal range of motion.      Cervical back: Normal range of motion.      Right lower leg: No edema.      Left lower leg: No edema.   Skin:     General: Skin is warm and dry.   Neurological:      Mental Status: He is alert and oriented to person, place, and time. Mental status is at baseline.   Psychiatric:         Mood and Affect: Mood normal.         Behavior: Behavior normal.         Thought Content: Thought content normal.         Judgment: Judgment normal.         Assessment:       1. Generalized abdominal pain    2. Dehydration    3. Abdominal aortic aneurysm (AAA) without rupture, unspecified part    4. Primary insomnia        Plan:     1- CT stat abd & pelvis w/contrast  2-BMP today  3- IV infusion for dehydration  -    Generalized abdominal pain  -     Cancel: CT Abdomen Pelvis W Wo Contrast; Future; Expected date: 07/03/2024  -     Basic Metabolic Panel; Future; Expected date: 07/03/2024  -     Cancel: CT Abdomen Pelvis W Wo Contrast; Future; Expected date: 07/03/2024  -     Cancel: CT Abdomen Pelvis W Wo Contrast; Future; Expected date: 07/03/2024  -     CT Abdomen Pelvis With IV Contrast Routine Oral Contrast; Future; Expected date: 07/03/2024    Dehydration    Abdominal aortic aneurysm (AAA) without rupture, unspecified part  -     Cancel: CT Abdomen Pelvis W Wo Contrast; Future; Expected date: 07/03/2024  -     Cancel: CT Abdomen Pelvis W Wo Contrast; Future; Expected date: 07/03/2024  -     CT Abdomen Pelvis With IV Contrast Routine Oral Contrast; Future; Expected date: 07/03/2024    Primary insomnia  -     mirtazapine (REMERON) 15 MG tablet; Take 1 tablet (15 mg total) by mouth every evening.   Dispense: 30 tablet; Refill: 11        Risks, benefits, and side effects were discussed with the patient. All questions were answered to the fullest satisfaction of the patient, and pt verbalized understanding and agreement to treatment plan. Pt was to call with any new or worsening symptoms, or present to the ER.

## 2024-07-03 NOTE — TELEPHONE ENCOUNTER
----- Message from Angela aMrt sent at 7/3/2024 11:33 AM CDT -----  Contact: PT  Type:  Same Day Appointment Request    Caller is requesting a same day appointment.  Caller declined first available appointment listed below.    Name of Caller:PT  When is the first available appointment? 07/08/24  Symptoms: STOMACH PAIN  Best Call Back Number:[phone] (home)   Additional Information: THANK YOU

## 2024-07-04 DIAGNOSIS — R10.33 PERIUMBILICAL ABDOMINAL PAIN: ICD-10-CM

## 2024-07-04 DIAGNOSIS — R10.13 EPIGASTRIC PAIN: ICD-10-CM

## 2024-07-05 RX ORDER — SUCRALFATE 1 G/1
TABLET ORAL
Qty: 120 TABLET | Refills: 1 | Status: SHIPPED | OUTPATIENT
Start: 2024-07-05

## 2024-07-05 RX ORDER — OMEPRAZOLE 40 MG/1
40 CAPSULE, DELAYED RELEASE ORAL
Qty: 180 CAPSULE | Refills: 0 | Status: SHIPPED | OUTPATIENT
Start: 2024-07-05

## 2024-07-07 DIAGNOSIS — F51.01 PRIMARY INSOMNIA: ICD-10-CM

## 2024-07-07 DIAGNOSIS — G25.81 RESTLESS LEG SYNDROME: ICD-10-CM

## 2024-07-08 ENCOUNTER — INFUSION (OUTPATIENT)
Dept: INFUSION THERAPY | Facility: HOSPITAL | Age: 69
End: 2024-07-08
Attending: NURSE PRACTITIONER
Payer: MEDICARE

## 2024-07-08 VITALS
WEIGHT: 130 LBS | HEIGHT: 67 IN | OXYGEN SATURATION: 99 % | BODY MASS INDEX: 20.4 KG/M2 | DIASTOLIC BLOOD PRESSURE: 64 MMHG | TEMPERATURE: 98 F | RESPIRATION RATE: 16 BRPM | SYSTOLIC BLOOD PRESSURE: 126 MMHG | HEART RATE: 79 BPM

## 2024-07-08 DIAGNOSIS — E86.0 DEHYDRATION: Primary | ICD-10-CM

## 2024-07-08 PROCEDURE — 25000003 PHARM REV CODE 250: Mod: UD | Performed by: NURSE PRACTITIONER

## 2024-07-08 PROCEDURE — 96365 THER/PROPH/DIAG IV INF INIT: CPT

## 2024-07-08 PROCEDURE — 96366 THER/PROPH/DIAG IV INF ADDON: CPT

## 2024-07-08 RX ORDER — ZOLPIDEM TARTRATE 10 MG/1
10 TABLET ORAL NIGHTLY
Qty: 30 TABLET | Refills: 0 | Status: SHIPPED | OUTPATIENT
Start: 2024-07-08

## 2024-07-08 RX ADMIN — SODIUM CHLORIDE 1000 ML: 9 INJECTION, SOLUTION INTRAVENOUS at 12:07

## 2024-07-09 ENCOUNTER — OFFICE VISIT (OUTPATIENT)
Dept: FAMILY MEDICINE | Facility: CLINIC | Age: 69
End: 2024-07-09
Payer: MEDICARE

## 2024-07-09 VITALS
BODY MASS INDEX: 19.6 KG/M2 | DIASTOLIC BLOOD PRESSURE: 64 MMHG | SYSTOLIC BLOOD PRESSURE: 120 MMHG | WEIGHT: 124.88 LBS | HEART RATE: 96 BPM | RESPIRATION RATE: 18 BRPM | OXYGEN SATURATION: 98 % | HEIGHT: 67 IN

## 2024-07-09 DIAGNOSIS — R10.84 GENERALIZED ABDOMINAL PAIN: Primary | ICD-10-CM

## 2024-07-09 DIAGNOSIS — I71.40 ABDOMINAL AORTIC ANEURYSM (AAA) WITHOUT RUPTURE, UNSPECIFIED PART: ICD-10-CM

## 2024-07-09 PROCEDURE — 99213 OFFICE O/P EST LOW 20 MIN: CPT | Mod: S$PBB,,, | Performed by: NURSE PRACTITIONER

## 2024-07-09 PROCEDURE — 99999 PR PBB SHADOW E&M-EST. PATIENT-LVL V: CPT | Mod: PBBFAC,,, | Performed by: NURSE PRACTITIONER

## 2024-07-09 PROCEDURE — 99215 OFFICE O/P EST HI 40 MIN: CPT | Mod: PBBFAC | Performed by: NURSE PRACTITIONER

## 2024-07-09 NOTE — PROGRESS NOTES
"Subjective:       Patient ID: Miles Vazquez is a 68 y.o. male.    Chief Complaint: Abdominal Pain (Lasting a couple weeks. )  -  The pt is a 67 y/o male that presents for abd pain follow up. Seen 7/3/24 with c/o generalized abd pain x 1 week. CT abd/pelvis non diagnostic thus instructed to take Carafate qid and Prilosec 40 mg bid. Reports pain on 7/3 8/10 and today 2/10. Has been able to tolerate PO intake w/o difficulty.     Pt has appt 7/11 with vascular Dr. Florian as MD wants to talk to pt about AAA.   -    Past Medical History:   Diagnosis Date    Abdominal aortic aneurysm (AAA) 05/2019    3.2 cm per CTA.--12/2020 4.3 x 4.7 by 4.5 cm--3/2022  4.8 x 4.5 cm    Abnormal findings on esophagogastroduodenoscopy (EGD) 04/2016    "intestinal metaplasia" which is a "risk factor" for gastric cancer.      Apical lung nodule 03/2022    Right    Bursitis of both hips     Colon polyp     COPD (chronic obstructive pulmonary disease) 2016    Coronary artery calcification seen on CAT scan 04/24/2013    Depression 2016    Accompanied drinking problem    HTN (hypertension) 01/07/2013    Hyperlipidemia 2019    Lumbar disc disease 06/2019    grade 1 spondylolisthesis of L5 on S1    Macrocytic anemia 03/27/2016    Personal history of colonic polyps 2017    Recovering alcoholic 03/16/2016    RLS (restless legs syndrome) 2014    Sleep apnea 2 years ago    does not use cpap       Past Surgical History:   Procedure Laterality Date    ANGIOGRAPHY OF LOWER EXTREMITY  08/14/2019    AORTOGRAPHY WITH SERIALOGRAPHY N/A 8/14/2019    Procedure: AORTOGRAM, WITH RUNOFF;  Surgeon: Alan Florian MD;  Location: [facility] CATH/EP LAB;  Service: Peripheral Vascular;  Laterality: N/A;    AORTOGRAPHY WITH SERIALOGRAPHY N/A 5/18/2022    Procedure: AORTOGRAM, WITH RUNOFF;  Surgeon: Alan Florian MD;  Location: [facility] CATH/EP LAB;  Service: Cardiovascular;  Laterality: N/A;    APPENDECTOMY N/A 9/26/2018    Procedure: APPENDECTOMY;  Surgeon: Ramírez MORRISON" MD vEens;  Location: [facility] OR;  Service: General;  Laterality: N/A;    COLON SURGERY  09/2018    COLONOSCOPY  2017    with polyps repeat 5 yrs- [facility] WARR[facility] Gutierrez    ESOPHAGOGASTRODUODENOSCOPY N/A 9/26/2018    Procedure: EGD (ESOPHAGOGASTRODUODENOSCOPY);  Surgeon: Ramírez Horner MD;  Location: [facility] OR;  Service: General;  Laterality: N/A;  WITH BX    HERNIA REPAIR N/A 9/26/2018    Procedure: REPAIR, HERNIA;  Surgeon: Ramírez Horner MD;  Location: [facility] OR;  Service: General;  Laterality: N/A;  INTERNAL LYSIS WITH REDUCTION OF INTERNAL HERNIA    SMALL INTESTINE SURGERY  09/2018        Social History     Socioeconomic History    Marital status: Single   Tobacco Use    Smoking status: Every Day     Current packs/day: 1.00     Average packs/day: 0.6 packs/day for 111.5 years (71.0 ttl pk-yrs)     Types: Cigarettes     Start date: 1967    Smokeless tobacco: Never    Tobacco comments:     1 or 2 cigarettes a day 5/23/24 Active participant with smoking cessation. 1X57 yrs   Substance and Sexual Activity    Drug use: Not Currently     Types: Marijuana     Comment: In the past.    Sexual activity: Not Currently     Partners: Female     Birth control/protection: Condom     Social Determinants of Health     Financial Resource Strain: High Risk (1/22/2024)    Overall Financial Resource Strain (CARDIA)     Difficulty of Paying Living Expenses: Very hard   Food Insecurity: Food Insecurity Present (1/22/2024)    Hunger Vital Sign     Worried About Running Out of Food in the Last Year: Sometimes true     Ran Out of Food in the Last Year: Sometimes true   Transportation Needs: No Transportation Needs (1/22/2024)    PRAPARE - Transportation     Lack of Transportation (Medical): No     Lack of Transportation (Non-Medical): No   Physical Activity: Unknown (1/22/2024)    Exercise Vital Sign     Days of Exercise per Week: 5 days   Stress: Stress Concern Present (1/22/2024)    [employer] [place] [employer]  [employer] - Occupational Stress Questionnaire     Feeling of Stress : To some extent   Housing Stability: Unknown (1/22/2024)    Housing Stability Vital Sign     Unable to Pay for Housing in the Last Year: No     Unstable Housing in the Last Year: No       Family History   Problem Relation Name Age of Onset    Alcohol abuse Mother Veronica Resendiz         Cancer    Alcohol abuse Father Ramon Vazquez     Tuberculosis Maternal Grandmother      Stomach cancer Neg Hx      Colon cancer Neg Hx         Review of patient's allergies indicates:   Allergen Reactions    Seroquel [quetiapine] Other (See Comments)     RESTLESS LEGS          Current Outpatient Medications:     amitriptyline (ELAVIL) 100 MG tablet, Take 1 tablet (100 mg total) by mouth every evening., Disp: 90 tablet, Rfl: 3    aspirin 81 MG Chew, Take 81 mg by mouth once daily., Disp: , Rfl:     atorvastatin (LIPITOR) 40 MG tablet, Take 1 tablet (40 mg total) by mouth every evening., Disp: 90 tablet, Rfl: 3    benazepriL (LOTENSIN) 40 MG tablet, Take 1 tablet (40 mg total) by mouth once daily., Disp: 90 tablet, Rfl: 2    clopidogreL (PLAVIX) 75 mg tablet, Take 1 tablet (75 mg total) by mouth once daily., Disp: 90 tablet, Rfl: 0    mirtazapine (REMERON) 15 MG tablet, Take 1 tablet (15 mg total) by mouth every evening., Disp: 30 tablet, Rfl: 11    multivit-min/FA/lycopen/lutein (CENTRUM SILVER MEN ORAL), Take by mouth., Disp: , Rfl:     omeprazole (PRILOSEC) 40 MG capsule, Take 1 capsule (40 mg total) by mouth 2 (two) times daily before meals., Disp: 180 capsule, Rfl: 0    rOPINIRole (REQUIP) 1 MG tablet, Take 1 tablet (1 mg total) by mouth every evening., Disp: 90 tablet, Rfl: 3    sucralfate (CARAFATE) 1 gram tablet, TAKE 1 TABLET BY MOUTH 4 TIMES DAILY BEFORE MEAL(S) AND NIGHTLY, Disp: 120 tablet, Rfl: 1    varenicline (CHANTIX) 1 mg Tab, Take 1 tablet (1 mg total) by mouth 2 (two) times daily., Disp: 56 tablet, Rfl: 0    zolpidem (AMBIEN) 10 mg Tab,  TAKE 1 TABLET BY MOUTH NIGHTLY AS NEEDED FOR INSOMNIA, Disp: 30 tablet, Rfl: 0  No current facility-administered medications for this visit.    Abdominal Pain      Review of Systems   Constitutional: Negative.    HENT: Negative.     Eyes: Negative.    Respiratory: Negative.     Cardiovascular: Negative.    Gastrointestinal:  Positive for abdominal pain.   Endocrine: Negative.    Genitourinary: Negative.    Musculoskeletal: Negative.    Skin: Negative.    Allergic/Immunologic: Negative.    Neurological: Negative.    Hematological: Negative.    Psychiatric/Behavioral: Negative.         Objective:      Physical Exam  Vitals and nursing note reviewed.   Constitutional:       General: He is not in acute distress.     Appearance: Normal appearance. He is well-developed and normal weight. He is not ill-appearing.   HENT:      Head: Normocephalic.   Eyes:      Conjunctiva/sclera: Conjunctivae normal.   Neck:      Thyroid: No thyromegaly.   Cardiovascular:      Rate and Rhythm: Normal rate and regular rhythm.      Heart sounds: Normal heart sounds. No murmur heard.  Pulmonary:      Effort: Pulmonary effort is normal.      Breath sounds: Normal breath sounds. No wheezing or rales.   Musculoskeletal:         General: Normal range of motion.      Cervical back: Normal range of motion.      Right lower leg: No edema.      Left lower leg: No edema.   Skin:     General: Skin is warm and dry.   Neurological:      Mental Status: He is alert and oriented to person, place, and time. Mental status is at baseline.   Psychiatric:         Mood and Affect: Mood normal.         Behavior: Behavior normal.         Thought Content: Thought content normal.         Judgment: Judgment normal.         Assessment:       No diagnosis found.    Plan:     1- handicap parking permit signed  2- contiue bland diet and medication regimen above.    There are no diagnoses linked to this encounter.    Risks, benefits, and side effects were discussed with the  patient. All questions were answered to the fullest satisfaction of the patient, and pt verbalized understanding and agreement to treatment plan. Pt was to call with any new or worsening symptoms, or present to the ER.

## 2024-07-10 ENCOUNTER — CLINICAL SUPPORT (OUTPATIENT)
Dept: SMOKING CESSATION | Facility: CLINIC | Age: 69
End: 2024-07-10

## 2024-07-10 DIAGNOSIS — F17.210 MODERATE SMOKER (20 OR LESS PER DAY): Primary | ICD-10-CM

## 2024-07-10 NOTE — PROGRESS NOTES
Individual Follow-Up Form    7/10/2024    Quit Date: none    Clinical Status of Patient: Outpatient    Length of Service: 30 minutes    Continuing Medication: yes  Chantix    Other Medications: none     Target Symptoms: Withdrawal and medication side effects. The following were rated moderate (3) to severe (4) on TCRS:  Moderate (3): none  Severe (4): none    Comments: #2 Patient notified me he has stopped taking Chantix for an upcoming possible surgical event for an AAA. He shared cutting back on smoking from 1 pack per day for 56 years to 7 to 8 cigarettes per day. He states he will resume taking Chantix once he is cleared from his possible surgery. Completion of TCRS (Tobacco Cessation Rating Scale) reviewed strategies, cues, and triggers. Introduced the negative impact of tobacco on health, the health advantages of discontinuing the use of tobacco, time line improved health changes after a quit, withdrawal issues to expect from nicotine and habit, and ways to achieve the goal of a quit.The patient has 1 mg Chantix BID on hold at this time. The patient denies any abnormal behavioral or mental changes at this time. The patient will continue with therapy sessions and medication monitoring by CTTS. Prescribed medication management will be by physician.     Diagnosis: F17.210    Next Visit: 1 week

## 2024-07-10 NOTE — Clinical Note
Patient notified me he has stopped taking Chantix for an upcoming possible surgical event for an AAA. He shared cutting back on smoking from 1 pack per day for 56 years to 7 to 8 cigarettes per day. He states he will resume taking Chantix once he is cleared from his possible surgery. Completion of TCRS (Tobacco Cessation Rating Scale) reviewed strategies, cues, and triggers. Introduced the negative impact of tobacco on health, the health advantages of discontinuing the use of tobacco, time line improved health changes after a quit, withdrawal issues to expect from nicotine and habit, and ways to achieve the goal of a quit.The patient has 1 mg Chantix BID on hold at this time. The patient denies any abnormal behavioral or mental changes at this time. The patient will continue with therapy sessions and medication monitoring by CTTS. Prescribed medication management will be by physician.

## 2024-07-24 ENCOUNTER — TELEPHONE (OUTPATIENT)
Dept: SMOKING CESSATION | Facility: CLINIC | Age: 69
End: 2024-07-24
Payer: MEDICARE

## 2024-07-24 NOTE — TELEPHONE ENCOUNTER
Attempted to contact patient to follow up with smoking cessation. I was able to leave a detailed message with my contact information. I requested a return call.

## 2024-07-25 ENCOUNTER — TELEPHONE (OUTPATIENT)
Dept: GASTROENTEROLOGY | Facility: CLINIC | Age: 69
End: 2024-07-25
Payer: MEDICARE

## 2024-07-25 ENCOUNTER — OFFICE VISIT (OUTPATIENT)
Dept: FAMILY MEDICINE | Facility: CLINIC | Age: 69
End: 2024-07-25
Payer: MEDICARE

## 2024-07-25 VITALS
RESPIRATION RATE: 20 BRPM | HEART RATE: 92 BPM | OXYGEN SATURATION: 98 % | BODY MASS INDEX: 21.16 KG/M2 | WEIGHT: 134.81 LBS | DIASTOLIC BLOOD PRESSURE: 74 MMHG | SYSTOLIC BLOOD PRESSURE: 122 MMHG | TEMPERATURE: 98 F | HEIGHT: 67 IN

## 2024-07-25 DIAGNOSIS — R10.84 GENERALIZED ABDOMINAL PAIN: Primary | ICD-10-CM

## 2024-07-25 DIAGNOSIS — I73.9 CLAUDICATION: ICD-10-CM

## 2024-07-25 DIAGNOSIS — R63.4 WEIGHT LOSS: ICD-10-CM

## 2024-07-25 PROCEDURE — 99213 OFFICE O/P EST LOW 20 MIN: CPT | Mod: S$PBB,,, | Performed by: NURSE PRACTITIONER

## 2024-07-25 PROCEDURE — 99999 PR PBB SHADOW E&M-EST. PATIENT-LVL IV: CPT | Mod: PBBFAC,,, | Performed by: NURSE PRACTITIONER

## 2024-07-25 PROCEDURE — 99214 OFFICE O/P EST MOD 30 MIN: CPT | Mod: PBBFAC | Performed by: NURSE PRACTITIONER

## 2024-07-25 RX ORDER — CLOPIDOGREL BISULFATE 75 MG/1
75 TABLET ORAL DAILY
Qty: 90 TABLET | Refills: 3 | Status: SHIPPED | OUTPATIENT
Start: 2024-07-25 | End: 2025-07-25

## 2024-07-25 NOTE — TELEPHONE ENCOUNTER
Called patient to see Nurse Practitioner today or tomorrow, informed that needed to be seen ASAP, patient declined sooner appointment, he scheduled for Tuesday July 30, 2024 @ [facility] No further issues noted.symptoms noted abdominal pain

## 2024-07-25 NOTE — PROGRESS NOTES
"Subjective:       Patient ID: Miles Vazquez is a 69 y.o. male.    Chief Complaint: Follow-up (GI issues)  -  The pt is a 68 y/o male that presents to reports his vascular doctor, Anival MENA is wanting him to have an EGD due to pt with c/o abd pain and weight loss. Pt has a significant arterial history with current AAA high-risk for open repair because of his hernia repairs and has had a peg.     Stools have been soft, sm amts, and frequent. Abd pain better than is was but only while taking carafate and prilosec.    He is still taking his plavix and needs a refill.    Past Medical History:   Diagnosis Date    Abdominal aortic aneurysm (AAA) 05/2019    3.2 cm per CTA.--12/2020 4.3 x 4.7 by 4.5 cm--3/2022  4.8 x 4.5 cm    Abnormal findings on esophagogastroduodenoscopy (EGD) 04/2016    "intestinal metaplasia" which is a "risk factor" for gastric cancer.      Apical lung nodule 03/2022    Right    Bursitis of both hips     Colon polyp     COPD (chronic obstructive pulmonary disease) 2016    Coronary artery calcification seen on CAT scan 04/24/2013    Depression 2016    Accompanied drinking problem    HTN (hypertension) 01/07/2013    Hyperlipidemia 2019    Lumbar disc disease 06/2019    grade 1 spondylolisthesis of L5 on S1    Macrocytic anemia 03/27/2016    Personal history of colonic polyps 2017    Recovering alcoholic 03/16/2016    RLS (restless legs syndrome) 2014    Sleep apnea 2 years ago    does not use cpap       Past Surgical History:   Procedure Laterality Date    ANGIOGRAPHY OF LOWER EXTREMITY  08/14/2019    AORTOGRAPHY WITH SERIALOGRAPHY N/A 8/14/2019    Procedure: AORTOGRAM, WITH RUNOFF;  Surgeon: Alan Florian MD;  Location: [facility] CATH/EP LAB;  Service: Peripheral Vascular;  Laterality: N/A;    AORTOGRAPHY WITH SERIALOGRAPHY N/A 5/18/2022    Procedure: AORTOGRAM, WITH RUNOFF;  Surgeon: Alan Florian MD;  Location: [facility] CATH/EP LAB;  Service: Cardiovascular;  Laterality: N/A;    APPENDECTOMY N/A " 9/26/2018    Procedure: APPENDECTOMY;  Surgeon: Ramírez Horner MD;  Location: [facility] OR;  Service: General;  Laterality: N/A;    COLON SURGERY  09/2018    COLONOSCOPY  2017    with polyps repeat 5 yrs- [place], [place]    ESOPHAGOGASTRODUODENOSCOPY N/A 9/26/2018    Procedure: EGD (ESOPHAGOGASTRODUODENOSCOPY);  Surgeon: Ramírez Horner MD;  Location: [facility] OR;  Service: General;  Laterality: N/A;  WITH BX    HERNIA REPAIR N/A 9/26/2018    Procedure: REPAIR, HERNIA;  Surgeon: Ramírez Horner MD;  Location: [facility] OR;  Service: General;  Laterality: N/A;  INTERNAL LYSIS WITH REDUCTION OF INTERNAL HERNIA    SMALL INTESTINE SURGERY  09/2018        Social History     Socioeconomic History    Marital status: Single   Tobacco Use    Smoking status: Every Day     Current packs/day: 1.00     Average packs/day: 0.6 packs/day for 111.6 years (71.1 ttl pk-yrs)     Types: Cigarettes     Start date: 1967    Smokeless tobacco: Never    Tobacco comments:     1 or 2 cigarettes a day.    5/23/24 Active participant with smoking cessation. 1X57 yrs   Substance and Sexual Activity    Drug use: Not Currently     Types: Marijuana     Comment: In the past.    Sexual activity: Not Currently     Partners: Female     Birth control/protection: Condom     Social Determinants of Health     Financial Resource Strain: High Risk (1/22/2024)    Overall Financial Resource Strain (CARDIA)     Difficulty of Paying Living Expenses: Very hard   Food Insecurity: Food Insecurity Present (1/22/2024)    Hunger Vital Sign     Worried About Running Out of Food in the Last Year: Sometimes true     Ran Out of Food in the Last Year: Sometimes true   Transportation Needs: No Transportation Needs (1/22/2024)    PRAPARE - Transportation     Lack of Transportation (Medical): No     Lack of Transportation (Non-Medical): No   Physical Activity: Unknown (1/22/2024)    Exercise Vital Sign     Days of Exercise per Week: 5 days   Stress:  Stress Concern Present (1/22/2024)    [employer] [place] [employer] - Occupational Stress Questionnaire     Feeling of Stress : To some extent   Housing Stability: Unknown (1/22/2024)    Housing Stability Vital Sign     Unable to Pay for Housing in the Last Year: No     Unstable Housing in the Last Year: No       Family History   Problem Relation Name Age of Onset    Alcohol abuse Mother Veronica Resendiz         Cancer    Alcohol abuse Father Ramon Vazquez     Tuberculosis Maternal Grandmother      Stomach cancer Neg Hx      Colon cancer Neg Hx         Review of patient's allergies indicates:   Allergen Reactions    Seroquel [quetiapine] Other (See Comments)     RESTLESS LEGS          Current Outpatient Medications:     amitriptyline (ELAVIL) 100 MG tablet, Take 1 tablet (100 mg total) by mouth every evening., Disp: 90 tablet, Rfl: 3    aspirin 81 MG Chew, Take 81 mg by mouth once daily., Disp: , Rfl:     atorvastatin (LIPITOR) 40 MG tablet, Take 1 tablet (40 mg total) by mouth every evening., Disp: 90 tablet, Rfl: 3    benazepriL (LOTENSIN) 40 MG tablet, Take 1 tablet (40 mg total) by mouth once daily., Disp: 90 tablet, Rfl: 2    mirtazapine (REMERON) 15 MG tablet, Take 1 tablet (15 mg total) by mouth every evening., Disp: 30 tablet, Rfl: 11    multivit-min/FA/lycopen/lutein (CENTRUM SILVER MEN ORAL), Take by mouth., Disp: , Rfl:     omeprazole (PRILOSEC) 40 MG capsule, Take 1 capsule (40 mg total) by mouth 2 (two) times daily before meals., Disp: 180 capsule, Rfl: 0    rOPINIRole (REQUIP) 1 MG tablet, Take 1 tablet (1 mg total) by mouth every evening., Disp: 90 tablet, Rfl: 3    sucralfate (CARAFATE) 1 gram tablet, TAKE 1 TABLET BY MOUTH 4 TIMES DAILY BEFORE MEAL(S) AND NIGHTLY, Disp: 120 tablet, Rfl: 1    zolpidem (AMBIEN) 10 mg Tab, TAKE 1 TABLET BY MOUTH NIGHTLY AS NEEDED FOR INSOMNIA, Disp: 30 tablet, Rfl: 0    clopidogreL (PLAVIX) 75 mg tablet, Take 1 tablet (75 mg total) by mouth once daily., Disp:  90 tablet, Rfl: 3    varenicline (CHANTIX) 1 mg Tab, Take 1 tablet (1 mg total) by mouth 2 (two) times daily., Disp: 56 tablet, Rfl: 0    Follow-up  Associated symptoms include abdominal pain.     Review of Systems   Constitutional: Negative.    HENT: Negative.     Eyes: Negative.    Respiratory: Negative.     Cardiovascular: Negative.    Gastrointestinal:  Positive for abdominal pain.   Endocrine: Negative.    Genitourinary: Negative.    Musculoskeletal: Negative.    Skin: Negative.    Allergic/Immunologic: Negative.    Neurological: Negative.    Hematological: Negative.    Psychiatric/Behavioral: Negative.         Objective:      Physical Exam  Vitals and nursing note reviewed.   Constitutional:       General: He is not in acute distress.     Appearance: Normal appearance. He is well-developed and normal weight. He is not ill-appearing.   HENT:      Head: Normocephalic.   Eyes:      Conjunctiva/sclera: Conjunctivae normal.   Neck:      Thyroid: No thyromegaly.   Cardiovascular:      Rate and Rhythm: Normal rate and regular rhythm.      Heart sounds: Normal heart sounds. No murmur heard.  Pulmonary:      Effort: Pulmonary effort is normal.      Breath sounds: Normal breath sounds. No wheezing or rales.   Abdominal:      Comments: Mild tenderness to palpation.    Musculoskeletal:         General: Normal range of motion.      Cervical back: Normal range of motion.      Right lower leg: No edema.      Left lower leg: No edema.   Skin:     General: Skin is warm and dry.   Neurological:      Mental Status: He is alert and oriented to person, place, and time. Mental status is at baseline.   Psychiatric:         Mood and Affect: Mood normal.         Behavior: Behavior normal.         Thought Content: Thought content normal.         Judgment: Judgment normal.         Assessment:       1. Generalized abdominal pain    2. Weight loss    3. Claudication, bilateral, after 50 yards        Plan:     1- Will have staff get in  touch with GI office to request sooner EGD. Pt will be notified.  2- RTC for Nov appt.  -    Generalized abdominal pain  -     Ambulatory referral/consult to Gastroenterology; Future; Expected date: 08/01/2024    Weight loss  -     Ambulatory referral/consult to Gastroenterology; Future; Expected date: 08/01/2024    Claudication, bilateral, after 50 yards  -     clopidogreL (PLAVIX) 75 mg tablet; Take 1 tablet (75 mg total) by mouth once daily.  Dispense: 90 tablet; Refill: 3        Risks, benefits, and side effects were discussed with the patient. All questions were answered to the fullest satisfaction of the patient, and pt verbalized understanding and agreement to treatment plan. Pt was to call with any new or worsening symptoms, or present to the ER.

## 2024-07-30 ENCOUNTER — TELEPHONE (OUTPATIENT)
Dept: GASTROENTEROLOGY | Facility: CLINIC | Age: 69
End: 2024-07-30
Payer: MEDICARE

## 2024-07-30 ENCOUNTER — OFFICE VISIT (OUTPATIENT)
Dept: GASTROENTEROLOGY | Facility: CLINIC | Age: 69
End: 2024-07-30
Payer: MEDICARE

## 2024-07-30 VITALS
WEIGHT: 131.63 LBS | HEART RATE: 101 BPM | HEIGHT: 67 IN | BODY MASS INDEX: 20.66 KG/M2 | SYSTOLIC BLOOD PRESSURE: 135 MMHG | DIASTOLIC BLOOD PRESSURE: 72 MMHG

## 2024-07-30 DIAGNOSIS — Z86.79 HISTORY OF ABDOMINAL AORTIC ANEURYSM (AAA): ICD-10-CM

## 2024-07-30 DIAGNOSIS — Z86.010 HISTORY OF COLON POLYPS: ICD-10-CM

## 2024-07-30 DIAGNOSIS — R10.30 LOWER ABDOMINAL PAIN: ICD-10-CM

## 2024-07-30 DIAGNOSIS — K59.00 CONSTIPATION, UNSPECIFIED CONSTIPATION TYPE: ICD-10-CM

## 2024-07-30 DIAGNOSIS — R63.4 WEIGHT LOSS: Primary | ICD-10-CM

## 2024-07-30 DIAGNOSIS — K21.9 GASTROESOPHAGEAL REFLUX DISEASE, UNSPECIFIED WHETHER ESOPHAGITIS PRESENT: ICD-10-CM

## 2024-07-30 DIAGNOSIS — R10.13 EPIGASTRIC PAIN: ICD-10-CM

## 2024-07-30 DIAGNOSIS — R12 HEARTBURN: ICD-10-CM

## 2024-07-30 PROCEDURE — 99214 OFFICE O/P EST MOD 30 MIN: CPT | Mod: S$PBB,,,

## 2024-07-30 PROCEDURE — 99999 PR PBB SHADOW E&M-EST. PATIENT-LVL V: CPT | Mod: PBBFAC,,,

## 2024-07-30 PROCEDURE — 99215 OFFICE O/P EST HI 40 MIN: CPT | Mod: PBBFAC,PN

## 2024-07-30 RX ORDER — PANTOPRAZOLE SODIUM 40 MG/1
40 TABLET, DELAYED RELEASE ORAL 2 TIMES DAILY
Qty: 180 TABLET | Refills: 3 | Status: SHIPPED | OUTPATIENT
Start: 2024-07-30 | End: 2025-07-30

## 2024-07-30 NOTE — PROGRESS NOTES
Subjective:       Patient ID: Miles Vazquez is a 69 y.o. male Body mass index is 20.61 kg/m².    Chief Complaint: Abdominal Pain    This patient is new to me.  Referring Provider: Puja Mcdaniel* for abdominal pain, weight loss.  Established patient of Dr. Horner.     GI Problem  The primary symptoms include weight loss (pt has experienced unintentional weight loss states typically is 145lbs and has lost weight throught this year now 131lbs states does not eat as much denies changes in appetite, eats twice a day but now attempting to eat 3x/day with boost drinks) and abdominal pain. Primary symptoms do not include fever, fatigue, nausea, vomiting, diarrhea, melena, hematemesis, jaundice, hematochezia, dysuria, myalgias, arthralgias or rash.   Onset: pain started a month ago. Progression: worsened by activity excess movement and stress/anxiety. The abdominal pain is located in the epigastric region (described as crampy pain). The abdominal pain does not radiate. The severity of the abdominal pain is 4/10. Relieved by: prilosec and carafate helps started on 4 weeks ago and rest helps as well.   The illness is also significant for constipation (Has a bm every 2-3 days, stools are soft type 5 on bristol stool scale, strains, does not feel empty, has tried otc stool softener pill and miralax does not take consistently states it helps). The illness does not include dysphagia or bloating. Associated symptoms comments: Patient reports to clinic with abdominal pain Pt has a significant arterial history with current AAA followed by vascular Dr. Florian reports had small intestinal sx in 2018 due to SBO, hx of peg tube r/t to ETOH abuse,states has not had ETOH in many years, currently a daily smoker states on Chantix and is attempting to quit, recent CT of abdomen: No acute abdominopelvic process, patient had an EGD in 2018 that showed gastritis biopsies normal, patient's last colonoscopy was in 2017 in  [place] history of colon polyps to repeat in 5 years denies family history of colon cancer. Significant associated medical issues include GERD. Associated medical issues do not include inflammatory bowel disease, gallstones, liver disease, alcohol abuse, PUD, gastric bypass, bowel resection, irritable bowel syndrome, hemorrhoids or diverticulitis.   Gastroesophageal Reflux  He complains of abdominal pain and heartburn (States amitriptyline causes heartburn). He reports no belching, no chest pain, no choking, no coughing, no dysphagia, no early satiety, no globus sensation, no hoarse voice, no nausea or no water brash. This is a recurrent problem. The current episode started more than 1 year ago. The problem has been waxing and waning. The heartburn is located in the substernum. The heartburn does not wake him from sleep. The heartburn does not limit his activity. The heartburn doesn't change with position. Nothing aggravates the symptoms. Associated symptoms include weight loss (pt has experienced unintentional weight loss states typically is 145lbs and has lost weight throught this year now 131lbs states does not eat as much denies changes in appetite, eats twice a day but now attempting to eat 3x/day with boost drinks). Pertinent negatives include no anemia, fatigue, melena, muscle weakness or orthopnea. Risk factors include smoking/tobacco exposure. He has tried a PPI (takes prilosec 40mg orally daily and was on carafate for a month) for the symptoms. The treatment provided moderate relief. Past procedures include an EGD. Past procedures do not include an abdominal ultrasound, esophageal manometry, esophageal pH monitoring, H. pylori antibody titer or a UGI. Past invasive treatments do not include gastroplasty, gastroplication or reflux surgery.       Review of Systems   Constitutional:  Positive for weight loss (pt has experienced unintentional weight loss states typically is 145lbs and has lost weight  "throught this year now 131lbs states does not eat as much denies changes in appetite, eats twice a day but now attempting to eat 3x/day with boost drinks). Negative for fatigue and fever.   HENT:  Negative for hoarse voice.    Respiratory:  Negative for cough and choking.    Cardiovascular:  Negative for chest pain.   Gastrointestinal:  Positive for abdominal pain, constipation (Has a bm every 2-3 days, stools are soft type 5 on bristol stool scale, strains, does not feel empty, has tried otc stool softener pill and miralax does not take consistently states it helps) and heartburn (States amitriptyline causes heartburn). Negative for abdominal distention, anal bleeding, bloating, blood in stool, diarrhea, dysphagia, hematemesis, hematochezia, jaundice, melena, nausea, rectal pain and vomiting.   Genitourinary:  Negative for dysuria.   Musculoskeletal:  Negative for arthralgias, myalgias and muscle weakness.   Skin:  Negative for rash.         No LMP for male patient.  Past Medical History:   Diagnosis Date    Abdominal aortic aneurysm (AAA) 05/2019    3.2 cm per CTA.--12/2020 4.3 x 4.7 by 4.5 cm--3/2022  4.8 x 4.5 cm    Abnormal findings on esophagogastroduodenoscopy (EGD) 04/2016    "intestinal metaplasia" which is a "risk factor" for gastric cancer.      Apical lung nodule 03/2022    Right    Bursitis of both hips     Colon polyp     COPD (chronic obstructive pulmonary disease) 2016    Coronary artery calcification seen on CAT scan 04/24/2013    Depression 2016    Accompanied drinking problem    GERD (gastroesophageal reflux disease)     HTN (hypertension) 01/07/2013    Hyperlipidemia 2019    Lumbar disc disease 06/2019    grade 1 spondylolisthesis of L5 on S1    Macrocytic anemia 03/27/2016    Personal history of colonic polyps 2017    Recovering alcoholic 03/16/2016    RLS (restless legs syndrome) 2014    Sleep apnea 2 years ago    does not use cpap     Past Surgical History:   Procedure Laterality Date    " ANGIOGRAPHY OF LOWER EXTREMITY  08/14/2019    AORTOGRAPHY WITH SERIALOGRAPHY N/A 08/14/2019    Procedure: AORTOGRAM, WITH RUNOFF;  Surgeon: Alan Florian MD;  Location: [facility] CATH/EP LAB;  Service: Peripheral Vascular;  Laterality: N/A;    AORTOGRAPHY WITH SERIALOGRAPHY N/A 05/18/2022    Procedure: AORTOGRAM, WITH RUNOFF;  Surgeon: Alan Florian MD;  Location: [facility] CATH/EP LAB;  Service: Cardiovascular;  Laterality: N/A;    APPENDECTOMY N/A 09/26/2018    Procedure: APPENDECTOMY;  Surgeon: Ramírez Horner MD;  Location: [facility] OR;  Service: General;  Laterality: N/A;    COLON SURGERY  09/2018    COLONOSCOPY  2017    with polyps repeat 5 yrs- [place]    ESOPHAGOGASTRODUODENOSCOPY N/A 09/26/2018    Procedure: EGD (ESOPHAGOGASTRODUODENOSCOPY);  Surgeon: Ramírez Horner MD;  Location: [facility] OR;  Service: General;  Laterality: N/A;  WITH BX    HERNIA REPAIR N/A 09/26/2018    Procedure: REPAIR, HERNIA;  Surgeon: Ramírez Horner MD;  Location: [facility] OR;  Service: General;  Laterality: N/A;  INTERNAL LYSIS WITH REDUCTION OF INTERNAL HERNIA    SMALL INTESTINE SURGERY  09/2018    UPPER GASTROINTESTINAL ENDOSCOPY       Family History   Problem Relation Name Age of Onset    Alcohol abuse Mother Veronica Resendiz         Cancer    Alcohol abuse Father Ramon Vazquez     Tuberculosis Maternal Grandmother      Stomach cancer Neg Hx      Colon cancer Neg Hx       Social History     Tobacco Use    Smoking status: Every Day     Current packs/day: 1.00     Average packs/day: 0.6 packs/day for 111.6 years (71.1 ttl pk-yrs)     Types: Cigarettes     Start date: 1967    Smokeless tobacco: Never    Tobacco comments:     1 or 2 cigarettes a day.    5/23/24 Active participant with smoking cessation. 1X57 yrs   Substance Use Topics    Drug use: Not Currently     Types: Marijuana     Comment: In the past.     Wt Readings from Last 10 Encounters:   07/30/24 59.7 kg (131 lb 9.8 oz)   07/25/24 61.1 kg  (134 lb 12.8 oz)   07/09/24 56.7 kg (124 lb 14.4 oz)   07/08/24 59 kg (130 lb)   07/03/24 58.1 kg (128 lb)   05/23/24 60.7 kg (133 lb 12.8 oz)   04/23/24 62.6 kg (138 lb)   02/29/24 63.5 kg (140 lb)   01/31/24 63.6 kg (140 lb 4.8 oz)   01/23/24 64.1 kg (141 lb 4.8 oz)     Lab Results   Component Value Date    WBC 7.61 01/23/2024    HGB 13.7 (L) 01/23/2024    HCT 41.1 01/23/2024    MCV 93 01/23/2024     01/23/2024     CMP  Sodium   Date Value Ref Range Status   07/03/2024 139 136 - 145 mmol/L Final     Potassium   Date Value Ref Range Status   07/03/2024 4.3 3.5 - 5.1 mmol/L Final     Chloride   Date Value Ref Range Status   07/03/2024 106 95 - 110 mmol/L Final     CO2   Date Value Ref Range Status   07/03/2024 21 (L) 23 - 29 mmol/L Final     Glucose   Date Value Ref Range Status   07/03/2024 83 70 - 110 mg/dL Final     BUN   Date Value Ref Range Status   07/03/2024 31 (H) 8 - 23 mg/dL Final     Creatinine   Date Value Ref Range Status   07/03/2024 1.6 (H) 0.5 - 1.4 mg/dL Final     Calcium   Date Value Ref Range Status   07/03/2024 10.0 8.7 - 10.5 mg/dL Final     Total Protein   Date Value Ref Range Status   01/23/2024 7.1 6.0 - 8.4 g/dL Final     Albumin   Date Value Ref Range Status   01/23/2024 3.6 3.5 - 5.2 g/dL Final     Total Bilirubin   Date Value Ref Range Status   01/23/2024 0.2 0.1 - 1.0 mg/dL Final     Comment:     For infants and newborns, interpretation of results should be based  on gestational age, weight and in agreement with clinical  observations.    Premature Infant recommended reference ranges:  Up to 24 hours.............<8.0 mg/dL  Up to 48 hours............<12.0 mg/dL  3-5 days..................<15.0 mg/dL  6-29 days.................<15.0 mg/dL       Alkaline Phosphatase   Date Value Ref Range Status   01/23/2024 98 55 - 135 U/L Final     AST   Date Value Ref Range Status   01/23/2024 20 10 - 40 U/L Final     ALT   Date Value Ref Range Status   01/23/2024 15 10 - 44 U/L Final     Anion  "Gap   Date Value Ref Range Status   07/03/2024 12 8 - 16 mmol/L Final     eGFR if    Date Value Ref Range Status   05/16/2022 >60.0 >60 mL/min/1.73 m^2 Final     eGFR if non    Date Value Ref Range Status   05/16/2022 >60.0 >60 mL/min/1.73 m^2 Final     Comment:     Calculation used to obtain the estimated glomerular filtration  rate (eGFR) is the CKD-EPI equation.        Lab Results   Component Value Date    LIPASE 13 09/24/2018     No results found for: "LIPASERES"  Lab Results   Component Value Date    TSH 1.637 09/21/2022       Reviewed prior medical records including radiology report of CT abdomen pelvis 07/03/2024 & endoscopy history (see surgical history/procedures).    Objective:      Physical Exam  Vitals and nursing note reviewed.   Constitutional:       Appearance: Normal appearance. He is normal weight.   Cardiovascular:      Rate and Rhythm: Normal rate and regular rhythm.      Heart sounds: Normal heart sounds.   Pulmonary:      Breath sounds: Normal breath sounds.   Abdominal:      General: Bowel sounds are normal.      Palpations: Abdomen is soft.      Tenderness: There is no abdominal tenderness.   Skin:     General: Skin is warm and dry.      Coloration: Skin is not jaundiced.   Neurological:      Mental Status: He is alert and oriented to person, place, and time.   Psychiatric:         Mood and Affect: Mood normal.         Behavior: Behavior normal.         Assessment:       1. Weight loss    2. Epigastric pain    3. Gastroesophageal reflux disease, unspecified whether esophagitis present    4. Heartburn    5. Lower abdominal pain    6. History of colon polyps    7. Constipation, unspecified constipation type    8. History of abdominal aortic aneurysm (AAA)        Plan:       Weight loss  -     Case Request Endoscopy: EGD (ESOPHAGOGASTRODUODENOSCOPY)  -     Case Request Endoscopy: COLONOSCOPY  - schedule EGD, discussed procedure with patient, including risks and " benefits, patient verbalized understanding  - schedule Colonoscopy, discussed procedure with the patient, including risks and benefits, patient verbalized understanding  - encouraged PO intake and daily calorie counts to ensure adequate nutrition is taken in, recommend at least 2,000 calories a day  - recommend nutritional drinks, such as Boost, Ensure or Glucerna, to supplement nutrition needs    Epigastric pain  -     Lipase; Future; Expected date: 07/30/2024  -      Abdomen Complete; Future; Expected date: 07/30/2024  -     Case Request Endoscopy: EGD (ESOPHAGOGASTRODUODENOSCOPY)  -Continue PPI  -continue carafate once or twice daily as needed for pain has been taking 4 times daily for a month informed not a long term medication typically 14 day course is recommended    Gastroesophageal reflux disease, unspecified whether esophagitis present, Heartburn  -   START  pantoprazole (PROTONIX) 40 MG tablet; Take 1 tablet (40 mg total) by mouth 2 (two) times daily.  Dispense: 180 tablet; Refill: 3  -Stop Prilosec to avoid interactions with Plavix , pt verbalized understanding  -     Case Request Endoscopy: EGD (ESOPHAGOGASTRODUODENOSCOPY)   -Take PPI 30min-1hr before eating breakfast  -Educated patient on lifestyle modifications to help control/reduce reflux/abdominal pain including: avoid large meals, avoid eating within 2-3 hours of bedtime (avoid late night eating & lying down soon after eating), elevate head of bed if nocturnal symptoms are present, smoking cessation (if current smoker), & weight loss (if overweight).   -Educated to avoid known foods which trigger reflux symptoms & to minimize/avoid high-fat foods, chocolate, caffeine, citrus, alcohol, & tomato products.  -Advised to avoid/limit use of NSAID's, since they can cause GI upset, bleeding, and/or ulcers. If needed, take with food.     Lower abdominal pain  -     Case Request Endoscopy: COLONOSCOPY  - Start on constipation regimen  - Consider  bentyl    History of colon polyps   -scheduled for colonoscopy     Constipation, unspecified constipation type   -Recommend daily exercise as tolerated, adequate water intake (six 8-oz glasses of water daily), and high fiber diet. OTC fiber supplements are recommended if diet does not reach daily fiber goal (20-30 grams daily), such as Metamucil, Citrucel, or FiberCon (take as directed, separate from other oral medications by >2 hours).  -Recommend trying OTC MiraLax once daily (17g PO) as directed  -If no improvement with above recommendations, try intermittently dosed Dulcolax OTC as directed (every 3-4 days) PRN to facilitate bowel movements  -If no relief with this, consider adding a emollient laxative (castor oil or mineral oil) +/- enema  -If still no improvement with these measures, call/follow-up    History of abdominal aortic aneurysm (AAA)   -continue follow up with vascular surgeon    Follow up if symptoms worsen or fail to improve.      If no improvement in symptoms or symptoms worsen, call/follow-up at clinic or go to ER.       [facility] - GASTROENTEROLOGY  [facility] [place]     Dictation software program was used for this note. Please expect some simple typographical  errors in this note.    Encounter includes face to face time and non-face to face time preparing to see the patient (eg, review of tests), obtaining and/or reviewing separately obtained history, documenting clinical information in the electronic or other health record, independently interpreting results (not separately reported) and communicating results to the patient/family/caregiver, or care coordination (not separately reported).

## 2024-07-30 NOTE — PATIENT INSTRUCTIONS
..  Instructions for Outpatient Endoscopy    PROCEDURE DATE: October 1, 2024  REPORT TO [facility] ([address] [place] [address] [place] [address]) Your arrival time will be provided by the ENDO department about 1 week prior to your procedure date. If you do not hear from them, they can be reached at [phone] Mon- Friday 8a-2p.      NO BLOOD THINNERS/NO ASPIRIN OR MEDICATIONS CONTAINING ASPIRIN, MOTRIN, IBUPROFEN, ALEVE OR ANY ANTI-INFLAMMATORY MEDICATIONS FOR 7 DAYS PRIOR TO PROCEDURE. TYLENOL IS OK.      Eat a light evening meal the night before your Endoscopy          (Soup, Salad, [place], or grilled chicken)     Nothing by mouth after midnight or the morning of EGD.                 Ok to take morning medications with small sip water by 5:30am (except no Diabetic medications)              SINCE SEDATION IS USED, YOU MUST HAVE SOMEONE TO DRIVE YOU HOME/NO TAXI         ..MIRALAX PREP FOR COLONOSCOPY(OVER THE COUNTER PREP)    PROCEDURE DATE: October 15, 2024  REPORT TO [facility] ([address]) ONE HOUR BEFORE PROCEDURE.Your arrival time will be provided by the ENDO department about 1 week prior to your procedure date. If you do not hear from them, they can be reached at [phone] Mon- Friday 8a-2p.      NO BLOOD THINNERS/NO ASPIRIN (OK TO TAKE 81mg ASPIRIN) OR MEDICATIONS CONTAINING ASPIRIN, MOTRIN, IBUPROFEN, ALEVE, OR ANY ANTI-INFLAMMATORY MEDICATIONS FOR 7 DAYS PRIOR TO PROCEDURE. TYLENOL IS OK.    Avoid eating beans, peas, corn, nuts, popcorn, okra, and tomatoes for 5 days prior to your colonoscopy    **PURCHASE One 64 oz Bottle of Lemon-Lime Gatorade, one 8.3 oz bottle of MiraLAX (generic is acceptable), One box of Dulcolax Laxatives**    MIX MIRALAX PREP WITH GATORADE ON DAY BEFORE AND REFRIGERATE                          THE ENTIRE DAY BEFORE YOUR COLONOSCOPY CLEAR LIQUIDS ONLY (LISTED BELOW)                                                      NO FOOD: October 14, 2024   ___________________________________________________   Coffee (no cream), tea, carbonated beverages, gelatin-plain or fruit flavored, Gatorade, Crystal Light, Popsicles, snowballs, hard candy (avoid anything red, purple or blue). Juices - apple, white grape, or cranberry juice, lemonade, limeade, clear beef or chicken bouillon or broth. Avoid liquids not listed, Alcohol is not permitted.  Take 2 Dulcolax laxative tablets at 10a  Take 2 more Dulcolax laxative tablets at 12p  At 5p drink 32oz of MiraLAX prep mix   Follow with 5(8oz) fluids of clear liquid over next 5 hours  At 10p drink other 32oz of MiraLAX prep mix  Follow with 3(8oz) fluids of clear liquid within 3 hours     NOTHING BY MOUTH AFTER 4am THE MORNING OF YOUR COLONOSCOPY     Ok to take morning medication by 4am (except no Diabetic medications)    Since sedation is used, you need someone to drive you home, No TAXI   Any Questions, please call  [phone]

## 2024-07-30 NOTE — TELEPHONE ENCOUNTER
Following Plavix Request Clearance created and to be sent automatically to Prescriber, PCP via Epic on 9/2/24:   Good morning Dr. Finney,   Mr. Vazquez is scheduled for an EGD on 10/01/24 and colonoscopy on 10/15/24 with Dr. Deja Joseph. Patient states he is taking Plavix; therefore, we are requesting clearance for patient to hold medication for 5 days prior to scheduled procedures.     Thanks  Leana Ayala LPN

## 2024-08-02 DIAGNOSIS — Z01.811 PRE-OP CHEST EXAM: Primary | ICD-10-CM

## 2024-08-05 ENCOUNTER — HOSPITAL ENCOUNTER (OUTPATIENT)
Dept: RADIOLOGY | Facility: HOSPITAL | Age: 69
Discharge: HOME | End: 2024-08-05
Attending: SURGERY
Payer: MEDICARE

## 2024-08-05 ENCOUNTER — HOSPITAL ENCOUNTER (OUTPATIENT)
Dept: PREADMISSION TESTING | Facility: HOSPITAL | Age: 69
Discharge: HOME | End: 2024-08-05
Attending: SURGERY
Payer: MEDICARE

## 2024-08-05 VITALS
HEART RATE: 79 BPM | BODY MASS INDEX: 20.72 KG/M2 | RESPIRATION RATE: 18 BRPM | WEIGHT: 132 LBS | HEIGHT: 67 IN | DIASTOLIC BLOOD PRESSURE: 73 MMHG | OXYGEN SATURATION: 98 % | TEMPERATURE: 98 F | SYSTOLIC BLOOD PRESSURE: 128 MMHG

## 2024-08-05 DIAGNOSIS — Z01.818 PRE-OP TESTING: Primary | ICD-10-CM

## 2024-08-05 DIAGNOSIS — Z79.01 MONITORING FOR ANTICOAGULANT USE: ICD-10-CM

## 2024-08-05 DIAGNOSIS — Z01.818 PRE-OP TESTING: ICD-10-CM

## 2024-08-05 DIAGNOSIS — Z01.811 PRE-OP CHEST EXAM: ICD-10-CM

## 2024-08-05 DIAGNOSIS — Z51.81 MONITORING FOR ANTICOAGULANT USE: ICD-10-CM

## 2024-08-05 PROCEDURE — 71046 X-RAY EXAM CHEST 2 VIEWS: CPT | Mod: TC

## 2024-08-05 PROCEDURE — 93005 ELECTROCARDIOGRAM TRACING: CPT | Performed by: GENERAL PRACTICE

## 2024-08-05 PROCEDURE — 93010 ELECTROCARDIOGRAM REPORT: CPT | Mod: ,,, | Performed by: GENERAL PRACTICE

## 2024-08-05 PROCEDURE — 71046 X-RAY EXAM CHEST 2 VIEWS: CPT | Mod: 26,,, | Performed by: RADIOLOGY

## 2024-08-05 RX ORDER — CEFAZOLIN SODIUM 2 G/50ML
2 SOLUTION INTRAVENOUS ONCE
Status: CANCELLED | OUTPATIENT
Start: 2024-08-07

## 2024-08-06 ENCOUNTER — LAB VISIT (OUTPATIENT)
Dept: LAB | Facility: HOSPITAL | Age: 69
End: 2024-08-06
Attending: SURGERY
Payer: MEDICARE

## 2024-08-06 DIAGNOSIS — Z79.01 MONITORING FOR ANTICOAGULANT USE: ICD-10-CM

## 2024-08-06 DIAGNOSIS — Z51.81 MONITORING FOR ANTICOAGULANT USE: ICD-10-CM

## 2024-08-06 DIAGNOSIS — Z01.818 PRE-OP TESTING: ICD-10-CM

## 2024-08-06 LAB
APTT PPP: 27.6 SEC (ref 21–32)
BASOPHILS # BLD AUTO: 0.05 K/UL (ref 0–0.2)
BASOPHILS NFR BLD: 0.6 % (ref 0–1.9)
DIFFERENTIAL METHOD BLD: ABNORMAL
EOSINOPHIL # BLD AUTO: 0.3 K/UL (ref 0–0.5)
EOSINOPHIL NFR BLD: 3.4 % (ref 0–8)
ERYTHROCYTE [DISTWIDTH] IN BLOOD BY AUTOMATED COUNT: 13.7 % (ref 11.5–14.5)
HCT VFR BLD AUTO: 38.5 % (ref 40–54)
HGB BLD-MCNC: 12.7 G/DL (ref 14–18)
IMM GRANULOCYTES # BLD AUTO: 0.04 K/UL (ref 0–0.04)
IMM GRANULOCYTES NFR BLD AUTO: 0.5 % (ref 0–0.5)
INR PPP: 1 (ref 0.8–1.2)
LYMPHOCYTES # BLD AUTO: 2.1 K/UL (ref 1–4.8)
LYMPHOCYTES NFR BLD: 25.7 % (ref 18–48)
MCH RBC QN AUTO: 30.7 PG (ref 27–31)
MCHC RBC AUTO-ENTMCNC: 33 G/DL (ref 32–36)
MCV RBC AUTO: 93 FL (ref 82–98)
MONOCYTES # BLD AUTO: 0.9 K/UL (ref 0.3–1)
MONOCYTES NFR BLD: 10.6 % (ref 4–15)
NEUTROPHILS # BLD AUTO: 4.9 K/UL (ref 1.8–7.7)
NEUTROPHILS NFR BLD: 59.2 % (ref 38–73)
NRBC BLD-RTO: 0 /100 WBC
PLATELET # BLD AUTO: 176 K/UL (ref 150–450)
PMV BLD AUTO: 11 FL (ref 9.2–12.9)
PROTHROMBIN TIME: 10.8 SEC (ref 9–12.5)
RBC # BLD AUTO: 4.14 M/UL (ref 4.6–6.2)
WBC # BLD AUTO: 8.2 K/UL (ref 3.9–12.7)

## 2024-08-06 PROCEDURE — 36415 COLL VENOUS BLD VENIPUNCTURE: CPT | Performed by: SURGERY

## 2024-08-06 PROCEDURE — 85730 THROMBOPLASTIN TIME PARTIAL: CPT | Performed by: SURGERY

## 2024-08-06 PROCEDURE — 85025 COMPLETE CBC W/AUTO DIFF WBC: CPT | Performed by: SURGERY

## 2024-08-06 PROCEDURE — 85610 PROTHROMBIN TIME: CPT | Performed by: SURGERY

## 2024-08-07 ENCOUNTER — HOSPITAL ENCOUNTER (INPATIENT)
Facility: HOSPITAL | Age: 69
LOS: 1 days | Discharge: HOME | DRG: 038 | End: 2024-08-08
Attending: SURGERY | Admitting: SURGERY
Payer: MEDICARE

## 2024-08-07 ENCOUNTER — ANESTHESIA (OUTPATIENT)
Dept: SURGERY | Facility: HOSPITAL | Age: 69
End: 2024-08-07
Payer: MEDICARE

## 2024-08-07 ENCOUNTER — ANESTHESIA EVENT (OUTPATIENT)
Dept: SURGERY | Facility: HOSPITAL | Age: 69
End: 2024-08-07
Payer: MEDICARE

## 2024-08-07 DIAGNOSIS — I65.23 BILATERAL CAROTID ARTERY STENOSIS: Primary | ICD-10-CM

## 2024-08-07 DIAGNOSIS — Z01.818 PRE-OP TESTING: ICD-10-CM

## 2024-08-07 LAB
ANION GAP SERPL CALC-SCNC: 7 MMOL/L (ref 8–16)
BUN SERPL-MCNC: 14 MG/DL (ref 8–23)
CALCIUM SERPL-MCNC: 9.5 MG/DL (ref 8.7–10.5)
CHLORIDE SERPL-SCNC: 107 MMOL/L (ref 95–110)
CO2 SERPL-SCNC: 26 MMOL/L (ref 23–29)
CREAT SERPL-MCNC: 0.7 MG/DL (ref 0.5–1.4)
EST. GFR  (NO RACE VARIABLE): >60 ML/MIN/1.73 M^2
GLUCOSE SERPL-MCNC: 86 MG/DL (ref 70–110)
POC ACTIVATED CLOTTING TIME K: 140 SEC (ref 74–137)
POC ACTIVATED CLOTTING TIME K: 152 SEC (ref 74–137)
POC ACTIVATED CLOTTING TIME K: 281 SEC (ref 74–137)
POC ACTIVATED CLOTTING TIME K: 330 SEC (ref 74–137)
POTASSIUM SERPL-SCNC: 4.2 MMOL/L (ref 3.5–5.1)
SAMPLE: ABNORMAL
SODIUM SERPL-SCNC: 140 MMOL/L (ref 136–145)

## 2024-08-07 PROCEDURE — 36000707: Performed by: SURGERY

## 2024-08-07 PROCEDURE — 37000008 HC ANESTHESIA 1ST 15 MINUTES: Performed by: SURGERY

## 2024-08-07 PROCEDURE — 25000003 PHARM REV CODE 250: Performed by: SURGERY

## 2024-08-07 PROCEDURE — 71000039 HC RECOVERY, EACH ADD'L HOUR: Performed by: SURGERY

## 2024-08-07 PROCEDURE — 25000003 PHARM REV CODE 250: Performed by: STUDENT IN AN ORGANIZED HEALTH CARE EDUCATION/TRAINING PROGRAM

## 2024-08-07 PROCEDURE — 88311 DECALCIFY TISSUE: CPT | Mod: TC | Performed by: PATHOLOGY

## 2024-08-07 PROCEDURE — 36000706: Performed by: SURGERY

## 2024-08-07 PROCEDURE — 03UL0KZ SUPPLEMENT LEFT INTERNAL CAROTID ARTERY WITH NONAUTOLOGOUS TISSUE SUBSTITUTE, OPEN APPROACH: ICD-10-PCS | Performed by: SURGERY

## 2024-08-07 PROCEDURE — C9248 INJ, CLEVIDIPINE BUTYRATE: HCPCS | Performed by: NURSE ANESTHETIST, CERTIFIED REGISTERED

## 2024-08-07 PROCEDURE — 71000033 HC RECOVERY, INTIAL HOUR: Performed by: SURGERY

## 2024-08-07 PROCEDURE — 63600175 PHARM REV CODE 636 W HCPCS: Mod: UD | Performed by: NURSE ANESTHETIST, CERTIFIED REGISTERED

## 2024-08-07 PROCEDURE — 03CL0ZZ EXTIRPATION OF MATTER FROM LEFT INTERNAL CAROTID ARTERY, OPEN APPROACH: ICD-10-PCS | Performed by: SURGERY

## 2024-08-07 PROCEDURE — 80048 BASIC METABOLIC PNL TOTAL CA: CPT | Performed by: SURGERY

## 2024-08-07 PROCEDURE — 27201423 OPTIME MED/SURG SUP & DEVICES STERILE SUPPLY: Performed by: SURGERY

## 2024-08-07 PROCEDURE — 25000003 PHARM REV CODE 250: Performed by: NURSE ANESTHETIST, CERTIFIED REGISTERED

## 2024-08-07 PROCEDURE — C1768 GRAFT, VASCULAR: HCPCS | Performed by: SURGERY

## 2024-08-07 PROCEDURE — 88304 TISSUE EXAM BY PATHOLOGIST: CPT | Mod: TC | Performed by: PATHOLOGY

## 2024-08-07 PROCEDURE — 37000009 HC ANESTHESIA EA ADD 15 MINS: Performed by: SURGERY

## 2024-08-07 PROCEDURE — 21000000 HC CCU ICU ROOM CHARGE

## 2024-08-07 PROCEDURE — 94761 N-INVAS EAR/PLS OXIMETRY MLT: CPT

## 2024-08-07 PROCEDURE — 63600175 PHARM REV CODE 636 W HCPCS: Performed by: SURGERY

## 2024-08-07 PROCEDURE — C9248 INJ, CLEVIDIPINE BUTYRATE: HCPCS | Performed by: SURGERY

## 2024-08-07 PROCEDURE — 63600175 PHARM REV CODE 636 W HCPCS: Mod: UD | Performed by: STUDENT IN AN ORGANIZED HEALTH CARE EDUCATION/TRAINING PROGRAM

## 2024-08-07 PROCEDURE — 25000003 PHARM REV CODE 250: Mod: UD | Performed by: SURGERY

## 2024-08-07 PROCEDURE — 63600175 PHARM REV CODE 636 W HCPCS: Mod: UD

## 2024-08-07 DEVICE — PATCH XENOSURE TAPR .8X8CM: Type: IMPLANTABLE DEVICE | Site: CAROTID | Status: FUNCTIONAL

## 2024-08-07 RX ORDER — SUCRALFATE 1 G/1
1 TABLET ORAL
Status: DISCONTINUED | OUTPATIENT
Start: 2024-08-07 | End: 2024-08-08 | Stop reason: HOSPADM

## 2024-08-07 RX ORDER — ONDANSETRON HYDROCHLORIDE 2 MG/ML
4 INJECTION, SOLUTION INTRAVENOUS DAILY PRN
Status: DISCONTINUED | OUTPATIENT
Start: 2024-08-07 | End: 2024-08-07 | Stop reason: HOSPADM

## 2024-08-07 RX ORDER — PHENYLEPHRINE HCL IN 0.9% NACL 20MG/250ML
0-5 PLASTIC BAG, INJECTION (ML) INTRAVENOUS CONTINUOUS
Status: DISCONTINUED | OUTPATIENT
Start: 2024-08-07 | End: 2024-08-08 | Stop reason: HOSPADM

## 2024-08-07 RX ORDER — SODIUM CHLORIDE 9 MG/ML
INJECTION, SOLUTION INTRAVENOUS CONTINUOUS
Status: DISCONTINUED | OUTPATIENT
Start: 2024-08-07 | End: 2024-08-08 | Stop reason: HOSPADM

## 2024-08-07 RX ORDER — ACETAMINOPHEN 10 MG/ML
INJECTION, SOLUTION INTRAVENOUS
Status: DISCONTINUED | OUTPATIENT
Start: 2024-08-07 | End: 2024-08-07

## 2024-08-07 RX ORDER — FAMOTIDINE 10 MG/ML
INJECTION INTRAVENOUS
Status: DISCONTINUED | OUTPATIENT
Start: 2024-08-07 | End: 2024-08-07

## 2024-08-07 RX ORDER — HEPARIN SODIUM 5000 [USP'U]/ML
INJECTION, SOLUTION INTRAVENOUS; SUBCUTANEOUS
Status: DISCONTINUED | OUTPATIENT
Start: 2024-08-07 | End: 2024-08-07 | Stop reason: HOSPADM

## 2024-08-07 RX ORDER — MEPERIDINE HYDROCHLORIDE 50 MG/ML
12.5 INJECTION INTRAMUSCULAR; INTRAVENOUS; SUBCUTANEOUS EVERY 10 MIN PRN
Status: DISCONTINUED | OUTPATIENT
Start: 2024-08-07 | End: 2024-08-07 | Stop reason: HOSPADM

## 2024-08-07 RX ORDER — MIRTAZAPINE 15 MG/1
15 TABLET, FILM COATED ORAL NIGHTLY
Status: DISCONTINUED | OUTPATIENT
Start: 2024-08-07 | End: 2024-08-08 | Stop reason: HOSPADM

## 2024-08-07 RX ORDER — LIDOCAINE HYDROCHLORIDE 20 MG/ML
INJECTION INTRAVENOUS
Status: DISCONTINUED | OUTPATIENT
Start: 2024-08-07 | End: 2024-08-07

## 2024-08-07 RX ORDER — ONDANSETRON HYDROCHLORIDE 2 MG/ML
INJECTION, SOLUTION INTRAVENOUS
Status: DISCONTINUED | OUTPATIENT
Start: 2024-08-07 | End: 2024-08-07

## 2024-08-07 RX ORDER — CEFAZOLIN SODIUM 2 G/50ML
2 SOLUTION INTRAVENOUS ONCE
Status: DISCONTINUED | OUTPATIENT
Start: 2024-08-07 | End: 2024-08-07 | Stop reason: HOSPADM

## 2024-08-07 RX ORDER — ZOLPIDEM TARTRATE 5 MG/1
10 TABLET ORAL NIGHTLY
Status: DISCONTINUED | OUTPATIENT
Start: 2024-08-07 | End: 2024-08-08 | Stop reason: HOSPADM

## 2024-08-07 RX ORDER — ONDANSETRON HYDROCHLORIDE 2 MG/ML
4 INJECTION, SOLUTION INTRAVENOUS EVERY 12 HOURS PRN
Status: DISCONTINUED | OUTPATIENT
Start: 2024-08-07 | End: 2024-08-08 | Stop reason: HOSPADM

## 2024-08-07 RX ORDER — CEFAZOLIN SODIUM 1 G/3ML
INJECTION, POWDER, FOR SOLUTION INTRAMUSCULAR; INTRAVENOUS
Status: DISCONTINUED | OUTPATIENT
Start: 2024-08-07 | End: 2024-08-07 | Stop reason: HOSPADM

## 2024-08-07 RX ORDER — NAPROXEN SODIUM 220 MG/1
81 TABLET, FILM COATED ORAL DAILY
Status: DISCONTINUED | OUTPATIENT
Start: 2024-08-07 | End: 2024-08-07

## 2024-08-07 RX ORDER — PROTAMINE SULFATE 10 MG/ML
INJECTION, SOLUTION INTRAVENOUS
Status: DISCONTINUED | OUTPATIENT
Start: 2024-08-07 | End: 2024-08-07

## 2024-08-07 RX ORDER — DIPHENHYDRAMINE HYDROCHLORIDE 50 MG/ML
12.5 INJECTION INTRAMUSCULAR; INTRAVENOUS
Status: DISCONTINUED | OUTPATIENT
Start: 2024-08-07 | End: 2024-08-07 | Stop reason: HOSPADM

## 2024-08-07 RX ORDER — MUPIROCIN 20 MG/G
OINTMENT TOPICAL 2 TIMES DAILY
Status: DISCONTINUED | OUTPATIENT
Start: 2024-08-07 | End: 2024-08-08 | Stop reason: HOSPADM

## 2024-08-07 RX ORDER — CEFAZOLIN SODIUM 2 G/50ML
2 SOLUTION INTRAVENOUS
Status: COMPLETED | OUTPATIENT
Start: 2024-08-07 | End: 2024-08-08

## 2024-08-07 RX ORDER — ROPINIROLE 0.25 MG/1
1 TABLET, FILM COATED ORAL NIGHTLY
Status: DISCONTINUED | OUTPATIENT
Start: 2024-08-07 | End: 2024-08-08 | Stop reason: HOSPADM

## 2024-08-07 RX ORDER — HYDROMORPHONE HYDROCHLORIDE 1 MG/ML
1 INJECTION, SOLUTION INTRAMUSCULAR; INTRAVENOUS; SUBCUTANEOUS EVERY 4 HOURS PRN
Status: DISCONTINUED | OUTPATIENT
Start: 2024-08-07 | End: 2024-08-08 | Stop reason: HOSPADM

## 2024-08-07 RX ORDER — FENTANYL CITRATE 50 UG/ML
INJECTION, SOLUTION INTRAMUSCULAR; INTRAVENOUS
Status: DISCONTINUED | OUTPATIENT
Start: 2024-08-07 | End: 2024-08-07

## 2024-08-07 RX ORDER — HYDROMORPHONE HYDROCHLORIDE 1 MG/ML
INJECTION, SOLUTION INTRAMUSCULAR; INTRAVENOUS; SUBCUTANEOUS
Status: COMPLETED
Start: 2024-08-07 | End: 2024-08-07

## 2024-08-07 RX ORDER — LIDOCAINE HYDROCHLORIDE 10 MG/ML
INJECTION, SOLUTION INFILTRATION; PERINEURAL
Status: DISCONTINUED | OUTPATIENT
Start: 2024-08-07 | End: 2024-08-07 | Stop reason: HOSPADM

## 2024-08-07 RX ORDER — ROCURONIUM BROMIDE 10 MG/ML
INJECTION, SOLUTION INTRAVENOUS
Status: DISCONTINUED | OUTPATIENT
Start: 2024-08-07 | End: 2024-08-07

## 2024-08-07 RX ORDER — MUPIROCIN 20 MG/G
OINTMENT TOPICAL 2 TIMES DAILY
Status: DISCONTINUED | OUTPATIENT
Start: 2024-08-07 | End: 2024-08-07

## 2024-08-07 RX ORDER — ETOMIDATE 2 MG/ML
INJECTION INTRAVENOUS
Status: DISCONTINUED | OUTPATIENT
Start: 2024-08-07 | End: 2024-08-07

## 2024-08-07 RX ORDER — HYDROMORPHONE HYDROCHLORIDE 1 MG/ML
0.2 INJECTION, SOLUTION INTRAMUSCULAR; INTRAVENOUS; SUBCUTANEOUS EVERY 5 MIN PRN
Status: COMPLETED | OUTPATIENT
Start: 2024-08-07 | End: 2024-08-07

## 2024-08-07 RX ORDER — LISINOPRIL 10 MG/1
40 TABLET ORAL DAILY
Status: DISCONTINUED | OUTPATIENT
Start: 2024-08-07 | End: 2024-08-08 | Stop reason: HOSPADM

## 2024-08-07 RX ORDER — HEPARIN SODIUM 10000 [USP'U]/ML
INJECTION, SOLUTION INTRAVENOUS; SUBCUTANEOUS
Status: DISCONTINUED | OUTPATIENT
Start: 2024-08-07 | End: 2024-08-07

## 2024-08-07 RX ORDER — AMITRIPTYLINE HYDROCHLORIDE 25 MG/1
100 TABLET, FILM COATED ORAL NIGHTLY
Status: DISCONTINUED | OUTPATIENT
Start: 2024-08-07 | End: 2024-08-08 | Stop reason: HOSPADM

## 2024-08-07 RX ORDER — HYDROCODONE BITARTRATE AND ACETAMINOPHEN 5; 325 MG/1; MG/1
1 TABLET ORAL EVERY 4 HOURS PRN
Status: DISCONTINUED | OUTPATIENT
Start: 2024-08-07 | End: 2024-08-08 | Stop reason: HOSPADM

## 2024-08-07 RX ORDER — CLOPIDOGREL BISULFATE 75 MG/1
75 TABLET ORAL DAILY
Status: DISCONTINUED | OUTPATIENT
Start: 2024-08-07 | End: 2024-08-08 | Stop reason: HOSPADM

## 2024-08-07 RX ORDER — SUCCINYLCHOLINE CHLORIDE 20 MG/ML
INJECTION INTRAMUSCULAR; INTRAVENOUS
Status: DISCONTINUED | OUTPATIENT
Start: 2024-08-07 | End: 2024-08-07

## 2024-08-07 RX ORDER — ATORVASTATIN CALCIUM 40 MG/1
40 TABLET, FILM COATED ORAL NIGHTLY
Status: DISCONTINUED | OUTPATIENT
Start: 2024-08-07 | End: 2024-08-08 | Stop reason: HOSPADM

## 2024-08-07 RX ORDER — ASPIRIN 325 MG
325 TABLET ORAL DAILY
Status: DISCONTINUED | OUTPATIENT
Start: 2024-08-07 | End: 2024-08-08 | Stop reason: HOSPADM

## 2024-08-07 RX ORDER — GLUCAGON 1 MG
1 KIT INJECTION
Status: DISCONTINUED | OUTPATIENT
Start: 2024-08-07 | End: 2024-08-07 | Stop reason: HOSPADM

## 2024-08-07 RX ORDER — PANTOPRAZOLE SODIUM 40 MG/1
40 TABLET, DELAYED RELEASE ORAL 2 TIMES DAILY
Status: DISCONTINUED | OUTPATIENT
Start: 2024-08-07 | End: 2024-08-08 | Stop reason: HOSPADM

## 2024-08-07 RX ORDER — OXYCODONE HYDROCHLORIDE 5 MG/1
5 TABLET ORAL
Status: DISCONTINUED | OUTPATIENT
Start: 2024-08-07 | End: 2024-08-07 | Stop reason: HOSPADM

## 2024-08-07 RX ORDER — VARENICLINE TARTRATE 0.5 MG/1
1 TABLET, FILM COATED ORAL 2 TIMES DAILY
Status: DISCONTINUED | OUTPATIENT
Start: 2024-08-08 | End: 2024-08-08 | Stop reason: HOSPADM

## 2024-08-07 RX ADMIN — ZOLPIDEM TARTRATE 10 MG: 5 TABLET, COATED ORAL at 09:08

## 2024-08-07 RX ADMIN — OXYCODONE HYDROCHLORIDE 5 MG: 5 TABLET ORAL at 01:08

## 2024-08-07 RX ADMIN — MULTIVITAMIN TABLET 1 TABLET: TABLET at 03:08

## 2024-08-07 RX ADMIN — ATORVASTATIN CALCIUM 40 MG: 40 TABLET, FILM COATED ORAL at 09:08

## 2024-08-07 RX ADMIN — SODIUM CHLORIDE: 9 INJECTION, SOLUTION INTRAVENOUS at 01:08

## 2024-08-07 RX ADMIN — CLEVIPIDINE 4 MG/HR: 0.5 EMULSION INTRAVENOUS at 01:08

## 2024-08-07 RX ADMIN — MUPIROCIN 1 G: 20 OINTMENT TOPICAL at 09:08

## 2024-08-07 RX ADMIN — HYDROMORPHONE HYDROCHLORIDE 0.2 MG: 1 INJECTION, SOLUTION INTRAMUSCULAR; INTRAVENOUS; SUBCUTANEOUS at 12:08

## 2024-08-07 RX ADMIN — CLEVIPIDINE 6 MG/HR: 0.5 EMULSION INTRAVENOUS at 10:08

## 2024-08-07 RX ADMIN — HYDROMORPHONE HYDROCHLORIDE 0.2 MG: 1 INJECTION, SOLUTION INTRAMUSCULAR; INTRAVENOUS; SUBCUTANEOUS at 01:08

## 2024-08-07 RX ADMIN — ROCURONIUM BROMIDE 5 MG: 10 INJECTION, SOLUTION INTRAVENOUS at 10:08

## 2024-08-07 RX ADMIN — SODIUM CHLORIDE, SODIUM LACTATE, POTASSIUM CHLORIDE, AND CALCIUM CHLORIDE: .6; .31; .03; .02 INJECTION, SOLUTION INTRAVENOUS at 10:08

## 2024-08-07 RX ADMIN — ACETAMINOPHEN 1000 MG: 10 INJECTION, SOLUTION INTRAVENOUS at 10:08

## 2024-08-07 RX ADMIN — LIDOCAINE HYDROCHLORIDE 50 MG: 20 INJECTION, SOLUTION INTRAVENOUS at 10:08

## 2024-08-07 RX ADMIN — CLOPIDOGREL BISULFATE 75 MG: 75 TABLET, FILM COATED ORAL at 03:08

## 2024-08-07 RX ADMIN — ETOMIDATE 20 MG: 2 INJECTION, SOLUTION INTRAVENOUS at 10:08

## 2024-08-07 RX ADMIN — FAMOTIDINE 20 MG: 10 INJECTION, SOLUTION INTRAVENOUS at 10:08

## 2024-08-07 RX ADMIN — Medication 160 MG: at 10:08

## 2024-08-07 RX ADMIN — PROTAMINE SULFATE 70 MG: 10 INJECTION, SOLUTION INTRAVENOUS at 12:08

## 2024-08-07 RX ADMIN — ONDANSETRON 4 MG: 2 INJECTION INTRAMUSCULAR; INTRAVENOUS at 10:08

## 2024-08-07 RX ADMIN — ROCURONIUM BROMIDE 40 MG: 10 INJECTION, SOLUTION INTRAVENOUS at 10:08

## 2024-08-07 RX ADMIN — ASPIRIN 325 MG ORAL TABLET 325 MG: 325 PILL ORAL at 03:08

## 2024-08-07 RX ADMIN — HEPARIN SODIUM 7500 UNITS: 10000 INJECTION, SOLUTION INTRAVENOUS; SUBCUTANEOUS at 11:08

## 2024-08-07 RX ADMIN — SODIUM CHLORIDE: 9 INJECTION, SOLUTION INTRAVENOUS at 03:08

## 2024-08-07 RX ADMIN — CLEVIPIDINE 2 MG/HR: 0.5 EMULSION INTRAVENOUS at 01:08

## 2024-08-07 RX ADMIN — ROPINIROLE HYDROCHLORIDE 1 MG: 0.25 TABLET, FILM COATED ORAL at 09:08

## 2024-08-07 RX ADMIN — CEFAZOLIN SODIUM 2 G: 2 SOLUTION INTRAVENOUS at 05:08

## 2024-08-07 RX ADMIN — FENTANYL CITRATE 100 MCG: 50 INJECTION, SOLUTION INTRAMUSCULAR; INTRAVENOUS at 10:08

## 2024-08-07 NOTE — PLAN OF CARE
Met with patient and     Marlene Jerez (Friend)  [phone] (Mobile)    at bedside to complete initial assessment. Patient / family reports patient DOES NOT have a living will and NO ONE is medical POA.     Patient will transport home via private vehicle with friend Marlene who will be staying with patient to help with recovery.    [facility]  Initial Discharge Assessment       Primary Care Provider: Puja Finney NP    Admission Diagnosis: Bilateral carotid artery stenosis [I65.23]  Pre-op testing [Z01.818]    Admission Date: 8/7/2024  Expected Discharge Date:     Transition of Care Barriers: None    Payor: MEDICARE / Plan: MEDICARE PART A & B / Product Type: Government /     Extended Emergency Contact Information  Primary Emergency Contact: Marlene Jerez   [facility]  Mobile Phone: [phone]  Relation: Friend  Secondary Emergency Contact: Duarte Vazquez   [place]  Mobile Phone: [phone]  Relation: Brother    Discharge Plan A: Home  Discharge Plan B: Home      [facility] [address] [place] [address], [place] [facility] [address]  [address] [place] [address]  Phone: [phone] Fax: [phone]      Initial Assessment (most recent)       Adult Discharge Assessment - 08/07/24 1507          Discharge Assessment    Assessment Type Discharge Planning Assessment     Confirmed/corrected address, phone number and insurance Yes     Confirmed Demographics Correct on Facesheet     Source of Information patient;other (see comments)   Friend Marlene    Communicated ARGENIS with patient/caregiver Date not available/Unable to determine     Reason For Admission bilateral carotid artery stenosis     People in Home alone     Facility Arrived From: home     Do you expect to return to your current living situation? Yes     Do you have help at home or someone to help you manage your care at home? Yes     Who are your caregiver(s) and their phone number(s)? Marlene Jerez (Friend)   [phone] (Mobile)     Current cognitive status: Alert/Oriented     Walking or Climbing Stairs Difficulty no     Dressing/Bathing Difficulty no     Equipment Currently Used at Home none     Readmission within 30 days? No     Patient currently being followed by outpatient case management? No     Do you currently have service(s) that help you manage your care at home? No     Do you have prescription coverage? Yes     Coverage Medicare A and B, [place] medicaid     Who is going to help you get home at discharge? Marlene Jerez (Friend)  [phone] (Mobile)     How do you get to doctors appointments? car, drives self     Are you on dialysis? No     Do you take coumadin? No     Discharge Plan A Home     Discharge Plan B Home     DME Needed Upon Discharge  none     Discharge Plan discussed with: Friend;Patient     Name(s) and Number(s) Marlene Jerez (Friend)  [phone] (Mobile)     Transition of Care Barriers None

## 2024-08-07 NOTE — NURSING
..Nurses Note -- 4 Eyes      8/7/2024   2:30 PM      Skin assessed during: Admit      [] No Altered Skin Integrity Present    []Prevention Measures Documented      [x] Yes- Altered Skin Integrity Present or Discovered   [] LDA Added if Not in Epic (Describe Wound)   [] New Altered Skin Integrity was Present on Admit and Documented in LDA   [] Wound Image Taken    Wound Care Consulted? No    Attending Nurse:  ESTEPHANIA Arizmendi     Second RN/Staff Member:   ESTEPHANIA Pantoja

## 2024-08-07 NOTE — OP NOTE
[facility]  Surgery Department  Operative Note    SUMMARY     Date of Procedure: 8/7/2024     Procedure: Procedure(s) (LRB):  ENDARTERECTOMY, CAROTID (Left)     Surgeons and Role:     * Alan Florian MD - Primary    Assisting Surgeon: None    Pre-Operative Diagnosis: Bilateral carotid artery stenosis [I65.23]    Post-Operative Diagnosis: Post-Op Diagnosis Codes:     * Bilateral carotid artery stenosis [I65.23]    Anesthesia: General    Operative Findings (including complications, if any):  Severe 80% left internal carotid stenosis    Description of Technical Procedures:  Left carotid endarterectomy with a bovine pericardium patch angioplasty under EEG monitoring.       Skin incision made anterior to the sternocleidomastoid dissection down through subcutaneous tissue achieved with hemo cautery.  Sternocleidomastoid retracted laterally.  The internal jugular vein was identified facial vein ligated and divided.  Jugular vein was retracted laterally the common carotid artery was dissected out looped proximally leaving the vagus nerve posterior laterally within the carotid sheath.  Common carotid was dissected out distally the bifurcation areolar tissue was injected with 1% plain lidocaine the external carotid was looped.  The internal carotid was dissected out distal to the plaque.  Hypoglossal nerve was identified high and medial in the neck.  The internal carotid was looped distal to the plaque.  Patient received 7500 units of IV heparin.  After 5 minutes the internal carotid was occluded 1st and the common and external carotid were occluded.  Arteriotomy was made with the 11 blade and lengthened with the Bolton scissors across the bifurcation going distal to the plaque.  There were no changes on EEG monitoring.  Endarterectomy was started proximally the [place] elevator the intima was transected proximally the [place] scissors distally the endpoint was further with the assistance of the [place] blade.   The external carotid underwent eversion endarterectomy the lumen was cleared of any debris the distal endpoint was well adherent.  Bovine pericardium patch angioplasty was performed with 7 0 and 6 0 Prolene.  Prior to completion of the closure the vessels were allowed to flush and backbleed.  The lumen was irrigated out with hep saline.  The closure was completed the internal was allowed to be backbleed 1st to the of the lumen and then the internal was clamped at its origin the common and external carotid arteries were opened everything was lot flush down the external carotid.  After several beats the internal was reopened.  Hemostasis was achieved with Surgicel and 75 mg of protamine.  Wounds irrigated out close the deep layer 3-0 Vicryl and 4-0 Monocryl subcuticular.       Significant Surgical Tasks Conducted by the Assistant(s), if Applicable:  Assisted with all dissection and patch closure.  No resident available.    Estimated Blood Loss (EBL): * No values recorded between 8/7/2024 11:02 AM and 8/7/2024 12:37 PM *           Implants:   Implant Name Type Inv. Item Serial No.  Lot No. LRB No. Used Action   PATCH XENOSURE TAPR .8X8CM - [id number]  PATCH XENOSURE TAPR .8X8CM  [facility] VASCULAR [id number] 1 Implanted       Specimens:   Specimen (24h ago, onward)       Start     Ordered    08/07/24 1115  Specimen to Pathology - Surgery  Once        Comments: Pre-op Diagnosis: Bilateral carotid artery stenosis [I65.23]Procedure(s):ENDARTERECTOMY, CAROTID Number of specimens: 1Name of specimens: Left carotid plaque     Question:  Release to patient  Answer:  Immediate    08/07/24 1124                            Condition: Good    Disposition: ICU - extubated and stable.    Attestation: Op Note Attestation: I was physically present and scrubbed for the entire procedure.

## 2024-08-08 ENCOUNTER — TELEPHONE (OUTPATIENT)
Dept: FAMILY MEDICINE | Facility: CLINIC | Age: 69
End: 2024-08-08
Payer: MEDICARE

## 2024-08-08 VITALS
TEMPERATURE: 99 F | OXYGEN SATURATION: 97 % | BODY MASS INDEX: 22.84 KG/M2 | WEIGHT: 145.5 LBS | DIASTOLIC BLOOD PRESSURE: 45 MMHG | HEART RATE: 98 BPM | RESPIRATION RATE: 27 BRPM | SYSTOLIC BLOOD PRESSURE: 103 MMHG | HEIGHT: 67 IN

## 2024-08-08 PROCEDURE — 94761 N-INVAS EAR/PLS OXIMETRY MLT: CPT

## 2024-08-08 PROCEDURE — 25000003 PHARM REV CODE 250: Performed by: SURGERY

## 2024-08-08 PROCEDURE — 63600175 PHARM REV CODE 636 W HCPCS: Performed by: SURGERY

## 2024-08-08 RX ORDER — HYDROCODONE BITARTRATE AND ACETAMINOPHEN 5; 325 MG/1; MG/1
1 TABLET ORAL EVERY 4 HOURS PRN
Qty: 25 TABLET | Refills: 0 | Status: SHIPPED | OUTPATIENT
Start: 2024-08-08

## 2024-08-08 RX ADMIN — MULTIVITAMIN TABLET 1 TABLET: TABLET at 08:08

## 2024-08-08 RX ADMIN — VARENICLINE TARTRATE 1 MG: 0.5 TABLET, FILM COATED ORAL at 10:08

## 2024-08-08 RX ADMIN — LISINOPRIL 40 MG: 10 TABLET ORAL at 08:08

## 2024-08-08 RX ADMIN — CEFAZOLIN SODIUM 2 G: 2 SOLUTION INTRAVENOUS at 02:08

## 2024-08-08 RX ADMIN — MUPIROCIN 1 G: 20 OINTMENT TOPICAL at 08:08

## 2024-08-08 RX ADMIN — ASPIRIN 325 MG ORAL TABLET 325 MG: 325 PILL ORAL at 08:08

## 2024-08-08 RX ADMIN — CLOPIDOGREL BISULFATE 75 MG: 75 TABLET, FILM COATED ORAL at 08:08

## 2024-08-08 NOTE — NURSING
A line and IV's dc'd. Tolerated well. DC instructions, follow up apps, and meds reviewed with pt and family. Verbalized understanding. Pt brought down via wheelchair with all belongings to family transport. Safety maintained.

## 2024-08-08 NOTE — PLAN OF CARE
Patient will transport home via private vehicle with friend Marlene.    Discharge orders and chart reviewed with no further post-acute discharge needs identified at this time.  At this time, patient is cleared for discharge from Case Management standpoint.        08/08/24 0824   Final Note   Assessment Type Final Discharge Note   Anticipated Discharge Disposition Home   Hospital Resources/Appts/Education Provided   ([facility] scheduling will have PCP's office staff reach out to patient to schedule his post hospital follow up appointment.)   Post-Acute Status   Coverage Medicare A and B, [place] medicaid   Discharge Delays None known at this time

## 2024-08-08 NOTE — DISCHARGE SUMMARY
Patient underwent left carotid endarterectomy on the day of the admission.  Postoperatively he was monitored in step-down unit.  Neurologically intact.  No pressors.  No hoarseness.  Neck is soft.  Ready for discharge.

## 2024-08-08 NOTE — CARE UPDATE
08/07/24 2000   Patient Assessment/Suction   Level of Consciousness (AVPU) alert   Respiratory Effort Normal;Unlabored   Expansion/Accessory Muscles/Retractions no use of accessory muscles;no retractions   PRE-TX-O2   Device (Oxygen Therapy) room air   SpO2 96 %   Pulse Oximetry Type Continuous   $ Pulse Oximetry - Multiple Charge Pulse Oximetry - Multiple   Pulse 63   Resp 12   BP (!) 101/54

## 2024-08-09 LAB
OHS QRS DURATION: 132 MS
OHS QTC CALCULATION: 450 MS

## 2024-08-21 ENCOUNTER — TELEPHONE (OUTPATIENT)
Dept: FAMILY MEDICINE | Facility: CLINIC | Age: 69
End: 2024-08-21
Payer: MEDICARE

## 2024-08-21 ENCOUNTER — OFFICE VISIT (OUTPATIENT)
Dept: FAMILY MEDICINE | Facility: CLINIC | Age: 69
End: 2024-08-21
Payer: MEDICARE

## 2024-08-21 VITALS
WEIGHT: 132.81 LBS | HEART RATE: 100 BPM | DIASTOLIC BLOOD PRESSURE: 60 MMHG | HEIGHT: 67 IN | BODY MASS INDEX: 20.84 KG/M2 | RESPIRATION RATE: 12 BRPM | SYSTOLIC BLOOD PRESSURE: 106 MMHG | OXYGEN SATURATION: 98 %

## 2024-08-21 DIAGNOSIS — Z13.1 SCREENING FOR DIABETES MELLITUS (DM): ICD-10-CM

## 2024-08-21 DIAGNOSIS — R10.33 PERIUMBILICAL ABDOMINAL PAIN: ICD-10-CM

## 2024-08-21 DIAGNOSIS — Z98.890 S/P CAROTID ENDARTERECTOMY: Primary | ICD-10-CM

## 2024-08-21 DIAGNOSIS — Z12.5 PROSTATE CANCER SCREENING: ICD-10-CM

## 2024-08-21 DIAGNOSIS — I10 ESSENTIAL HYPERTENSION: ICD-10-CM

## 2024-08-21 DIAGNOSIS — R10.84 GENERALIZED ABDOMINAL PAIN: ICD-10-CM

## 2024-08-21 DIAGNOSIS — Z00.00 WELL ADULT EXAM: Primary | ICD-10-CM

## 2024-08-21 DIAGNOSIS — E78.2 MIXED HYPERLIPIDEMIA: ICD-10-CM

## 2024-08-21 DIAGNOSIS — F51.01 PRIMARY INSOMNIA: ICD-10-CM

## 2024-08-21 DIAGNOSIS — R73.01 ELEVATED FASTING GLUCOSE: ICD-10-CM

## 2024-08-21 PROCEDURE — 99999 PR PBB SHADOW E&M-EST. PATIENT-LVL IV: CPT | Mod: PBBFAC,,, | Performed by: NURSE PRACTITIONER

## 2024-08-21 PROCEDURE — 99213 OFFICE O/P EST LOW 20 MIN: CPT | Mod: S$PBB,,, | Performed by: NURSE PRACTITIONER

## 2024-08-21 PROCEDURE — 99214 OFFICE O/P EST MOD 30 MIN: CPT | Mod: PBBFAC | Performed by: NURSE PRACTITIONER

## 2024-08-21 PROCEDURE — G2211 COMPLEX E/M VISIT ADD ON: HCPCS | Mod: S$PBB,,, | Performed by: NURSE PRACTITIONER

## 2024-08-21 RX ORDER — RAMELTEON 8 MG/1
1 TABLET ORAL NIGHTLY
COMMUNITY
Start: 2024-04-23 | End: 2024-08-21

## 2024-08-21 RX ORDER — OMEPRAZOLE 40 MG/1
40 CAPSULE, DELAYED RELEASE ORAL
Start: 2024-08-21

## 2024-08-21 RX ORDER — CILOSTAZOL 100 MG/1
1 TABLET ORAL 2 TIMES DAILY
COMMUNITY

## 2024-08-21 NOTE — PROGRESS NOTES
"Subjective:       Patient ID: Miles Vazquez is a 69 y.o. male.    Chief Complaint: Follow-up (3 month)  -  The pt is a 68 y/o male that presents for 3 mo follow up. Underwent left CEA 8/7/24 with Dr. Florian doing well and site is healed over.    Continue to have abd pain with EGD scheduled for 10/1/24. Reports still having intermittent abd pain.     HTN well controlled with current regimen. Pt appears to be doing very well thus will have RTC in Jan for Wellness.  -    Past Medical History:   Diagnosis Date    Abdominal aortic aneurysm (AAA) 05/2019    3.2 cm per CTA.--12/2020 4.3 x 4.7 by 4.5 cm--3/2022  4.8 x 4.5 cm    Abnormal findings on esophagogastroduodenoscopy (EGD) 04/2016    "intestinal metaplasia" which is a "risk factor" for gastric cancer.      Apical lung nodule 03/2022    Right    Bursitis of both hips     Colon polyp     COPD (chronic obstructive pulmonary disease) 2016    Coronary artery calcification seen on CAT scan 04/24/2013    Depression 2016    Accompanied drinking problem    GERD (gastroesophageal reflux disease)     HTN (hypertension) 01/07/2013    Hyperlipidemia 2019    Lumbar disc disease 06/2019    grade 1 spondylolisthesis of L5 on S1    Macrocytic anemia 03/27/2016    Neuropathy     Personal history of colonic polyps 2017    Recovering alcoholic 03/16/2016    RLS (restless legs syndrome) 2014    Sleep apnea 2 years ago    does not use cpap            Social History     Socioeconomic History    Marital status: Single   Tobacco Use    Smoking status: Every Day     Current packs/day: 1.00     Average packs/day: 0.6 packs/day for 111.6 years (71.1 ttl pk-yrs)     Types: Cigarettes     Start date: 1967    Smokeless tobacco: Never    Tobacco comments:     1 or 2 cigarettes a day.    5/23/24 Active participant with smoking cessation. 1X57 yrs   Substance and Sexual Activity    Alcohol use: Not Currently     Comment: Sobriety date 03/16/2016    Drug use: Not Currently     Types: Marijuana     " Comment: In the past.    Sexual activity: Not Currently     Partners: Female     Birth control/protection: Condom     Social Determinants of Health     Financial Resource Strain: High Risk (1/22/2024)    Overall Financial Resource Strain (CARDIA)     Difficulty of Paying Living Expenses: Very hard   Food Insecurity: Food Insecurity Present (1/22/2024)    Hunger Vital Sign     Worried About Running Out of Food in the Last Year: Sometimes true     Ran Out of Food in the Last Year: Sometimes true   Transportation Needs: No Transportation Needs (1/22/2024)    PRAPARE - Transportation     Lack of Transportation (Medical): No     Lack of Transportation (Non-Medical): No   Physical Activity: Unknown (1/22/2024)    Exercise Vital Sign     Days of Exercise per Week: 5 days   Stress: Stress Concern Present (1/22/2024)    [employer] [place] [employer] - Occupational Stress Questionnaire     Feeling of Stress : To some extent   Housing Stability: Unknown (1/22/2024)    Housing Stability Vital Sign     Unable to Pay for Housing in the Last Year: No     Unstable Housing in the Last Year: No       Family History   Problem Relation Name Age of Onset    Alcohol abuse Mother Veronica Resendiz         Cancer    Alcohol abuse Father Ramon Vazquez     Tuberculosis Maternal Grandmother      Stomach cancer Neg Hx      Colon cancer Neg Hx         Review of patient's allergies indicates:   Allergen Reactions    Seroquel [quetiapine] Other (See Comments)     RESTLESS LEGS          Current Outpatient Medications:     amitriptyline (ELAVIL) 100 MG tablet, Take 1 tablet (100 mg total) by mouth every evening., Disp: 90 tablet, Rfl: 3    aspirin 81 MG Chew, Take 81 mg by mouth once daily., Disp: , Rfl:     atorvastatin (LIPITOR) 40 MG tablet, Take 1 tablet (40 mg total) by mouth every evening., Disp: 90 tablet, Rfl: 3    benazepriL (LOTENSIN) 40 MG tablet, Take 1 tablet (40 mg total) by mouth once daily., Disp: 90 tablet, Rfl: 2     cilostazoL (PLETAL) 100 MG Tab, Take 1 tablet by mouth 2 (two) times daily., Disp: , Rfl:     clopidogreL (PLAVIX) 75 mg tablet, Take 1 tablet (75 mg total) by mouth once daily., Disp: 90 tablet, Rfl: 3    mirtazapine (REMERON) 15 MG tablet, Take 1 tablet (15 mg total) by mouth every evening., Disp: 30 tablet, Rfl: 11    multivit-min/FA/lycopen/lutein (CENTRUM SILVER MEN ORAL), Take by mouth., Disp: , Rfl:     rOPINIRole (REQUIP) 1 MG tablet, Take 1 tablet (1 mg total) by mouth every evening., Disp: 90 tablet, Rfl: 3    sucralfate (CARAFATE) 1 gram tablet, TAKE 1 TABLET BY MOUTH 4 TIMES DAILY BEFORE MEAL(S) AND NIGHTLY, Disp: 120 tablet, Rfl: 1    omeprazole (PRILOSEC) 40 MG capsule, Take 1 capsule (40 mg total) by mouth 2 (two) times daily before meals., Disp: , Rfl:     Follow-up  Associated symptoms include abdominal pain.     Review of Systems   Constitutional: Negative.    HENT: Negative.     Eyes: Negative.    Respiratory: Negative.     Cardiovascular: Negative.    Gastrointestinal:  Positive for abdominal pain.   Endocrine: Negative.    Genitourinary: Negative.    Musculoskeletal: Negative.    Skin: Negative.    Allergic/Immunologic: Negative.    Neurological: Negative.    Hematological: Negative.    Psychiatric/Behavioral: Negative.         Objective:      Physical Exam  Vitals and nursing note reviewed.   Constitutional:       General: He is not in acute distress.     Appearance: Normal appearance. He is well-developed and normal weight. He is not ill-appearing.   HENT:      Head: Normocephalic.   Eyes:      Conjunctiva/sclera: Conjunctivae normal.   Neck:      Thyroid: No thyromegaly.   Cardiovascular:      Rate and Rhythm: Normal rate and regular rhythm.      Heart sounds: Normal heart sounds. No murmur heard.  Pulmonary:      Effort: Pulmonary effort is normal.      Breath sounds: Normal breath sounds. No wheezing or rales.   Musculoskeletal:         General: Normal range of motion.      Cervical back:  Normal range of motion.      Right lower leg: No edema.      Left lower leg: No edema.   Skin:     General: Skin is warm and dry.   Neurological:      Mental Status: He is alert and oriented to person, place, and time. Mental status is at baseline.   Psychiatric:         Mood and Affect: Mood normal.         Behavior: Behavior normal.         Thought Content: Thought content normal.         Judgment: Judgment normal.         Assessment:       1. S/P carotid endarterectomy    2. Periumbilical abdominal pain    3. Primary insomnia    4. Essential hypertension        Plan:     1- fasting labs 1/24/25 then RTC for annual Wellness.  -  Visit today included increased complexity associated with the care of the episodic problem HTN addressed and managing the longitudinal care of the patient due to the serious and/or complex managed problem(s) HTN.   -    S/P carotid endarterectomy    Periumbilical abdominal pain  -     omeprazole (PRILOSEC) 40 MG capsule; Take 1 capsule (40 mg total) by mouth 2 (two) times daily before meals.    Primary insomnia    Essential hypertension        Risks, benefits, and side effects were discussed with the patient. All questions were answered to the fullest satisfaction of the patient, and pt verbalized understanding and agreement to treatment plan. Pt was to call with any new or worsening symptoms, or present to the ER.

## 2024-08-21 NOTE — PROGRESS NOTES
"Subjective:       Patient ID: Miles Vazquez is a 69 y.o. male.    Chief Complaint: Follow-up (3 month)  -  The pt is a 70 y/o male that presents for 3 mo follow up. Underwent left CEA 8/7/24 with Dr. Florian doing well and site is healed over.    Continue to have abd pain with EGD scheduled for 10/1/24. Reports still having intermittent abd pain.     HTN well controlled with current regimen. Pt appears to be doing very well thus will have RTC in Jan for Wellness.  -    Past Medical History:   Diagnosis Date    Abdominal aortic aneurysm (AAA) 05/2019    3.2 cm per CTA.--12/2020 4.3 x 4.7 by 4.5 cm--3/2022  4.8 x 4.5 cm    Abnormal findings on esophagogastroduodenoscopy (EGD) 04/2016    "intestinal metaplasia" which is a "risk factor" for gastric cancer.      Apical lung nodule 03/2022    Right    Bursitis of both hips     Colon polyp     COPD (chronic obstructive pulmonary disease) 2016    Coronary artery calcification seen on CAT scan 04/24/2013    Depression 2016    Accompanied drinking problem    GERD (gastroesophageal reflux disease)     HTN (hypertension) 01/07/2013    Hyperlipidemia 2019    Lumbar disc disease 06/2019    grade 1 spondylolisthesis of L5 on S1    Macrocytic anemia 03/27/2016    Neuropathy     Personal history of colonic polyps 2017    Recovering alcoholic 03/16/2016    RLS (restless legs syndrome) 2014    Sleep apnea 2 years ago    does not use cpap          Social History     Socioeconomic History    Marital status: Single   Tobacco Use    Smoking status: Every Day     Current packs/day: 1.00     Average packs/day: 0.6 packs/day for 111.6 years (71.1 ttl pk-yrs)     Types: Cigarettes     Start date: 1967    Smokeless tobacco: Never    Tobacco comments:     1 or 2 cigarettes a day.    5/23/24 Active participant with smoking cessation. 1X57 yrs   Substance and Sexual Activity    Alcohol use: Not Currently     Comment: Sobriety date 03/16/2016    Drug use: Not Currently     Types: Marijuana     " Comment: In the past.    Sexual activity: Not Currently     Partners: Female     Birth control/protection: Condom     Social Determinants of Health     Financial Resource Strain: High Risk (1/22/2024)    Overall Financial Resource Strain (CARDIA)     Difficulty of Paying Living Expenses: Very hard   Food Insecurity: Food Insecurity Present (1/22/2024)    Hunger Vital Sign     Worried About Running Out of Food in the Last Year: Sometimes true     Ran Out of Food in the Last Year: Sometimes true   Transportation Needs: No Transportation Needs (1/22/2024)    PRAPARE - Transportation     Lack of Transportation (Medical): No     Lack of Transportation (Non-Medical): No   Physical Activity: Unknown (1/22/2024)    Exercise Vital Sign     Days of Exercise per Week: 5 days   Stress: Stress Concern Present (1/22/2024)    [employer] [place] - Occupational Stress Questionnaire     Feeling of Stress : To some extent   Housing Stability: Unknown (1/22/2024)    Housing Stability Vital Sign     Unable to Pay for Housing in the Last Year: No     Unstable Housing in the Last Year: No       Family History   Problem Relation Name Age of Onset    Alcohol abuse Mother Veronica Resendiz         Cancer    Alcohol abuse Father Ramon Vazquez     Tuberculosis Maternal Grandmother      Stomach cancer Neg Hx      Colon cancer Neg Hx         Review of patient's allergies indicates:   Allergen Reactions    Seroquel [quetiapine] Other (See Comments)     RESTLESS LEGS          Current Outpatient Medications:     amitriptyline (ELAVIL) 100 MG tablet, Take 1 tablet (100 mg total) by mouth every evening., Disp: 90 tablet, Rfl: 3    aspirin 81 MG Chew, Take 81 mg by mouth once daily., Disp: , Rfl:     atorvastatin (LIPITOR) 40 MG tablet, Take 1 tablet (40 mg total) by mouth every evening., Disp: 90 tablet, Rfl: 3    benazepriL (LOTENSIN) 40 MG tablet, Take 1 tablet (40 mg total) by mouth once daily., Disp: 90 tablet, Rfl: 2     cilostazoL (PLETAL) 100 MG Tab, Take 1 tablet by mouth 2 (two) times daily., Disp: , Rfl:     clopidogreL (PLAVIX) 75 mg tablet, Take 1 tablet (75 mg total) by mouth once daily., Disp: 90 tablet, Rfl: 3    mirtazapine (REMERON) 15 MG tablet, Take 1 tablet (15 mg total) by mouth every evening., Disp: 30 tablet, Rfl: 11    multivit-min/FA/lycopen/lutein (CENTRUM SILVER MEN ORAL), Take by mouth., Disp: , Rfl:     rOPINIRole (REQUIP) 1 MG tablet, Take 1 tablet (1 mg total) by mouth every evening., Disp: 90 tablet, Rfl: 3    sucralfate (CARAFATE) 1 gram tablet, TAKE 1 TABLET BY MOUTH 4 TIMES DAILY BEFORE MEAL(S) AND NIGHTLY, Disp: 120 tablet, Rfl: 1    omeprazole (PRILOSEC) 40 MG capsule, Take 1 capsule (40 mg total) by mouth 2 (two) times daily before meals., Disp: , Rfl:     Follow-up  Associated symptoms include abdominal pain.     Review of Systems   Constitutional: Negative.    HENT: Negative.     Eyes: Negative.    Respiratory: Negative.     Cardiovascular: Negative.    Gastrointestinal:  Positive for abdominal pain.   Endocrine: Negative.    Genitourinary: Negative.    Musculoskeletal: Negative.    Skin: Negative.    Allergic/Immunologic: Negative.    Neurological: Negative.    Hematological: Negative.    Psychiatric/Behavioral: Negative.         Objective:      Physical Exam  Vitals and nursing note reviewed.   Constitutional:       General: He is not in acute distress.     Appearance: Normal appearance. He is well-developed and normal weight. He is not ill-appearing.   HENT:      Head: Normocephalic.   Eyes:      Conjunctiva/sclera: Conjunctivae normal.   Neck:      Thyroid: No thyromegaly.   Cardiovascular:      Rate and Rhythm: Normal rate and regular rhythm.      Heart sounds: Normal heart sounds. No murmur heard.  Pulmonary:      Effort: Pulmonary effort is normal.      Breath sounds: Normal breath sounds. No wheezing or rales.   Musculoskeletal:         General: Normal range of motion.      Cervical back:  Normal range of motion.      Right lower leg: No edema.      Left lower leg: No edema.   Skin:     General: Skin is warm and dry.   Neurological:      Mental Status: He is alert and oriented to person, place, and time. Mental status is at baseline.   Psychiatric:         Mood and Affect: Mood normal.         Behavior: Behavior normal.         Thought Content: Thought content normal.         Judgment: Judgment normal.         Assessment:       1. S/P carotid endarterectomy    2. Periumbilical abdominal pain    3. Primary insomnia    4. Essential hypertension        Plan:     1- fasting labs 1/24/25 then RTC for annual Wellness.  -  Visit today included increased complexity associated with the care of the episodic problem HTN addressed and managing the longitudinal care of the patient due to the serious and/or complex managed problem(s) HTN.   -    S/P carotid endarterectomy    Periumbilical abdominal pain  -     omeprazole (PRILOSEC) 40 MG capsule; Take 1 capsule (40 mg total) by mouth 2 (two) times daily before meals.    Primary insomnia    Essential hypertension        Risks, benefits, and side effects were discussed with the patient. All questions were answered to the fullest satisfaction of the patient, and pt verbalized understanding and agreement to treatment plan. Pt was to call with any new or worsening symptoms, or present to the ER.

## 2024-08-22 ENCOUNTER — TELEPHONE (OUTPATIENT)
Dept: SMOKING CESSATION | Facility: CLINIC | Age: 69
End: 2024-08-22
Payer: MEDICARE

## 2024-08-22 NOTE — PROGRESS NOTES
I have reviewed the notes, assessments, and/or procedures performed by Merna Finney NP, I concur with her documentation of Miles Vazquez.    Michelle Dunn MD

## 2024-08-27 NOTE — TELEPHONE ENCOUNTER
LOV 1/23/24   Sodium 134 on 8/27. Stable. Patient asymptomatic and continue to monitor.   Hyponatremia is likely due to Dehydration/hypovolemia. Sodium 134 on admit and given IVF's about 2 liters of normal saline and sodium improved and back to normal range on 8/26 at 136. The patient's most recent sodium results are listed below.  Recent Labs     08/26/24  0340 08/26/24  1957 08/27/24  0359    139 134*       Plan  - Correct the sodium by 4-6mEq in 24 hours.   - Monitor sodium Daily.   - Patient hyponatremia is improving.

## 2024-09-04 ENCOUNTER — TELEPHONE (OUTPATIENT)
Dept: FAMILY MEDICINE | Facility: CLINIC | Age: 69
End: 2024-09-04
Payer: MEDICARE

## 2024-09-04 NOTE — TELEPHONE ENCOUNTER
Okay for pt to stop plavix for 5 days to have EGD. Plavix to be resumed when approved by GI MD. Please notify pt for us.  Thanks, Merna

## 2024-09-04 NOTE — TELEPHONE ENCOUNTER
----- Message from Jessica Ayala LPN sent at 9/3/2024  8:19 AM CDT -----  Regarding: FW: Plavix Clearance Request  Can you okay this  ----- Message -----  From: Leana Ayala LPN  Sent: 9/2/2024   8:00 AM CDT  To: [facility] Staff  Subject: Plavix Clearance Request                         Good morning Dr. Finney,   Mr. Vazquez is scheduled for an EGD on 10/01/24 and colonoscopy on 10/15/24 with Dr. Deja Joseph. Patient states he is taking Plavix; therefore, we are requesting clearance for patient to hold medication for 5 days prior to scheduled procedures.     Thanks  Leana Ayala LPN

## 2024-09-05 ENCOUNTER — PATIENT MESSAGE (OUTPATIENT)
Dept: GASTROENTEROLOGY | Facility: CLINIC | Age: 69
End: 2024-09-05
Payer: MEDICARE

## 2024-09-05 ENCOUNTER — TELEPHONE (OUTPATIENT)
Dept: GASTROENTEROLOGY | Facility: CLINIC | Age: 69
End: 2024-09-05
Payer: MEDICARE

## 2024-09-05 NOTE — TELEPHONE ENCOUNTER
Attempted to reach out to patient to discuss Plavix Clearance received from his PCP's office but had to leave vmm that I will send portal message via [employer] regarding specifics on clearance. Left contact information on  for questions/concerns.

## 2024-09-05 NOTE — TELEPHONE ENCOUNTER
Attempted to reach out to patient once again but had to leave [facility] requesting pt call back or to view patient portal [employer] message sent regarding his clearance. Left my contact information as well.

## 2024-09-18 ENCOUNTER — TELEPHONE (OUTPATIENT)
Dept: GASTROENTEROLOGY | Facility: CLINIC | Age: 69
End: 2024-09-18
Payer: MEDICARE

## 2024-09-18 ENCOUNTER — PATIENT MESSAGE (OUTPATIENT)
Dept: GASTROENTEROLOGY | Facility: CLINIC | Age: 69
End: 2024-09-18
Payer: MEDICARE

## 2024-09-18 DIAGNOSIS — G25.81 RESTLESS LEG SYNDROME: ICD-10-CM

## 2024-09-18 NOTE — TELEPHONE ENCOUNTER
Got okay to schedule patient's EGD and colonoscopy on 10/15/24 with Dr. Joseph. Called patient; patient states he prefers 10/15/24 for both instead of 10/1/24 for EGD and colonoscopy scheduled separately on 10/15/24. Discussed prep instructions to follow is colonoscopy. Sent colonoscopy prep instructions while on call with patient with 10/15/24 date. Removed 10/01/24 EGD from [facility] and added EGD to colonoscopy scheduled on 10/15/24. Patient verbalized understanding that both procedures are now scheduled on 10/15/24 and to follow colonoscopy prep instructions.

## 2024-09-18 NOTE — TELEPHONE ENCOUNTER
----- Message from Fadi Edmonds sent at 9/18/2024  3:57 PM CDT -----  Type: Needs Medical Advice  Who Called:  pt  Pharmacy name and phone #:    Best Call Back Number: [phone]  Additional Information: pt is calling the office for his procedures. He has one on 10/1 and one on  10/15. He would like rto schedule them for the same day and complete it.. please have a nurse call him back to schedule, thansk

## 2024-09-19 NOTE — TELEPHONE ENCOUNTER
Refill Routing Note   Medication(s) are not appropriate for processing by [facility] for the following reason(s):        Non-participating provider    [facility] action(s):  Route               Appointments  past 12m or future 3m with PCP    Date Provider   Last Visit   8/21/2024 Puja Finney NP   Next Visit   1/28/2025 Puja Finney NP   ED visits in past 90 days: 0        Note composed:1:24 PM 09/19/2024

## 2024-09-21 RX ORDER — ROPINIROLE 1 MG/1
1 TABLET, FILM COATED ORAL NIGHTLY
Qty: 90 TABLET | Refills: 3 | Status: SHIPPED | OUTPATIENT
Start: 2024-09-21

## 2024-10-04 ENCOUNTER — TELEPHONE (OUTPATIENT)
Dept: GASTROENTEROLOGY | Facility: CLINIC | Age: 69
End: 2024-10-04
Payer: MEDICARE

## 2024-10-04 NOTE — TELEPHONE ENCOUNTER
----- Message from Cecille sent at 10/4/2024  3:35 PM CDT -----  Type: Needs Medical Advice  Who Called:  pt  Best Call Back Number: [phone]  Additional Information: pt is calling in regards to needing to have the office submit the pt's information for his upcoming procedure via fax. Pt says he has a summons from court and they are requesting that the office sends in the information about his upcoming procedure. Please submit all information via fax to [phone]. Please call the pt once the fax has been sent. Please call back and advise. Thanks!

## 2024-10-06 ENCOUNTER — E-VISIT (OUTPATIENT)
Dept: FAMILY MEDICINE | Facility: CLINIC | Age: 69
End: 2024-10-06
Payer: MEDICARE

## 2024-10-06 DIAGNOSIS — F51.01 PRIMARY INSOMNIA: Primary | ICD-10-CM

## 2024-10-06 RX ORDER — ZOLPIDEM TARTRATE 10 MG/1
10 TABLET ORAL NIGHTLY PRN
Qty: 30 TABLET | Refills: 2 | Status: SHIPPED | OUTPATIENT
Start: 2024-10-06 | End: 2025-04-06

## 2024-10-06 NOTE — PROGRESS NOTES
..Patient ID: Miles Vazquez is a 69 y.o. male.    Chief Complaint: General Illness (Entered automatically based on patient selection in [employer].)    The patient initiated a request through [employer] on 10/6/2024 for evaluation and management with a chief complaint of General Illness (Entered automatically based on patient selection in [employer].)     I evaluated the questionnaire submission on 10/6/24 6089.  -  Pt with h/o using ambien and requests refill via e-visit d/t being removed from his MAR.  -    [facility] E[facility]-Medication    10/6/2024 11:21 AM CDT - Filed by Patient   What do you need help with? Medication Request   Do you agree to participate in an E-Visit? Yes   If you have any of the following symptoms, please present to your local emergency room or call 911:  I acknowledge   Medication requests for narcotics will not be addressed via an E-Visit.  Please schedule an appointment. I acknowledge   Do you want to address a new or existing medication? I would like to address a medication I currently take   What is the main issue you would like addressed today? I need a refill of Zolpidem as soon as possible. It is not on my medications list.   Would you like to change or continue your medication? Continue medication   What medication would you like to continue?  Zolpidem 10mg. Rx #[id number]   Are you taking it as prescribed? Yes    What medical condition is the  medication intended to treat? Insomnia   Is the medication helping your condition? Yes   Are you having any side effects from the medication? No   Provide any additional information you feel is important.    Please attach any relevant images or files    Are you able to take your vital signs? No         Encounter Diagnosis   Name Primary?    Primary insomnia Yes        No orders of the defined types were placed in this encounter.     Medications Ordered This Encounter   Medications    zolpidem (AMBIEN) 10 mg Tab     Sig: Take 1 tablet (10 mg  total) by mouth nightly as needed (insomnia).     Dispense:  30 tablet     Refill:  2        No follow-ups on file.      E-Visit Time Tracking:    Day 1 Time (in minutes): 13    Total Time (in minutes): 13

## 2024-10-08 ENCOUNTER — TELEPHONE (OUTPATIENT)
Dept: GASTROENTEROLOGY | Facility: CLINIC | Age: 69
End: 2024-10-08
Payer: MEDICARE

## 2024-10-08 NOTE — TELEPHONE ENCOUNTER
----- Message from Sherin sent at 10/8/2024  9:07 AM CDT -----  Type:  RX Refill Request    Who Called: Pt   Refill or New Rx: New Rx   RX Name and Strength: prep for colonoscopy   Preferred Pharmacy with phone number:  [facility] [place], [place] - [address] [address]  Phone: [phone] Fax: [phone]  Local or Mail Order: Local   Ordering Provider: Opal   Would the patient rather a call back or a response via [facility]? Call back   Best Call Back Number: [phone]

## 2024-10-08 NOTE — TELEPHONE ENCOUNTER
Call placed to Mr. Vazquez in regards to message received. No answer, left message to return call.

## 2024-10-08 NOTE — TELEPHONE ENCOUNTER
Call placed to Mr. Vazquez, he stated he needs a Rx for his colon prep. I advised him it is all over the counter. He stated he called the store and they told him he needed a prescription. I apologized for the misinformation, but miraLAX and laxative tables are OTC. He verbalized understanding.No further issues noted.

## 2024-10-08 NOTE — TELEPHONE ENCOUNTER
----- Message from Sherin sent at 10/8/2024  8:39 AM CDT -----  Contact: Miles [phone]   Type:  Needs Medical Advice    Who Called: Miles Galeano Call Back Number: [phone]        Additional Information: PT is have procedure 10/15/24 need to know where you get the things need to to prep from.  Please call back to advise. Thank you!

## 2024-10-12 ENCOUNTER — NURSE TRIAGE (OUTPATIENT)
Dept: ADMINISTRATIVE | Facility: CLINIC | Age: 69
End: 2024-10-12
Payer: MEDICARE

## 2024-10-12 NOTE — TELEPHONE ENCOUNTER
Pt called with questions about his prep for upcoming colonoscopy. Instructions reviewed with pt, pt verbalized understanding.   Reason for Disposition   Question about upcoming scheduled surgery, procedure or test, no triage required, and triager able to answer question    Protocols used: Information Only Call - No Triage-A-

## 2024-10-14 NOTE — TELEPHONE ENCOUNTER
Call placed to Mr. Vazquez in regard to message received from Triage nurse. He stated they answered his questions and he is good to go for his colonoscopy tomorrow.

## 2024-10-15 ENCOUNTER — ANESTHESIA (OUTPATIENT)
Dept: ENDOSCOPY | Facility: HOSPITAL | Age: 69
End: 2024-10-15
Payer: MEDICARE

## 2024-10-15 ENCOUNTER — ANESTHESIA EVENT (OUTPATIENT)
Dept: ENDOSCOPY | Facility: HOSPITAL | Age: 69
End: 2024-10-15
Payer: MEDICARE

## 2024-10-15 ENCOUNTER — HOSPITAL ENCOUNTER (OUTPATIENT)
Facility: HOSPITAL | Age: 69
Discharge: HOME | End: 2024-10-15
Attending: INTERNAL MEDICINE | Admitting: NURSE PRACTITIONER
Payer: MEDICARE

## 2024-10-15 VITALS
HEIGHT: 67 IN | SYSTOLIC BLOOD PRESSURE: 150 MMHG | OXYGEN SATURATION: 96 % | WEIGHT: 130 LBS | TEMPERATURE: 97 F | HEART RATE: 73 BPM | RESPIRATION RATE: 16 BRPM | BODY MASS INDEX: 20.4 KG/M2 | DIASTOLIC BLOOD PRESSURE: 67 MMHG

## 2024-10-15 DIAGNOSIS — R63.4 WEIGHT LOSS: ICD-10-CM

## 2024-10-15 PROCEDURE — 27201089 HC SNARE, DISP (ANY): Performed by: INTERNAL MEDICINE

## 2024-10-15 PROCEDURE — 45385 COLONOSCOPY W/LESION REMOVAL: CPT | Mod: ,,, | Performed by: INTERNAL MEDICINE

## 2024-10-15 PROCEDURE — 43239 EGD BIOPSY SINGLE/MULTIPLE: CPT | Performed by: INTERNAL MEDICINE

## 2024-10-15 PROCEDURE — 27201012 HC FORCEPS, HOT/COLD, DISP: Performed by: INTERNAL MEDICINE

## 2024-10-15 PROCEDURE — 43239 EGD BIOPSY SINGLE/MULTIPLE: CPT | Mod: 51,,, | Performed by: INTERNAL MEDICINE

## 2024-10-15 PROCEDURE — 37000009 HC ANESTHESIA EA ADD 15 MINS: Performed by: INTERNAL MEDICINE

## 2024-10-15 PROCEDURE — 45385 COLONOSCOPY W/LESION REMOVAL: CPT | Performed by: INTERNAL MEDICINE

## 2024-10-15 PROCEDURE — 63600175 PHARM REV CODE 636 W HCPCS: Performed by: STUDENT IN AN ORGANIZED HEALTH CARE EDUCATION/TRAINING PROGRAM

## 2024-10-15 PROCEDURE — 88305 TISSUE EXAM BY PATHOLOGIST: CPT | Mod: TC,59 | Performed by: PATHOLOGY

## 2024-10-15 PROCEDURE — 37000008 HC ANESTHESIA 1ST 15 MINUTES: Performed by: INTERNAL MEDICINE

## 2024-10-15 RX ORDER — LIDOCAINE HYDROCHLORIDE 20 MG/ML
INJECTION INTRAVENOUS
Status: DISCONTINUED | OUTPATIENT
Start: 2024-10-15 | End: 2024-10-15

## 2024-10-15 RX ORDER — PROPOFOL 10 MG/ML
VIAL (ML) INTRAVENOUS
Status: DISCONTINUED | OUTPATIENT
Start: 2024-10-15 | End: 2024-10-15

## 2024-10-15 RX ADMIN — PROPOFOL 30 MG: 10 INJECTION, EMULSION INTRAVENOUS at 09:10

## 2024-10-15 RX ADMIN — PROPOFOL 30 MG: 10 INJECTION, EMULSION INTRAVENOUS at 10:10

## 2024-10-15 RX ADMIN — PROPOFOL 80 MG: 10 INJECTION, EMULSION INTRAVENOUS at 09:10

## 2024-10-15 RX ADMIN — PROPOFOL 20 MG: 10 INJECTION, EMULSION INTRAVENOUS at 09:10

## 2024-10-15 RX ADMIN — LIDOCAINE HYDROCHLORIDE 80 MG: 20 INJECTION, SOLUTION INTRAVENOUS at 09:10

## 2024-10-15 NOTE — H&P
"[facility]    CC: Weight loss, abdominal pain    HPI 69 y.o. male presents for evaluation of weight loss and abdominal pain    Past Medical History:   Diagnosis Date    Abdominal aortic aneurysm (AAA) 05/2019    3.2 cm per CTA.--12/2020 4.3 x 4.7 by 4.5 cm--3/2022  4.8 x 4.5 cm    Abnormal findings on esophagogastroduodenoscopy (EGD) 04/2016    "intestinal metaplasia" which is a "risk factor" for gastric cancer.      Apical lung nodule 03/2022    Right    Bursitis of both hips     Colon polyp     COPD (chronic obstructive pulmonary disease) 2016    Coronary artery calcification seen on CAT scan 04/24/2013    Depression 2016    Accompanied drinking problem    GERD (gastroesophageal reflux disease)     HTN (hypertension) 01/07/2013    Hyperlipidemia 2019    Lumbar disc disease 06/2019    grade 1 spondylolisthesis of L5 on S1    Macrocytic anemia 03/27/2016    Neuropathy     Personal history of colonic polyps 2017    Recovering alcoholic 03/16/2016    RLS (restless legs syndrome) 2014    Sleep apnea 2 years ago    does not use cpap       Allergies and Medications reviewed     Review of Systems  General ROS: negative for - chills, fever ; + weight loss  Cardiovascular ROS: no chest pain or dyspnea on exertion  Gastrointestinal ROS: + abdominal pain    Physical Examination  BP (!) 162/75 (BP Location: Left arm, Patient Position: Lying)   Pulse 93   Temp 97.7 °F (36.5 °C) (Skin)   Resp 16   Ht 5' 7" (1.702 m)   Wt 59 kg (130 lb)   SpO2 99%   BMI 20.36 kg/m²   General appearance: alert, cooperative, no distress  HENT: Normocephalic, atraumatic, neck symmetrical, no nasal discharge, sclera anicteric   Lungs: clear to auscultation bilaterally, symmetric chest wall expansion bilaterally  Heart: regular rate and rhythm without rub; no displacement of the PMI   Abdomen: soft  Extremities: extremities symmetric; no clubbing, cyanosis, or edema        Labs:  Lab Results   Component Value Date    WBC 8.20 " 08/06/2024    HGB 12.7 (L) 08/06/2024    HCT 38.5 (L) 08/06/2024    MCV 93 08/06/2024     08/06/2024       Assessment:   69 y.o. male presents for EGD and colonoscopy    Plan:  -Proceed to endoscopy     Deja Joseph MD  [facility]  [address], [address]  [place] [address]  Office: [phone]  Fax: [phone]

## 2024-10-15 NOTE — [employer] PATIENT INSTRUCTIONS
Discharge Summary/Instructions after an Endoscopic Procedure  Patient Name: Miles Saldaña  Patient MRN: [id number]  Patient YOB: 1955  Tuesday, October 15, 2024  Deja Joseph MD  Dear patient,  As a result of recent federal legislation (The Federal Cures Act), you may   receive lab or pathology results from your procedure in your [facility]   account before your physician is able to contact you. Your physician or   their representative will relay the results to you with their   recommendations at their soonest availability.  Thank you,  RESTRICTIONS:  During your procedure today, you received medications for sedation.  These   medications may affect your judgment, balance and coordination.  Therefore,   for 24 hours, you have the following restrictions:   - DO NOT drive a car, operate machinery, make legal/financial decisions,   sign important papers or drink alcohol.    ACTIVITY:  Today: no heavy lifting, straining or running due to procedural   sedation/anesthesia.  The following day: return to full activity including work.  DIET:  Eat and drink normally unless instructed otherwise.     TREATMENT FOR COMMON SIDE EFFECTS:  - Mild abdominal pain, nausea, belching, bloating or excessive gas:  rest,   eat lightly and use a heating pad.  - Sore Throat: treat with throat lozenges and/or gargle with warm salt   water.  - Because air was used during the procedure, expelling large amounts of air   from your rectum or belching is normal.  - If a bowel prep was taken, you may not have a bowel movement for 1-3 days.    This is normal.  SYMPTOMS TO WATCH FOR AND REPORT TO YOUR PHYSICIAN:  1. Abdominal pain or bloating, other than gas cramps.  2. Chest pain.  3. Back pain.  4. Signs of infection such as: chills or fever occurring within 24 hours   after the procedure.  5. Rectal bleeding, which would show as bright red, maroon, or black stools.   (A tablespoon of blood from the rectum is not serious,  especially if   hemorrhoids are present.)  6. Vomiting.  7. Weakness or dizziness.  GO DIRECTLY TO THE NEAREST EMERGENCY ROOM IF YOU HAVE ANY OF THE FOLLOWING:      Difficulty breathing              Chills and/or fever over 101 F   Persistent vomiting and/or vomiting blood   Severe abdominal pain   Severe chest pain   Black, tarry stools   Bleeding- more than one tablespoon   Any other symptom or condition that you feel may need urgent attention  Your doctor recommends these additional instructions:  If any biopsies were taken, your doctors clinic will contact you in 1 to 2   weeks with any results.  - Discharge patient to home (with escort).   - Patient has a contact number available for emergencies.  The signs and   symptoms of potential delayed complications were discussed with the   patient.  Return to normal activities tomorrow.  Written discharge   instructions were provided to the patient.   - Resume previous diet.   - Continue present medications.   - Await pathology results.   - Repeat colonoscopy in 5 years for surveillance based on pathology results.     - Return to my office PRN.  For questions, problems or results please call your physician - Deja Joseph MD at Work:  [phone].  [facility], EMERGENCY ROOM PHONE NUMBER: [phone]  IF A COMPLICATION OR EMERGENCY SITUATION ARISES AND YOU ARE UNABLE TO REACH   YOUR PHYSICIAN - GO DIRECTLY TO THE EMERGENCY ROOM.  Deja Joseph MD  10/15/2024 10:19:12 AM  This report has been verified and signed electronically.  Dear patient,  As a result of recent federal legislation (The Federal Cures Act), you may   receive lab or pathology results from your procedure in your [facility]   account before your physician is able to contact you. Your physician or   their representative will relay the results to you with their   recommendations at their soonest availability.  Thank you,  PROVATION

## 2024-10-15 NOTE — TRANSFER OF CARE
"Anesthesia Transfer of Care Note    Patient: Miles Vazquez    Procedure(s) Performed: Procedure(s) (LRB):  COLONOSCOPY (N/A)  EGD (ESOPHAGOGASTRODUODENOSCOPY) (N/A)    Patient location: GI    Anesthesia Type: general    Transport from OR: Transported from OR on room air with adequate spontaneous ventilation    Post pain: adequate analgesia    Post assessment: no apparent anesthetic complications    Post vital signs: stable    Level of consciousness: lethargic and responds to stimulation    Nausea/Vomiting: no nausea/vomiting    Complications: none    Transfer of care protocol was followed      Last vitals: Visit Vitals  BP (!) 162/75 (BP Location: Left arm, Patient Position: Lying)   Pulse 93   Temp 36.5 °C (97.7 °F) (Skin)   Resp 16   Ht 5' 7" (1.702 m)   Wt 59 kg (130 lb)   SpO2 99%   BMI 20.36 kg/m²     "

## 2024-10-15 NOTE — PROVATION PATIENT INSTRUCTIONS
Discharge Summary/Instructions after an Endoscopic Procedure  Patient Name: Miles Saldaña  Patient MRN: [id number]  Patient YOB: 1955  Tuesday, October 15, 2024  Deja Joseph MD  Dear patient,  As a result of recent federal legislation (The Federal Cures Act), you may   receive lab or pathology results from your procedure in your [facility]   account before your physician is able to contact you. Your physician or   their representative will relay the results to you with their   recommendations at their soonest availability.  Thank you,  RESTRICTIONS:  During your procedure today, you received medications for sedation.  These   medications may affect your judgment, balance and coordination.  Therefore,   for 24 hours, you have the following restrictions:   - DO NOT drive a car, operate machinery, make legal/financial decisions,   sign important papers or drink alcohol.    ACTIVITY:  Today: no heavy lifting, straining or running due to procedural   sedation/anesthesia.  The following day: return to full activity including work.  DIET:  Eat and drink normally unless instructed otherwise.     TREATMENT FOR COMMON SIDE EFFECTS:  - Mild abdominal pain, nausea, belching, bloating or excessive gas:  rest,   eat lightly and use a heating pad.  - Sore Throat: treat with throat lozenges and/or gargle with warm salt   water.  - Because air was used during the procedure, expelling large amounts of air   from your rectum or belching is normal.  - If a bowel prep was taken, you may not have a bowel movement for 1-3 days.    This is normal.  SYMPTOMS TO WATCH FOR AND REPORT TO YOUR PHYSICIAN:  1. Abdominal pain or bloating, other than gas cramps.  2. Chest pain.  3. Back pain.  4. Signs of infection such as: chills or fever occurring within 24 hours   after the procedure.  5. Rectal bleeding, which would show as bright red, maroon, or black stools.   (A tablespoon of blood from the rectum is not serious,  especially if   hemorrhoids are present.)  6. Vomiting.  7. Weakness or dizziness.  GO DIRECTLY TO THE NEAREST EMERGENCY ROOM IF YOU HAVE ANY OF THE FOLLOWING:      Difficulty breathing              Chills and/or fever over 101 F   Persistent vomiting and/or vomiting blood   Severe abdominal pain   Severe chest pain   Black, tarry stools   Bleeding- more than one tablespoon   Any other symptom or condition that you feel may need urgent attention  Your doctor recommends these additional instructions:  If any biopsies were taken, your doctors clinic will contact you in 1 to 2   weeks with any results.  - Await pathology results.   - Discharge patient to home (with escort).   - Patient has a contact number available for emergencies.  The signs and   symptoms of potential delayed complications were discussed with the   patient.  Return to normal activities tomorrow.  Written discharge   instructions were provided to the patient.   - Resume previous diet.   - Continue present medications.   -Daily PPI  For questions, problems or results please call your physician - Deja Joseph MD at Work:  [phone].  [facility], EMERGENCY ROOM PHONE NUMBER: [phone]  IF A COMPLICATION OR EMERGENCY SITUATION ARISES AND YOU ARE UNABLE TO REACH   YOUR PHYSICIAN - GO DIRECTLY TO THE EMERGENCY ROOM.  Deja Joseph MD  10/15/2024 9:58:52 AM  This report has been verified and signed electronically.  Dear patient,  As a result of recent federal legislation (The Federal Cures Act), you may   receive lab or pathology results from your procedure in your [facility]   account before your physician is able to contact you. Your physician or   their representative will relay the results to you with their   recommendations at their soonest availability.  Thank you,  PROVATION

## 2024-10-15 NOTE — PLAN OF CARE
Vss, isaac po fluids, denies pain, ambulates easily. IV removed, catheter intact. Discharge instructions provided and states understanding. States ready to go home.  Discharged from facility with family per wheelchair.

## 2024-10-15 NOTE — ANESTHESIA POSTPROCEDURE EVALUATION
Anesthesia Post Evaluation    Patient: Miles Vazquez    Procedure(s) Performed: Procedure(s) (LRB):  COLONOSCOPY (N/A)  EGD (ESOPHAGOGASTRODUODENOSCOPY) (N/A)    Final Anesthesia Type: general      Patient location during evaluation: PACU  Patient participation: Yes- Able to Participate  Level of consciousness: awake  Post-procedure vital signs: reviewed and stable  Pain management: adequate  Airway patency: patent    PONV status at discharge: No PONV  Anesthetic complications: no      Cardiovascular status: blood pressure returned to baseline  Respiratory status: unassisted  Hydration status: euvolemic  Follow-up not needed.              Vitals Value Taken Time   /67 10/15/24 1035   Temp 36.2 °C (97.2 °F) 10/15/24 1030   Pulse 73 10/15/24 1035   Resp 16 10/15/24 1035   SpO2 96 % 10/15/24 1035         Event Time   Out of Recovery 10:47:29         Pain/Patricia Score: Patricia Score: 10 (10/15/2024 10:36 AM)

## 2024-10-15 NOTE — ANESTHESIA PREPROCEDURE EVALUATION
10/15/2024  Miles Vazquez is a 69 y.o., male.            Pre-op Assessment    I have reviewed the Patient Summary Reports.     I have reviewed the Nursing Notes. I have reviewed the NPO Status.   I have reviewed the Medications.     Review of Systems  Anesthesia Hx:  No problems with previous Anesthesia                Social:  Smoker       Hematology/Oncology:       -- Anemia:                                  Cardiovascular:     Hypertension   CAD          PVD hyperlipidemia   ECG has been reviewed. AAA  EF - 60%     Coronary Artery Disease:                            Hypertension         Pulmonary:   COPD     Sleep Apnea    Chronic Obstructive Pulmonary Disease (COPD):           Obstructive Sleep Apnea (OMAR).           Hepatic/GI:     GERD                Musculoskeletal:         Spine Disorders: lumbar            Neurological:    Neuromuscular Disease,                                 Neuromuscular Disease   Psych:  Psychiatric History                  Physical Exam  General: Well nourished, Cooperative, Alert and Oriented    Airway:  Mallampati: II   Mouth Opening: Normal  TM Distance: Normal  Tongue: Normal  Neck ROM: Normal ROM    Dental:  Intact        Anesthesia Plan  Type of Anesthesia, risks & benefits discussed:    Anesthesia Type: Gen Natural Airway  Intra-op Monitoring Plan: Standard ASA Monitors  Induction:  IV  Informed Consent: Informed consent signed with the Patient and all parties understand the risks and agree with anesthesia plan.  All questions answered.   ASA Score: 3    Ready For Surgery From Anesthesia Perspective.     .

## 2024-10-30 ENCOUNTER — HOSPITAL ENCOUNTER (OUTPATIENT)
Dept: RADIOLOGY | Facility: HOSPITAL | Age: 69
Discharge: HOME | End: 2024-10-30
Attending: STUDENT IN AN ORGANIZED HEALTH CARE EDUCATION/TRAINING PROGRAM
Payer: MEDICARE

## 2024-10-30 DIAGNOSIS — F17.218 NICOTINE DEPENDENCE, CIGARETTES, WITH OTHER NICOTINE-INDUCED DISORDERS: ICD-10-CM

## 2024-10-30 PROCEDURE — 71271 CT THORAX LUNG CANCER SCR C-: CPT | Mod: 26,,, | Performed by: RADIOLOGY

## 2024-10-30 PROCEDURE — 71271 CT THORAX LUNG CANCER SCR C-: CPT | Mod: TC

## 2024-11-25 ENCOUNTER — TELEPHONE (OUTPATIENT)
Dept: SMOKING CESSATION | Facility: CLINIC | Age: 69
End: 2024-11-25
Payer: MEDICARE

## 2024-11-26 DIAGNOSIS — I10 ESSENTIAL HYPERTENSION: ICD-10-CM

## 2024-11-26 RX ORDER — BENAZEPRIL HYDROCHLORIDE 20 MG/1
20 TABLET ORAL DAILY
Qty: 90 TABLET | Refills: 0 | Status: SHIPPED | OUTPATIENT
Start: 2024-11-26

## 2024-11-26 NOTE — TELEPHONE ENCOUNTER
Please see the attached refill request. Dr. Armas last filled this. PCP had discontinued prior to. Thank you

## 2025-01-24 ENCOUNTER — LAB VISIT (OUTPATIENT)
Dept: LAB | Facility: HOSPITAL | Age: 70
End: 2025-01-24
Attending: NURSE PRACTITIONER
Payer: MEDICARE

## 2025-01-24 DIAGNOSIS — Z12.5 PROSTATE CANCER SCREENING: ICD-10-CM

## 2025-01-24 DIAGNOSIS — Z00.00 WELL ADULT EXAM: ICD-10-CM

## 2025-01-24 DIAGNOSIS — Z13.1 SCREENING FOR DIABETES MELLITUS (DM): ICD-10-CM

## 2025-01-24 DIAGNOSIS — E78.2 MIXED HYPERLIPIDEMIA: ICD-10-CM

## 2025-01-24 DIAGNOSIS — R73.01 ELEVATED FASTING GLUCOSE: ICD-10-CM

## 2025-01-24 DIAGNOSIS — I10 ESSENTIAL HYPERTENSION: ICD-10-CM

## 2025-01-24 LAB
ALBUMIN SERPL BCP-MCNC: 3.5 G/DL (ref 3.5–5.2)
ALP SERPL-CCNC: 122 U/L (ref 40–150)
ALT SERPL W/O P-5'-P-CCNC: 17 U/L (ref 10–44)
ANION GAP SERPL CALC-SCNC: 11 MMOL/L (ref 8–16)
AST SERPL-CCNC: 18 U/L (ref 10–40)
BASOPHILS # BLD AUTO: 0.06 K/UL (ref 0–0.2)
BASOPHILS NFR BLD: 0.6 % (ref 0–1.9)
BILIRUB SERPL-MCNC: 0.3 MG/DL (ref 0.1–1)
BUN SERPL-MCNC: 21 MG/DL (ref 8–23)
CALCIUM SERPL-MCNC: 9.5 MG/DL (ref 8.7–10.5)
CHLORIDE SERPL-SCNC: 108 MMOL/L (ref 95–110)
CHOLEST SERPL-MCNC: 213 MG/DL (ref 120–199)
CHOLEST/HDLC SERPL: 5.8 {RATIO} (ref 2–5)
CO2 SERPL-SCNC: 23 MMOL/L (ref 23–29)
COMPLEXED PSA SERPL-MCNC: 0.75 NG/ML (ref 0–4)
CREAT SERPL-MCNC: 1 MG/DL (ref 0.5–1.4)
DIFFERENTIAL METHOD BLD: ABNORMAL
EOSINOPHIL # BLD AUTO: 0.4 K/UL (ref 0–0.5)
EOSINOPHIL NFR BLD: 3.4 % (ref 0–8)
ERYTHROCYTE [DISTWIDTH] IN BLOOD BY AUTOMATED COUNT: 13.7 % (ref 11.5–14.5)
EST. GFR  (NO RACE VARIABLE): >60 ML/MIN/1.73 M^2
ESTIMATED AVG GLUCOSE: 111 MG/DL (ref 68–131)
GLUCOSE SERPL-MCNC: 83 MG/DL (ref 70–110)
HBA1C MFR BLD: 5.5 % (ref 4–5.6)
HCT VFR BLD AUTO: 43.9 % (ref 40–54)
HDLC SERPL-MCNC: 37 MG/DL (ref 40–75)
HDLC SERPL: 17.4 % (ref 20–50)
HGB BLD-MCNC: 14.5 G/DL (ref 14–18)
IMM GRANULOCYTES # BLD AUTO: 0.04 K/UL (ref 0–0.04)
IMM GRANULOCYTES NFR BLD AUTO: 0.4 % (ref 0–0.5)
LDLC SERPL CALC-MCNC: 123.2 MG/DL (ref 63–159)
LYMPHOCYTES # BLD AUTO: 2.7 K/UL (ref 1–4.8)
LYMPHOCYTES NFR BLD: 26.1 % (ref 18–48)
MCH RBC QN AUTO: 30.4 PG (ref 27–31)
MCHC RBC AUTO-ENTMCNC: 33 G/DL (ref 32–36)
MCV RBC AUTO: 92 FL (ref 82–98)
MONOCYTES # BLD AUTO: 1.2 K/UL (ref 0.3–1)
MONOCYTES NFR BLD: 11.1 % (ref 4–15)
NEUTROPHILS # BLD AUTO: 6.1 K/UL (ref 1.8–7.7)
NEUTROPHILS NFR BLD: 58.4 % (ref 38–73)
NONHDLC SERPL-MCNC: 176 MG/DL
NRBC BLD-RTO: 0 /100 WBC
PLATELET # BLD AUTO: 207 K/UL (ref 150–450)
PMV BLD AUTO: 10.9 FL (ref 9.2–12.9)
POTASSIUM SERPL-SCNC: 3.6 MMOL/L (ref 3.5–5.1)
PROT SERPL-MCNC: 7.1 G/DL (ref 6–8.4)
RBC # BLD AUTO: 4.77 M/UL (ref 4.6–6.2)
SODIUM SERPL-SCNC: 142 MMOL/L (ref 136–145)
TRIGL SERPL-MCNC: 264 MG/DL (ref 30–150)
TSH SERPL DL<=0.005 MIU/L-ACNC: 1.59 UIU/ML (ref 0.4–4)
WBC # BLD AUTO: 10.36 K/UL (ref 3.9–12.7)

## 2025-01-24 PROCEDURE — 84443 ASSAY THYROID STIM HORMONE: CPT | Performed by: NURSE PRACTITIONER

## 2025-01-24 PROCEDURE — 36415 COLL VENOUS BLD VENIPUNCTURE: CPT | Performed by: NURSE PRACTITIONER

## 2025-01-24 PROCEDURE — 80061 LIPID PANEL: CPT | Performed by: NURSE PRACTITIONER

## 2025-01-24 PROCEDURE — 85025 COMPLETE CBC W/AUTO DIFF WBC: CPT | Performed by: NURSE PRACTITIONER

## 2025-01-24 PROCEDURE — 84153 ASSAY OF PSA TOTAL: CPT | Performed by: NURSE PRACTITIONER

## 2025-01-24 PROCEDURE — 83036 HEMOGLOBIN GLYCOSYLATED A1C: CPT | Performed by: NURSE PRACTITIONER

## 2025-01-24 PROCEDURE — 80053 COMPREHEN METABOLIC PANEL: CPT | Performed by: NURSE PRACTITIONER

## 2025-01-27 ENCOUNTER — TELEPHONE (OUTPATIENT)
Dept: FAMILY MEDICINE | Facility: CLINIC | Age: 70
End: 2025-01-27
Payer: MEDICARE

## 2025-01-28 ENCOUNTER — OFFICE VISIT (OUTPATIENT)
Dept: FAMILY MEDICINE | Facility: CLINIC | Age: 70
End: 2025-01-28
Payer: MEDICARE

## 2025-01-28 VITALS
HEIGHT: 67 IN | DIASTOLIC BLOOD PRESSURE: 70 MMHG | HEART RATE: 99 BPM | SYSTOLIC BLOOD PRESSURE: 120 MMHG | BODY MASS INDEX: 21.25 KG/M2 | WEIGHT: 135.38 LBS

## 2025-01-28 DIAGNOSIS — I73.9 CLAUDICATION: ICD-10-CM

## 2025-01-28 DIAGNOSIS — F51.01 PRIMARY INSOMNIA: ICD-10-CM

## 2025-01-28 DIAGNOSIS — I10 ESSENTIAL HYPERTENSION: ICD-10-CM

## 2025-01-28 DIAGNOSIS — I73.9 PVD (PERIPHERAL VASCULAR DISEASE): ICD-10-CM

## 2025-01-28 DIAGNOSIS — F32.A ANXIETY AND DEPRESSION: ICD-10-CM

## 2025-01-28 DIAGNOSIS — R10.84 GENERALIZED ABDOMINAL PAIN: Primary | ICD-10-CM

## 2025-01-28 DIAGNOSIS — G62.9 NEUROPATHY: ICD-10-CM

## 2025-01-28 DIAGNOSIS — F41.9 ANXIETY AND DEPRESSION: ICD-10-CM

## 2025-01-28 PROBLEM — F10.21 RECOVERING ALCOHOLIC IN REMISSION: Status: RESOLVED | Noted: 2018-08-30 | Resolved: 2025-01-28

## 2025-01-28 PROBLEM — F13.20 BENZODIAZEPINE DEPENDENCE: Status: RESOLVED | Noted: 2024-01-31 | Resolved: 2025-01-28

## 2025-01-28 PROBLEM — I74.09 OTHER ARTERIAL EMBOLISM AND THROMBOSIS OF ABDOMINAL AORTA: Status: RESOLVED | Noted: 2024-01-31 | Resolved: 2025-01-28

## 2025-01-28 PROBLEM — Z78.9 EXCESSIVE CAFFEINE INTAKE: Status: RESOLVED | Noted: 2019-06-07 | Resolved: 2025-01-28

## 2025-01-28 PROCEDURE — 99214 OFFICE O/P EST MOD 30 MIN: CPT | Mod: PBBFAC | Performed by: NURSE PRACTITIONER

## 2025-01-28 PROCEDURE — 99999 PR PBB SHADOW E&M-EST. PATIENT-LVL IV: CPT | Mod: PBBFAC,,, | Performed by: NURSE PRACTITIONER

## 2025-01-28 PROCEDURE — 99214 OFFICE O/P EST MOD 30 MIN: CPT | Mod: S$PBB,,, | Performed by: NURSE PRACTITIONER

## 2025-01-28 RX ORDER — CLOPIDOGREL BISULFATE 75 MG/1
75 TABLET ORAL DAILY
Start: 2025-01-28 | End: 2026-01-28

## 2025-01-28 RX ORDER — CILOSTAZOL 100 MG/1
100 TABLET ORAL 2 TIMES DAILY
Start: 2025-01-28

## 2025-01-28 RX ORDER — DULOXETIN HYDROCHLORIDE 60 MG/1
60 CAPSULE, DELAYED RELEASE ORAL DAILY
Qty: 90 CAPSULE | Refills: 3 | Status: SHIPPED | OUTPATIENT
Start: 2025-01-28 | End: 2026-01-28

## 2025-01-28 RX ORDER — GABAPENTIN 300 MG/1
300 CAPSULE ORAL 2 TIMES DAILY
Qty: 60 CAPSULE | Refills: 5 | Status: SHIPPED | OUTPATIENT
Start: 2025-01-28

## 2025-01-28 NOTE — PROGRESS NOTES
"dSubjective:       Patient ID: Miles Vazquez is a 69 y.o. male.    Chief Complaint:   History of Present Illness    CHIEF COMPLAINT:  Patient presents today for follow up of abdominal pain and weight loss.    GASTROINTESTINAL:  Endoscopy at [facility] showed active duodenitis. He takes omeprazole twice daily. Discussed chronic abd pain with RELL/depression as possible cause  RELL-7 severe 15  PHQ-9 Moderate 12    NEUROLOGICAL:  He reports numbness and nerve sensitivity in the face, particularly around the eye, describing soreness and a pulling sensation with electrical-like sensitivity. He experiences neuropathy in his feet since discontinuing alcohol use, with intermittent symptoms occurring in different locations.    SLEEP:  He has difficulty with sleep onset due to racing thoughts. While current medications help maintain sleep, initial sleep onset remains problematic. He experiences foot and ankle cramping at night which disturbs sleep. Ropinirole has been effective in controlling restless leg symptoms.    CARDIOVASCULAR:  Home blood pressure readings typically run 120/70s. He experiences severe claudication pain from hips when walking for 2-3 minutes, requiring frequent breaks during household work. He needs to rest for 5-10 minutes after 10-15 minutes of activity.    MEDICATIONS:  Current medications include Benazepril 40mg, Plavix, amitriptyline at night, ropinirole, and omeprazole twice daily.    SOCIAL HISTORY:  He currently smokes but is attempting to quit using Chantix for the past couple months. He consumes a couple cups of caffeine in the morning. He quit alcohol consumption 8-9 years ago.        Past Medical History:   Diagnosis Date    Abdominal aortic aneurysm (AAA) 05/2019    3.2 cm per CTA.--12/2020 4.3 x 4.7 by 4.5 cm--3/2022  4.8 x 4.5 cm    Abnormal findings on esophagogastroduodenoscopy (EGD) 04/2016    "intestinal metaplasia" which is a "risk factor" for gastric cancer.      Apical lung " nodule 03/2022    Right    Bursitis of both hips     Colon polyp     COPD (chronic obstructive pulmonary disease) 2016    Coronary artery calcification seen on CAT scan 04/24/2013    Depression 2016    Accompanied drinking problem    GERD (gastroesophageal reflux disease)     HTN (hypertension) 01/07/2013    Hyperlipidemia 2019    Lumbar disc disease 06/2019    grade 1 spondylolisthesis of L5 on S1    Macrocytic anemia 03/27/2016    Neuropathy     Personal history of colonic polyps 2017    Recovering alcoholic 03/16/2016    RLS (restless legs syndrome) 2014    Sleep apnea 2 years ago    does not use cpap            Social History     Socioeconomic History    Marital status: Single   Tobacco Use    Smoking status: Every Day     Current packs/day: 1.00     Average packs/day: 0.6 packs/day for 112.1 years (71.6 ttl pk-yrs)     Types: Cigarettes     Start date: 1967    Smokeless tobacco: Never    Tobacco comments:     1 or 2 cigarettes a day.    5/23/24 Active participant with smoking cessation. 1X57 yrs   Substance and Sexual Activity    Alcohol use: Not Currently     Comment: Sobriety date 03/16/2016    Drug use: Not Currently     Types: Marijuana     Comment: In the past.    Sexual activity: Not Currently     Partners: Female     Birth control/protection: Condom     Social Drivers of Health     Financial Resource Strain: High Risk (1/22/2024)    Overall Financial Resource Strain (CARDIA)     Difficulty of Paying Living Expenses: Very hard   Food Insecurity: Food Insecurity Present (1/22/2024)    Hunger Vital Sign     Worried About Running Out of Food in the Last Year: Sometimes true     Ran Out of Food in the Last Year: Sometimes true   Transportation Needs: No Transportation Needs (1/22/2024)    PRAPARE - Transportation     Lack of Transportation (Medical): No     Lack of Transportation (Non-Medical): No   Physical Activity: Unknown (1/22/2024)    Exercise Vital Sign     Days of Exercise per Week: 5 days   Stress:  Stress Concern Present (1/22/2024)    [employer] [place] [employer] - Occupational Stress Questionnaire     Feeling of Stress : To some extent   Housing Stability: Unknown (1/22/2024)    Housing Stability Vital Sign     Unable to Pay for Housing in the Last Year: No     Unstable Housing in the Last Year: No       Family History   Problem Relation Name Age of Onset    Alcohol abuse Mother Veronica Resendiz         Cancer    Alcohol abuse Father Ramon Vazquez     Tuberculosis Maternal Grandmother      Stomach cancer Neg Hx      Colon cancer Neg Hx         Review of patient's allergies indicates:   Allergen Reactions    Seroquel [quetiapine] Other (See Comments)     RESTLESS LEGS          Current Outpatient Medications:     amitriptyline (ELAVIL) 100 MG tablet, Take 1 tablet (100 mg total) by mouth every evening., Disp: 90 tablet, Rfl: 3    aspirin 81 MG Chew, Take 81 mg by mouth once daily., Disp: , Rfl:     atorvastatin (LIPITOR) 40 MG tablet, Take 1 tablet (40 mg total) by mouth every evening., Disp: 90 tablet, Rfl: 3    benazepriL (LOTENSIN) 40 MG tablet, Take 1 tablet (40 mg total) by mouth once daily., Disp: 90 tablet, Rfl: 2    cilostazoL (PLETAL) 100 MG Tab, Take 1 tablet (100 mg total) by mouth 2 (two) times daily., Disp: , Rfl:     clopidogreL (PLAVIX) 75 mg tablet, Take 1 tablet (75 mg total) by mouth once daily., Disp: , Rfl:     DULoxetine (CYMBALTA) 60 MG capsule, Take 1 capsule (60 mg total) by mouth once daily., Disp: 90 capsule, Rfl: 3    gabapentin (NEURONTIN) 300 MG capsule, Take 1 capsule (300 mg total) by mouth 2 (two) times daily., Disp: 60 capsule, Rfl: 5    mirtazapine (REMERON) 15 MG tablet, Take 1 tablet (15 mg total) by mouth every evening., Disp: 30 tablet, Rfl: 11    multivit-min/FA/lycopen/lutein (CENTRUM SILVER MEN ORAL), Take by mouth., Disp: , Rfl:     omeprazole (PRILOSEC) 40 MG capsule, Take 1 capsule (40 mg total) by mouth 2 (two) times daily before meals., Disp: , Rfl:      rOPINIRole (REQUIP) 1 MG tablet, Take 1 tablet (1 mg total) by mouth every evening., Disp: 90 tablet, Rfl: 3    Follow-up  Associated symptoms include abdominal pain.     Review of Systems   Constitutional: Negative.    HENT: Negative.     Eyes: Negative.    Respiratory: Negative.     Cardiovascular: Negative.    Gastrointestinal:  Positive for abdominal pain.   Endocrine: Negative.    Genitourinary: Negative.    Musculoskeletal: Negative.    Skin: Negative.    Allergic/Immunologic: Negative.    Neurological: Negative.    Hematological: Negative.    Psychiatric/Behavioral: Negative.               Objective:      Physical Exam  Vitals and nursing note reviewed.   Constitutional:       General: He is not in acute distress.     Appearance: Normal appearance. He is well-developed and normal weight. He is not ill-appearing.   HENT:      Head: Normocephalic.   Eyes:      Conjunctiva/sclera: Conjunctivae normal.   Neck:      Thyroid: No thyromegaly.   Cardiovascular:      Rate and Rhythm: Normal rate and regular rhythm.      Heart sounds: Normal heart sounds. No murmur heard.  Pulmonary:      Effort: Pulmonary effort is normal.      Breath sounds: Normal breath sounds. No wheezing or rales.   Musculoskeletal:         General: Normal range of motion.      Cervical back: Normal range of motion.      Right lower leg: No edema.      Left lower leg: No edema.   Skin:     General: Skin is warm and dry.   Neurological:      Mental Status: He is alert and oriented to person, place, and time. Mental status is at baseline.   Psychiatric:         Mood and Affect: Mood normal.         Behavior: Behavior normal.         Thought Content: Thought content normal.         Judgment: Judgment normal.         Assessment:      1. Generalized abdominal pain    2. Claudication, bilateral, after 50 yards    3. Primary insomnia    4. Essential hypertension    5. Neuropathy    6. Anxiety and depression    7. PVD (peripheral vascular disease)         Plan:    1- start gabapentin 300 mg bid  2- start cymbalta 60 mg for neuropathy, RELL, and anxiety.   3- RTC 3 mo for med eval  4-Pt request transfer of care to [facility], Cardiology.       Generalized abdominal pain    Claudication, bilateral, after 50 yards  -     clopidogreL (PLAVIX) 75 mg tablet; Take 1 tablet (75 mg total) by mouth once daily.  -     Ambulatory referral/consult to Cardiology; Future; Expected date: 02/04/2025    Primary insomnia    Essential hypertension  -     Ambulatory referral/consult to Cardiology; Future; Expected date: 02/04/2025    Neuropathy  -     gabapentin (NEURONTIN) 300 MG capsule; Take 1 capsule (300 mg total) by mouth 2 (two) times daily.  Dispense: 60 capsule; Refill: 5  -     DULoxetine (CYMBALTA) 60 MG capsule; Take 1 capsule (60 mg total) by mouth once daily.  Dispense: 90 capsule; Refill: 3    Anxiety and depression  -     DULoxetine (CYMBALTA) 60 MG capsule; Take 1 capsule (60 mg total) by mouth once daily.  Dispense: 90 capsule; Refill: 3    PVD (peripheral vascular disease)  -     cilostazoL (PLETAL) 100 MG Tab; Take 1 tablet (100 mg total) by mouth 2 (two) times daily.  -     Ambulatory referral/consult to Cardiology; Future; Expected date: 02/04/2025        Risks, benefits, and side effects were discussed with the patient. All questions were answered to the fullest satisfaction of the patient, and pt verbalized understanding and agreement to treatment plan. Pt was to call with any new or worsening symptoms, or present to the ER.        This note was generated with the assistance of ambient listening technology. Verbal consent was obtained by the patient and accompanying visitor(s) for the recording of patient appointment to facilitate this note. I attest to having reviewed and edited the generated note for accuracy, though some syntax or spelling errors may persist. Please contact the author of this note for any clarification.

## 2025-02-06 DIAGNOSIS — F51.01 PRIMARY INSOMNIA: ICD-10-CM

## 2025-02-07 RX ORDER — AMITRIPTYLINE HYDROCHLORIDE 100 MG/1
100 TABLET ORAL NIGHTLY
Qty: 90 TABLET | Refills: 3 | Status: SHIPPED | OUTPATIENT
Start: 2025-02-07

## 2025-02-07 NOTE — TELEPHONE ENCOUNTER
Refill Routing Note   Medication(s) are not appropriate for processing by [facility] for the following reason(s):        Non-participating provider    [facility] action(s):  Route               Appointments  past 12m or future 3m with PCP    Date Provider   Last Visit   1/28/2025 Puja Finney NP   Next Visit   4/29/2025 Puja Finney NP   ED visits in past 90 days: 0        Note composed:10:43 AM 02/07/2025

## 2025-02-22 DIAGNOSIS — Z00.00 ENCOUNTER FOR MEDICARE ANNUAL WELLNESS EXAM: ICD-10-CM

## 2025-03-13 ENCOUNTER — TELEPHONE (OUTPATIENT)
Dept: FAMILY MEDICINE | Facility: CLINIC | Age: 70
End: 2025-03-13
Payer: MEDICARE

## 2025-03-13 NOTE — TELEPHONE ENCOUNTER
----- Message from Puja Finney NP sent at 1/28/2025  9:15 AM CST -----  Regarding: abd pain improved  See 1/28 encounter and ask if abd pain improved, if not refer to GI

## 2025-04-24 DIAGNOSIS — F51.01 PRIMARY INSOMNIA: ICD-10-CM

## 2025-04-24 NOTE — TELEPHONE ENCOUNTER
Refill Routing Note   Medication(s) are not appropriate for processing by [facility] for the following reason(s):        Non-participating provider    [facility] action(s):  Route               Appointments  past 12m or future 3m with PCP    Date Provider   Last Visit   1/28/2025 Puja Finney NP   Next Visit   4/29/2025 Puja Finney NP   ED visits in past 90 days: 0        Note composed:12:14 PM 04/24/2025

## 2025-04-25 RX ORDER — MIRTAZAPINE 15 MG/1
15 TABLET, FILM COATED ORAL NIGHTLY
Qty: 30 TABLET | Refills: 3 | Status: SHIPPED | OUTPATIENT
Start: 2025-04-25 | End: 2025-08-23

## 2025-04-29 ENCOUNTER — TELEPHONE (OUTPATIENT)
Dept: FAMILY MEDICINE | Facility: CLINIC | Age: 70
End: 2025-04-29
Payer: MEDICARE

## 2025-04-29 ENCOUNTER — OFFICE VISIT (OUTPATIENT)
Dept: FAMILY MEDICINE | Facility: CLINIC | Age: 70
End: 2025-04-29
Payer: MEDICARE

## 2025-04-29 VITALS
HEART RATE: 86 BPM | WEIGHT: 129.38 LBS | OXYGEN SATURATION: 100 % | HEIGHT: 67 IN | DIASTOLIC BLOOD PRESSURE: 78 MMHG | SYSTOLIC BLOOD PRESSURE: 138 MMHG | BODY MASS INDEX: 20.31 KG/M2

## 2025-04-29 DIAGNOSIS — G47.19 EXCESSIVE DAYTIME SLEEPINESS: ICD-10-CM

## 2025-04-29 DIAGNOSIS — I25.10 CORONARY ARTERY CALCIFICATION SEEN ON CAT SCAN: Chronic | ICD-10-CM

## 2025-04-29 DIAGNOSIS — I73.9 CLAUDICATION: Primary | ICD-10-CM

## 2025-04-29 DIAGNOSIS — G62.9 NEUROPATHY: ICD-10-CM

## 2025-04-29 DIAGNOSIS — I11.9 LVH (LEFT VENTRICULAR HYPERTROPHY) DUE TO HYPERTENSIVE DISEASE, WITHOUT HEART FAILURE: ICD-10-CM

## 2025-04-29 DIAGNOSIS — R06.83 LOUD SNORING: ICD-10-CM

## 2025-04-29 DIAGNOSIS — G47.33 OSA (OBSTRUCTIVE SLEEP APNEA): ICD-10-CM

## 2025-04-29 DIAGNOSIS — R80.9 MICROALBUMINURIA: ICD-10-CM

## 2025-04-29 DIAGNOSIS — I73.9 PAD (PERIPHERAL ARTERY DISEASE): ICD-10-CM

## 2025-04-29 DIAGNOSIS — F32.A ANXIETY AND DEPRESSION: ICD-10-CM

## 2025-04-29 DIAGNOSIS — R53.82 CHRONIC FATIGUE: ICD-10-CM

## 2025-04-29 DIAGNOSIS — I71.42 JUXTARENAL ABDOMINAL AORTIC ANEURYSM (AAA) WITHOUT RUPTURE: ICD-10-CM

## 2025-04-29 DIAGNOSIS — F41.9 ANXIETY AND DEPRESSION: ICD-10-CM

## 2025-04-29 DIAGNOSIS — F51.01 PRIMARY INSOMNIA: ICD-10-CM

## 2025-04-29 DIAGNOSIS — I10 ESSENTIAL HYPERTENSION: ICD-10-CM

## 2025-04-29 DIAGNOSIS — F17.200 SMOKER: ICD-10-CM

## 2025-04-29 PROBLEM — I65.23 BILATERAL CAROTID ARTERY STENOSIS: Status: RESOLVED | Noted: 2024-08-07 | Resolved: 2025-04-29

## 2025-04-29 PROBLEM — Z91.89 AT RISK FOR CARDIOVASCULAR EVENT: Status: RESOLVED | Noted: 2019-06-07 | Resolved: 2025-04-29

## 2025-04-29 PROCEDURE — G2211 COMPLEX E/M VISIT ADD ON: HCPCS | Mod: S$PBB,,, | Performed by: NURSE PRACTITIONER

## 2025-04-29 PROCEDURE — 99215 OFFICE O/P EST HI 40 MIN: CPT | Mod: S$PBB,,, | Performed by: NURSE PRACTITIONER

## 2025-04-29 PROCEDURE — 99215 OFFICE O/P EST HI 40 MIN: CPT | Mod: PBBFAC | Performed by: NURSE PRACTITIONER

## 2025-04-29 PROCEDURE — 99999 PR PBB SHADOW E&M-EST. PATIENT-LVL V: CPT | Mod: PBBFAC,,, | Performed by: NURSE PRACTITIONER

## 2025-04-29 RX ORDER — METOPROLOL SUCCINATE 25 MG/1
25 TABLET, EXTENDED RELEASE ORAL NIGHTLY
Qty: 90 TABLET | Refills: 3 | Status: SHIPPED | OUTPATIENT
Start: 2025-04-29 | End: 2026-04-29

## 2025-04-29 RX ORDER — CLOPIDOGREL BISULFATE 75 MG/1
75 TABLET ORAL NIGHTLY
Start: 2025-04-29 | End: 2026-04-29

## 2025-04-29 RX ORDER — BENAZEPRIL HYDROCHLORIDE 40 MG/1
40 TABLET ORAL NIGHTLY
Qty: 90 TABLET | Refills: 2 | Status: SHIPPED | OUTPATIENT
Start: 2025-04-29

## 2025-04-29 RX ORDER — DULOXETIN HYDROCHLORIDE 60 MG/1
60 CAPSULE, DELAYED RELEASE ORAL 2 TIMES DAILY
Qty: 180 CAPSULE | Refills: 3 | Status: SHIPPED | OUTPATIENT
Start: 2025-04-29 | End: 2026-04-29

## 2025-04-29 RX ORDER — MIRTAZAPINE 15 MG/1
15 TABLET, FILM COATED ORAL NIGHTLY
Qty: 90 TABLET | Refills: 3 | Status: SHIPPED | OUTPATIENT
Start: 2025-04-29

## 2025-04-29 RX ORDER — ROSUVASTATIN CALCIUM 40 MG/1
40 TABLET, COATED ORAL NIGHTLY
Qty: 90 TABLET | Refills: 3 | Status: SHIPPED | OUTPATIENT
Start: 2025-04-29 | End: 2026-04-29

## 2025-04-29 NOTE — TELEPHONE ENCOUNTER
Called patient to give him some information regarding his cardiology appointment per his provider  Merna Jacob

## 2025-04-29 NOTE — PROGRESS NOTES
dSubjective:       Patient ID: Miles Vazquez is a 69 y.o. male.    Chief Complaint:   History of Present Illness    CHIEF COMPLAINT:  Patient presents today for follow-up    PAIN AND NEUROLOGICAL SYMPTOMS:  He experiences daily leg pain. X-ray of his back showed two small nerves protruding approximately 0.25 inch at L4 level. He reports persistent numbness and soreness in his right hand. He has not noticed improvement in pain with Cymbalta or Gabapentin.    SLEEP DISORDERS:  He reports more restful sleep and faster sleep onset with Mirtazapine compared to previous experiences with Ambien, which caused nighttime awakening. He has diagnosed sleep apnea but does not use CPAP, attributing previous sleep study results to poor sleep in unfamiliar environment. No evidence of PSG in chart. He reports fatigue, describing himself as tired. He denies awareness of breathing issues during sleep but told snores and has excessive daytime sleepiness.    RESTLESS LEG SYNDROME:  He reports improvement in RLS symptoms with Ropinirole 1mg.    CARDIOVASCULAR:  He has difficulty walking short distances due to vascular issues in his legs. He is being monitored for carotid artery disease and an abdominal aneurysm, with repeat imaging scheduled.    MENTAL HEALTH:  He continues Cymbalta for depression and anxiety but reports no significant change in symptoms in pain since dose increase in January. He acknowledges not monitoring his mental health symptoms closely.            1/25/25                             Today  PHQ-9 Moderate 12                  Mild 9  RELL-7 severe 15                       Mild 6    GASTROINTESTINAL:  He reports improved GI symptoms but continues to experience upset stomach 1-2 times weekly. He notes stomach upset with aspirin use.    VISION:  He reports difficulty reading graphics on television and believes he needs an eye exam.    SUBSTANCE USE:  He needs to restart Chantix for smoking cessation, which he has at  "home. He reports no reduction in smoking urges with previous Chantix use, acknowledging smoking cessation is largely a mental challenge.    CURRENT MEDICATIONS:  Current medications include: Atorvastatin, Gabapentin, Plavix, Cilostazol (taking daily instead of prescribed twice daily), Omeprazole (takes twice daily when remembers), Amitriptyline (due for refill in about a week), Mirtazapine, Ropinirole, and Cymbalta. He discontinued Aspirin due to stomach upset.                        Past Medical History:   Diagnosis Date    Abdominal aortic aneurysm (AAA) 05/2019    3.2 cm per CTA.--12/2020 4.3 x 4.7 by 4.5 cm--3/2022  4.8 x 4.5 cm    Abnormal findings on esophagogastroduodenoscopy (EGD) 04/2016    "intestinal metaplasia" which is a "risk factor" for gastric cancer.      Apical lung nodule 03/2022    Right    Bursitis of both hips     Colon polyp     COPD (chronic obstructive pulmonary disease) 2016    Coronary artery calcification seen on CAT scan 04/24/2013    Depression 2016    Accompanied drinking problem    GERD (gastroesophageal reflux disease)     HTN (hypertension) 01/07/2013    Hyperlipidemia 2019    Lumbar disc disease 06/2019    grade 1 spondylolisthesis of L5 on S1    Macrocytic anemia 03/27/2016    Neuropathy     Personal history of colonic polyps 2017    Recovering alcoholic 03/16/2016    RLS (restless legs syndrome) 2014    Sleep apnea 2 years ago    does not use cpap       Past Surgical History:   Procedure Laterality Date    ANGIOGRAPHY OF LOWER EXTREMITY  08/14/2019    AORTOGRAPHY WITH SERIALOGRAPHY N/A 08/14/2019    Procedure: AORTOGRAM, WITH RUNOFF;  Surgeon: Alan Florian MD;  Location: [facility] CATH/EP LAB;  Service: Peripheral Vascular;  Laterality: N/A;    AORTOGRAPHY WITH SERIALOGRAPHY N/A 05/18/2022    Procedure: AORTOGRAM, WITH RUNOFF;  Surgeon: Alan Florian MD;  Location: [facility] CATH/EP LAB;  Service: Cardiovascular;  Laterality: N/A;    APPENDECTOMY N/A 09/26/2018    Procedure: " APPENDECTOMY;  Surgeon: Ramírez Horner MD;  Location: [facility] OR;  Service: General;  Laterality: N/A;    CAROTID ENDARTERECTOMY Left 08/07/2024    Procedure: ENDARTERECTOMY, CAROTID;  Surgeon: Alan Florian MD;  Location: [facility] OR;  Service: General;  Laterality: Left-NEURO CONTACTED 8/1    COLON SURGERY  09/2018    COLONOSCOPY  2017    with polyps repeat 5 yrs- [place]    COLONOSCOPY N/A 10/15/2024    Procedure: COLONOSCOPY;  Surgeon: Deja Joseph MD;  Location: [facility];  Service: Endoscopy;  Laterality: N/A;    ESOPHAGOGASTRODUODENOSCOPY N/A 09/26/2018    Procedure: EGD (ESOPHAGOGASTRODUODENOSCOPY);  Surgeon: Ramírez Horner MD;  Location: [facility] OR;  Service: General;  Laterality: N/A;  WITH BX    ESOPHAGOGASTRODUODENOSCOPY N/A 10/15/2024    Procedure: EGD (ESOPHAGOGASTRODUODENOSCOPY);  Surgeon: Deja Joseph MD;  Location: [facility];  Service: Endoscopy;  Laterality: N/A;    HERNIA REPAIR N/A 09/26/2018    Procedure: REPAIR, HERNIA;  Surgeon: Ramírez Horner MD;  Location: [facility];  Service: General;  Laterality: N/A;  INTERNAL LYSIS WITH REDUCTION OF INTERNAL HERNIA    SMALL INTESTINE SURGERY  09/2018    UPPER GASTROINTESTINAL ENDOSCOPY          Social History[1]    Family History   Problem Relation Name Age of Onset    Alcohol abuse Mother Veronica Resendiz         Cancer    Alcohol abuse Father Ramon George     Tuberculosis Maternal Grandmother      Stomach cancer Neg Hx      Colon cancer Neg Hx         Review of patient's allergies indicates:   Allergen Reactions    Seroquel [quetiapine] Other (See Comments)     RESTLESS LEGS        Current Medications[2]    Follow-up  Associated symptoms include abdominal pain and fatigue.     Review of Systems   Constitutional:  Positive for fatigue.   HENT: Negative.     Eyes: Negative.    Respiratory: Negative.     Cardiovascular: Negative.    Gastrointestinal:  Positive for abdominal pain.   Endocrine:  Negative.    Genitourinary: Negative.    Musculoskeletal: Negative.    Skin: Negative.    Allergic/Immunologic: Negative.    Neurological: Negative.         Claudication   Hematological: Negative.    Psychiatric/Behavioral:  Positive for sleep disturbance.              Objective:      Physical Exam  Vitals and nursing note reviewed.   Constitutional:       General: He is not in acute distress.     Appearance: Normal appearance. He is well-developed and normal weight. He is not ill-appearing.   HENT:      Head: Normocephalic.   Eyes:      Conjunctiva/sclera: Conjunctivae normal.   Neck:      Thyroid: No thyromegaly.   Cardiovascular:      Rate and Rhythm: Normal rate and regular rhythm.      Heart sounds: Normal heart sounds. No murmur heard.  Pulmonary:      Effort: Pulmonary effort is normal.      Breath sounds: Normal breath sounds. No wheezing or rales.   Musculoskeletal:         General: Normal range of motion.      Cervical back: Normal range of motion.      Right lower leg: No edema.      Left lower leg: No edema.   Skin:     General: Skin is warm and dry.   Neurological:      Mental Status: He is alert and oriented to person, place, and time. Mental status is at baseline.   Psychiatric:         Mood and Affect: Mood normal.         Behavior: Behavior normal.         Thought Content: Thought content normal.         Judgment: Judgment normal.         Assessment:      1. Claudication, bilateral, after 50 yards    2. Primary insomnia    3. Neuropathy    4. Anxiety and depression    5. Essential hypertension    6. Microalbuminuria, ratio 39.7 in 2024    7. Juxtarenal abdominal aortic aneurysm (AAA) without rupture    8. Smoker, half ppd, 75+ py, started age 12    9. PAD (peripheral artery disease)    10. LVH (left ventricular hypertrophy) due to hypertensive disease, without heart failure    11. Coronary artery calcification seen on CAT scan    12. OMAR (obstructive sleep apnea), postive sleep study, never  underwent CPAP titration    13. Excessive daytime sleepiness    14. Chronic fatigue    15. Loud snoring        Plan:    1- smoking cessation encouraged  2- continue remeron 15 mg  3- Refer again to [facility] pt to call since was unreachable  4- stop lipitor and start crestor  5- refer for sleep study  6- encourage to set phone alarm for medication reminders  7-start toprol xl 25 mg nightly for known aneurysm  8- Return 6 months  9- stop gabapentin, ineffective  -----------------------  ..Visit today included increased complexity associated with the care of the episodic problem osbaldo/depression addressed and managing the longitudinal care of the patient due to the serious and/or complex managed problem(s) osbaldo/depression.    Claudication, bilateral, after 50 yards  -     clopidogreL (PLAVIX) 75 mg tablet; Take 1 tablet (75 mg total) by mouth nightly.    Primary insomnia  -     mirtazapine (REMERON) 15 MG tablet; Take 1 tablet (15 mg total) by mouth every evening.  Dispense: 90 tablet; Refill: 3  -     Polysomnography 4 or more parameters; Future  -     BiPAP/CPAP Titration ((Must have dx of OMAR from previous sleep study); Future    Neuropathy  -     DULoxetine (CYMBALTA) 60 MG capsule; Take 1 capsule (60 mg total) by mouth 2 (two) times daily.  Dispense: 180 capsule; Refill: 3    Anxiety and depression  -     DULoxetine (CYMBALTA) 60 MG capsule; Take 1 capsule (60 mg total) by mouth 2 (two) times daily.  Dispense: 180 capsule; Refill: 3    Essential hypertension  -     benazepriL (LOTENSIN) 40 MG tablet; Take 1 tablet (40 mg total) by mouth nightly.  Dispense: 90 tablet; Refill: 2    Microalbuminuria, ratio 39.7 in 2024  -     benazepriL (LOTENSIN) 40 MG tablet; Take 1 tablet (40 mg total) by mouth nightly.  Dispense: 90 tablet; Refill: 2    Juxtarenal abdominal aortic aneurysm (AAA) without rupture  -     Ambulatory referral/consult to Cardiology; Future; Expected date: 05/06/2025  -     benazepriL (LOTENSIN) 40 MG  tablet; Take 1 tablet (40 mg total) by mouth nightly.  Dispense: 90 tablet; Refill: 2  -     metoprolol succinate (TOPROL-XL) 25 MG 24 hr tablet; Take 1 tablet (25 mg total) by mouth nightly.  Dispense: 90 tablet; Refill: 3    Smoker, half ppd, 75+ py, started age 12    PAD (peripheral artery disease)  -     rosuvastatin (CRESTOR) 40 MG Tab; Take 1 tablet (40 mg total) by mouth every evening.  Dispense: 90 tablet; Refill: 3    LVH (left ventricular hypertrophy) due to hypertensive disease, without heart failure  -     Ambulatory referral/consult to Cardiology; Future; Expected date: 05/06/2025    Coronary artery calcification seen on CAT scan  -     rosuvastatin (CRESTOR) 40 MG Tab; Take 1 tablet (40 mg total) by mouth every evening.  Dispense: 90 tablet; Refill: 3  -     Ambulatory referral/consult to Cardiology; Future; Expected date: 05/06/2025    OMAR (obstructive sleep apnea), postive sleep study, never underwent CPAP titration  -     Polysomnography 4 or more parameters; Future  -     BiPAP/CPAP Titration ((Must have dx of OMAR from previous sleep study); Future    Excessive daytime sleepiness  -     Polysomnography 4 or more parameters; Future  -     BiPAP/CPAP Titration ((Must have dx of OMAR from previous sleep study); Future    Chronic fatigue  -     Polysomnography 4 or more parameters; Future  -     BiPAP/CPAP Titration ((Must have dx of OMAR from previous sleep study); Future    Loud snoring  -     Polysomnography 4 or more parameters; Future  -     BiPAP/CPAP Titration ((Must have dx of OMAR from previous sleep study); Future        Risks, benefits, and side effects were discussed with the patient. All questions were answered to the fullest satisfaction of the patient, and pt verbalized understanding and agreement to treatment plan. Pt was to call with any new or worsening symptoms, or present to the ER.        This note was generated with the assistance of ambient listening technology. Verbal consent was  obtained by the patient and accompanying visitor(s) for the recording of patient appointment to facilitate this note. I attest to having reviewed and edited the generated note for accuracy, though some syntax or spelling errors may persist. Please contact the author of this note for any clarification.                 [1]   Social History  Socioeconomic History    Marital status: Single   Tobacco Use    Smoking status: Every Day     Current packs/day: 1.00     Average packs/day: 0.6 packs/day for 112.3 years (71.8 ttl pk-yrs)     Types: Cigarettes     Start date: 1967    Smokeless tobacco: Never    Tobacco comments:     1 or 2 cigarettes a day.    5/23/24 Active participant with smoking cessation. 1X57 yrs   Substance and Sexual Activity    Alcohol use: Not Currently     Comment: Sobriety date 03/16/2016    Drug use: Not Currently     Types: Marijuana     Comment: In the past.    Sexual activity: Not Currently     Partners: Female     Birth control/protection: Condom     Social Drivers of Health     Financial Resource Strain: High Risk (4/29/2025)    Overall Financial Resource Strain (CARDIA)     Difficulty of Paying Living Expenses: Hard   Food Insecurity: Food Insecurity Present (4/29/2025)    Hunger Vital Sign     Worried About Running Out of Food in the Last Year: Sometimes true     Ran Out of Food in the Last Year: Sometimes true   Transportation Needs: No Transportation Needs (4/29/2025)    PRAPARE - Transportation     Lack of Transportation (Medical): No     Lack of Transportation (Non-Medical): No   Physical Activity: Sufficiently Active (4/29/2025)    Exercise Vital Sign     Days of Exercise per Week: 5 days     Minutes of Exercise per Session: 150+ min   Stress: Stress Concern Present (4/29/2025)    [employer] - Occupational Stress Questionnaire     Feeling of Stress : To some extent   Housing Stability: Low Risk  (4/29/2025)    Housing Stability Vital Sign     Unable to Pay  for Housing in the Last Year: No     Number of Times Moved in the Last Year: 0     Homeless in the Last Year: No   [2]   Current Outpatient Medications:     amitriptyline (ELAVIL) 100 MG tablet, Take 1 tablet by mouth in the evening, Disp: 90 tablet, Rfl: 3    aspirin 81 MG Chew, Take 81 mg by mouth nightly., Disp: , Rfl:     benazepriL (LOTENSIN) 40 MG tablet, Take 1 tablet (40 mg total) by mouth nightly., Disp: 90 tablet, Rfl: 2    cilostazoL (PLETAL) 100 MG Tab, Take 1 tablet (100 mg total) by mouth 2 (two) times daily., Disp: , Rfl:     clopidogreL (PLAVIX) 75 mg tablet, Take 1 tablet (75 mg total) by mouth nightly., Disp: , Rfl:     DULoxetine (CYMBALTA) 60 MG capsule, Take 1 capsule (60 mg total) by mouth 2 (two) times daily., Disp: 180 capsule, Rfl: 3    metoprolol succinate (TOPROL-XL) 25 MG 24 hr tablet, Take 1 tablet (25 mg total) by mouth nightly., Disp: 90 tablet, Rfl: 3    mirtazapine (REMERON) 15 MG tablet, Take 1 tablet (15 mg total) by mouth every evening., Disp: 90 tablet, Rfl: 3    multivit-min/FA/lycopen/lutein (CENTRUM SILVER MEN ORAL), Take 1 tablet by mouth Daily., Disp: , Rfl:     omeprazole (PRILOSEC) 40 MG capsule, Take 1 capsule (40 mg total) by mouth 2 (two) times daily before meals., Disp: , Rfl:     rOPINIRole (REQUIP) 1 MG tablet, Take 1 tablet (1 mg total) by mouth every evening., Disp: 90 tablet, Rfl: 3    rosuvastatin (CRESTOR) 40 MG Tab, Take 1 tablet (40 mg total) by mouth every evening., Disp: 90 tablet, Rfl: 3

## 2025-04-29 NOTE — PATIENT INSTRUCTIONS
[facility] MD - Cardiology -    Phone [phone]   -----------------------  1- smoking cessation encouraged  2- continue remeron 15 mg  3- Refer again to [facility], pt to call since was unreachable  4- stop lipitor and start crestor  5- refer for sleep study  6- encourage to set phone alarm for medication reminders  7-start toprol xl 25 mg nightly for known aneurysm  8- Return 6 months  9- stop gabapentin, ineffective

## 2025-05-23 ENCOUNTER — TELEPHONE (OUTPATIENT)
Dept: SMOKING CESSATION | Facility: CLINIC | Age: 70
End: 2025-05-23
Payer: MEDICARE

## 2025-06-02 ENCOUNTER — TELEPHONE (OUTPATIENT)
Dept: SMOKING CESSATION | Facility: CLINIC | Age: 70
End: 2025-06-02
Payer: MEDICARE

## 2025-08-19 ENCOUNTER — OFFICE VISIT (OUTPATIENT)
Dept: CARDIOLOGY | Facility: CLINIC | Age: 70
End: 2025-08-19
Payer: MEDICARE

## 2025-08-19 VITALS — OXYGEN SATURATION: 76 % | HEART RATE: 77 BPM | BODY MASS INDEX: 19.77 KG/M2 | WEIGHT: 126.19 LBS

## 2025-08-19 DIAGNOSIS — I10 ESSENTIAL HYPERTENSION: ICD-10-CM

## 2025-08-19 DIAGNOSIS — M43.07 SPONDYLOLYSIS OF LUMBOSACRAL REGION: ICD-10-CM

## 2025-08-19 DIAGNOSIS — I73.9 PAD (PERIPHERAL ARTERY DISEASE): ICD-10-CM

## 2025-08-19 DIAGNOSIS — R94.31 NONSPECIFIC ABNORMAL ELECTROCARDIOGRAM (ECG) (EKG): ICD-10-CM

## 2025-08-19 DIAGNOSIS — Z98.890 S/P CAROTID ENDARTERECTOMY: ICD-10-CM

## 2025-08-19 DIAGNOSIS — M54.16 LUMBAR RADICULOPATHY: ICD-10-CM

## 2025-08-19 DIAGNOSIS — J41.8 MIXED SIMPLE AND MUCOPURULENT CHRONIC BRONCHITIS: ICD-10-CM

## 2025-08-19 DIAGNOSIS — I25.10 CORONARY ARTERY CALCIFICATION SEEN ON CAT SCAN: Primary | Chronic | ICD-10-CM

## 2025-08-19 DIAGNOSIS — E78.5 DYSLIPIDEMIA: ICD-10-CM

## 2025-08-19 DIAGNOSIS — I71.40 ABDOMINAL AORTIC ANEURYSM (AAA) WITHOUT RUPTURE, UNSPECIFIED PART: ICD-10-CM

## 2025-08-19 PROCEDURE — 99214 OFFICE O/P EST MOD 30 MIN: CPT | Mod: PBBFAC,PN | Performed by: INTERNAL MEDICINE

## 2025-08-19 PROCEDURE — 99999 PR PBB SHADOW E&M-EST. PATIENT-LVL IV: CPT | Mod: PBBFAC,,, | Performed by: INTERNAL MEDICINE

## 2025-08-19 PROCEDURE — 99215 OFFICE O/P EST HI 40 MIN: CPT | Mod: S$PBB,,, | Performed by: INTERNAL MEDICINE

## 2025-08-19 RX ORDER — EZETIMIBE 10 MG/1
10 TABLET ORAL DAILY
Qty: 90 TABLET | Refills: 3 | Status: SHIPPED | OUTPATIENT
Start: 2025-08-19 | End: 2026-08-19

## 2025-08-26 ENCOUNTER — TELEPHONE (OUTPATIENT)
Dept: VASCULAR SURGERY | Facility: CLINIC | Age: 70
End: 2025-08-26
Payer: MEDICARE

## 2025-08-26 ENCOUNTER — TELEPHONE (OUTPATIENT)
Dept: CARDIOLOGY | Facility: HOSPITAL | Age: 70
End: 2025-08-26

## 2025-08-26 ENCOUNTER — HOSPITAL ENCOUNTER (OUTPATIENT)
Dept: RADIOLOGY | Facility: HOSPITAL | Age: 70
Discharge: HOME | End: 2025-08-26
Attending: INTERNAL MEDICINE
Payer: MEDICARE

## 2025-08-26 ENCOUNTER — PATIENT MESSAGE (OUTPATIENT)
Dept: CARDIOLOGY | Facility: CLINIC | Age: 70
End: 2025-08-26
Payer: MEDICARE

## 2025-08-26 DIAGNOSIS — R93.5 ABNORMAL COMPUTED TOMOGRAPHY ANGIOGRAPHY (CTA) OF ABDOMEN: Primary | ICD-10-CM

## 2025-08-26 DIAGNOSIS — I71.40 ABDOMINAL AORTIC ANEURYSM (AAA) WITHOUT RUPTURE, UNSPECIFIED PART: ICD-10-CM

## 2025-08-26 LAB
CREAT SERPL-MCNC: 1 MG/DL (ref 0.5–1.4)
SAMPLE: NORMAL

## 2025-08-26 PROCEDURE — 25500020 PHARM REV CODE 255: Performed by: INTERNAL MEDICINE

## 2025-08-26 PROCEDURE — 75635 CT ANGIO ABDOMINAL ARTERIES: CPT | Mod: 26,,, | Performed by: RADIOLOGY

## 2025-08-26 PROCEDURE — 75635 CT ANGIO ABDOMINAL ARTERIES: CPT | Mod: TC

## 2025-08-26 RX ADMIN — IOHEXOL 100 ML: 350 INJECTION, SOLUTION INTRAVENOUS at 07:08

## 2025-08-27 ENCOUNTER — TELEPHONE (OUTPATIENT)
Facility: CLINIC | Age: 70
End: 2025-08-27
Payer: MEDICARE

## 2025-08-27 ENCOUNTER — HOSPITAL ENCOUNTER (OUTPATIENT)
Dept: RADIOLOGY | Facility: HOSPITAL | Age: 70
Discharge: HOME | End: 2025-08-27
Attending: INTERNAL MEDICINE
Payer: MEDICARE

## 2025-08-27 ENCOUNTER — HOSPITAL ENCOUNTER (OUTPATIENT)
Dept: CARDIOLOGY | Facility: HOSPITAL | Age: 70
Discharge: HOME | End: 2025-08-27
Attending: INTERNAL MEDICINE
Payer: MEDICARE

## 2025-08-27 VITALS — BODY MASS INDEX: 19.78 KG/M2 | WEIGHT: 126 LBS | HEIGHT: 67 IN

## 2025-08-27 DIAGNOSIS — R94.39 ABNORMAL CARDIOVASCULAR STRESS TEST: Primary | ICD-10-CM

## 2025-08-27 DIAGNOSIS — I25.10 CORONARY ARTERY CALCIFICATION SEEN ON CAT SCAN: Chronic | ICD-10-CM

## 2025-08-27 DIAGNOSIS — I10 ESSENTIAL HYPERTENSION: ICD-10-CM

## 2025-08-27 DIAGNOSIS — R94.31 NONSPECIFIC ABNORMAL ELECTROCARDIOGRAM (ECG) (EKG): ICD-10-CM

## 2025-08-27 DIAGNOSIS — E78.5 DYSLIPIDEMIA: ICD-10-CM

## 2025-08-27 DIAGNOSIS — R93.1 ABNORMAL FINDINGS ON DIAGNOSTIC IMAGING OF HEART AND CORONARY CIRCULATION: ICD-10-CM

## 2025-08-27 LAB
AORTIC ROOT ANNULUS: 3.7 CM
AORTIC VALVE CUSP SEPERATION: 0.9 CM
APICAL FOUR CHAMBER EJECTION FRACTION: 64 %
AV INDEX (PROSTH): 0.46
AV MEAN GRADIENT: 7 MMHG
AV PEAK GRADIENT: 13 MMHG
AV REGURGITATION PRESSURE HALF TIME: 433 MS
AV VALVE AREA BY VELOCITY RATIO: 1.4 CM²
AV VALVE AREA: 1.5 CM²
AV VELOCITY RATIO: 0.44
BSA FOR ECHO PROCEDURE: 1.64 M2
CV ECHO LV RWT: 0.58 CM
CV PHARM DOSE: 0.4 MG
CV STRESS BASE HR: 71 BPM
DIASTOLIC BLOOD PRESSURE: 68 MMHG
DOP CALC AO PEAK VEL: 1.8 M/S
DOP CALC AO VTI: 36.3 CM
DOP CALC LVOT AREA: 3.1 CM2
DOP CALC LVOT DIAMETER: 2 CM
DOP CALC LVOT PEAK VEL: 0.8 M/S
DOP CALCLVOT PEAK VEL VTI: 16.8 CM
E WAVE DECELERATION TIME: 195 MSEC
E/A RATIO: 0.62
E/E' RATIO: 14 M/S
ECHO LV POSTERIOR WALL: 1.3 CM (ref 0.6–1.1)
EJECTION FRACTION- HIGH: 65 %
END DIASTOLIC INDEX-HIGH: 153 ML/M2
END DIASTOLIC INDEX-LOW: 93 ML/M2
END SYSTOLIC INDEX-HIGH: 71 ML/M2
END SYSTOLIC INDEX-LOW: 31 ML/M2
FRACTIONAL SHORTENING: 31.1 % (ref 28–44)
INTERVENTRICULAR SEPTUM: 1.1 CM (ref 0.6–1.1)
IVRT: 95 MSEC
LEFT ATRIUM SIZE: 3.9 CM
LEFT INTERNAL DIMENSION IN SYSTOLE: 3.1 CM (ref 2.1–4)
LEFT VENTRICLE DIASTOLIC VOLUME INDEX: 54.82 ML/M2
LEFT VENTRICLE DIASTOLIC VOLUME: 91 ML
LEFT VENTRICLE END DIASTOLIC VOLUME APICAL 4 CHAMBER INDEX BSA: 30.78 ML/M2
LEFT VENTRICLE END DIASTOLIC VOLUME APICAL 4 CHAMBER: 51.1 ML
LEFT VENTRICLE MASS INDEX: 119.3 G/M2
LEFT VENTRICLE SYSTOLIC VOLUME INDEX: 21.7 ML/M2
LEFT VENTRICLE SYSTOLIC VOLUME: 36 ML
LEFT VENTRICULAR INTERNAL DIMENSION IN DIASTOLE: 4.5 CM (ref 3.5–6)
LEFT VENTRICULAR MASS: 198.1 G
LV LATERAL E/E' RATIO: 13.7 M/S
LV SEPTAL E/E' RATIO: 13.7 M/S
LVED V (TEICH): 91 ML
LVES V (TEICH): 36.4 ML
LVOT MG: 1 MMHG
LVOT MV: 0.51 CM/S
MV PEAK A VEL: 1.32 M/S
MV PEAK E VEL: 0.82 M/S
MV STENOSIS PRESSURE HALF TIME: 56 MS
MV VALVE AREA P 1/2 METHOD: 3.93 CM2
NUC REST DIASTOLIC VOLUME INDEX: 75
NUC REST EJECTION FRACTION: 56
NUC REST SYSTOLIC VOLUME INDEX: 33
NUC STRESS DIASTOLIC VOLUME INDEX: 83
NUC STRESS EJECTION FRACTION: 69 %
NUC STRESS SYSTOLIC VOLUME INDEX: 26
OHS CV CPX 1 MINUTE RECOVERY HEART RATE: 88 BPM
OHS CV CPX 85 PERCENT MAX PREDICTED HEART RATE MALE: 126
OHS CV CPX MAX PREDICTED HEART RATE: 148
OHS CV CPX PATIENT HEIGHT IN: 67
OHS CV CPX PATIENT HEIGHT IN: 67
OHS CV CPX PATIENT IS FEMALE: 0
OHS CV CPX PATIENT IS MALE: 1
OHS CV CPX PEAK DIASTOLIC BLOOD PRESSURE: 65 MMHG
OHS CV CPX PEAK HEAR RATE: 88 BPM
OHS CV CPX PEAK RATE PRESSURE PRODUCT: NORMAL
OHS CV CPX PEAK SYSTOLIC BLOOD PRESSURE: 161 MMHG
OHS CV CPX PERCENT MAX PREDICTED HEART RATE ACHIEVED: 59
OHS CV CPX RATE PRESSURE PRODUCT PRESENTING: NORMAL
OHS CV INITIAL DOSE: 12.4 MCG/KG/MIN
OHS CV PEAK DOSE: 25.7 MCG/KG/MIN
OHS CV RV/LV RATIO: 0.56 CM
PISA AR MAX VEL: 3.63 M/S
PV MV: 0.75 M/S
PV PEAK GRADIENT: 5 MMHG
PV PEAK VELOCITY: 1.14 M/S
RA PRESSURE ESTIMATED: 3 MMHG
RETIRED EF AND QEF - SEE NOTES: 53 %
RIGHT VENTRICLE DIASTOLIC BASEL DIMENSION: 2.5 CM
RIGHT VENTRICULAR END-DIASTOLIC DIMENSION: 2.53 CM
SYSTOLIC BLOOD PRESSURE: 168 MMHG
TDI LATERAL: 0.06 M/S
TDI SEPTAL: 0.06 M/S
TDI: 0.06 M/S
Z-SCORE OF LEFT VENTRICULAR DIMENSION IN END DIASTOLE: -0.33
Z-SCORE OF LEFT VENTRICULAR DIMENSION IN END SYSTOLE: 0.58

## 2025-08-27 PROCEDURE — A9502 TC99M TETROFOSMIN: HCPCS | Performed by: INTERNAL MEDICINE

## 2025-08-27 PROCEDURE — 93017 CV STRESS TEST TRACING ONLY: CPT

## 2025-08-27 PROCEDURE — 93016 CV STRESS TEST SUPVJ ONLY: CPT | Mod: ,,, | Performed by: INTERNAL MEDICINE

## 2025-08-27 PROCEDURE — 63600175 PHARM REV CODE 636 W HCPCS: Performed by: INTERNAL MEDICINE

## 2025-08-27 PROCEDURE — 93306 TTE W/DOPPLER COMPLETE: CPT

## 2025-08-27 PROCEDURE — 78452 HT MUSCLE IMAGE SPECT MULT: CPT | Mod: 26,,, | Performed by: INTERNAL MEDICINE

## 2025-08-27 PROCEDURE — 93306 TTE W/DOPPLER COMPLETE: CPT | Mod: 26,,, | Performed by: INTERNAL MEDICINE

## 2025-08-27 PROCEDURE — 93018 CV STRESS TEST I&R ONLY: CPT | Mod: ,,, | Performed by: INTERNAL MEDICINE

## 2025-08-27 RX ORDER — REGADENOSON 0.08 MG/ML
0.4 INJECTION, SOLUTION INTRAVENOUS
Status: COMPLETED | OUTPATIENT
Start: 2025-08-27 | End: 2025-08-27

## 2025-08-27 RX ADMIN — TETROFOSMIN 12.4 MILLICURIE: 1.38 INJECTION, POWDER, LYOPHILIZED, FOR SOLUTION INTRAVENOUS at 06:08

## 2025-08-27 RX ADMIN — REGADENOSON 0.4 MG: 0.08 INJECTION, SOLUTION INTRAVENOUS at 08:08

## 2025-08-27 RX ADMIN — TETROFOSMIN 25.7 MILLICURIE: 1.38 INJECTION, POWDER, LYOPHILIZED, FOR SOLUTION INTRAVENOUS at 08:08

## 2025-08-28 ENCOUNTER — TELEPHONE (OUTPATIENT)
Dept: CARDIOLOGY | Facility: CLINIC | Age: 70
End: 2025-08-28
Payer: MEDICARE

## 2025-09-02 ENCOUNTER — TELEPHONE (OUTPATIENT)
Dept: VASCULAR SURGERY | Facility: CLINIC | Age: 70
End: 2025-09-02
Payer: MEDICARE

## 2025-09-05 ENCOUNTER — TELEPHONE (OUTPATIENT)
Dept: CARDIOLOGY | Facility: CLINIC | Age: 70
End: 2025-09-05
Payer: MEDICARE

## (undated) DEVICE — SHEATH INTRODUCER DESTINATION 6FX45

## (undated) DEVICE — TRAY CATH 1-LYR URIMTR 16FR

## (undated) DEVICE — SOL POVIDONE SCRUB IODINE 4 OZ

## (undated) DEVICE — BLADE SCALP OPHTL BEVEL STR

## (undated) DEVICE — ELECTRODE REM PLYHSV RETURN 9

## (undated) DEVICE — DECANTER FLUID TRNSF WHITE 9IN

## (undated) DEVICE — GLOVE BIOGEL PI ORTHO PRO 7.5

## (undated) DEVICE — GOWN POLY REINF BRTH SLV LG

## (undated) DEVICE — COVER PROBE CIV-FLEX 96INCH

## (undated) DEVICE — CATHETER ANGIO OMNI FLUSH 10732201

## (undated) DEVICE — GUIDEWIRE REG. ANGLED .035X260 LAUREATE

## (undated) DEVICE — DRAPE INCISE IOBAN 2 13X13IN

## (undated) DEVICE — BITE BLOCK ADULT JUMBO ENDO W/

## (undated) DEVICE — Device

## (undated) DEVICE — SOL IRRI STRL WATER 1000ML

## (undated) DEVICE — SYR COMBO 1ML 27G .5 IN SAFETY

## (undated) DEVICE — DRAPE INSTR MAGNETIC 10X16IN

## (undated) DEVICE — PAD ABD 8X10 STERILE

## (undated) DEVICE — SUT CTD VICRYL VIL BR CR/SH

## (undated) DEVICE — SUT SILK 0 SUTUPAK SA86H

## (undated) DEVICE — SUT 3-0 12-18IN SILK

## (undated) DEVICE — SUT STRATAFIX 4-0 30CM PS-2

## (undated) DEVICE — ADHESIVE DERMABOND ADVANCED

## (undated) DEVICE — CATHETER TRAILBLAZER 35X135

## (undated) DEVICE — SUT PROLENE 7-0 BV175-6 30IN

## (undated) DEVICE — NDL SAFETY 25G X 1.5 ECLIPSE

## (undated) DEVICE — GOWN SURGICAL XX LARGE X LONG

## (undated) DEVICE — ELECTRODE BLD EXT 6.50 ST MDFD

## (undated) DEVICE — TUBE FRAZIER 5MM 2FT SOFT TIP

## (undated) DEVICE — SUT VICRYL PLUS 3-0 SH 18IN

## (undated) DEVICE — GLOVE SURG ULTRA TOUCH 7.5

## (undated) DEVICE — GUIDEWIRE ANGLED GLIDE .035-150 GR3506

## (undated) DEVICE — ADPT IV NDLLSS M LL CLRLNK

## (undated) DEVICE — TRAY VASCULAR SMH

## (undated) DEVICE — SOL NACL IRR 1000ML BTL

## (undated) DEVICE — TUBING SUCTION 3/16X10 2 CONN

## (undated) DEVICE — APPLIER CLIP LIAGCLIP 9.375IN

## (undated) DEVICE — HEMOSTAT SURGICEL 4X8IN

## (undated) DEVICE — CANISTER SUCTION 3000CC

## (undated) DEVICE — SHEATH PINNACLE 5FRX10CM W/GUIDEWIRE

## (undated) DEVICE — TRAY SKIN SCRUB DRY PREMIUM

## (undated) DEVICE — SUT SA85H SILK 2-0

## (undated) DEVICE — GUIDEWIRE STF .035X260CM ANG

## (undated) DEVICE — SHEATH PINNACLE 6FRX10CM W/GUIDEWIRE

## (undated) DEVICE — BLADE EZ CLEAN 2.5IN MODIFIED

## (undated) DEVICE — GLOVE BIOGEL PI MICRO SZ 7.5

## (undated) DEVICE — SUT 1 48IN PDS II VIO MONO

## (undated) DEVICE — TRAY FOLEY 16F IC W/UMETER LUM

## (undated) DEVICE — SLEEVE SCD EXPRESS CALF MEDIUM

## (undated) DEVICE — DRESSING MEPILEX 4X6IN

## (undated) DEVICE — APPLIER LIGACLIP SM 9.38IN

## (undated) DEVICE — GAUZE SPONGE 4X4 12PLY

## (undated) DEVICE — LOOP VESSEL MAXI RED

## (undated) DEVICE — SUT PROLENE 6-0 24 BV-1

## (undated) DEVICE — GOWN B1 X-LG X-LONG

## (undated) DEVICE — DURAPREP SURG SCRUB 26ML

## (undated) DEVICE — DRAPE T THYROID STERILE

## (undated) DEVICE — SUT CTD VICRYL 3-0 CR/SH

## (undated) DEVICE — SEE MEDLINE ITEM 156964

## (undated) DEVICE — SUT SILK SH 2-0 30IN MP BLK

## (undated) DEVICE — CUTTER PROXIMATE BLUE 75MM